# Patient Record
Sex: FEMALE | Race: WHITE | Employment: OTHER
[De-identification: names, ages, dates, MRNs, and addresses within clinical notes are randomized per-mention and may not be internally consistent; named-entity substitution may affect disease eponyms.]

---

## 2017-02-15 ENCOUNTER — OFFICE VISIT (OUTPATIENT)
Facility: CLINIC | Age: 72
End: 2017-02-15

## 2017-02-15 VITALS
OXYGEN SATURATION: 93 % | SYSTOLIC BLOOD PRESSURE: 138 MMHG | TEMPERATURE: 98.5 F | HEIGHT: 67 IN | HEART RATE: 71 BPM | DIASTOLIC BLOOD PRESSURE: 82 MMHG | BODY MASS INDEX: 41.65 KG/M2 | WEIGHT: 265.4 LBS

## 2017-02-15 DIAGNOSIS — J06.9 UPPER RESPIRATORY TRACT INFECTION, UNSPECIFIED TYPE: Primary | ICD-10-CM

## 2017-02-15 PROCEDURE — 99213 OFFICE O/P EST LOW 20 MIN: CPT | Performed by: PHYSICIAN ASSISTANT

## 2017-02-15 RX ORDER — BENZONATATE 100 MG/1
100 CAPSULE ORAL 3 TIMES DAILY PRN
Qty: 30 CAPSULE | Refills: 0 | Status: SHIPPED | OUTPATIENT
Start: 2017-02-15 | End: 2017-02-22

## 2017-02-15 RX ORDER — AZITHROMYCIN 250 MG/1
TABLET, FILM COATED ORAL
Qty: 1 PACKET | Refills: 0 | Status: SHIPPED | OUTPATIENT
Start: 2017-02-15 | End: 2017-02-25

## 2017-02-15 RX ORDER — FLUTICASONE PROPIONATE 50 MCG
1 SPRAY, SUSPENSION (ML) NASAL DAILY
Qty: 1 BOTTLE | Refills: 0 | Status: SHIPPED | OUTPATIENT
Start: 2017-02-15 | End: 2017-10-31 | Stop reason: ALTCHOICE

## 2017-02-15 ASSESSMENT — ENCOUNTER SYMPTOMS
VOMITING: 0
COUGH: 1
SORE THROAT: 0
RHINORRHEA: 1
NAUSEA: 0
CHEST TIGHTNESS: 0
DIARRHEA: 0

## 2017-06-27 PROBLEM — E78.5 DYSLIPIDEMIA: Status: ACTIVE | Noted: 2017-06-27

## 2017-06-27 PROBLEM — G89.29 CHRONIC BILATERAL LOW BACK PAIN WITH LEFT-SIDED SCIATICA: Status: ACTIVE | Noted: 2017-06-27

## 2017-06-27 PROBLEM — M54.42 CHRONIC BILATERAL LOW BACK PAIN WITH LEFT-SIDED SCIATICA: Status: ACTIVE | Noted: 2017-06-27

## 2017-09-07 ENCOUNTER — HOSPITAL ENCOUNTER (OUTPATIENT)
Dept: VASCULAR LAB | Age: 72
Discharge: HOME | End: 2017-09-07
Payer: OTHER MISCELLANEOUS

## 2017-09-07 ENCOUNTER — HOSPITAL ENCOUNTER (OUTPATIENT)
Facility: CLINIC | Age: 72
Discharge: HOME | End: 2017-09-07
Payer: OTHER MISCELLANEOUS

## 2017-09-07 ENCOUNTER — HOSPITAL ENCOUNTER (OUTPATIENT)
Dept: GENERAL RADIOLOGY | Facility: CLINIC | Age: 72
Discharge: HOME | End: 2017-09-07
Payer: OTHER MISCELLANEOUS

## 2017-09-07 DIAGNOSIS — L03.116 CELLULITIS OF LEFT LOWER EXTREMITY: ICD-10-CM

## 2017-09-07 DIAGNOSIS — M79.605 LEFT LEG PAIN: ICD-10-CM

## 2017-09-07 DIAGNOSIS — M25.572 LEFT ANKLE PAIN, UNSPECIFIED CHRONICITY: ICD-10-CM

## 2017-09-07 DIAGNOSIS — M79.89 LEFT LEG SWELLING: ICD-10-CM

## 2017-09-07 DIAGNOSIS — R60.0 LEG EDEMA, LEFT: ICD-10-CM

## 2017-09-07 PROCEDURE — 93971 EXTREMITY STUDY: CPT

## 2017-09-07 PROCEDURE — 73610 X-RAY EXAM OF ANKLE: CPT

## 2017-10-31 ENCOUNTER — HOSPITAL ENCOUNTER (OUTPATIENT)
Dept: PREADMISSION TESTING | Age: 72
Discharge: HOME | End: 2017-10-31
Payer: COMMERCIAL

## 2017-10-31 ENCOUNTER — ANESTHESIA EVENT (OUTPATIENT)
Dept: OPERATING ROOM | Age: 72
End: 2017-10-31
Payer: COMMERCIAL

## 2017-10-31 VITALS
SYSTOLIC BLOOD PRESSURE: 151 MMHG | WEIGHT: 266.98 LBS | HEART RATE: 66 BPM | HEIGHT: 67 IN | RESPIRATION RATE: 16 BRPM | DIASTOLIC BLOOD PRESSURE: 63 MMHG | BODY MASS INDEX: 41.9 KG/M2 | OXYGEN SATURATION: 94 %

## 2017-10-31 LAB
ABSOLUTE EOS #: 0.5 K/UL (ref 0–0.4)
ABSOLUTE IMMATURE GRANULOCYTE: ABNORMAL K/UL (ref 0–0.3)
ABSOLUTE LYMPH #: 1.7 K/UL (ref 1–4.8)
ABSOLUTE MONO #: 0.7 K/UL (ref 0.2–0.8)
ANION GAP SERPL CALCULATED.3IONS-SCNC: 13 MMOL/L (ref 9–17)
BASOPHILS # BLD: 1 %
BASOPHILS ABSOLUTE: 0.1 K/UL (ref 0–0.2)
BUN BLDV-MCNC: 35 MG/DL (ref 8–23)
CHLORIDE BLD-SCNC: 102 MMOL/L (ref 98–107)
CO2: 26 MMOL/L (ref 20–31)
CREAT SERPL-MCNC: 1.49 MG/DL (ref 0.5–0.9)
DIFFERENTIAL TYPE: ABNORMAL
EKG ATRIAL RATE: 70 BPM
EKG Q-T INTERVAL: 424 MS
EKG QRS DURATION: 132 MS
EKG QTC CALCULATION (BAZETT): 440 MS
EKG R AXIS: 59 DEGREES
EKG T AXIS: 29 DEGREES
EKG VENTRICULAR RATE: 65 BPM
EOSINOPHILS RELATIVE PERCENT: 6 %
GFR AFRICAN AMERICAN: 42 ML/MIN
GFR NON-AFRICAN AMERICAN: 35 ML/MIN
GFR SERPL CREATININE-BSD FRML MDRD: ABNORMAL ML/MIN/{1.73_M2}
GFR SERPL CREATININE-BSD FRML MDRD: ABNORMAL ML/MIN/{1.73_M2}
HCT VFR BLD CALC: 38.4 % (ref 36–46)
HEMOGLOBIN: 12.9 G/DL (ref 12–16)
IMMATURE GRANULOCYTES: ABNORMAL %
LYMPHOCYTES # BLD: 22 %
MCH RBC QN AUTO: 31.4 PG (ref 26–34)
MCHC RBC AUTO-ENTMCNC: 33.6 G/DL (ref 31–37)
MCV RBC AUTO: 93.5 FL (ref 80–100)
MONOCYTES # BLD: 9 %
PDW BLD-RTO: 13.9 % (ref 11.5–14.5)
PLATELET # BLD: 236 K/UL (ref 130–400)
PLATELET ESTIMATE: ABNORMAL
PMV BLD AUTO: ABNORMAL FL (ref 6–12)
POTASSIUM SERPL-SCNC: 4.3 MMOL/L (ref 3.7–5.3)
RBC # BLD: 4.11 M/UL (ref 4–5.2)
RBC # BLD: ABNORMAL 10*6/UL
SEG NEUTROPHILS: 62 %
SEGMENTED NEUTROPHILS ABSOLUTE COUNT: 4.6 K/UL (ref 1.8–7.7)
SODIUM BLD-SCNC: 141 MMOL/L (ref 135–144)
WBC # BLD: 7.6 K/UL (ref 3.5–11)
WBC # BLD: ABNORMAL 10*3/UL

## 2017-10-31 PROCEDURE — 82565 ASSAY OF CREATININE: CPT

## 2017-10-31 PROCEDURE — 85025 COMPLETE CBC W/AUTO DIFF WBC: CPT

## 2017-10-31 PROCEDURE — 84520 ASSAY OF UREA NITROGEN: CPT

## 2017-10-31 PROCEDURE — 80051 ELECTROLYTE PANEL: CPT

## 2017-10-31 PROCEDURE — 93005 ELECTROCARDIOGRAM TRACING: CPT

## 2017-10-31 PROCEDURE — 36415 COLL VENOUS BLD VENIPUNCTURE: CPT

## 2017-10-31 RX ORDER — ACETAMINOPHEN 500 MG
1000 TABLET ORAL EVERY 6 HOURS PRN
Status: ON HOLD | COMMUNITY
End: 2017-11-01 | Stop reason: HOSPADM

## 2017-10-31 RX ORDER — SODIUM CHLORIDE 9 MG/ML
INJECTION, SOLUTION INTRAVENOUS CONTINUOUS
Status: CANCELLED | OUTPATIENT
Start: 2017-11-01

## 2017-10-31 RX ORDER — DOXYCYCLINE HYCLATE 100 MG/1
100 CAPSULE ORAL 2 TIMES DAILY
COMMUNITY
End: 2018-10-23 | Stop reason: ALTCHOICE

## 2017-10-31 RX ORDER — PRAVASTATIN SODIUM 40 MG
40 TABLET ORAL DAILY
COMMUNITY
End: 2020-02-11 | Stop reason: SDUPTHER

## 2017-10-31 NOTE — PRE-PROCEDURE INSTRUCTIONS
Pt states she did not stop her aspirin for her surgery tomorrow and states Dr. Vona Babinski is aware.

## 2017-10-31 NOTE — PRE-PROCEDURE INSTRUCTIONS
ARRIVE  [facility] Wednesday, November 1 at 9:15 AM    Once you enter the hospital lobby, take the elevators to the second floor. Check-In is at the surgery registration desk. If you have been given a blood band, you must bring it with you the day of surgery. Continue to take your home medications as you normally do up to and including the night before surgery with the exception of any blood thinning medications. Please stop any blood thinning medications as directed by your surgeon or prescribing physician. Failure to stop certain medications may interfere with your scheduled surgery. These may include:  Aspirin, Warfarin (Coumadin), Clopidogrel (Plavix), Ibuprofen (Motrin, Advil), Naproxen (Aleve), Meloxicam (Mobic), Celecoxib (Celebrex), Eliquis, Pradaxa, Xarelto, Effient, Fish Oil, Herbal supplements. Stop ibuprofen now    If you are diabetic, do not take any of your diabetic medications by mouth the morning of surgery. If you are taking insulin contact the doctor that manages your diabetes for instructions about any changes to your insulin dosages the day before surgery. Do not inject insulin or other injectable diabetic medications the morning of surgery unless otherwise instructed by the doctor who manages your diabetes. Please take the following medication(s) the day of surgery with a small sip of water:  carvedilol    Please use your inhalers at home the day of surgery      PREPARING FOR YOUR SURGERY:     Before surgery, you can play an important role in your own health. Because skin is not sterile, we need to be sure that your skin is as free of germs as possible before surgery by carefully washing before surgery. Preparing or prepping skin before surgery can reduce the risk of a surgical site infection.   Do not shave the area of your body where your surgery will be performed unless you received specific permission from your physician.     Preoperative Bathing Instructions   Bathe or shower the day of surgery.  Wear clean clothing after bathing.  Shampoo hair before surgery   Keep nails clean    Do not apply alcohol-based hair or skin products,    Do not apply lotion, emollients, cosmetics, perfumes or deodorant the day of surgery.  Do not shave the area of your body where your surgery will be performed unless you receive specific permission from your surgeon. Patient Instructions:    [facility] If you are having any type of anesthesia you are to have nothing to eat or drink after midnight the night before your surgery. This includes gum, mints, water or smoking or chewing tobacco.  The only exception to this is a small sip of water to take with any morning dose of heart, blood pressure, or seizure medications. No alcoholic beverages for 24 hours prior to surgery.  Brush your teeth but do not swallow water.  Bring your eyeglasses and case with you. No contacts are to be worn the day of surgery. You also may bring your hearing aids.  If you are on C-PAP or Bi-PAP at home and plan on staying in the hospital overnight for your surgery please bring the machine with you.  Do not wear any jewelry or body piercings day of surgery. Also, NO lotion, perfume or deodorant to be used the day of surgery. No nail polish on the operative extremity (arm/leg surgeries)     Do not bring any valuables, such as jewelry, cash or credit cards. If you are staying overnight with us, please bring a SMALL bag of personal items.  Please wear loose, comfortable clothing. If you are potentially going to have a cast or brace bring clothing that will fit over them.                                                                                                                            In case of illness - If you have cold or flu like symptoms (high fever, runny nose, sore throat, cough, etc.) rash, nausea, vomiting, loose stools, and/or recent contact with someone
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
## 2017-11-01 ENCOUNTER — HOSPITAL ENCOUNTER (OUTPATIENT)
Age: 72
Setting detail: OUTPATIENT SURGERY
Discharge: HOME | End: 2017-11-01
Attending: PODIATRIST | Admitting: PODIATRIST
Payer: COMMERCIAL

## 2017-11-01 ENCOUNTER — ANESTHESIA (OUTPATIENT)
Dept: OPERATING ROOM | Age: 72
End: 2017-11-01
Payer: COMMERCIAL

## 2017-11-01 VITALS
DIASTOLIC BLOOD PRESSURE: 63 MMHG | HEART RATE: 65 BPM | TEMPERATURE: 98.2 F | SYSTOLIC BLOOD PRESSURE: 124 MMHG | OXYGEN SATURATION: 94 % | RESPIRATION RATE: 18 BRPM

## 2017-11-01 VITALS — SYSTOLIC BLOOD PRESSURE: 92 MMHG | OXYGEN SATURATION: 96 % | TEMPERATURE: 96.8 F | DIASTOLIC BLOOD PRESSURE: 51 MMHG

## 2017-11-01 PROCEDURE — 2500000003 HC RX 250 WO HCPCS: Performed by: PODIATRIST

## 2017-11-01 PROCEDURE — 3700000000 HC ANESTHESIA ATTENDED CARE: Performed by: PODIATRIST

## 2017-11-01 PROCEDURE — 2580000003 HC RX 258: Performed by: ANESTHESIOLOGY

## 2017-11-01 PROCEDURE — 2500000003 HC RX 250 WO HCPCS: Performed by: NURSE ANESTHETIST, CERTIFIED REGISTERED

## 2017-11-01 PROCEDURE — 2580000003 HC RX 258: Performed by: NURSE ANESTHETIST, CERTIFIED REGISTERED

## 2017-11-01 PROCEDURE — 3600000012 HC SURGERY LEVEL 2 ADDTL 15MIN: Performed by: PODIATRIST

## 2017-11-01 PROCEDURE — 6360000002 HC RX W HCPCS: Performed by: NURSE ANESTHETIST, CERTIFIED REGISTERED

## 2017-11-01 PROCEDURE — 3700000001 HC ADD 15 MINUTES (ANESTHESIA): Performed by: PODIATRIST

## 2017-11-01 PROCEDURE — 3600000002 HC SURGERY LEVEL 2 BASE: Performed by: PODIATRIST

## 2017-11-01 PROCEDURE — 7100000001 HC PACU RECOVERY - ADDTL 15 MIN: Performed by: PODIATRIST

## 2017-11-01 PROCEDURE — 7100000000 HC PACU RECOVERY - FIRST 15 MIN: Performed by: PODIATRIST

## 2017-11-01 PROCEDURE — 7100000010 HC PHASE II RECOVERY - FIRST 15 MIN: Performed by: PODIATRIST

## 2017-11-01 DEVICE — GRAFT HUM TISS NEO L7.5IN FORESKIN PROC APLIGRAF: Type: IMPLANTABLE DEVICE | Site: LEG | Status: FUNCTIONAL

## 2017-11-01 RX ORDER — FENTANYL CITRATE 50 UG/ML
INJECTION, SOLUTION INTRAMUSCULAR; INTRAVENOUS PRN
Status: DISCONTINUED | OUTPATIENT
Start: 2017-11-01 | End: 2017-11-01 | Stop reason: SDUPTHER

## 2017-11-01 RX ORDER — ONDANSETRON 2 MG/ML
4 INJECTION INTRAMUSCULAR; INTRAVENOUS
Status: CANCELLED | OUTPATIENT
Start: 2017-11-01 | End: 2017-11-01

## 2017-11-01 RX ORDER — FENTANYL CITRATE 50 UG/ML
25 INJECTION, SOLUTION INTRAMUSCULAR; INTRAVENOUS EVERY 5 MIN PRN
Status: DISCONTINUED | OUTPATIENT
Start: 2017-11-01 | End: 2017-11-01 | Stop reason: HOSPADM

## 2017-11-01 RX ORDER — MEPERIDINE HYDROCHLORIDE 25 MG/ML
12.5 INJECTION INTRAMUSCULAR; INTRAVENOUS; SUBCUTANEOUS EVERY 5 MIN PRN
Status: CANCELLED | OUTPATIENT
Start: 2017-11-01

## 2017-11-01 RX ORDER — LABETALOL HYDROCHLORIDE 5 MG/ML
5 INJECTION, SOLUTION INTRAVENOUS EVERY 10 MIN PRN
Status: DISCONTINUED | OUTPATIENT
Start: 2017-11-01 | End: 2017-11-01 | Stop reason: HOSPADM

## 2017-11-01 RX ORDER — FENTANYL CITRATE 50 UG/ML
25 INJECTION, SOLUTION INTRAMUSCULAR; INTRAVENOUS EVERY 5 MIN PRN
Status: CANCELLED | OUTPATIENT
Start: 2017-11-01

## 2017-11-01 RX ORDER — KETOROLAC TROMETHAMINE 30 MG/ML
INJECTION, SOLUTION INTRAMUSCULAR; INTRAVENOUS PRN
Status: DISCONTINUED | OUTPATIENT
Start: 2017-11-01 | End: 2017-11-01

## 2017-11-01 RX ORDER — MIDAZOLAM HYDROCHLORIDE 1 MG/ML
INJECTION INTRAMUSCULAR; INTRAVENOUS PRN
Status: DISCONTINUED | OUTPATIENT
Start: 2017-11-01 | End: 2017-11-01 | Stop reason: SDUPTHER

## 2017-11-01 RX ORDER — SODIUM CHLORIDE, SODIUM LACTATE, POTASSIUM CHLORIDE, CALCIUM CHLORIDE 600; 310; 30; 20 MG/100ML; MG/100ML; MG/100ML; MG/100ML
INJECTION, SOLUTION INTRAVENOUS CONTINUOUS PRN
Status: DISCONTINUED | OUTPATIENT
Start: 2017-11-01 | End: 2017-11-01 | Stop reason: SDUPTHER

## 2017-11-01 RX ORDER — SODIUM CHLORIDE 0.9 % (FLUSH) 0.9 %
10 SYRINGE (ML) INJECTION PRN
Status: DISCONTINUED | OUTPATIENT
Start: 2017-11-01 | End: 2017-11-01 | Stop reason: HOSPADM

## 2017-11-01 RX ORDER — HYDROMORPHONE HCL 110MG/55ML
0.5 PATIENT CONTROLLED ANALGESIA SYRINGE INTRAVENOUS EVERY 5 MIN PRN
Status: CANCELLED | OUTPATIENT
Start: 2017-11-01

## 2017-11-01 RX ORDER — DEXAMETHASONE SODIUM PHOSPHATE 10 MG/ML
4 INJECTION INTRAMUSCULAR; INTRAVENOUS
Status: DISCONTINUED | OUTPATIENT
Start: 2017-11-01 | End: 2017-11-01 | Stop reason: HOSPADM

## 2017-11-01 RX ORDER — LIDOCAINE HYDROCHLORIDE 20 MG/ML
INJECTION, SOLUTION INFILTRATION; PERINEURAL PRN
Status: DISCONTINUED | OUTPATIENT
Start: 2017-11-01 | End: 2017-11-01 | Stop reason: SDUPTHER

## 2017-11-01 RX ORDER — HYDROMORPHONE HCL 110MG/55ML
0.5 PATIENT CONTROLLED ANALGESIA SYRINGE INTRAVENOUS EVERY 5 MIN PRN
Status: DISCONTINUED | OUTPATIENT
Start: 2017-11-01 | End: 2017-11-01 | Stop reason: HOSPADM

## 2017-11-01 RX ORDER — DEXAMETHASONE SODIUM PHOSPHATE 10 MG/ML
4 INJECTION INTRAMUSCULAR; INTRAVENOUS
Status: CANCELLED | OUTPATIENT
Start: 2017-11-01 | End: 2017-11-01

## 2017-11-01 RX ORDER — DIPHENHYDRAMINE HYDROCHLORIDE 50 MG/ML
12.5 INJECTION INTRAMUSCULAR; INTRAVENOUS
Status: DISCONTINUED | OUTPATIENT
Start: 2017-11-01 | End: 2017-11-01 | Stop reason: HOSPADM

## 2017-11-01 RX ORDER — SODIUM CHLORIDE, SODIUM LACTATE, POTASSIUM CHLORIDE, CALCIUM CHLORIDE 600; 310; 30; 20 MG/100ML; MG/100ML; MG/100ML; MG/100ML
INJECTION, SOLUTION INTRAVENOUS CONTINUOUS
Status: DISCONTINUED | OUTPATIENT
Start: 2017-11-02 | End: 2017-11-01 | Stop reason: HOSPADM

## 2017-11-01 RX ORDER — LIDOCAINE HYDROCHLORIDE 10 MG/ML
INJECTION, SOLUTION EPIDURAL; INFILTRATION; INTRACAUDAL; PERINEURAL PRN
Status: DISCONTINUED | OUTPATIENT
Start: 2017-11-01 | End: 2017-11-01 | Stop reason: HOSPADM

## 2017-11-01 RX ORDER — HYDRALAZINE HYDROCHLORIDE 20 MG/ML
5 INJECTION INTRAMUSCULAR; INTRAVENOUS EVERY 10 MIN PRN
Status: CANCELLED | OUTPATIENT
Start: 2017-11-01

## 2017-11-01 RX ORDER — DIPHENHYDRAMINE HYDROCHLORIDE 50 MG/ML
12.5 INJECTION INTRAMUSCULAR; INTRAVENOUS
Status: CANCELLED | OUTPATIENT
Start: 2017-11-01 | End: 2017-11-01

## 2017-11-01 RX ORDER — HYDRALAZINE HYDROCHLORIDE 20 MG/ML
5 INJECTION INTRAMUSCULAR; INTRAVENOUS EVERY 10 MIN PRN
Status: DISCONTINUED | OUTPATIENT
Start: 2017-11-01 | End: 2017-11-01 | Stop reason: HOSPADM

## 2017-11-01 RX ORDER — ONDANSETRON 2 MG/ML
4 INJECTION INTRAMUSCULAR; INTRAVENOUS
Status: DISCONTINUED | OUTPATIENT
Start: 2017-11-01 | End: 2017-11-01 | Stop reason: HOSPADM

## 2017-11-01 RX ORDER — OXYCODONE HYDROCHLORIDE AND ACETAMINOPHEN 5; 325 MG/1; MG/1
1 TABLET ORAL EVERY 6 HOURS PRN
Qty: 28 TABLET | Refills: 0 | Status: SHIPPED | OUTPATIENT
Start: 2017-11-01 | End: 2017-11-08

## 2017-11-01 RX ORDER — PROPOFOL 10 MG/ML
INJECTION, EMULSION INTRAVENOUS PRN
Status: DISCONTINUED | OUTPATIENT
Start: 2017-11-01 | End: 2017-11-01 | Stop reason: SDUPTHER

## 2017-11-01 RX ORDER — ONDANSETRON 2 MG/ML
INJECTION INTRAMUSCULAR; INTRAVENOUS PRN
Status: DISCONTINUED | OUTPATIENT
Start: 2017-11-01 | End: 2017-11-01 | Stop reason: SDUPTHER

## 2017-11-01 RX ORDER — LIDOCAINE HYDROCHLORIDE 10 MG/ML
1 INJECTION, SOLUTION EPIDURAL; INFILTRATION; INTRACAUDAL; PERINEURAL
Status: DISCONTINUED | OUTPATIENT
Start: 2017-11-01 | End: 2017-11-01 | Stop reason: HOSPADM

## 2017-11-01 RX ORDER — MEPERIDINE HYDROCHLORIDE 25 MG/ML
12.5 INJECTION INTRAMUSCULAR; INTRAVENOUS; SUBCUTANEOUS EVERY 5 MIN PRN
Status: DISCONTINUED | OUTPATIENT
Start: 2017-11-01 | End: 2017-11-01 | Stop reason: HOSPADM

## 2017-11-01 RX ORDER — LABETALOL HYDROCHLORIDE 5 MG/ML
5 INJECTION, SOLUTION INTRAVENOUS EVERY 10 MIN PRN
Status: CANCELLED | OUTPATIENT
Start: 2017-11-01

## 2017-11-01 RX ORDER — BUPIVACAINE HYDROCHLORIDE 2.5 MG/ML
INJECTION, SOLUTION EPIDURAL; INFILTRATION; INTRACAUDAL PRN
Status: DISCONTINUED | OUTPATIENT
Start: 2017-11-01 | End: 2017-11-01 | Stop reason: HOSPADM

## 2017-11-01 RX ORDER — DEXAMETHASONE SODIUM PHOSPHATE 10 MG/ML
INJECTION INTRAMUSCULAR; INTRAVENOUS PRN
Status: DISCONTINUED | OUTPATIENT
Start: 2017-11-01 | End: 2017-11-01 | Stop reason: SDUPTHER

## 2017-11-01 RX ORDER — SODIUM CHLORIDE 0.9 % (FLUSH) 0.9 %
10 SYRINGE (ML) INJECTION EVERY 12 HOURS SCHEDULED
Status: DISCONTINUED | OUTPATIENT
Start: 2017-11-01 | End: 2017-11-01 | Stop reason: HOSPADM

## 2017-11-01 RX ADMIN — PROPOFOL 200 MG: 10 INJECTION, EMULSION INTRAVENOUS at 11:36

## 2017-11-01 RX ADMIN — PHENYLEPHRINE HYDROCHLORIDE 200 MCG: 10 INJECTION INTRAVENOUS at 11:48

## 2017-11-01 RX ADMIN — PHENYLEPHRINE HYDROCHLORIDE 200 MCG: 10 INJECTION INTRAVENOUS at 11:45

## 2017-11-01 RX ADMIN — PHENYLEPHRINE HYDROCHLORIDE 100 MCG: 10 INJECTION INTRAVENOUS at 11:40

## 2017-11-01 RX ADMIN — ONDANSETRON 4 MG: 2 INJECTION, SOLUTION INTRAMUSCULAR; INTRAVENOUS at 11:36

## 2017-11-01 RX ADMIN — FENTANYL CITRATE 50 MCG: 50 INJECTION, SOLUTION INTRAMUSCULAR; INTRAVENOUS at 11:40

## 2017-11-01 RX ADMIN — MIDAZOLAM HYDROCHLORIDE 2 MG: 1 INJECTION, SOLUTION INTRAMUSCULAR; INTRAVENOUS at 11:27

## 2017-11-01 RX ADMIN — SODIUM CHLORIDE, POTASSIUM CHLORIDE, SODIUM LACTATE AND CALCIUM CHLORIDE: 600; 310; 30; 20 INJECTION, SOLUTION INTRAVENOUS at 10:16

## 2017-11-01 RX ADMIN — CEFAZOLIN SODIUM 3 G: 2 SOLUTION INTRAVENOUS at 11:39

## 2017-11-01 RX ADMIN — DEXAMETHASONE SODIUM PHOSPHATE 6 MG: 10 INJECTION INTRAMUSCULAR; INTRAVENOUS at 11:36

## 2017-11-01 RX ADMIN — SODIUM CHLORIDE, POTASSIUM CHLORIDE, SODIUM LACTATE AND CALCIUM CHLORIDE: 600; 310; 30; 20 INJECTION, SOLUTION INTRAVENOUS at 11:30

## 2017-11-01 RX ADMIN — FENTANYL CITRATE 50 MCG: 50 INJECTION, SOLUTION INTRAMUSCULAR; INTRAVENOUS at 11:55

## 2017-11-01 RX ADMIN — LIDOCAINE HYDROCHLORIDE 100 MG: 20 INJECTION, SOLUTION INFILTRATION; PERINEURAL at 11:36

## 2017-11-01 ASSESSMENT — PAIN - FUNCTIONAL ASSESSMENT: PAIN_FUNCTIONAL_ASSESSMENT: 0-10

## 2017-11-01 ASSESSMENT — PAIN SCALES - GENERAL: PAINLEVEL_OUTOF10: 0

## 2017-11-01 ASSESSMENT — PAIN DESCRIPTION - PAIN TYPE: TYPE: ACUTE PAIN
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
## 2018-04-04 ENCOUNTER — HOSPITAL ENCOUNTER (OUTPATIENT)
Dept: GENERAL RADIOLOGY | Facility: CLINIC | Age: 73
Discharge: HOME | End: 2018-04-06
Payer: MEDICARE

## 2018-04-04 ENCOUNTER — HOSPITAL ENCOUNTER (OUTPATIENT)
Facility: CLINIC | Age: 73
Discharge: HOME | End: 2018-04-06
Payer: MEDICARE

## 2018-04-04 DIAGNOSIS — G89.29 CHRONIC BILATERAL LOW BACK PAIN WITHOUT SCIATICA: ICD-10-CM

## 2018-04-04 DIAGNOSIS — M54.50 CHRONIC BILATERAL LOW BACK PAIN WITHOUT SCIATICA: ICD-10-CM

## 2018-04-04 DIAGNOSIS — M25.551 ACUTE RIGHT HIP PAIN: ICD-10-CM

## 2018-04-04 PROCEDURE — 73501 X-RAY EXAM HIP UNI 1 VIEW: CPT

## 2018-04-04 PROCEDURE — 72100 X-RAY EXAM L-S SPINE 2/3 VWS: CPT

## 2018-05-23 ENCOUNTER — HOSPITAL ENCOUNTER (OUTPATIENT)
Dept: PHYSICAL THERAPY | Age: 73
Setting detail: THERAPIES SERIES
Discharge: HOME | End: 2018-05-23
Payer: MEDICARE

## 2018-05-23 PROCEDURE — G8979 MOBILITY GOAL STATUS: HCPCS

## 2018-05-23 PROCEDURE — G8978 MOBILITY CURRENT STATUS: HCPCS

## 2018-05-23 PROCEDURE — 97162 PT EVAL MOD COMPLEX 30 MIN: CPT

## 2018-05-23 PROCEDURE — 97110 THERAPEUTIC EXERCISES: CPT

## 2018-05-23 ASSESSMENT — PAIN DESCRIPTION - DESCRIPTORS: DESCRIPTORS: ACHING

## 2018-05-23 ASSESSMENT — ACTIVITIES OF DAILY LIVING (ADL): EFFECT OF PAIN ON DAILY ACTIVITIES: 100% OF HER ADL'S

## 2018-05-23 ASSESSMENT — PAIN DESCRIPTION - PAIN TYPE: TYPE: CHRONIC PAIN

## 2018-05-23 ASSESSMENT — PAIN DESCRIPTION - ORIENTATION: ORIENTATION: RIGHT

## 2018-05-23 ASSESSMENT — PAIN DESCRIPTION - LOCATION: LOCATION: HIP

## 2018-05-23 ASSESSMENT — PAIN DESCRIPTION - PROGRESSION: CLINICAL_PROGRESSION: GRADUALLY WORSENING

## 2018-05-23 ASSESSMENT — PAIN SCALES - GENERAL: PAINLEVEL_OUTOF10: 8

## 2018-05-23 ASSESSMENT — PAIN DESCRIPTION - FREQUENCY: FREQUENCY: INTERMITTENT

## 2018-05-23 ASSESSMENT — PAIN DESCRIPTION - ONSET: ONSET: PROGRESSIVE

## 2018-05-29 ENCOUNTER — HOSPITAL ENCOUNTER (OUTPATIENT)
Dept: PHYSICAL THERAPY | Age: 73
Setting detail: THERAPIES SERIES
Discharge: HOME | End: 2018-05-29
Payer: MEDICARE

## 2018-05-29 PROCEDURE — G0283 ELEC STIM OTHER THAN WOUND: HCPCS

## 2018-05-29 PROCEDURE — 97110 THERAPEUTIC EXERCISES: CPT

## 2018-05-29 ASSESSMENT — PAIN DESCRIPTION - LOCATION: LOCATION: HIP

## 2018-05-29 ASSESSMENT — PAIN DESCRIPTION - PROGRESSION: CLINICAL_PROGRESSION: GRADUALLY WORSENING

## 2018-05-29 ASSESSMENT — PAIN SCALES - GENERAL: PAINLEVEL_OUTOF10: 10

## 2018-05-29 ASSESSMENT — PAIN DESCRIPTION - PAIN TYPE: TYPE: CHRONIC PAIN

## 2018-05-29 ASSESSMENT — PAIN DESCRIPTION - ORIENTATION: ORIENTATION: RIGHT

## 2018-05-31 ENCOUNTER — HOSPITAL ENCOUNTER (OUTPATIENT)
Dept: PHYSICAL THERAPY | Age: 73
Setting detail: THERAPIES SERIES
Discharge: HOME | End: 2018-05-31
Payer: MEDICARE

## 2018-05-31 PROCEDURE — G0283 ELEC STIM OTHER THAN WOUND: HCPCS

## 2018-05-31 PROCEDURE — 97110 THERAPEUTIC EXERCISES: CPT

## 2018-05-31 ASSESSMENT — PAIN DESCRIPTION - PAIN TYPE: TYPE: CHRONIC PAIN

## 2018-05-31 ASSESSMENT — PAIN SCALES - GENERAL: PAINLEVEL_OUTOF10: 8

## 2018-05-31 ASSESSMENT — PAIN DESCRIPTION - PROGRESSION: CLINICAL_PROGRESSION: GRADUALLY WORSENING

## 2018-05-31 ASSESSMENT — PAIN DESCRIPTION - LOCATION: LOCATION: HIP

## 2018-05-31 ASSESSMENT — PAIN DESCRIPTION - ORIENTATION: ORIENTATION: RIGHT

## 2018-06-05 ENCOUNTER — HOSPITAL ENCOUNTER (OUTPATIENT)
Dept: PHYSICAL THERAPY | Age: 73
Setting detail: THERAPIES SERIES
Discharge: HOME | End: 2018-06-05
Payer: MEDICARE

## 2018-06-05 PROCEDURE — G0283 ELEC STIM OTHER THAN WOUND: HCPCS

## 2018-06-05 PROCEDURE — 97110 THERAPEUTIC EXERCISES: CPT

## 2018-06-05 PROCEDURE — G8980 MOBILITY D/C STATUS: HCPCS

## 2018-06-05 PROCEDURE — G8979 MOBILITY GOAL STATUS: HCPCS

## 2018-06-05 ASSESSMENT — PAIN DESCRIPTION - PAIN TYPE: TYPE: CHRONIC PAIN

## 2018-06-05 ASSESSMENT — PAIN SCALES - GENERAL: PAINLEVEL_OUTOF10: 5

## 2018-06-05 ASSESSMENT — PAIN DESCRIPTION - LOCATION: LOCATION: BACK

## 2018-06-05 ASSESSMENT — PAIN DESCRIPTION - ORIENTATION: ORIENTATION: LOWER

## 2018-06-07 ENCOUNTER — APPOINTMENT (OUTPATIENT)
Dept: PHYSICAL THERAPY | Age: 73
End: 2018-06-07
Payer: MEDICARE

## 2018-06-12 ENCOUNTER — APPOINTMENT (OUTPATIENT)
Dept: PHYSICAL THERAPY | Age: 73
End: 2018-06-12
Payer: MEDICARE

## 2018-06-15 ENCOUNTER — APPOINTMENT (OUTPATIENT)
Dept: PHYSICAL THERAPY | Age: 73
End: 2018-06-15
Payer: MEDICARE

## 2018-06-19 ENCOUNTER — APPOINTMENT (OUTPATIENT)
Dept: PHYSICAL THERAPY | Age: 73
End: 2018-06-19
Payer: MEDICARE

## 2018-06-21 ENCOUNTER — APPOINTMENT (OUTPATIENT)
Dept: PHYSICAL THERAPY | Age: 73
End: 2018-06-21
Payer: MEDICARE

## 2018-06-26 ENCOUNTER — APPOINTMENT (OUTPATIENT)
Dept: PHYSICAL THERAPY | Age: 73
End: 2018-06-26
Payer: MEDICARE

## 2018-06-28 ENCOUNTER — APPOINTMENT (OUTPATIENT)
Dept: PHYSICAL THERAPY | Age: 73
End: 2018-06-28
Payer: MEDICARE

## 2018-11-06 ENCOUNTER — OFFICE VISIT (OUTPATIENT)
Dept: NEUROSURGERY | Age: 73
End: 2018-11-06
Payer: MEDICARE

## 2018-11-06 VITALS
HEART RATE: 61 BPM | BODY MASS INDEX: 42.45 KG/M2 | DIASTOLIC BLOOD PRESSURE: 70 MMHG | HEIGHT: 66 IN | SYSTOLIC BLOOD PRESSURE: 101 MMHG

## 2018-11-06 DIAGNOSIS — G89.29 CHRONIC BILATERAL LOW BACK PAIN WITH LEFT-SIDED SCIATICA: Primary | ICD-10-CM

## 2018-11-06 DIAGNOSIS — M54.42 CHRONIC BILATERAL LOW BACK PAIN WITH LEFT-SIDED SCIATICA: Primary | ICD-10-CM

## 2018-11-06 DIAGNOSIS — B02.9 HERPES ZOSTER WITHOUT COMPLICATION: ICD-10-CM

## 2018-11-06 PROCEDURE — 99202 OFFICE O/P NEW SF 15 MIN: CPT | Performed by: PHYSICIAN ASSISTANT

## 2018-11-06 RX ORDER — VALACYCLOVIR HYDROCHLORIDE 1 G/1
1000 TABLET, FILM COATED ORAL 3 TIMES DAILY
Qty: 21 TABLET | Refills: 0 | Status: SHIPPED | OUTPATIENT
Start: 2018-11-06 | End: 2018-11-12 | Stop reason: SDUPTHER
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
## 2018-11-16 PROBLEM — B02.23 SHINGLES (HERPES ZOSTER) POLYNEUROPATHY: Status: ACTIVE | Noted: 2018-11-16

## 2018-11-16 PROBLEM — K57.32 DIVERTICULITIS OF LARGE INTESTINE WITHOUT BLEEDING: Status: ACTIVE | Noted: 2018-11-16

## 2018-12-11 ENCOUNTER — OFFICE VISIT (OUTPATIENT)
Dept: PRIMARY CARE CLINIC | Age: 73
End: 2018-12-11
Payer: MEDICARE

## 2018-12-11 VITALS
OXYGEN SATURATION: 97 % | SYSTOLIC BLOOD PRESSURE: 128 MMHG | HEART RATE: 60 BPM | WEIGHT: 265.6 LBS | BODY MASS INDEX: 42.87 KG/M2 | DIASTOLIC BLOOD PRESSURE: 82 MMHG | TEMPERATURE: 97.6 F

## 2018-12-11 DIAGNOSIS — R10.9 FLANK PAIN: ICD-10-CM

## 2018-12-11 DIAGNOSIS — B02.23 SHINGLES (HERPES ZOSTER) POLYNEUROPATHY: ICD-10-CM

## 2018-12-11 DIAGNOSIS — I10 ESSENTIAL HYPERTENSION: Primary | ICD-10-CM

## 2018-12-11 DIAGNOSIS — G89.29 CHRONIC LLQ PAIN: ICD-10-CM

## 2018-12-11 DIAGNOSIS — R10.32 CHRONIC LLQ PAIN: ICD-10-CM

## 2018-12-11 DIAGNOSIS — N28.9 RENAL INSUFFICIENCY: ICD-10-CM

## 2018-12-11 DIAGNOSIS — R10.11 RUQ PAIN: ICD-10-CM

## 2018-12-11 DIAGNOSIS — D69.6 THROMBOCYTOPENIA (HCC): ICD-10-CM

## 2018-12-11 LAB
BILIRUBIN, POC: ABNORMAL
BLOOD URINE, POC: ABNORMAL
CLARITY, POC: ABNORMAL
COLOR, POC: ABNORMAL
GLUCOSE URINE, POC: ABNORMAL
KETONES, POC: ABNORMAL
LEUKOCYTE EST, POC: ABNORMAL
NITRITE, POC: ABNORMAL
PH, POC: 6
PROTEIN, POC: ABNORMAL
SPECIFIC GRAVITY, POC: 1.02
UROBILINOGEN, POC: 1

## 2018-12-11 PROCEDURE — 99214 OFFICE O/P EST MOD 30 MIN: CPT | Performed by: FAMILY MEDICINE

## 2018-12-11 PROCEDURE — 81003 URINALYSIS AUTO W/O SCOPE: CPT | Performed by: FAMILY MEDICINE

## 2018-12-11 RX ORDER — METRONIDAZOLE 500 MG/1
500 TABLET ORAL 3 TIMES DAILY
Qty: 42 TABLET | Refills: 0 | Status: SHIPPED | OUTPATIENT
Start: 2018-12-11 | End: 2018-12-25

## 2018-12-11 RX ORDER — GABAPENTIN 100 MG/1
200 CAPSULE ORAL NIGHTLY
Qty: 60 CAPSULE | Refills: 1
Start: 2018-12-11 | End: 2019-02-28 | Stop reason: SDUPTHER

## 2018-12-11 ASSESSMENT — ENCOUNTER SYMPTOMS: ABDOMINAL PAIN: 1

## 2018-12-11 NOTE — PATIENT INSTRUCTIONS
Patient Education        Diverticulitis: Care Instructions  Your Care Instructions    Diverticulitis occurs when pouches form in the wall of the colon and become inflamed or infected. It can be very painful. Doctors aren't sure what causes diverticulitis. There is no proof that foods such as nuts, seeds, or berries cause it or make it worse. A low-fiber diet may cause the colon to work harder to push stool forward. Pouches may form because of this extra work. It may be hard to think about healthy eating while you're in pain. But as you recover, you might think about how you can use healthy eating for overall better health. Healthy eating may help you avoid future attacks. Follow-up care is a key part of your treatment and safety. Be sure to make and go to all appointments, and call your doctor if you are having problems. It's also a good idea to know your test results and keep a list of the medicines you take. How can you care for yourself at home? · Drink plenty of fluids, enough so that your urine is light yellow or clear like water. If you have kidney, heart, or liver disease and have to limit fluids, talk with your doctor before you increase the amount of fluids you drink. · Stick to liquids or a bland diet (plain rice, bananas, dry toast or crackers, applesauce) until you are feeling better. Then you can return to regular foods and gradually increase the amount of fiber in your diet. · Use a heating pad set on low on your belly to relieve mild cramps and pain. · Get extra rest until you are feeling better. · Be safe with medicines. Read and follow all instructions on the label. ? If the doctor gave you a prescription medicine for pain, take it as prescribed. ? If you are not taking a prescription pain medicine, ask your doctor if you can take an over-the-counter medicine. · If your doctor prescribed antibiotics, take them as directed. Do not stop taking them just because you feel better.  You need

## 2018-12-19 ENCOUNTER — HOSPITAL ENCOUNTER (OUTPATIENT)
Dept: ULTRASOUND IMAGING | Age: 73
Discharge: HOME | End: 2018-12-21
Payer: MEDICARE

## 2018-12-19 ENCOUNTER — HOSPITAL ENCOUNTER (OUTPATIENT)
Dept: CT IMAGING | Age: 73
Discharge: HOME | End: 2018-12-21
Payer: MEDICARE

## 2018-12-19 ENCOUNTER — HOSPITAL ENCOUNTER (OUTPATIENT)
Age: 73
Discharge: HOME | End: 2018-12-19
Payer: MEDICARE

## 2018-12-19 DIAGNOSIS — G89.29 CHRONIC LLQ PAIN: ICD-10-CM

## 2018-12-19 DIAGNOSIS — R10.32 CHRONIC LLQ PAIN: ICD-10-CM

## 2018-12-19 DIAGNOSIS — R10.11 RUQ PAIN: ICD-10-CM

## 2018-12-19 DIAGNOSIS — N28.9 RENAL INSUFFICIENCY: ICD-10-CM

## 2018-12-19 DIAGNOSIS — R10.9 FLANK PAIN: ICD-10-CM

## 2018-12-19 LAB
BILIRUBIN URINE: NEGATIVE
BUN BLDV-MCNC: 33 MG/DL (ref 8–23)
COLOR: YELLOW
COMMENT UA: NORMAL
CREAT SERPL-MCNC: 1.41 MG/DL (ref 0.5–0.9)
GFR AFRICAN AMERICAN: 45 ML/MIN
GFR NON-AFRICAN AMERICAN: 37 ML/MIN
GFR SERPL CREATININE-BSD FRML MDRD: ABNORMAL ML/MIN/{1.73_M2}
GFR SERPL CREATININE-BSD FRML MDRD: ABNORMAL ML/MIN/{1.73_M2}
GLUCOSE URINE: NEGATIVE
KETONES, URINE: NEGATIVE
LEUKOCYTE ESTERASE, URINE: NEGATIVE
NITRITE, URINE: NEGATIVE
PH UA: 6 (ref 5–8)
PROTEIN UA: NEGATIVE
SPECIFIC GRAVITY UA: 1.01 (ref 1–1.03)
TURBIDITY: CLEAR
URINE HGB: NEGATIVE
UROBILINOGEN, URINE: NORMAL

## 2018-12-19 PROCEDURE — 84520 ASSAY OF UREA NITROGEN: CPT

## 2018-12-19 PROCEDURE — 82565 ASSAY OF CREATININE: CPT

## 2018-12-19 PROCEDURE — 74176 CT ABD & PELVIS W/O CONTRAST: CPT

## 2018-12-19 PROCEDURE — 81003 URINALYSIS AUTO W/O SCOPE: CPT

## 2018-12-19 PROCEDURE — 76705 ECHO EXAM OF ABDOMEN: CPT

## 2018-12-19 PROCEDURE — 36415 COLL VENOUS BLD VENIPUNCTURE: CPT

## 2018-12-21 ENCOUNTER — TELEPHONE (OUTPATIENT)
Dept: PRIMARY CARE CLINIC | Age: 73
End: 2018-12-21

## 2018-12-21 NOTE — TELEPHONE ENCOUNTER
Pt calling to say that Dr. Rocky Hatch said that she does not operate on the type of hernia that the Pt has. Pt would like to know what you suggest or who you would suggest at this time? Pt would like to have someone at [facility] so that she does not have to go clear across town.  Please advise HL

## 2018-12-26 DIAGNOSIS — R10.32 LLQ PAIN: Primary | ICD-10-CM

## 2019-01-04 ENCOUNTER — TELEPHONE (OUTPATIENT)
Dept: GASTROENTEROLOGY | Age: 74
End: 2019-01-04

## 2019-01-25 ENCOUNTER — TELEPHONE (OUTPATIENT)
Dept: GASTROENTEROLOGY | Age: 74
End: 2019-01-25

## 2019-02-28 ENCOUNTER — OFFICE VISIT (OUTPATIENT)
Dept: PRIMARY CARE CLINIC | Age: 74
End: 2019-02-28
Payer: MEDICARE

## 2019-02-28 VITALS
SYSTOLIC BLOOD PRESSURE: 116 MMHG | HEART RATE: 61 BPM | OXYGEN SATURATION: 97 % | WEIGHT: 265.8 LBS | BODY MASS INDEX: 42.9 KG/M2 | DIASTOLIC BLOOD PRESSURE: 68 MMHG

## 2019-02-28 DIAGNOSIS — M54.42 CHRONIC BILATERAL LOW BACK PAIN WITH LEFT-SIDED SCIATICA: ICD-10-CM

## 2019-02-28 DIAGNOSIS — D69.6 THROMBOCYTOPENIA (HCC): ICD-10-CM

## 2019-02-28 DIAGNOSIS — G89.29 CHRONIC BILATERAL LOW BACK PAIN WITH LEFT-SIDED SCIATICA: ICD-10-CM

## 2019-02-28 DIAGNOSIS — J31.0 NON-ALLERGIC RHINITIS: ICD-10-CM

## 2019-02-28 DIAGNOSIS — E66.01 MORBID OBESITY WITH BMI OF 40.0-44.9, ADULT (HCC): ICD-10-CM

## 2019-02-28 DIAGNOSIS — N18.30 CHRONIC KIDNEY DISEASE, STAGE III (MODERATE) (HCC): ICD-10-CM

## 2019-02-28 DIAGNOSIS — I10 ESSENTIAL HYPERTENSION: Primary | ICD-10-CM

## 2019-02-28 DIAGNOSIS — I25.10 CORONARY ARTERY DISEASE INVOLVING NATIVE CORONARY ARTERY OF NATIVE HEART WITHOUT ANGINA PECTORIS: ICD-10-CM

## 2019-02-28 PROBLEM — Z95.5 PRESENCE OF BARE METAL STENT IN LEFT CIRCUMFLEX CORONARY ARTERY: Status: ACTIVE | Noted: 2017-08-30

## 2019-02-28 PROBLEM — I49.1 APC (ATRIAL PREMATURE CONTRACTIONS): Status: ACTIVE | Noted: 2017-10-31

## 2019-02-28 PROCEDURE — 99214 OFFICE O/P EST MOD 30 MIN: CPT | Performed by: FAMILY MEDICINE

## 2019-02-28 RX ORDER — IPRATROPIUM BROMIDE 21 UG/1
2 SPRAY, METERED NASAL 3 TIMES DAILY
Qty: 1 BOTTLE | Refills: 3 | Status: SHIPPED | OUTPATIENT
Start: 2019-02-28 | End: 2019-05-29

## 2019-02-28 RX ORDER — GABAPENTIN 100 MG/1
100 CAPSULE ORAL 2 TIMES DAILY
Qty: 60 CAPSULE | Refills: 2 | Status: SHIPPED | OUTPATIENT
Start: 2019-02-28 | End: 2019-05-29 | Stop reason: SDUPTHER

## 2019-02-28 ASSESSMENT — PATIENT HEALTH QUESTIONNAIRE - PHQ9
SUM OF ALL RESPONSES TO PHQ QUESTIONS 1-9: 0
1. LITTLE INTEREST OR PLEASURE IN DOING THINGS: 0
2. FEELING DOWN, DEPRESSED OR HOPELESS: 0
SUM OF ALL RESPONSES TO PHQ QUESTIONS 1-9: 0
SUM OF ALL RESPONSES TO PHQ9 QUESTIONS 1 & 2: 0

## 2019-02-28 ASSESSMENT — ENCOUNTER SYMPTOMS
RHINORRHEA: 1
SHORTNESS OF BREATH: 1
BACK PAIN: 1

## 2019-03-13 ENCOUNTER — HOSPITAL ENCOUNTER (OUTPATIENT)
Age: 74
Setting detail: SPECIMEN
Discharge: HOME | End: 2019-03-13
Payer: MEDICARE

## 2019-03-13 DIAGNOSIS — D69.6 THROMBOCYTOPENIA (HCC): ICD-10-CM

## 2019-03-13 DIAGNOSIS — N18.30 CHRONIC KIDNEY DISEASE, STAGE III (MODERATE) (HCC): ICD-10-CM

## 2019-03-13 DIAGNOSIS — I10 ESSENTIAL HYPERTENSION: ICD-10-CM

## 2019-03-13 DIAGNOSIS — I25.10 CORONARY ARTERY DISEASE INVOLVING NATIVE CORONARY ARTERY OF NATIVE HEART WITHOUT ANGINA PECTORIS: ICD-10-CM

## 2019-03-13 LAB
ALBUMIN SERPL-MCNC: 3.8 G/DL (ref 3.5–5.2)
ALBUMIN/GLOBULIN RATIO: 1.3 (ref 1–2.5)
ALP BLD-CCNC: 61 U/L (ref 35–104)
ALT SERPL-CCNC: 9 U/L (ref 5–33)
ANION GAP SERPL CALCULATED.3IONS-SCNC: 12 MMOL/L (ref 9–17)
AST SERPL-CCNC: 12 U/L
BILIRUB SERPL-MCNC: 0.62 MG/DL (ref 0.3–1.2)
BUN BLDV-MCNC: 24 MG/DL (ref 8–23)
BUN/CREAT BLD: ABNORMAL (ref 9–20)
CALCIUM SERPL-MCNC: 9.6 MG/DL (ref 8.6–10.4)
CHLORIDE BLD-SCNC: 105 MMOL/L (ref 98–107)
CHOLESTEROL/HDL RATIO: 3
CHOLESTEROL: 134 MG/DL
CO2: 26 MMOL/L (ref 20–31)
CREAT SERPL-MCNC: 1.36 MG/DL (ref 0.5–0.9)
GFR AFRICAN AMERICAN: 46 ML/MIN
GFR NON-AFRICAN AMERICAN: 38 ML/MIN
GFR SERPL CREATININE-BSD FRML MDRD: ABNORMAL ML/MIN/{1.73_M2}
GFR SERPL CREATININE-BSD FRML MDRD: ABNORMAL ML/MIN/{1.73_M2}
GLUCOSE FASTING: 101 MG/DL (ref 70–99)
HCT VFR BLD CALC: 41.6 % (ref 36.3–47.1)
HDLC SERPL-MCNC: 45 MG/DL
HEMOGLOBIN: 13 G/DL (ref 11.9–15.1)
LDL CHOLESTEROL: 74 MG/DL (ref 0–130)
MCH RBC QN AUTO: 31.9 PG (ref 25.2–33.5)
MCHC RBC AUTO-ENTMCNC: 31.3 G/DL (ref 28.4–34.8)
MCV RBC AUTO: 102 FL (ref 82.6–102.9)
NRBC AUTOMATED: 0 PER 100 WBC
PDW BLD-RTO: 13.4 % (ref 11.8–14.4)
PLATELET # BLD: 191 K/UL (ref 138–453)
PMV BLD AUTO: 10.3 FL (ref 8.1–13.5)
POTASSIUM SERPL-SCNC: 4.1 MMOL/L (ref 3.7–5.3)
RBC # BLD: 4.08 M/UL (ref 3.95–5.11)
SODIUM BLD-SCNC: 143 MMOL/L (ref 135–144)
TOTAL PROTEIN: 6.7 G/DL (ref 6.4–8.3)
TRIGL SERPL-MCNC: 75 MG/DL
VLDLC SERPL CALC-MCNC: NORMAL MG/DL (ref 1–30)
WBC # BLD: 6.6 K/UL (ref 3.5–11.3)

## 2019-05-29 ENCOUNTER — OFFICE VISIT (OUTPATIENT)
Dept: PRIMARY CARE CLINIC | Age: 74
End: 2019-05-29
Payer: MEDICARE

## 2019-05-29 ENCOUNTER — HOSPITAL ENCOUNTER (OUTPATIENT)
Age: 74
Setting detail: SPECIMEN
Discharge: HOME | End: 2019-05-29
Payer: MEDICARE

## 2019-05-29 VITALS
BODY MASS INDEX: 43.35 KG/M2 | WEIGHT: 268.6 LBS | HEART RATE: 64 BPM | DIASTOLIC BLOOD PRESSURE: 70 MMHG | OXYGEN SATURATION: 96 % | SYSTOLIC BLOOD PRESSURE: 116 MMHG

## 2019-05-29 DIAGNOSIS — R63.5 WEIGHT GAIN: ICD-10-CM

## 2019-05-29 DIAGNOSIS — N18.30 CHRONIC KIDNEY DISEASE, STAGE III (MODERATE) (HCC): ICD-10-CM

## 2019-05-29 DIAGNOSIS — R06.09 DYSPNEA ON EXERTION: ICD-10-CM

## 2019-05-29 DIAGNOSIS — R06.2 WHEEZING: ICD-10-CM

## 2019-05-29 DIAGNOSIS — I10 ESSENTIAL HYPERTENSION: Primary | ICD-10-CM

## 2019-05-29 DIAGNOSIS — E66.01 MORBID OBESITY WITH BMI OF 40.0-44.9, ADULT (HCC): ICD-10-CM

## 2019-05-29 DIAGNOSIS — G89.29 CHRONIC BILATERAL LOW BACK PAIN WITH LEFT-SIDED SCIATICA: ICD-10-CM

## 2019-05-29 DIAGNOSIS — Z12.31 ENCOUNTER FOR SCREENING MAMMOGRAM FOR BREAST CANCER: ICD-10-CM

## 2019-05-29 DIAGNOSIS — M54.42 CHRONIC BILATERAL LOW BACK PAIN WITH LEFT-SIDED SCIATICA: ICD-10-CM

## 2019-05-29 LAB — TSH SERPL DL<=0.05 MIU/L-ACNC: 1.27 MIU/L (ref 0.3–5)

## 2019-05-29 PROCEDURE — 99214 OFFICE O/P EST MOD 30 MIN: CPT | Performed by: FAMILY MEDICINE

## 2019-05-29 RX ORDER — GABAPENTIN 100 MG/1
100 CAPSULE ORAL 3 TIMES DAILY
Qty: 90 CAPSULE | Refills: 0 | Status: SHIPPED | OUTPATIENT
Start: 2019-05-29 | End: 2019-12-13 | Stop reason: ALTCHOICE

## 2019-05-29 ASSESSMENT — ENCOUNTER SYMPTOMS
SHORTNESS OF BREATH: 1
WHEEZING: 1

## 2019-05-29 NOTE — PATIENT INSTRUCTIONS
Patient Education        Learning About Low-Carbohydrate Diets for Weight Loss  What is a low-carbohydrate diet? Low-carb diets avoid foods that are high in carbohydrate. These high-carb foods include pasta, bread, rice, cereal, fruits, and starchy vegetables. Instead, these diets usually have you eat foods that are high in fat and protein. Many people lose weight quickly on a low-carb diet. But the early weight loss is water. People on this diet often gain the weight back after they start eating carbs again. Not all diet plans are safe or work well. A lot of the evidence shows that low-carb diets aren't healthy. That's because these diets often don't include healthy foods like fruits and vegetables. Losing weight safely means balancing protein, fat, and carbs with every meal and snack. And low-carb diets don't always provide the vitamins, minerals, and fiber you need. If you have a serious medical condition, talk to your doctor before you try any diet. These conditions include kidney disease, heart disease, type 2 diabetes, high cholesterol, and high blood pressure. If you are pregnant, it may not be safe for your baby if you are on a low-carb diet. How can you lose weight safely? You might have heard that a diet plan helped another person lose weight. But that doesn't mean that it will work for you. It is very hard to stay on a diet that includes lots of big changes in your eating habits. If you want to get to a healthy weight and stay there, making healthy lifestyle changes will often work better than dieting. These steps can help. · Make a plan for change. Work with your doctor to create a plan that is right for you. · See a dietitian. He or she can show you how to make healthy changes in your eating habits. · Manage stress. If you have a lot of stress in your life, it can be hard to focus on making healthy changes to your daily habits. · Track your food and activity.  You are likely to do better at losing weight if you keep track of what you eat and what you do. Follow-up care is a key part of your treatment and safety. Be sure to make and go to all appointments, and call your doctor if you are having problems. It's also a good idea to know your test results and keep a list of the medicines you take. Where can you learn more? Go to https://chpepiceweb.[email or web address] org and sign in to your [employer] account. Enter A121 in the [employer] box to learn more about \"Learning About Low-Carbohydrate Diets for Weight Loss. \"     If you do not have an account, please click on the \"Sign Up Now\" link. Current as of: November 7, 2018  Content Version: 12.0  © 5198-2894 Healthwise, [employer]. Care instructions adapted under license by [facility] ([facility]). If you have questions about a medical condition or this instruction, always ask your healthcare professional. [place] [address] any warranty or liability for your use of this information. Patient Education        Sleep Apnea: Care Instructions  Your Care Instructions    Sleep apnea means that you frequently stop breathing for 10 seconds or longer during sleep. It can be mild to severe, based on the number of times an hour that you stop breathing or have slowed breathing. Blocked or narrowed airways in your nose, mouth, or throat can cause sleep apnea. Your airway can become blocked when your throat muscles and tongue relax during sleep. You can treat sleep apnea at home by making lifestyle changes. You also can use a CPAP breathing machine that keeps tissues in the throat from blocking your airway. Or your doctor may suggest that you use a breathing device while you sleep. It helps keep your airway open. This could be a device that you put in your mouth. In some cases, surgery may be needed to remove enlarged tissues in the throat. Follow-up care is a key part of your treatment and safety.  Be sure to make and go to all appointments, and call your doctor if you are having problems. It's also a good idea to know your test results and keep a list of the medicines you take. How can you care for yourself at home? · Lose weight, if needed. It may reduce the number of times you stop breathing or have slowed breathing. · Sleep on your side. It may stop mild apnea. If you tend to roll onto your back, sew a pocket in the back of your pajama top. Put a tennis ball into the pocket, and stitch the pocket shut. This will help keep you from sleeping on your back. · Avoid alcohol and medicines such as sleeping pills and sedatives before bed. · Do not smoke. Smoking can make sleep apnea worse. If you need help quitting, talk to your doctor about stop-smoking programs and medicines. These can increase your chances of quitting for good. · Prop up the head of your bed 4 to 6 inches by putting bricks under the legs of the bed. · Treat breathing problems, such as a stuffy nose, caused by a cold or allergies. · Try a continuous positive airway pressure (CPAP) breathing machine if your doctor recommends it. The machine keeps your airway open when you sleep. · If CPAP does not work for you, ask your doctor if you can try other breathing machines. A bilevel positive airway pressure machine uses one type of air pressure for breathing in and another type for breathing out. Another device raises or lowers air pressure as needed while you breathe. · Talk to your doctor if:  ? Your nose feels dry or bleeds when you use one of these machines. You may need to increase moisture in the air. A humidifier may help. ? Your nose is runny or stuffy from using a breathing machine. Decongestants or a corticosteroid nasal spray may help. ? You are sleepy during the day and it gets in the way of the normal things you do. Do not drive when you are drowsy. When should you call for help?   Watch closely for changes in your health, and be sure to contact your doctor if:    · You still have sleep apnea even though you have made lifestyle changes.     · You are thinking of trying a device such as CPAP.     · You are having problems using a CPAP or similar machine. Where can you learn more? Go to https://[email or web address].[email or web address] org and sign in to your [employer] account. Enter L536 in the [employer] box to learn more about \"Sleep Apnea: Care Instructions. \"     If you do not have an account, please click on the \"Sign Up Now\" link. Current as of: September 5, 2018  Content Version: 12.0  © 1098-9793 Healthwise, Incorporated. Care instructions adapted under license by [facility] ([facility]). If you have questions about a medical condition or this instruction, always ask your healthcare professional. [address] any warranty or liability for your use of this information.
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
## 2019-06-04 ENCOUNTER — PROCEDURE VISIT (OUTPATIENT)
Dept: PRIMARY CARE CLINIC | Age: 74
End: 2019-06-04
Payer: MEDICARE

## 2019-06-04 DIAGNOSIS — R06.2 WHEEZING: Primary | ICD-10-CM

## 2019-06-04 PROCEDURE — 94729 DIFFUSING CAPACITY: CPT | Performed by: FAMILY MEDICINE

## 2019-06-04 PROCEDURE — 94727 GAS DIL/WSHOT DETER LNG VOL: CPT | Performed by: FAMILY MEDICINE

## 2019-06-04 PROCEDURE — 94664 DEMO&/EVAL PT USE INHALER: CPT | Performed by: FAMILY MEDICINE

## 2019-06-04 PROCEDURE — 94060 EVALUATION OF WHEEZING: CPT | Performed by: FAMILY MEDICINE

## 2019-06-14 DIAGNOSIS — R06.2 WHEEZING: ICD-10-CM

## 2019-06-14 DIAGNOSIS — R06.09 DYSPNEA ON EXERTION: ICD-10-CM

## 2019-06-17 DIAGNOSIS — R94.2 RESTRICTIVE PATTERN PRESENT ON PULMONARY FUNCTION TESTING: ICD-10-CM

## 2019-06-17 DIAGNOSIS — R94.2 ABNORMAL PFT: Primary | ICD-10-CM

## 2019-06-17 DIAGNOSIS — R06.09 DYSPNEA ON EXERTION: ICD-10-CM

## 2019-08-12 ENCOUNTER — OFFICE VISIT (OUTPATIENT)
Dept: PULMONOLOGY | Age: 74
End: 2019-08-12
Payer: MEDICARE

## 2019-08-12 VITALS
HEIGHT: 66 IN | SYSTOLIC BLOOD PRESSURE: 129 MMHG | HEART RATE: 65 BPM | DIASTOLIC BLOOD PRESSURE: 72 MMHG | TEMPERATURE: 97.1 F | OXYGEN SATURATION: 96 % | WEIGHT: 271 LBS | BODY MASS INDEX: 43.55 KG/M2

## 2019-08-12 DIAGNOSIS — R09.82 POST-NASAL DRIP: ICD-10-CM

## 2019-08-12 DIAGNOSIS — I10 ESSENTIAL HYPERTENSION: ICD-10-CM

## 2019-08-12 DIAGNOSIS — E66.01 MORBID OBESITY DUE TO EXCESS CALORIES (HCC): ICD-10-CM

## 2019-08-12 DIAGNOSIS — J45.20 MILD INTERMITTENT ASTHMA WITHOUT COMPLICATION: ICD-10-CM

## 2019-08-12 DIAGNOSIS — R05.3 CHRONIC COUGH: Primary | ICD-10-CM

## 2019-08-12 PROCEDURE — 99204 OFFICE O/P NEW MOD 45 MIN: CPT | Performed by: INTERNAL MEDICINE

## 2019-08-12 RX ORDER — NITROGLYCERIN 0.4 MG/1
TABLET SUBLINGUAL
COMMUNITY
Start: 2017-08-30 | End: 2022-06-14

## 2019-08-12 RX ORDER — ALBUTEROL SULFATE 2.5 MG/3ML
2.5 SOLUTION RESPIRATORY (INHALATION) EVERY 6 HOURS PRN
Qty: 120 EACH | Refills: 3 | Status: SHIPPED | OUTPATIENT
Start: 2019-08-12 | End: 2021-09-09

## 2019-08-12 ASSESSMENT — SLEEP AND FATIGUE QUESTIONNAIRES
HOW LIKELY ARE YOU TO NOD OFF OR FALL ASLEEP WHILE SITTING AND TALKING TO SOMEONE: 0
ESS TOTAL SCORE: 5
HOW LIKELY ARE YOU TO NOD OFF OR FALL ASLEEP WHILE SITTING AND READING: 0
HOW LIKELY ARE YOU TO NOD OFF OR FALL ASLEEP WHEN YOU ARE A PASSENGER IN A CAR FOR AN HOUR WITHOUT A BREAK: 0
HOW LIKELY ARE YOU TO NOD OFF OR FALL ASLEEP IN A CAR, WHILE STOPPED FOR A FEW MINUTES IN TRAFFIC: 0
HOW LIKELY ARE YOU TO NOD OFF OR FALL ASLEEP WHILE SITTING INACTIVE IN A PUBLIC PLACE: 0
HOW LIKELY ARE YOU TO NOD OFF OR FALL ASLEEP WHILE SITTING QUIETLY AFTER LUNCH WITHOUT ALCOHOL: 0
HOW LIKELY ARE YOU TO NOD OFF OR FALL ASLEEP WHILE WATCHING TV: 3
HOW LIKELY ARE YOU TO NOD OFF OR FALL ASLEEP WHILE LYING DOWN TO REST IN THE AFTERNOON WHEN CIRCUMSTANCES PERMIT: 2

## 2019-08-15 NOTE — PROGRESS NOTES
Reactions    Aspirin Itching and Rash     Pt can only take 81mg       MEDICATIONS:   Current Outpatient Medications   Medication Sig Dispense Refill    nitroGLYCERIN (NITROSTAT) 0.4 MG SL tablet 1 under the tongue as needed for angina, may repeat q5mins for up three doses      Nebulizers (PORTABLE COMPRESSOR NEBULIZER) MISC 1 ampule by Does not apply route 4 times daily as needed (shortness of breathing or wheezing) 1 each 11    albuterol (PROVENTIL) (2.5 MG/3ML) 0.083% nebulizer solution Take 3 mLs by nebulization every 6 hours as needed for Wheezing 120 each 3    gabapentin (NEURONTIN) 100 MG capsule Take 1 capsule by mouth 3 times daily for 30 days. Intended supply: 90 days 90 capsule 0    pravastatin (PRAVACHOL) 40 MG tablet Take 40 mg by mouth daily      carvedilol (COREG) 25 MG tablet Take 1 tablet by mouth 2 times daily (with meals) 60 tablet 2    lisinopril-hydrochlorothiazide (PRINZIDE;ZESTORETIC) 20-25 MG per tablet Take 1 tablet by mouth daily (Patient taking differently: Take 1 tablet by mouth 2 times daily ) 30 tablet 2    aspirin 81 MG tablet Take 81 mg by mouth daily       No current facility-administered medications for this visit. FAMILY HISTORY: family history includes Heart Disease in her father and mother; Stroke in her father and mother. SOCIAL AND OCCUPATIONAL HEALTH:  The patient is a Past smoker of 80 pack years but quit smoking in 2002. There  is not history of TB or TB exposure. There is not asbestos or silica dust exposure. The patient reports does not have coal, foundry, quarry or [employer] exposure. Travel history reveals no significant history of risk factors for pulm disease. There is not  history of recreational or IV drug use. The patient does not pets, dogs, cats turtles or exotic birds.         Review of Systems:  Review of Systems -   General ROS: Completed and except as mentioned above were negative   Psychological ROS:  Completed and except as mentioned

## 2019-10-16 ENCOUNTER — OFFICE VISIT (OUTPATIENT)
Dept: PRIMARY CARE CLINIC | Age: 74
End: 2019-10-16
Payer: MEDICARE

## 2019-10-16 VITALS
SYSTOLIC BLOOD PRESSURE: 126 MMHG | TEMPERATURE: 98 F | BODY MASS INDEX: 44.45 KG/M2 | OXYGEN SATURATION: 98 % | WEIGHT: 275.4 LBS | HEART RATE: 57 BPM | DIASTOLIC BLOOD PRESSURE: 68 MMHG

## 2019-10-16 DIAGNOSIS — N89.8 VAGINAL DISCHARGE: Primary | ICD-10-CM

## 2019-10-16 DIAGNOSIS — N89.8 VAGINAL ITCHING: ICD-10-CM

## 2019-10-16 PROCEDURE — 99213 OFFICE O/P EST LOW 20 MIN: CPT | Performed by: PHYSICIAN ASSISTANT

## 2019-10-16 RX ORDER — NYSTATIN 100000 U/G
OINTMENT TOPICAL 2 TIMES DAILY
Qty: 30 G | Refills: 0 | Status: SHIPPED | OUTPATIENT
Start: 2019-10-16 | End: 2019-12-13 | Stop reason: ALTCHOICE

## 2019-10-17 DIAGNOSIS — B37.31 VAGINAL YEAST INFECTION: Primary | ICD-10-CM

## 2019-10-17 LAB
CANDIDA SPECIES, DNA PROBE: POSITIVE
GARDNERELLA VAGINALIS, DNA PROBE: NEGATIVE
TRICHOMONAS VAGINALIS DNA: NEGATIVE

## 2019-10-17 RX ORDER — FLUCONAZOLE 150 MG/1
150 TABLET ORAL ONCE
Qty: 2 TABLET | Refills: 0 | Status: SHIPPED | OUTPATIENT
Start: 2019-10-17 | End: 2019-10-17

## 2019-11-26 ENCOUNTER — TELEPHONE (OUTPATIENT)
Dept: PRIMARY CARE CLINIC | Age: 74
End: 2019-11-26

## 2019-11-26 NOTE — TELEPHONE ENCOUNTER
Pt was last seen 10/16/19 with Susanne Todd and was given rx for yeast infection, The meds and cream have not worked. She would like something else into Health system in [facility].      Last visit: 10/16/19  Last Med refill:   Does patient have enough medication for 72 hours: NO  Next Visit Date:  Future Appointments   Date Time Provider Jessica Llamas   8/10/2020  1:30 PM SCHEDULE, RESP [address] Maintenance   Topic Date Due    DTaP/Tdap/Td vaccine (1 - Tdap) 06/19/1956    Shingles Vaccine (1 of 2) 06/19/1997    Breast cancer screen  02/15/2018    Annual Wellness Visit (AWV)  05/29/2019    Pneumococcal 65+ years Vaccine (2 of 2 - PPSV23) 11/16/2019    Lipid screen  03/13/2020    Potassium monitoring  03/13/2020    Creatinine monitoring  03/13/2020    Colon cancer screen colonoscopy  03/17/2026    Flu vaccine  Completed    DEXA (modify frequency per FRAX score)  Addressed    Hepatitis C screen  Addressed       No results found for: LABA1C          ( goal A1C is < 7)   No results found for: LABMICR  LDL Cholesterol (mg/dL)   Date Value   03/13/2019 74       (goal LDL is <100)   AST (U/L)   Date Value   03/13/2019 12     ALT (U/L)   Date Value   03/13/2019 9     BUN (mg/dL)   Date Value   03/13/2019 24 (H)     BP Readings from Last 3 Encounters:   10/16/19 126/68   08/12/19 129/72   05/29/19 116/70          (goal 120/80)    All Future Testing planned in [facility]  Lab Frequency Next Occurrence   Creatinine, Serum Once 03/19/2019   BUN Once 03/19/2019   NAILA Digital Screen Bilateral [[id number]] Once 01/30/2020               Patient Active Problem List:     Essential hypertension     Dyslipidemia     Chronic bilateral low back pain with left-sided sciatica     Diverticulitis of large intestine without bleeding     Shingles (herpes zoster) polyneuropathy     Thrombocytopenia (HCC)     Renal insufficiency     LLQ pain     APC (atrial premature contractions)     Coronary artery disease involving native coronary artery of native heart without angina pectoris     Presence of bare metal stent in left circumflex coronary artery     Dyspnea on exertion     Wheezing     Morbid obesity with BMI of 40.0-44.9, adult (HCC)     Chronic kidney disease, stage III (moderate) (HCC)

## 2019-12-13 ENCOUNTER — OFFICE VISIT (OUTPATIENT)
Dept: PRIMARY CARE CLINIC | Age: 74
End: 2019-12-13
Payer: MEDICARE

## 2019-12-13 ENCOUNTER — TELEPHONE (OUTPATIENT)
Dept: PRIMARY CARE CLINIC | Age: 74
End: 2019-12-13

## 2019-12-13 VITALS
WEIGHT: 273 LBS | HEART RATE: 66 BPM | SYSTOLIC BLOOD PRESSURE: 132 MMHG | BODY MASS INDEX: 44.06 KG/M2 | DIASTOLIC BLOOD PRESSURE: 84 MMHG | OXYGEN SATURATION: 96 % | TEMPERATURE: 97.7 F

## 2019-12-13 DIAGNOSIS — J32.9 RHINOSINUSITIS: ICD-10-CM

## 2019-12-13 DIAGNOSIS — J31.0 RHINOSINUSITIS: ICD-10-CM

## 2019-12-13 DIAGNOSIS — R19.7 DIARRHEA, UNSPECIFIED TYPE: ICD-10-CM

## 2019-12-13 DIAGNOSIS — J40 BRONCHITIS: Primary | ICD-10-CM

## 2019-12-13 PROCEDURE — 99213 OFFICE O/P EST LOW 20 MIN: CPT | Performed by: NURSE PRACTITIONER

## 2019-12-13 RX ORDER — GABAPENTIN 100 MG/1
CAPSULE ORAL
Refills: 2 | COMMUNITY
Start: 2019-11-07 | End: 2021-02-22

## 2019-12-13 RX ORDER — ALUMINUM ZIRCONIUM OCTACHLOROHYDREX GLY 16 G/100G
1 GEL TOPICAL DAILY
Refills: 0 | COMMUNITY
Start: 2019-12-13 | End: 2020-01-12

## 2019-12-13 RX ORDER — BENZONATATE 100 MG/1
100 CAPSULE ORAL 3 TIMES DAILY PRN
Qty: 21 CAPSULE | Refills: 0 | Status: SHIPPED | OUTPATIENT
Start: 2019-12-13 | End: 2019-12-20

## 2019-12-13 RX ORDER — FLUCONAZOLE 150 MG/1
TABLET ORAL
Refills: 0 | COMMUNITY
Start: 2019-10-17 | End: 2019-12-13 | Stop reason: ALTCHOICE

## 2019-12-13 ASSESSMENT — ENCOUNTER SYMPTOMS
NAUSEA: 0
EYE REDNESS: 0
COUGH: 1
SINUS PAIN: 0
SINUS PRESSURE: 0
EYE DISCHARGE: 0
VOMITING: 0
RHINORRHEA: 1
SHORTNESS OF BREATH: 1
DIARRHEA: 1
BLOOD IN STOOL: 0
WHEEZING: 1
SORE THROAT: 1
EYE ITCHING: 0

## 2020-02-11 RX ORDER — PRAVASTATIN SODIUM 40 MG
40 TABLET ORAL DAILY
Qty: 30 TABLET | Refills: 5 | Status: SHIPPED | OUTPATIENT
Start: 2020-02-11

## 2020-06-29 ENCOUNTER — TELEPHONE (OUTPATIENT)
Dept: PRIMARY CARE CLINIC | Age: 75
End: 2020-06-29

## 2020-09-23 NOTE — TELEPHONE ENCOUNTER
I will send in terazol vaginal cream: insert into the vagina nightly x 7 days. Do NOT use soap at vaginal opening when showering or bathing, only use clear water. Patient is in the process of transferring his pap therapy care to [facility].

## 2020-11-12 ENCOUNTER — TELEPHONE (OUTPATIENT)
Dept: PRIMARY CARE CLINIC | Age: 75
End: 2020-11-12

## 2020-11-12 ENCOUNTER — NURSE TRIAGE (OUTPATIENT)
Dept: OTHER | Facility: CLINIC | Age: 75
End: 2020-11-12

## 2020-11-12 NOTE — TELEPHONE ENCOUNTER
Reason for Disposition   Urinating more frequently than usual (i.e., frequency)    Answer Assessment - Initial Assessment Questions  1. SYMPTOM: \"What's the main symptom you're concerned about? \" (e.g., frequency, incontinence)      Urinary frequency, dysuria    2. ONSET: \"When did the  Urinary frequency, dysuria  start? \"      3-4 days    3. PAIN: \"Is there any pain? \" If so, ask: \"How bad is it? \" (Scale: 1-10; mild, moderate, severe)      No pain unless urinating - then mild    4. CAUSE: \"What do you think is causing the symptoms? \"      Possible UTI    5. OTHER SYMPTOMS: \"Do you have any other symptoms? \" (e.g., fever, flank pain, blood in urine, pain with urination)      DEnies    6. PREGNANCY: \"Is there any chance you are pregnant? \" \"When was your last menstrual period? \"      N/A    Protocols used: URINARY SYMPTOMS-ADULT-OH    Pt reports having Urinary frequency, dysuria for 3-4 days. States she prefers telephone appt or having PCP call in medication to treat. Pt insists that she does not want to come in to office for appt. Warm transfer to [facility] [place]. Caller provided care advice and instructed to call back with worsening symptoms. Attention Provider: Thank you for allowing me to participate in the care of your patient. The patient was connected to triage in response to information provided to the [facility]. Please do not respond through this encounter as the response is not directed to a shared pool.

## 2020-11-12 NOTE — TELEPHONE ENCOUNTER
Patient was transferred to scheduling by nurse triage for possible UTI. Experiencing frequency, pain and burning. She is asking not to be seen in the office and is hoping something can be called into her pharmacy on file today. Please advise at earliest convenience. Thank you.

## 2020-11-13 ENCOUNTER — HOSPITAL ENCOUNTER (OUTPATIENT)
Age: 75
Setting detail: SPECIMEN
Discharge: HOME | End: 2020-11-13
Payer: MEDICARE

## 2020-11-13 LAB
-: ABNORMAL
AMORPHOUS: ABNORMAL
BACTERIA: ABNORMAL
BILIRUBIN URINE: NEGATIVE
CASTS UA: ABNORMAL /LPF (ref 0–8)
COLOR: YELLOW
COMMENT UA: ABNORMAL
CRYSTALS, UA: ABNORMAL /HPF
EPITHELIAL CELLS UA: ABNORMAL /HPF (ref 0–5)
GLUCOSE URINE: NEGATIVE
KETONES, URINE: NEGATIVE
LEUKOCYTE ESTERASE, URINE: ABNORMAL
MUCUS: ABNORMAL
NITRITE, URINE: NEGATIVE
OTHER OBSERVATIONS UA: ABNORMAL
PH UA: 5 (ref 5–8)
PROTEIN UA: NEGATIVE
RBC UA: ABNORMAL /HPF (ref 0–4)
RENAL EPITHELIAL, UA: ABNORMAL /HPF
SPECIFIC GRAVITY UA: 1.01 (ref 1–1.03)
TRICHOMONAS: ABNORMAL
TURBIDITY: ABNORMAL
URINE HGB: ABNORMAL
UROBILINOGEN, URINE: NORMAL
WBC UA: ABNORMAL /HPF (ref 0–5)
YEAST: ABNORMAL

## 2020-11-15 LAB
CULTURE: ABNORMAL
Lab: ABNORMAL
SPECIMEN DESCRIPTION: ABNORMAL

## 2020-11-16 RX ORDER — CEPHALEXIN 250 MG/1
250 CAPSULE ORAL 3 TIMES DAILY
Qty: 15 CAPSULE | Refills: 0 | Status: SHIPPED | OUTPATIENT
Start: 2020-11-16 | End: 2020-11-21

## 2021-02-22 RX ORDER — GABAPENTIN 100 MG/1
CAPSULE ORAL
Qty: 60 CAPSULE | Refills: 0 | Status: SHIPPED | OUTPATIENT
Start: 2021-02-22 | End: 2021-09-09

## 2021-03-09 ENCOUNTER — OFFICE VISIT (OUTPATIENT)
Dept: PRIMARY CARE CLINIC | Age: 76
End: 2021-03-09
Payer: MEDICARE

## 2021-03-09 VITALS
HEIGHT: 66 IN | SYSTOLIC BLOOD PRESSURE: 130 MMHG | OXYGEN SATURATION: 98 % | BODY MASS INDEX: 36.74 KG/M2 | HEART RATE: 60 BPM | WEIGHT: 228.6 LBS | DIASTOLIC BLOOD PRESSURE: 60 MMHG | TEMPERATURE: 97.9 F

## 2021-03-09 DIAGNOSIS — I10 ESSENTIAL HYPERTENSION: Primary | ICD-10-CM

## 2021-03-09 DIAGNOSIS — D69.6 THROMBOCYTOPENIA (HCC): ICD-10-CM

## 2021-03-09 DIAGNOSIS — E78.5 DYSLIPIDEMIA: ICD-10-CM

## 2021-03-09 DIAGNOSIS — N28.9 RENAL INSUFFICIENCY: ICD-10-CM

## 2021-03-09 DIAGNOSIS — Z23 IMMUNIZATION DUE: ICD-10-CM

## 2021-03-09 DIAGNOSIS — M47.816 ARTHRITIS OF LUMBAR SPINE: ICD-10-CM

## 2021-03-09 PROCEDURE — 99214 OFFICE O/P EST MOD 30 MIN: CPT | Performed by: FAMILY MEDICINE

## 2021-03-09 SDOH — ECONOMIC STABILITY: FOOD INSECURITY: WITHIN THE PAST 12 MONTHS, THE FOOD YOU BOUGHT JUST DIDN'T LAST AND YOU DIDN'T HAVE MONEY TO GET MORE.: NEVER TRUE

## 2021-03-09 SDOH — ECONOMIC STABILITY: FOOD INSECURITY: WITHIN THE PAST 12 MONTHS, YOU WORRIED THAT YOUR FOOD WOULD RUN OUT BEFORE YOU GOT MONEY TO BUY MORE.: NEVER TRUE

## 2021-03-09 ASSESSMENT — PATIENT HEALTH QUESTIONNAIRE - PHQ9
1. LITTLE INTEREST OR PLEASURE IN DOING THINGS: 0
SUM OF ALL RESPONSES TO PHQ9 QUESTIONS 1 & 2: 0
SUM OF ALL RESPONSES TO PHQ QUESTIONS 1-9: 0
SUM OF ALL RESPONSES TO PHQ QUESTIONS 1-9: 0

## 2021-03-09 ASSESSMENT — ENCOUNTER SYMPTOMS
RESPIRATORY NEGATIVE: 1
BACK PAIN: 1
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
## 2021-03-09 NOTE — PATIENT INSTRUCTIONS
Patient Education        Neck: Exercises  Introduction  Here are some examples of exercises for you to try. The exercises may be suggested for a condition or for rehabilitation. Start each exercise slowly. Ease off the exercises if you start to have pain. You will be told when to start these exercises and which ones will work best for you. How to do the exercises  Neck stretch   1. This stretch works best if you keep your shoulder down as you lean away from it. To help you remember to do this, start by relaxing your shoulders and lightly holding on to your thighs or your chair. 2. Tilt your head toward your shoulder and hold for 15 to 30 seconds. Let the weight of your head stretch your muscles. 3. If you would like a little added stretch, use your hand to gently and steadily pull your head toward your shoulder. For example, keeping your right shoulder down, lean your head to the left. 4. Repeat 2 to 4 times toward each shoulder. Diagonal neck stretch   1. Turn your head slightly toward the direction you will be stretching, and tilt your head diagonally toward your chest and hold for 15 to 30 seconds. 2. If you would like a little added stretch, use your hand to gently and steadily pull your head forward on the diagonal.  3. Repeat 2 to 4 times toward each side. Dorsal glide stretch   The dorsal glide stretches the back of the neck. If you feel pain, do not glide so far back. Some people find this exercise easier to do while lying on their backs with an ice pack on the neck. 1. Sit or stand tall and look straight ahead. 2. Slowly tuck your chin as you glide your head backward over your body  3. Hold for a count of 6, and then relax for up to 10 seconds. 4. Repeat 8 to 12 times. Chest and shoulder stretch   1. Sit or stand tall and glide your head backward as in the dorsal glide stretch. 2. Raise both arms so that your hands are next to your ears.   3. Take a deep breath, and as you breathe out, lower your elbows down and behind your back. You will feel your shoulder blades slide down and together, and at the same time you will feel a stretch across your chest and the front of your shoulders. 4. Hold for about 6 seconds, and then relax for up to 10 seconds. 5. Repeat 8 to 12 times. Strengthening: Hands on head   1. Move your head backward, forward, and side to side against gentle pressure from your hands, holding each position for about 6 seconds. 2. Repeat 8 to 12 times. Follow-up care is a key part of your treatment and safety. Be sure to make and go to all appointments, and call your doctor if you are having problems. It's also a good idea to know your test results and keep a list of the medicines you take. Where can you learn more? Go to [email or web address] org and sign in to your MyGeekDay account. Enter P975 in the Nutrinia box to learn more about \"Neck: Exercises. \"     If you do not have an account, please click on the \"Sign Up Now\" link. Current as of: March 2, 2020               Content Version: 12.6  © 2006-2020 [employer]. Care instructions adapted under license by [employer] ([facility]). If you have questions about a medical condition or this instruction, always ask your healthcare professional. [address] any warranty or liability for your use of this information. Patient Education         Ergonomics: Exercises to Do While Sitting (02:25)  Your health professional recommends that you watch this short online health video. Learn seated exercises you can do at work or at home that can help you relieve stress and strain. How to watch the video    Scan the QR code   OR Visit the website    https://hwi. se/r/Kbqztcddvh8kh   Current as of: March 2, 2020               Content Version: 12.6  © 2006-2020 [employer]. Care instructions adapted under license by [facility]).  If you have questions about a medical condition or this instruction, always ask your healthcare professional. Ayleen Prude any warranty or liability for your use of this information. Patient Education        Pneumococcal Polysaccharide Vaccine: What You Need to Know  Why get vaccinated? Pneumococcal polysaccharide vaccine (PPSV23) can prevent pneumococcal disease. Pneumococcal disease refers to any illness caused by pneumococcal bacteria. These bacteria can cause many types of illnesses, including pneumonia, which is an infection of the lungs. Pneumococcal bacteria are one of the most common causes of pneumonia. Besides pneumonia, pneumococcal bacteria can also cause:  · Ear infections,  · Sinus infections  · Meningitis (infection of the tissue covering the brain and spinal cord)  · Bacteremia (bloodstream infection)  Anyone can get pneumococcal disease, but children under 3years of age, people with certain medical conditions, adults 72 years or older, and cigarette smokers are at the highest risk. Most pneumococcal infections are mild. However, some can result in long-term problems, such as brain damage or hearing loss. Meningitis, bacteremia, and pneumonia caused by pneumococcal disease can be fatal.  PPSV23  PPSV23 protects against 23 types of bacteria that cause pneumococcal disease. PPSV23 is recommended for:  · All adults 72 years or older,  · Anyone 2 years or older with certain medical conditions that can lead to an increased risk for pneumococcal disease. Most people need only one dose of PPSV23. A second dose of PPSV23, and another type of pneumococcal vaccine called PCV13, are recommended for certain high-risk groups. Your health care provider can give you more information. People 65 years or older should get a dose of PPSV23 even if they have already gotten one or more doses of the vaccine before they turned 65.   Talk with your health care provider  Tell your vaccine provider if the person getting the vaccine:  · Has had an allergic reaction after a previous dose of PPSV23, or has any severe, life-threatening allergies. In some cases, your health care provider may decide to postpone PPSV23 vaccination to a future visit. People with minor illnesses, such as a cold, may be vaccinated. People who are moderately or severely ill should usually wait until they recover before getting PPSV23. Your health care provider can give you more information. Risks of a vaccine reaction  · Redness or pain where the shot is given, feeling tired, fever, or muscle aches can happen after PPSV23. People sometimes faint after medical procedures, including vaccination. Tell your provider if you feel dizzy or have vision changes or ringing in the ears. As with any medicine, there is a very remote chance of a vaccine causing a severe allergic reaction, other serious injury, or death. What if there is a serious problem? An allergic reaction could occur after the vaccinated person leaves the clinic. If you see signs of a severe allergic reaction (hives, swelling of the face and throat, difficulty breathing, a fast heartbeat, dizziness, or weakness), call 9-1-1 and get the person to the nearest hospital.  For other signs that concern you, call your health care provider. Adverse reactions should be reported to the Vaccine Adverse Event Reporting System (VAERS). Your health care provider will usually file this report, or you can do it yourself. Visit the VAERS website at www.vaers. hhs.gov at www.vaers. hhs.gov or call 7-866.562.4371. VAERS is only for reporting reactions, and VAERS staff do not give medical advice. How can I learn more? · Ask your health care provider. · Call your local or state health department. · Contact the Centers for Disease Control and Prevention (CDC):  ? Call 0-253.993.4681 (1-800-CDC-INFO) or  ?  Visit CDC's website at www.cdc.gov/vaccines  Vaccine Information Statement  PPSV23 Vaccine  10/30/2019  Department of Health and Human Services  Centers for Disease Control and Prevention  Many Vaccine Information Statements are available in Frisian and other languages. See www.immunize.org/vis. Hojas de información Sobre Vacunas están disponibles en español y en muchos otros idiomas. Visite Ana.si. Care instructions adapted under license by [facility] ([facility]). If you have questions about a medical condition or this instruction, always ask your healthcare professional. [address] any warranty or liability for your use of this information.

## 2021-04-28 ENCOUNTER — TELEPHONE (OUTPATIENT)
Dept: PRIMARY CARE CLINIC | Age: 76
End: 2021-04-28

## 2021-07-30 ENCOUNTER — TELEPHONE (OUTPATIENT)
Dept: FAMILY MEDICINE CLINIC | Age: 76
End: 2021-07-30

## 2021-07-30 NOTE — TELEPHONE ENCOUNTER
Dipak Valles was contacted as part of mammography outreach. Message left on machine for patient to return call to schedule appointment.

## 2021-09-09 ENCOUNTER — OFFICE VISIT (OUTPATIENT)
Dept: PRIMARY CARE CLINIC | Age: 76
End: 2021-09-09
Payer: MEDICARE

## 2021-09-09 VITALS
DIASTOLIC BLOOD PRESSURE: 86 MMHG | OXYGEN SATURATION: 96 % | SYSTOLIC BLOOD PRESSURE: 136 MMHG | WEIGHT: 221 LBS | BODY MASS INDEX: 35.67 KG/M2 | HEART RATE: 59 BPM

## 2021-09-09 DIAGNOSIS — G89.29 CHRONIC BILATERAL LOW BACK PAIN WITH LEFT-SIDED SCIATICA: ICD-10-CM

## 2021-09-09 DIAGNOSIS — E78.5 DYSLIPIDEMIA: ICD-10-CM

## 2021-09-09 DIAGNOSIS — N28.9 RENAL INSUFFICIENCY: ICD-10-CM

## 2021-09-09 DIAGNOSIS — G47.9 SLEEP DISORDER: ICD-10-CM

## 2021-09-09 DIAGNOSIS — I10 ESSENTIAL HYPERTENSION: Primary | ICD-10-CM

## 2021-09-09 DIAGNOSIS — D69.6 THROMBOCYTOPENIA (HCC): ICD-10-CM

## 2021-09-09 DIAGNOSIS — M54.42 CHRONIC BILATERAL LOW BACK PAIN WITH LEFT-SIDED SCIATICA: ICD-10-CM

## 2021-09-09 PROCEDURE — 99214 OFFICE O/P EST MOD 30 MIN: CPT | Performed by: FAMILY MEDICINE

## 2021-09-09 RX ORDER — LANOLIN ALCOHOL/MO/W.PET/CERES
3 CREAM (GRAM) TOPICAL NIGHTLY PRN
Refills: 3 | COMMUNITY
Start: 2021-09-09

## 2021-09-09 RX ORDER — GABAPENTIN 100 MG/1
100 CAPSULE ORAL 2 TIMES DAILY PRN
Qty: 60 CAPSULE | Refills: 1 | Status: SHIPPED | OUTPATIENT
Start: 2021-09-09 | End: 2022-06-14 | Stop reason: DRUGHIGH

## 2021-09-09 ASSESSMENT — PATIENT HEALTH QUESTIONNAIRE - PHQ9
SUM OF ALL RESPONSES TO PHQ QUESTIONS 1-9: 0
SUM OF ALL RESPONSES TO PHQ QUESTIONS 1-9: 0
SUM OF ALL RESPONSES TO PHQ9 QUESTIONS 1 & 2: 0
2. FEELING DOWN, DEPRESSED OR HOPELESS: 0
SUM OF ALL RESPONSES TO PHQ QUESTIONS 1-9: 0
1. LITTLE INTEREST OR PLEASURE IN DOING THINGS: 0

## 2021-09-09 ASSESSMENT — ENCOUNTER SYMPTOMS: RESPIRATORY NEGATIVE: 1

## 2021-09-09 NOTE — PATIENT INSTRUCTIONS
Patient Education        Back Stretches: Exercises  Introduction  Here are some examples of exercises for stretching your back. Start each exercise slowly. Ease off the exercise if you start to have pain. Your doctor or physical therapist will tell you when you can start these exercises and which ones will work best for you. How to do the exercises  Overhead stretch   1. Stand comfortably with your feet shoulder-width apart. 2. Looking straight ahead, raise both arms over your head and reach toward the ceiling. Do not allow your head to tilt back. 3. Hold for 15 to 30 seconds, then lower your arms to your sides. 4. Repeat 2 to 4 times. Side stretch   1. Stand comfortably with your feet shoulder-width apart. 2. Raise one arm over your head, and then lean to the other side. 3. Slide your hand down your leg as you let the weight of your arm gently stretch your side muscles. Hold for 15 to 30 seconds. 4. Repeat 2 to 4 times on each side. Press-up   1. Lie on your stomach, supporting your body with your forearms. 2. Press your elbows down into the floor to raise your upper back. As you do this, relax your stomach muscles and allow your back to arch without using your back muscles. As your press up, do not let your hips or pelvis come off the floor. 3. Hold for 15 to 30 seconds, then relax. 4. Repeat 2 to 4 times. Relax and rest   1. Lie on your back with a rolled towel under your neck and a pillow under your knees. Extend your arms comfortably to your sides. 2. Relax and breathe normally. 3. Remain in this position for about 10 minutes. 4. If you can, do this 2 or 3 times each day. Follow-up care is a key part of your treatment and safety. Be sure to make and go to all appointments, and call your doctor if you are having problems. It's also a good idea to know your test results and keep a list of the medicines you take. Where can you learn more? Go to [email or web address] and sign in to your Open Wager account. Enter O473 in the [employer] box to learn more about \"Back Stretches: Exercises. \"     If you do not have an account, please click on the \"Sign Up Now\" link. Current as of: November 16, 2020               Content Version: 12.9  © 2006-2021 Healthwise, Incorporated. Care instructions adapted under license by [facility] ([facility]). If you have questions about a medical condition or this instruction, always ask your healthcare professional. [place] [address] any warranty or liability for your use of this information. Patient Education        Back Stretches: Exercises  Introduction  Here are some examples of exercises for stretching your back. Start each exercise slowly. Ease off the exercise if you start to have pain. Your doctor or physical therapist will tell you when you can start these exercises and which ones will work best for you. How to do the exercises  Overhead stretch   5. Stand comfortably with your feet shoulder-width apart. 6. Looking straight ahead, raise both arms over your head and reach toward the ceiling. Do not allow your head to tilt back. 7. Hold for 15 to 30 seconds, then lower your arms to your sides. 8. Repeat 2 to 4 times. Side stretch   5. Stand comfortably with your feet shoulder-width apart. 6. Raise one arm over your head, and then lean to the other side. 7. Slide your hand down your leg as you let the weight of your arm gently stretch your side muscles. Hold for 15 to 30 seconds. 8. Repeat 2 to 4 times on each side. Press-up   5. Lie on your stomach, supporting your body with your forearms. 6. Press your elbows down into the floor to raise your upper back. As you do this, relax your stomach muscles and allow your back to arch without using your back muscles. As your press up, do not let your hips or pelvis come off the floor. 7. Hold for 15 to 30 seconds, then relax. 8. Repeat 2 to 4 times.     Relax and rest   5. Lie on your back with a rolled towel under your neck and a pillow under your knees. Extend your arms comfortably to your sides. 6. Relax and breathe normally. 7. Remain in this position for about 10 minutes. 8. If you can, do this 2 or 3 times each day. Follow-up care is a key part of your treatment and safety. Be sure to make and go to all appointments, and call your doctor if you are having problems. It's also a good idea to know your test results and keep a list of the medicines you take. Where can you learn more? Go to [email or web address] and sign in to your [employer] account. Enter D909 in the [employer] box to learn more about \"Back Stretches: Exercises. \"     If you do not have an account, please click on the \"Sign Up Now\" link. Current as of: November 16, 2020               Content Version: 12.9  © 2006-2021 [employer]. Care instructions adapted under license by [employer] ([facility]). If you have questions about a medical condition or this instruction, always ask your healthcare professional. [place] [address] any warranty or liability for your use of this information. Patient Education         How to Do the Single Knee-to-Chest Exercise (01:06)  Your health professional recommends that you watch this short online health video. Learn how to do the single knee-to-chest exercise to stretch your hip and lower back. Purpose:  Demonstrates the steps required to perform the single knee-to-chest exercise to stretch the hip and lower back. Goal:  The user will learn how to do the single knee-to-chest exercise to stretch the hip and lower back. How to watch the video    Scan the QR code   OR Visit the website    [email or web address]   Current as of: September 10, 2020               Content Version: 12.9  © 2006-2021 [employer]. Care instructions adapted under license by [employer] ([facility]).  If you have questions about a medical condition or this instruction, always ask your healthcare professional. [place] [address] any warranty or liability for your use of this information.
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
## 2022-05-27 ENCOUNTER — TELEPHONE (OUTPATIENT)
Dept: PRIMARY CARE CLINIC | Age: 77
End: 2022-05-27

## 2022-05-27 NOTE — TELEPHONE ENCOUNTER
----- Message from Jl Anthony sent at 5/26/2022 10:06 AM EDT -----  Subject: Message to Provider    QUESTIONS  Information for Provider? Pt has an appt on 6/6/2022 for rectal bleeding   when going to the bathroom, hemorrhoid's. Pt would like to be seen sooner   if possible.  ---------------------------------------------------------------------------  --------------  CALL BACK INFO  What is the best way for the office to contact you? OK to leave message on   voicemail  Preferred Call Back Phone Number? [phone]  ---------------------------------------------------------------------------  --------------  SCRIPT ANSWERS  Relationship to Patient?  Self

## 2022-05-27 NOTE — TELEPHONE ENCOUNTER
Hafsa Corona  MRN: [id number]  Female, 65 year old, 1952    Left message with Hgb result of 10.6 which is slightly improved from 10.0, but still anemic. Her ferretin is 23 on the low side of normal.  I told her she may benefit from some OTC iron (Ferrous sulfate 324 mg) for her fatigue. I encouraged her to try taking 1 tab with orange juice daily for first week.  If tolerating, she can increase to 1 tab twice daily for next week. If still tolerating, she can increase again to 1 tab three times daily.  We will see her back in 3 months to see how this is going, and see if helping her fatigue.  Nevaeh Travis, RN, MSN -Nurse Clinician, [facility]   Ok to schedule in a sick call spot earlier next week?

## 2022-06-14 ENCOUNTER — OFFICE VISIT (OUTPATIENT)
Dept: PRIMARY CARE CLINIC | Age: 77
End: 2022-06-14
Payer: MEDICARE

## 2022-06-14 VITALS
OXYGEN SATURATION: 97 % | DIASTOLIC BLOOD PRESSURE: 76 MMHG | BODY MASS INDEX: 37.83 KG/M2 | SYSTOLIC BLOOD PRESSURE: 130 MMHG | HEART RATE: 57 BPM | WEIGHT: 235.4 LBS | HEIGHT: 66 IN

## 2022-06-14 DIAGNOSIS — N18.30 STAGE 3 CHRONIC KIDNEY DISEASE, UNSPECIFIED WHETHER STAGE 3A OR 3B CKD (HCC): ICD-10-CM

## 2022-06-14 DIAGNOSIS — E66.01 SEVERE OBESITY (BMI 35.0-39.9) WITH COMORBIDITY (HCC): ICD-10-CM

## 2022-06-14 DIAGNOSIS — M54.50 CHRONIC MIDLINE LOW BACK PAIN WITHOUT SCIATICA: ICD-10-CM

## 2022-06-14 DIAGNOSIS — M47.816 ARTHRITIS OF LUMBAR SPINE: Primary | ICD-10-CM

## 2022-06-14 DIAGNOSIS — D69.6 THROMBOCYTOPENIA (HCC): ICD-10-CM

## 2022-06-14 DIAGNOSIS — G89.29 CHRONIC MIDLINE LOW BACK PAIN WITHOUT SCIATICA: ICD-10-CM

## 2022-06-14 PROCEDURE — 1123F ACP DISCUSS/DSCN MKR DOCD: CPT | Performed by: FAMILY MEDICINE

## 2022-06-14 PROCEDURE — 99213 OFFICE O/P EST LOW 20 MIN: CPT | Performed by: FAMILY MEDICINE

## 2022-06-14 RX ORDER — GABAPENTIN 300 MG/1
300 CAPSULE ORAL 2 TIMES DAILY
Qty: 60 CAPSULE | Refills: 1 | Status: SHIPPED | OUTPATIENT
Start: 2022-06-14 | End: 2022-07-14

## 2022-06-14 RX ORDER — ACETAMINOPHEN 500 MG
500 TABLET ORAL 4 TIMES DAILY PRN
Qty: 360 TABLET | Refills: 1 | COMMUNITY
Start: 2022-06-14

## 2022-06-14 RX ORDER — AMLODIPINE BESYLATE 5 MG/1
5 TABLET ORAL DAILY
COMMUNITY
Start: 2021-12-21

## 2022-06-14 RX ORDER — METHYLPREDNISOLONE 4 MG/1
TABLET ORAL
COMMUNITY
Start: 2022-05-06 | End: 2022-06-14

## 2022-06-14 SDOH — ECONOMIC STABILITY: FOOD INSECURITY: WITHIN THE PAST 12 MONTHS, THE FOOD YOU BOUGHT JUST DIDN'T LAST AND YOU DIDN'T HAVE MONEY TO GET MORE.: NEVER TRUE

## 2022-06-14 SDOH — ECONOMIC STABILITY: FOOD INSECURITY: WITHIN THE PAST 12 MONTHS, YOU WORRIED THAT YOUR FOOD WOULD RUN OUT BEFORE YOU GOT MONEY TO BUY MORE.: NEVER TRUE

## 2022-06-14 ASSESSMENT — PATIENT HEALTH QUESTIONNAIRE - PHQ9
SUM OF ALL RESPONSES TO PHQ QUESTIONS 1-9: 0
2. FEELING DOWN, DEPRESSED OR HOPELESS: 0
SUM OF ALL RESPONSES TO PHQ QUESTIONS 1-9: 0
SUM OF ALL RESPONSES TO PHQ QUESTIONS 1-9: 0
1. LITTLE INTEREST OR PLEASURE IN DOING THINGS: 0
SUM OF ALL RESPONSES TO PHQ QUESTIONS 1-9: 0
SUM OF ALL RESPONSES TO PHQ9 QUESTIONS 1 & 2: 0

## 2022-06-14 ASSESSMENT — SOCIAL DETERMINANTS OF HEALTH (SDOH): HOW HARD IS IT FOR YOU TO PAY FOR THE VERY BASICS LIKE FOOD, HOUSING, MEDICAL CARE, AND HEATING?: NOT HARD AT ALL

## 2022-06-14 NOTE — PROGRESS NOTES
Joi Villafuerte is a 78 y.o. femalewho presents today for her medical conditions/complaints as noted below. Chief Complaint   Patient presents with    Back Pain     Pt here today for back pain. Pt states medication isn't working. HPI:     HPI    Back pain worse x 6 months, hx of pain x years. Neck pain  Sitting too long and walking makes lower back hurt ( less than 5 min of walking)  Still has walker at home  She falls asleep a lot in the chair    Tylenol helps the pain. Current Outpatient Medications   Medication Sig Dispense Refill    amLODIPine (NORVASC) 5 MG tablet Take 5 mg by mouth daily      acetaminophen (TYLENOL) 500 MG tablet Take 1 tablet by mouth 4 times daily as needed for Pain 360 tablet 1    gabapentin (NEURONTIN) 300 MG capsule Take 1 capsule by mouth 2 times daily for 30 days. Intended supply: 30 days 60 capsule 1    pravastatin (PRAVACHOL) 40 MG tablet Take 1 tablet by mouth daily 30 tablet 5    carvedilol (COREG) 25 MG tablet Take 1 tablet by mouth 2 times daily (with meals) 60 tablet 2    lisinopril-hydrochlorothiazide (PRINZIDE;ZESTORETIC) 20-25 MG per tablet Take 1 tablet by mouth daily 30 tablet 2    aspirin 81 MG tablet Take 81 mg by mouth daily      melatonin (RA MELATONIN) 3 MG TABS tablet Take 1 tablet by mouth nightly as needed (insomnia) (Patient not taking: Reported on 6/14/2022)  3     No current facility-administered medications for this visit. Allergies   Allergen Reactions    Aspirin Itching and Rash     Pt can only take 81mg       Subjective:     Review of Systems   Constitutional: Negative. Here with spouse    Objective:     /76   Pulse 57   Ht 5' 6\" (1.676 m)   Wt 235 lb 6.4 oz (106.8 kg)   SpO2 97%   BMI 37.99 kg/m²   Physical Exam  Vitals and nursing note reviewed. Constitutional:       Appearance: Normal appearance. Musculoskeletal:      Right lower leg: No edema. Left lower leg: No edema.       Comments: Lower lumbar spine is tender to palpation. Sacroiliac joints are slightly tender to palpation sciatic notches are slightly tender to palpation. Lumbar paraspinals are not tender. Lumbar range of motion is decreased forward flexion is about 50 to 60 degrees, extension 0 to 5 degrees, decreased side to side bending. Slow gait. Patient has difficulty getting up from sitting and feels slightly unsteady when she first stands up. She needs standby assist to step up 1 step to get to the exam table   Skin:     General: Skin is warm. Comments: Deep scars on left lower leg. Neurological:      Mental Status: She is alert and oriented to person, place, and time. Deep Tendon Reflexes:      Reflex Scores:       Patellar reflexes are 0 on the right side and 0 on the left side. Achilles reflexes are 0 on the right side and 0 on the left side. Comments: Negative straight leg raise bilaterally  Toes dorsiflexion strength 4-5 over 5. Straight leg raise strength while sitting 4-5 over 5 equal bilaterally   Psychiatric:         Mood and Affect: Mood normal.         Behavior: Behavior normal.         Thought Content: Thought content normal.         Assessment:       Diagnosis Orders   1. Arthritis of lumbar spine  acetaminophen (TYLENOL) 500 MG tablet    gabapentin (NEURONTIN) 300 MG capsule    XR LUMBAR SPINE (MIN 4 VIEWS)   2. Chronic midline low back pain without sciatica     3. Severe obesity (BMI 35.0-39. 9) with comorbidity ([address]     4. Thrombocytopenia (HCC)     5. Stage 3 chronic kidney disease, unspecified whether stage 3a or 3b CKD ([facility] [address]          Plan:      No follow-ups on file. Gentle stretches while sitting  Try to walk to tolerance. Chair exercises: Touch toes while sitting. Sit up straight and stretch the arms to the ceiling, bend side to side. Try gabapentin higher dose  Take Tylenol on a regular basis for the pain  Call for pain is not improved in a month.   If the gabapentin makes her too tired can try Lyrica instead. She does not want to go to physical therapy at this point. She needs to consider doing that if not improving. Orders Placed This Encounter   Procedures    XR LUMBAR SPINE (MIN 4 VIEWS)     Standing Status:   Future     Standing Expiration Date:   6/14/2023     Order Specific Question:   Reason for exam:     Answer:   pain     Orders Placed This Encounter   Medications    acetaminophen (TYLENOL) 500 MG tablet     Sig: Take 1 tablet by mouth 4 times daily as needed for Pain     Dispense:  360 tablet     Refill:  1    gabapentin (NEURONTIN) 300 MG capsule     Sig: Take 1 capsule by mouth 2 times daily for 30 days. Intended supply: 30 days     Dispense:  60 capsule     Refill:  1      Reviewed medications and possibleside effects.        Electronically signed by Nannette Schultz MD on 6/14/2022 at 10:12 AM

## 2022-08-18 ENCOUNTER — OFFICE VISIT (OUTPATIENT)
Dept: PRIMARY CARE CLINIC | Age: 77
End: 2022-08-18
Payer: MEDICARE

## 2022-08-18 ENCOUNTER — HOSPITAL ENCOUNTER (OUTPATIENT)
Age: 77
Setting detail: SPECIMEN
Discharge: HOME | End: 2022-08-18

## 2022-08-18 VITALS
WEIGHT: 238 LBS | BODY MASS INDEX: 38.25 KG/M2 | OXYGEN SATURATION: 98 % | HEIGHT: 66 IN | HEART RATE: 51 BPM | DIASTOLIC BLOOD PRESSURE: 74 MMHG | SYSTOLIC BLOOD PRESSURE: 130 MMHG

## 2022-08-18 DIAGNOSIS — R31.9 HEMATURIA, UNSPECIFIED TYPE: ICD-10-CM

## 2022-08-18 DIAGNOSIS — N89.8 ITCHING IN THE VAGINAL AREA: Primary | ICD-10-CM

## 2022-08-18 LAB
BILIRUBIN, POC: NORMAL
BLOOD URINE, POC: NORMAL
CLARITY, POC: NORMAL
COLOR, POC: NORMAL
GLUCOSE URINE, POC: NORMAL
KETONES, POC: NORMAL
LEUKOCYTE EST, POC: NORMAL
NITRITE, POC: NORMAL
PH, POC: 5.5
PROTEIN, POC: NORMAL
SPECIFIC GRAVITY, POC: 1.02
UROBILINOGEN, POC: 0.2

## 2022-08-18 PROCEDURE — 99214 OFFICE O/P EST MOD 30 MIN: CPT | Performed by: PHYSICIAN ASSISTANT

## 2022-08-18 PROCEDURE — 1123F ACP DISCUSS/DSCN MKR DOCD: CPT | Performed by: PHYSICIAN ASSISTANT

## 2022-08-18 PROCEDURE — 81003 URINALYSIS AUTO W/O SCOPE: CPT | Performed by: PHYSICIAN ASSISTANT

## 2022-08-18 RX ORDER — OXYCODONE HYDROCHLORIDE AND ACETAMINOPHEN 5; 325 MG/1; MG/1
TABLET ORAL
COMMUNITY
Start: 2022-07-22

## 2022-08-18 RX ORDER — CLOTRIMAZOLE AND BETAMETHASONE DIPROPIONATE 10; .64 MG/G; MG/G
CREAM TOPICAL
Qty: 30 G | Refills: 0 | Status: SHIPPED | OUTPATIENT
Start: 2022-08-18

## 2022-08-18 ASSESSMENT — ENCOUNTER SYMPTOMS
COLOR CHANGE: 1
GASTROINTESTINAL NEGATIVE: 1

## 2022-08-18 NOTE — PROGRESS NOTES
[facility] Primary Care  [address]  Phone: [phone]  Fax: [phone]    Mona Pettit is a 78 y.o. female who presents today for her medical conditions/complaintsas noted below. Chief Complaint   Patient presents with    Vaginal Itching     Vaginal itching x 4 weeks and redness. Patient denies any new soaps and thinks it may be a yeast infection. No discharge that she notices         HPI:     HPI  Had an ABX for a toe and started with itching outside. Tried monistat and vagisil. No new TP or soap  Happens after urinating. Wears a pad for urination issues. Current Outpatient Medications   Medication Sig Dispense Refill    oxyCODONE-acetaminophen (PERCOCET) 5-325 MG per tablet TAKE 1 TABLET BY MOUTH EVERY 8 HOURS AS NEEDED POST OP PAIN      clotrimazole-betamethasone (LOTRISONE) 1-0.05 % cream Apply topically 2 times daily for not more than 4-5 days 30 g 0    amLODIPine (NORVASC) 5 MG tablet Take 5 mg by mouth daily      pravastatin (PRAVACHOL) 40 MG tablet Take 1 tablet by mouth daily 30 tablet 5    carvedilol (COREG) 25 MG tablet Take 1 tablet by mouth 2 times daily (with meals) 60 tablet 2    lisinopril-hydrochlorothiazide (PRINZIDE;ZESTORETIC) 20-25 MG per tablet Take 1 tablet by mouth daily 30 tablet 2    aspirin 81 MG tablet Take 81 mg by mouth daily      acetaminophen (TYLENOL) 500 MG tablet Take 1 tablet by mouth 4 times daily as needed for Pain 360 tablet 1    gabapentin (NEURONTIN) 300 MG capsule Take 1 capsule by mouth 2 times daily for 30 days. Intended supply: 30 days 60 capsule 1    melatonin (RA MELATONIN) 3 MG TABS tablet Take 1 tablet by mouth nightly as needed (insomnia) (Patient not taking: No sig reported)  3     No current facility-administered medications for this visit.      Allergies   Allergen Reactions    Aspirin Itching and Rash     Pt can only take 81mg       Subjective:      Review of Systems   Constitutional: Negative. Gastrointestinal: Negative. Genitourinary: Negative. Skin:  Positive for color change (red per ) and rash. Objective:     /74   Pulse 51   Ht 5' 6\" (1.676 m)   Wt 238 lb (108 kg)   SpO2 98%   BMI 38.41 kg/m²   Physical Exam  Vitals and nursing note reviewed. Exam conducted with a chaperone present. Constitutional:       Appearance: Normal appearance. She is obese. Cardiovascular:      Rate and Rhythm: Normal rate. Pulmonary:      Effort: Pulmonary effort is normal.      Comments: Some distress in recumbent position  Genitourinary:     Labia:         Right: No tenderness or lesion. Left: No tenderness or lesion. Comments: Vulva very red, no lesions  Lymphadenopathy:      Lower Body: No right inguinal adenopathy. No left inguinal adenopathy. Neurological:      Mental Status: She is alert. Assessment:       Diagnosis Orders   1. Itching in the vaginal area  POCT Urinalysis No Micro (Auto)           Plan:    Discussed how to use the cream  Check to see if pads or TP have changed and switch back to older one  Use Desitin for barrier protection  No follow-ups on file.     Orders Placed This Encounter   Procedures    POCT Urinalysis No Micro (Auto)       Orders Placed This Encounter   Medications    clotrimazole-betamethasone (LOTRISONE) 1-0.05 % cream     Sig: Apply topically 2 times daily for not more than 4-5 days     Dispense:  30 g     Refill:  0             Electronically signed by Nancy Bains 8/18/2022 at 10:08 AM

## 2022-08-19 LAB
CULTURE: NORMAL
SPECIMEN DESCRIPTION: NORMAL

## 2022-11-07 ENCOUNTER — OFFICE VISIT (OUTPATIENT)
Dept: PRIMARY CARE CLINIC | Age: 77
End: 2022-11-07
Payer: MEDICARE

## 2022-11-07 VITALS
DIASTOLIC BLOOD PRESSURE: 74 MMHG | WEIGHT: 243.8 LBS | SYSTOLIC BLOOD PRESSURE: 128 MMHG | HEART RATE: 69 BPM | OXYGEN SATURATION: 98 % | BODY MASS INDEX: 39.35 KG/M2

## 2022-11-07 DIAGNOSIS — I10 ESSENTIAL HYPERTENSION: ICD-10-CM

## 2022-11-07 DIAGNOSIS — N89.8 ITCHING IN THE VAGINAL AREA: Primary | ICD-10-CM

## 2022-11-07 DIAGNOSIS — R06.02 SHORTNESS OF BREATH: ICD-10-CM

## 2022-11-07 PROCEDURE — 99213 OFFICE O/P EST LOW 20 MIN: CPT | Performed by: FAMILY MEDICINE

## 2022-11-07 PROCEDURE — 1123F ACP DISCUSS/DSCN MKR DOCD: CPT | Performed by: FAMILY MEDICINE

## 2022-11-07 PROCEDURE — 3074F SYST BP LT 130 MM HG: CPT | Performed by: FAMILY MEDICINE

## 2022-11-07 PROCEDURE — 3078F DIAST BP <80 MM HG: CPT | Performed by: FAMILY MEDICINE

## 2022-11-07 RX ORDER — FLUCONAZOLE 150 MG/1
150 TABLET ORAL
Qty: 2 TABLET | Refills: 1 | Status: SHIPPED | OUTPATIENT
Start: 2022-11-07 | End: 2022-11-11

## 2022-11-07 RX ORDER — ALBUTEROL SULFATE 90 UG/1
2 AEROSOL, METERED RESPIRATORY (INHALATION) 4 TIMES DAILY PRN
Qty: 18 G | Refills: 0 | Status: SHIPPED | OUTPATIENT
Start: 2022-11-07

## 2022-11-07 RX ORDER — CLOTRIMAZOLE AND BETAMETHASONE DIPROPIONATE 10; .64 MG/G; MG/G
CREAM TOPICAL
Qty: 30 G | Refills: 1 | Status: SHIPPED | OUTPATIENT
Start: 2022-11-07
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
## 2022-11-08 DIAGNOSIS — I10 ESSENTIAL HYPERTENSION: ICD-10-CM

## 2022-11-08 DIAGNOSIS — R06.02 SHORTNESS OF BREATH: Primary | ICD-10-CM

## 2022-11-08 LAB — PRO-BNP: 1393 PG/ML

## 2022-11-08 RX ORDER — FUROSEMIDE 20 MG/1
20 TABLET ORAL DAILY
Qty: 30 TABLET | Refills: 1 | Status: SHIPPED | OUTPATIENT
Start: 2022-11-08 | End: 2022-12-08

## 2022-11-08 NOTE — RESULT ENCOUNTER NOTE
BNP blood test indicates Congestive heart failure. Start a water pill: lasix,   Check BMP in 2-4 days. Go to ER if SOB is worse  Get echo done soon.

## 2022-11-11 ENCOUNTER — HOSPITAL ENCOUNTER (OUTPATIENT)
Dept: GENERAL RADIOLOGY | Age: 77
Discharge: HOME | End: 2022-11-13
Payer: MEDICARE

## 2022-11-11 ENCOUNTER — HOSPITAL ENCOUNTER (OUTPATIENT)
Age: 77
Discharge: HOME | End: 2022-11-13
Payer: MEDICARE

## 2022-11-11 ENCOUNTER — HOSPITAL ENCOUNTER (OUTPATIENT)
Dept: NON INVASIVE DIAGNOSTICS | Age: 77
Discharge: HOME | End: 2022-11-11
Payer: MEDICARE

## 2022-11-11 DIAGNOSIS — R06.02 SHORTNESS OF BREATH: ICD-10-CM

## 2022-11-11 LAB
LV EF: 55 %
LVEF MODALITY: NORMAL

## 2022-11-11 PROCEDURE — 71046 X-RAY EXAM CHEST 2 VIEWS: CPT

## 2022-11-11 PROCEDURE — 93306 TTE W/DOPPLER COMPLETE: CPT

## 2022-11-11 PROCEDURE — 93005 ELECTROCARDIOGRAM TRACING: CPT

## 2022-11-14 NOTE — RESULT ENCOUNTER NOTE
Overall normal echocardiogram. Mild LVH: this is from Blood pressure, if the blood pressure is kept under good control, it should not get worse.    LVH ( mild thickness in the wall of the heart  )

## 2022-11-16 LAB
EKG ATRIAL RATE: 59 BPM
EKG Q-T INTERVAL: 450 MS
EKG QRS DURATION: 126 MS
EKG QTC CALCULATION (BAZETT): 430 MS
EKG R AXIS: 48 DEGREES
EKG T AXIS: 23 DEGREES
EKG VENTRICULAR RATE: 55 BPM

## 2022-11-16 PROCEDURE — 93010 ELECTROCARDIOGRAM REPORT: CPT | Performed by: INTERNAL MEDICINE

## 2022-11-16 NOTE — RESULT ENCOUNTER NOTE
EKG does show some abnormalities with electrical system with a right bundle branch block and first-degree AV block. As her pulse rate was lower than normal on EKG.   I would like her to see a cardiologist

## 2022-12-03 DIAGNOSIS — R06.02 SHORTNESS OF BREATH: ICD-10-CM

## 2022-12-03 DIAGNOSIS — M47.816 ARTHRITIS OF LUMBAR SPINE: ICD-10-CM

## 2022-12-05 RX ORDER — GABAPENTIN 300 MG/1
CAPSULE ORAL
Qty: 60 CAPSULE | Refills: 3 | Status: SHIPPED | OUTPATIENT
Start: 2022-12-05 | End: 2023-01-04

## 2022-12-05 RX ORDER — ALBUTEROL SULFATE 90 UG/1
AEROSOL, METERED RESPIRATORY (INHALATION)
Qty: 18 G | Refills: 1 | Status: SHIPPED | OUTPATIENT
Start: 2022-12-05

## 2023-05-18 ENCOUNTER — OFFICE VISIT (OUTPATIENT)
Dept: PRIMARY CARE CLINIC | Age: 78
End: 2023-05-18
Payer: MEDICARE

## 2023-05-18 VITALS
OXYGEN SATURATION: 95 % | SYSTOLIC BLOOD PRESSURE: 126 MMHG | HEART RATE: 56 BPM | DIASTOLIC BLOOD PRESSURE: 70 MMHG | BODY MASS INDEX: 41.91 KG/M2 | HEIGHT: 66 IN | WEIGHT: 260.8 LBS

## 2023-05-18 DIAGNOSIS — N18.30 STAGE 3 CHRONIC KIDNEY DISEASE, UNSPECIFIED WHETHER STAGE 3A OR 3B CKD (HCC): ICD-10-CM

## 2023-05-18 DIAGNOSIS — J98.4 RESTRICTIVE LUNG DISEASE: Chronic | ICD-10-CM

## 2023-05-18 DIAGNOSIS — I10 ESSENTIAL HYPERTENSION: Primary | ICD-10-CM

## 2023-05-18 DIAGNOSIS — E66.01 OBESITY, CLASS III, BMI 40-49.9 (MORBID OBESITY) (HCC): ICD-10-CM

## 2023-05-18 DIAGNOSIS — R06.2 WHEEZING: ICD-10-CM

## 2023-05-18 DIAGNOSIS — R06.02 SHORTNESS OF BREATH ON EXERTION: ICD-10-CM

## 2023-05-18 DIAGNOSIS — D69.6 THROMBOCYTOPENIA (HCC): ICD-10-CM

## 2023-05-18 PROCEDURE — 99214 OFFICE O/P EST MOD 30 MIN: CPT | Performed by: FAMILY MEDICINE

## 2023-05-18 PROCEDURE — 1123F ACP DISCUSS/DSCN MKR DOCD: CPT | Performed by: FAMILY MEDICINE

## 2023-05-18 PROCEDURE — 3074F SYST BP LT 130 MM HG: CPT | Performed by: FAMILY MEDICINE

## 2023-05-18 PROCEDURE — 3078F DIAST BP <80 MM HG: CPT | Performed by: FAMILY MEDICINE

## 2023-05-18 RX ORDER — FLUTICASONE FUROATE AND VILANTEROL 200; 25 UG/1; UG/1
1 POWDER RESPIRATORY (INHALATION) DAILY
Qty: 1 EACH | Refills: 5 | Status: SHIPPED | OUTPATIENT
Start: 2023-05-18 | End: 2023-06-17

## 2023-05-18 SDOH — ECONOMIC STABILITY: HOUSING INSECURITY
IN THE LAST 12 MONTHS, WAS THERE A TIME WHEN YOU DID NOT HAVE A STEADY PLACE TO SLEEP OR SLEPT IN A SHELTER (INCLUDING NOW)?: NO

## 2023-05-18 SDOH — ECONOMIC STABILITY: INCOME INSECURITY: HOW HARD IS IT FOR YOU TO PAY FOR THE VERY BASICS LIKE FOOD, HOUSING, MEDICAL CARE, AND HEATING?: NOT HARD AT ALL

## 2023-05-18 SDOH — ECONOMIC STABILITY: FOOD INSECURITY: WITHIN THE PAST 12 MONTHS, THE FOOD YOU BOUGHT JUST DIDN'T LAST AND YOU DIDN'T HAVE MONEY TO GET MORE.: NEVER TRUE

## 2023-05-18 SDOH — ECONOMIC STABILITY: FOOD INSECURITY: WITHIN THE PAST 12 MONTHS, YOU WORRIED THAT YOUR FOOD WOULD RUN OUT BEFORE YOU GOT MONEY TO BUY MORE.: NEVER TRUE

## 2023-05-18 ASSESSMENT — PATIENT HEALTH QUESTIONNAIRE - PHQ9
2. FEELING DOWN, DEPRESSED OR HOPELESS: 0
SUM OF ALL RESPONSES TO PHQ QUESTIONS 1-9: 0
1. LITTLE INTEREST OR PLEASURE IN DOING THINGS: 0
SUM OF ALL RESPONSES TO PHQ9 QUESTIONS 1 & 2: 0
SUM OF ALL RESPONSES TO PHQ QUESTIONS 1-9: 0
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
## 2023-05-22 DIAGNOSIS — M47.816 ARTHRITIS OF LUMBAR SPINE: ICD-10-CM

## 2023-05-22 RX ORDER — GABAPENTIN 300 MG/1
CAPSULE ORAL
Qty: 30 CAPSULE | Refills: 3 | Status: SHIPPED | OUTPATIENT
Start: 2023-05-22 | End: 2023-09-25

## 2023-05-22 RX ORDER — GABAPENTIN 300 MG/1
CAPSULE ORAL
Qty: 60 CAPSULE | Refills: 0 | OUTPATIENT
Start: 2023-05-22

## 2023-07-11 DIAGNOSIS — I10 ESSENTIAL HYPERTENSION: ICD-10-CM

## 2023-07-11 NOTE — TELEPHONE ENCOUNTER
----- Message from Jose Martin Mandujano sent at 7/11/2023  2:40 PM EDT -----  Subject: Refill Request    QUESTIONS  Name of Medication? lisinopril-hydrochlorothiazide (PRINZIDE;ZESTORETIC)   20-25 MG per tablet  Patient-reported dosage and instructions? 20-25 mg / 1/2 tablet a day  How many days do you have left? 5  Preferred Pharmacy? [facility]  Pharmacy phone number (if available)? [phone]  ---------------------------------------------------------------------------  --------------  CALL BACK INFO  What is the best way for the office to contact you? OK to leave message on   voicemail  Preferred Call Back Phone Number? [phone]  ---------------------------------------------------------------------------  --------------  SCRIPT ANSWERS  Relationship to Patient?  Self

## 2023-07-12 RX ORDER — LISINOPRIL AND HYDROCHLOROTHIAZIDE 25; 20 MG/1; MG/1
1 TABLET ORAL DAILY
Qty: 30 TABLET | Refills: 2 | OUTPATIENT
Start: 2023-07-12

## 2023-07-12 RX ORDER — LISINOPRIL AND HYDROCHLOROTHIAZIDE 12.5; 1 MG/1; MG/1
1 TABLET ORAL DAILY
Qty: 90 TABLET | Refills: 1 | Status: SHIPPED | OUTPATIENT
Start: 2023-07-12

## 2023-08-17 ENCOUNTER — OFFICE VISIT (OUTPATIENT)
Dept: PRIMARY CARE CLINIC | Age: 78
End: 2023-08-17
Payer: MEDICARE

## 2023-08-17 VITALS
OXYGEN SATURATION: 95 % | HEART RATE: 59 BPM | HEIGHT: 66 IN | DIASTOLIC BLOOD PRESSURE: 80 MMHG | WEIGHT: 259.4 LBS | SYSTOLIC BLOOD PRESSURE: 130 MMHG | BODY MASS INDEX: 41.69 KG/M2

## 2023-08-17 DIAGNOSIS — I10 ESSENTIAL HYPERTENSION: Primary | ICD-10-CM

## 2023-08-17 DIAGNOSIS — M47.816 ARTHRITIS OF LUMBAR SPINE: ICD-10-CM

## 2023-08-17 DIAGNOSIS — J45.30 MILD PERSISTENT ASTHMA WITHOUT COMPLICATION: ICD-10-CM

## 2023-08-17 DIAGNOSIS — Z12.31 BREAST CANCER SCREENING BY MAMMOGRAM: ICD-10-CM

## 2023-08-17 PROCEDURE — 3079F DIAST BP 80-89 MM HG: CPT | Performed by: FAMILY MEDICINE

## 2023-08-17 PROCEDURE — 1123F ACP DISCUSS/DSCN MKR DOCD: CPT | Performed by: FAMILY MEDICINE

## 2023-08-17 PROCEDURE — 3075F SYST BP GE 130 - 139MM HG: CPT | Performed by: FAMILY MEDICINE

## 2023-08-17 PROCEDURE — 99214 OFFICE O/P EST MOD 30 MIN: CPT | Performed by: FAMILY MEDICINE

## 2023-08-17 RX ORDER — FLUTICASONE PROPIONATE AND SALMETEROL 100; 50 UG/1; UG/1
1 POWDER RESPIRATORY (INHALATION) EVERY 12 HOURS
Qty: 1 EACH | Refills: 5 | Status: SHIPPED | OUTPATIENT
Start: 2023-08-17 | End: 2023-09-16

## 2023-08-17 RX ORDER — GABAPENTIN 300 MG/1
300 CAPSULE ORAL 2 TIMES DAILY
Qty: 60 CAPSULE | Refills: 2 | Status: SHIPPED | OUTPATIENT
Start: 2023-08-17 | End: 2023-08-18 | Stop reason: SDUPTHER

## 2023-08-17 ASSESSMENT — ENCOUNTER SYMPTOMS
SHORTNESS OF BREATH: 1
WHEEZING: 1
BACK PAIN: 1
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
## 2023-08-18 DIAGNOSIS — M47.816 ARTHRITIS OF LUMBAR SPINE: ICD-10-CM

## 2023-08-18 RX ORDER — GABAPENTIN 300 MG/1
300 CAPSULE ORAL 2 TIMES DAILY
Qty: 60 CAPSULE | Refills: 2 | Status: SHIPPED | OUTPATIENT
Start: 2023-08-18 | End: 2023-11-16

## 2023-08-30 DIAGNOSIS — N18.30 STAGE 3 CHRONIC KIDNEY DISEASE, UNSPECIFIED WHETHER STAGE 3A OR 3B CKD (HCC): ICD-10-CM

## 2023-08-30 DIAGNOSIS — D69.6 THROMBOCYTOPENIA (HCC): ICD-10-CM

## 2023-08-30 DIAGNOSIS — I10 ESSENTIAL HYPERTENSION: ICD-10-CM

## 2023-08-30 LAB
ABSOLUTE IMMATURE GRANULOCYTE: <0.03 K/UL (ref 0–0.3)
BASOPHILS ABSOLUTE: 0.06 K/UL (ref 0–0.2)
BASOPHILS RELATIVE PERCENT: 1 % (ref 0–2)
EOSINOPHILS ABSOLUTE: 0.42 K/UL (ref 0–0.44)
EOSINOPHILS RELATIVE PERCENT: 6 % (ref 1–4)
HCT VFR BLD CALC: 44.5 % (ref 36.3–47.1)
HEMOGLOBIN: 13.7 G/DL (ref 11.9–15.1)
IMMATURE GRANULOCYTES: 0 %
LYMPHOCYTES ABSOLUTE: 2.03 K/UL (ref 1.1–3.7)
LYMPHOCYTES RELATIVE PERCENT: 29 % (ref 24–43)
MCH RBC QN AUTO: 30.8 PG (ref 25.2–33.5)
MCHC RBC AUTO-ENTMCNC: 30.8 G/DL (ref 28.4–34.8)
MCV RBC AUTO: 100 FL (ref 82.6–102.9)
MONOCYTES ABSOLUTE: 0.56 K/UL (ref 0.1–1.2)
MONOCYTES RELATIVE PERCENT: 8 % (ref 3–12)
NEUTROPHILS ABSOLUTE: 3.88 K/UL (ref 1.5–8.1)
NEUTROPHILS RELATIVE PERCENT: 56 % (ref 36–65)
NRBC AUTOMATED: 0 PER 100 WBC
PDW BLD-RTO: 13.2 % (ref 11.8–14.4)
PLATELET # BLD: 184 K/UL (ref 138–453)
PMV BLD AUTO: 10.2 FL (ref 8.1–13.5)
RBC # BLD: 4.45 M/UL (ref 3.95–5.11)
WBC # BLD: 7 K/UL (ref 3.5–11.3)

## 2023-08-31 ENCOUNTER — TELEPHONE (OUTPATIENT)
Dept: PRIMARY CARE CLINIC | Age: 78
End: 2023-08-31

## 2023-08-31 DIAGNOSIS — M54.50 CHRONIC MIDLINE LOW BACK PAIN WITHOUT SCIATICA: ICD-10-CM

## 2023-08-31 DIAGNOSIS — G89.29 CHRONIC MIDLINE LOW BACK PAIN WITHOUT SCIATICA: ICD-10-CM

## 2023-08-31 DIAGNOSIS — I10 ESSENTIAL HYPERTENSION: Primary | ICD-10-CM

## 2023-08-31 DIAGNOSIS — M47.816 ARTHRITIS OF LUMBAR SPINE: Primary | ICD-10-CM

## 2023-08-31 PROBLEM — N18.9 CKD (CHRONIC KIDNEY DISEASE): Status: ACTIVE | Noted: 2018-12-11

## 2023-08-31 LAB
ALBUMIN SERPL-MCNC: 3.7 G/DL (ref 3.5–5.2)
ALBUMIN/GLOBULIN RATIO: 1.2 (ref 1–2.5)
ALP BLD-CCNC: 80 U/L (ref 35–104)
ALT SERPL-CCNC: 9 U/L (ref 5–33)
ANION GAP SERPL CALCULATED.3IONS-SCNC: 11 MMOL/L (ref 9–17)
AST SERPL-CCNC: 13 U/L
BILIRUB SERPL-MCNC: 0.4 MG/DL (ref 0.3–1.2)
BUN BLDV-MCNC: 31 MG/DL (ref 8–23)
CALCIUM SERPL-MCNC: 10.2 MG/DL (ref 8.6–10.4)
CHLORIDE BLD-SCNC: 105 MMOL/L (ref 98–107)
CHOLESTEROL/HDL RATIO: 3.3
CHOLESTEROL: 137 MG/DL
CO2: 28 MMOL/L (ref 20–31)
CREAT SERPL-MCNC: 1.6 MG/DL (ref 0.5–0.9)
GFR SERPL CREATININE-BSD FRML MDRD: 33 ML/MIN/1.73M2
GLUCOSE FASTING: 106 MG/DL (ref 70–99)
HDLC SERPL-MCNC: 41 MG/DL
LDL CHOLESTEROL: 83 MG/DL (ref 0–130)
POTASSIUM SERPL-SCNC: 4.3 MMOL/L (ref 3.7–5.3)
SODIUM BLD-SCNC: 144 MMOL/L (ref 135–144)
TOTAL PROTEIN: 6.7 G/DL (ref 6.4–8.3)
TRIGL SERPL-MCNC: 63 MG/DL

## 2023-08-31 RX ORDER — LISINOPRIL 10 MG/1
10 TABLET ORAL DAILY
Qty: 90 TABLET | Refills: 1 | Status: SHIPPED | OUTPATIENT
Start: 2023-08-31

## 2023-08-31 NOTE — TELEPHONE ENCOUNTER
Pt would like a referral to pain management, pt does not have a preference but would like it to be in [place].      Please advise

## 2023-08-31 NOTE — RESULT ENCOUNTER NOTE
Lipids good  Kidney issues still but not as bad as last year. Should consider changing the lisinopril/HCTZ to plain lisinopril. Since diuretics can cause stress on kidneys  If this is ok with her I will change it.

## 2023-09-07 DIAGNOSIS — M47.816 ARTHRITIS OF LUMBAR SPINE: ICD-10-CM

## 2023-09-08 RX ORDER — GABAPENTIN 300 MG/1
CAPSULE ORAL
Qty: 30 CAPSULE | Refills: 5 | Status: SHIPPED | OUTPATIENT
Start: 2023-09-08 | End: 2023-09-13 | Stop reason: SDUPTHER

## 2023-09-08 NOTE — TELEPHONE ENCOUNTER
LAST VISIT:   8/17/2023     Future Appointments   Date Time Provider [address]   10/2/2023  9:45 AM Sree Orosco, DO [address] Michelle Phlegm

## 2023-09-11 DIAGNOSIS — M47.816 ARTHRITIS OF LUMBAR SPINE: ICD-10-CM

## 2023-09-12 RX ORDER — GABAPENTIN 300 MG/1
CAPSULE ORAL
Qty: 30 CAPSULE | Refills: 0 | OUTPATIENT
Start: 2023-09-12 | End: 2023-10-12

## 2023-09-13 RX ORDER — GABAPENTIN 300 MG/1
300 CAPSULE ORAL DAILY
Qty: 30 CAPSULE | Refills: 5 | Status: SHIPPED | OUTPATIENT
Start: 2023-09-13 | End: 2024-03-11

## 2023-09-19 RX ORDER — PRAVASTATIN SODIUM 40 MG
40 TABLET ORAL DAILY
Qty: 30 TABLET | Refills: 5 | Status: SHIPPED | OUTPATIENT
Start: 2023-09-19

## 2023-10-09 ENCOUNTER — TELEPHONE (OUTPATIENT)
Dept: PRIMARY CARE CLINIC | Age: 78
End: 2023-10-09

## 2023-10-09 NOTE — TELEPHONE ENCOUNTER
Pt has had a  constant runny nose for over a week. Has been using Claritin for a week now with no help. Asking what you recommend? No other symptoms, per pt. [facility] listed, if anything sent in.

## 2023-10-25 ENCOUNTER — HOSPITAL ENCOUNTER (OUTPATIENT)
Dept: PAIN MANAGEMENT | Age: 78
Discharge: HOME | End: 2023-10-25
Payer: MEDICARE

## 2023-10-25 VITALS — TEMPERATURE: 97.3 F | WEIGHT: 259 LBS | BODY MASS INDEX: 43.15 KG/M2 | HEIGHT: 65 IN

## 2023-10-25 DIAGNOSIS — M51.36 LUMBAR DEGENERATIVE DISC DISEASE: ICD-10-CM

## 2023-10-25 DIAGNOSIS — M47.817 LUMBOSACRAL SPONDYLOSIS WITHOUT MYELOPATHY: ICD-10-CM

## 2023-10-25 DIAGNOSIS — E66.01 CLASS 3 SEVERE OBESITY WITH BODY MASS INDEX (BMI) OF 40.0 TO 44.9 IN ADULT, UNSPECIFIED OBESITY TYPE, UNSPECIFIED WHETHER SERIOUS COMORBIDITY PRESENT (HCC): ICD-10-CM

## 2023-10-25 DIAGNOSIS — M54.16 LUMBAR RADICULOPATHY: Primary | ICD-10-CM

## 2023-10-25 PROCEDURE — 99204 OFFICE O/P NEW MOD 45 MIN: CPT | Performed by: STUDENT IN AN ORGANIZED HEALTH CARE EDUCATION/TRAINING PROGRAM

## 2023-10-25 PROCEDURE — 99203 OFFICE O/P NEW LOW 30 MIN: CPT

## 2023-10-25 ASSESSMENT — PAIN SCALES - GENERAL: PAINLEVEL_OUTOF10: 0
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
## 2023-10-26 ENCOUNTER — HOSPITAL ENCOUNTER (OUTPATIENT)
Dept: GENERAL RADIOLOGY | Age: 78
Discharge: HOME | End: 2023-10-28
Attending: STUDENT IN AN ORGANIZED HEALTH CARE EDUCATION/TRAINING PROGRAM
Payer: MEDICARE

## 2023-10-26 ENCOUNTER — HOSPITAL ENCOUNTER (OUTPATIENT)
Dept: CT IMAGING | Age: 78
Discharge: HOME | End: 2023-10-28
Attending: STUDENT IN AN ORGANIZED HEALTH CARE EDUCATION/TRAINING PROGRAM
Payer: MEDICARE

## 2023-10-26 ENCOUNTER — HOSPITAL ENCOUNTER (OUTPATIENT)
Age: 78
Discharge: HOME | End: 2023-10-28
Attending: STUDENT IN AN ORGANIZED HEALTH CARE EDUCATION/TRAINING PROGRAM
Payer: MEDICARE

## 2023-10-26 DIAGNOSIS — M54.16 LUMBAR RADICULOPATHY: ICD-10-CM

## 2023-10-26 PROCEDURE — 72120 X-RAY BEND ONLY L-S SPINE: CPT

## 2023-10-26 PROCEDURE — 72131 CT LUMBAR SPINE W/O DYE: CPT

## 2023-11-01 ENCOUNTER — HOSPITAL ENCOUNTER (OUTPATIENT)
Dept: PAIN MANAGEMENT | Age: 78
Discharge: HOME | End: 2023-11-01
Payer: MEDICARE

## 2023-11-01 VITALS — RESPIRATION RATE: 20 BRPM | TEMPERATURE: 97.3 F | WEIGHT: 259 LBS | BODY MASS INDEX: 43.15 KG/M2 | HEIGHT: 65 IN

## 2023-11-01 DIAGNOSIS — M51.36 LUMBAR DEGENERATIVE DISC DISEASE: ICD-10-CM

## 2023-11-01 DIAGNOSIS — M47.817 LUMBOSACRAL SPONDYLOSIS WITHOUT MYELOPATHY: Primary | ICD-10-CM

## 2023-11-01 DIAGNOSIS — E66.01 CLASS 3 SEVERE OBESITY WITH BODY MASS INDEX (BMI) OF 40.0 TO 44.9 IN ADULT, UNSPECIFIED OBESITY TYPE, UNSPECIFIED WHETHER SERIOUS COMORBIDITY PRESENT (HCC): ICD-10-CM

## 2023-11-01 PROCEDURE — 99213 OFFICE O/P EST LOW 20 MIN: CPT

## 2023-11-01 PROCEDURE — 99214 OFFICE O/P EST MOD 30 MIN: CPT | Performed by: STUDENT IN AN ORGANIZED HEALTH CARE EDUCATION/TRAINING PROGRAM

## 2023-11-01 NOTE — PROGRESS NOTES
Chronic Pain Clinic Note     Encounter Date: 11/1/2023     SUBJECTIVE:  Chief Complaint   Patient presents with    Back Pain     C/T imaging       History of Present Illness:   Chadd Marks is a 70 y.o. female who presents with back pain    Medication Refill: n/a    Current Complaints of Pain:   Location: low back   Radiation: None  Severity: moderate  Pain Numerical Score - 4   Average: 10 if walking - 3 if NOT walking   Highest: 10  Lowest: 3  Character/Quality: Complains of pain that is burning  Timing: Intermittent  Associated symptoms: none  Numbness: no  Weakness: yes, legs  Exacerbating factors: walking  Alleviating factors:  sitting  Length of time pain has been present: Started about 2 years ago  Inciting event/injury: no  Bowel/Bladder incontinence: no  Falls: no  Physical Therapy: pt states had PT at [facility] about 2 years ago    History of Interventions:   Surgery: No previous lumbar/cervical surgeries  Injections: None    Imaging:    Lumbar CT 10/30/2023    FINDINGS:  Vertebral Body Height: No fracture. Endplate sclerosis at the opposing  endplates of H1-1. Anterior marginal osteophytes throughout the lumbar  spine. .     Alignment: Straightening of the lumbar lordosis. Disc spaces: Mild disc space height loss L3-4 and L5-S1. Severe disc space  height loss with vacuum disc phenomena at L4-5. Severe spinal canal stenosis  at L4-5 secondary to circumferential disc bulge and redundancy of ligamentum  flavum on background congenital spinal canal stenosis. Posterior Elements: Congenital small canal stenosis on the basis of short  pedicles. Facet degenerative change throughout the lumbar spine most  significant at L3-4. Erin Smith Soft Tissues: Atherosclerotic calcifications of the aorta ectatic infrarenal  abdominal aorta measuring 2.7 cm. Past Medical History:   Diagnosis Date    Atrial fibrillation ([address]) 10/31/2017    New onset atrial fibrillation rate well controlled.     CAD (coronary artery

## 2023-11-10 ENCOUNTER — OFFICE VISIT (OUTPATIENT)
Dept: PRIMARY CARE CLINIC | Age: 78
End: 2023-11-10

## 2023-11-10 VITALS
SYSTOLIC BLOOD PRESSURE: 130 MMHG | OXYGEN SATURATION: 98 % | WEIGHT: 271.8 LBS | BODY MASS INDEX: 45.23 KG/M2 | HEART RATE: 57 BPM | DIASTOLIC BLOOD PRESSURE: 80 MMHG | TEMPERATURE: 98.7 F

## 2023-11-10 DIAGNOSIS — R06.02 SOB (SHORTNESS OF BREATH): ICD-10-CM

## 2023-11-10 DIAGNOSIS — J44.9 CHRONIC OBSTRUCTIVE PULMONARY DISEASE, UNSPECIFIED COPD TYPE (HCC): ICD-10-CM

## 2023-11-10 DIAGNOSIS — R06.02 SOB (SHORTNESS OF BREATH): Primary | ICD-10-CM

## 2023-11-10 DIAGNOSIS — R06.2 WHEEZING: ICD-10-CM

## 2023-11-10 LAB
ABSOLUTE IMMATURE GRANULOCYTE: <0.03 K/UL (ref 0–0.3)
BASOPHILS ABSOLUTE: 0.04 K/UL (ref 0–0.2)
BASOPHILS RELATIVE PERCENT: 1 % (ref 0–2)
EOSINOPHILS ABSOLUTE: 0.23 K/UL (ref 0–0.44)
EOSINOPHILS RELATIVE PERCENT: 4 % (ref 1–4)
HCT VFR BLD CALC: 43.3 % (ref 36.3–47.1)
HEMOGLOBIN: 12.4 G/DL (ref 11.9–15.1)
IMMATURE GRANULOCYTES: 0 %
LYMPHOCYTES ABSOLUTE: 1.22 K/UL (ref 1.1–3.7)
LYMPHOCYTES RELATIVE PERCENT: 19 % (ref 24–43)
MCH RBC QN AUTO: 30.2 PG (ref 25.2–33.5)
MCHC RBC AUTO-ENTMCNC: 28.6 G/DL (ref 28.4–34.8)
MCV RBC AUTO: 105.6 FL (ref 82.6–102.9)
MONOCYTES ABSOLUTE: 0.75 K/UL (ref 0.1–1.2)
MONOCYTES RELATIVE PERCENT: 12 % (ref 3–12)
NEUTROPHILS ABSOLUTE: 4.28 K/UL (ref 1.5–8.1)
NEUTROPHILS RELATIVE PERCENT: 64 % (ref 36–65)
NRBC AUTOMATED: 0 PER 100 WBC
PDW BLD-RTO: 15 % (ref 11.8–14.4)
PLATELET # BLD: 173 K/UL (ref 138–453)
PMV BLD AUTO: 10.1 FL (ref 8.1–13.5)
PRO-BNP: 6042 PG/ML
RBC # BLD: 4.1 M/UL (ref 3.95–5.11)
RBC # BLD: ABNORMAL 10*6/UL
RBC # BLD: ABNORMAL 10*6/UL
WBC # BLD: 6.5 K/UL (ref 3.5–11.3)

## 2023-11-10 RX ORDER — FUROSEMIDE 20 MG/1
20 TABLET ORAL DAILY
Qty: 30 TABLET | Refills: 0 | Status: SHIPPED | OUTPATIENT
Start: 2023-11-10 | End: 2023-12-10

## 2023-11-10 ASSESSMENT — ENCOUNTER SYMPTOMS: SHORTNESS OF BREATH: 1

## 2023-11-10 NOTE — PROGRESS NOTES
María Jain is a 70 y.o. femalewho presents today for her medical conditions/complaints as noted below. Chief Complaint   Patient presents with    Nasal Congestion     Pt is c/o sx x2 weeks ago. Shortness of Breath         HPI:     HPI  Using albuterol   Some coughing  Ill x 2 weeks  Sneezing. No ST , no Ear pain  Clear rhinorrhea  Clear mucous with the coughing  No fever or chills  Takes flonase, claritin   + SOB  Legs more swollen since off water pill    Current Outpatient Medications   Medication Sig Dispense Refill    furosemide (LASIX) 20 MG tablet Take 1 tablet by mouth daily 30 tablet 0    pravastatin (PRAVACHOL) 40 MG tablet Take 1 tablet by mouth daily 30 tablet 5    gabapentin (NEURONTIN) 300 MG capsule Take 1 capsule by mouth daily for 180 days. 30 capsule 5    lisinopril (PRINIVIL;ZESTRIL) 10 MG tablet Take 1 tablet by mouth daily 90 tablet 1    fluticasone-salmeterol (ADVAIR DISKUS) 100-50 MCG/ACT AEPB diskus inhaler Inhale 1 puff into the lungs in the morning and 1 puff in the evening. 1 each 5    albuterol sulfate HFA (PROVENTIL;VENTOLIN;PROAIR) 108 (90 Base) MCG/ACT inhaler INHALE 2 PUFFS BY MOUTH 4 TIMES DAILY AS NEEDED FOR WHEEZING 18 g 1    clotrimazole-betamethasone (LOTRISONE) 1-0.05 % cream Apply topically 2 times daily for not more than 4-5 days 30 g 1    acetaminophen (TYLENOL) 500 MG tablet Take 1 tablet by mouth 4 times daily as needed for Pain 360 tablet 1    carvedilol (COREG) 25 MG tablet Take 1 tablet by mouth 2 times daily (with meals) 60 tablet 2    aspirin 81 MG tablet Take 1 tablet by mouth daily       No current facility-administered medications for this visit. Allergies   Allergen Reactions    Amlodipine Other (See Comments)     Ankle and leg swelling    Aspirin Itching and Rash     Pt can only take 81mg       Subjective:     Review of Systems   Constitutional:  Positive for fatigue. Respiratory:  Positive for shortness of breath.         Objective:     BP

## 2023-11-13 DIAGNOSIS — R06.02 SOB (SHORTNESS OF BREATH): Primary | ICD-10-CM

## 2023-11-13 DIAGNOSIS — I50.9 ACUTE ON CHRONIC CONGESTIVE HEART FAILURE, UNSPECIFIED HEART FAILURE TYPE (HCC): ICD-10-CM

## 2023-11-13 NOTE — RESULT ENCOUNTER NOTE
BNP is high, which indicates CHF.  Is the lasix helping ?  Needs an EKG and echocardiogram: she may want to get this with her cardiologist or at [facility]: orders in.   Needs to see her cardiologist

## 2023-11-13 NOTE — PROGRESS NOTES
On preop call sts tested positive for covid on Friday, Nov 10th. Canceled and sts will call office now and reschedule.

## 2023-11-14 ENCOUNTER — HOSPITAL ENCOUNTER (OUTPATIENT)
Dept: PAIN MANAGEMENT | Facility: CLINIC | Age: 78
Discharge: HOME | End: 2023-11-14

## 2023-11-20 ENCOUNTER — CARE COORDINATION (OUTPATIENT)
Dept: CARE COORDINATION | Age: 78
End: 2023-11-20

## 2023-11-20 ENCOUNTER — TELEPHONE (OUTPATIENT)
Dept: PRIMARY CARE CLINIC | Age: 78
End: 2023-11-20

## 2023-11-20 RX ORDER — HYDRALAZINE HYDROCHLORIDE 25 MG/1
25 TABLET, FILM COATED ORAL EVERY 12 HOURS SCHEDULED
Qty: 60 TABLET | Refills: 0 | COMMUNITY
Start: 2023-11-17 | End: 2023-12-17

## 2023-11-20 NOTE — TELEPHONE ENCOUNTER
Care Transitions Initial Follow Up Call    Outreach made within 2 business days of discharge: Yes    Patient: Mi Lemos Patient : 1945   MRN: [id number]  Reason for Admission: There are no discharge diagnoses documented for the most recent discharge. Discharge Date: 17       Spoke with: patient    Discharge department/facility: [facility] Interactive Patient Contact:  Was patient able to fill all prescriptions: Yes  Was patient instructed to bring all medications to the follow-up visit: Yes  Is patient taking all medications as directed in the discharge summary? Yes  Does patient understand their discharge instructions: Yes  Does patient have questions or concerns that need addressed prior to 7-14 day follow up office visit: no    Scheduled appointment with PCP within 7-14 days    Follow Up  No future appointments.     Neil Garcia MA

## 2023-11-20 NOTE — CARE COORDINATION
Admission 11/14-11/17 at [facility] for acute on chronic CHF, COPD exacerbation. Was prescribed Eliquis for paroxysmal atrial fib, lisinopril was stopped, prescribed hydralazine instead. She is supposed to follow up with Dr. Kieran Owen (no appt scheduled yet) and was referred to [facility]. She was ordered oxygen for home-from [facility], to be followed at home by [facility]. Attempted to call patient for hospital follow up, enroll in care management, left VM message asking for return call. Will call again tomorrow. No future appointments.

## 2023-11-21 ENCOUNTER — TELEPHONE (OUTPATIENT)
Dept: PRIMARY CARE CLINIC | Age: 78
End: 2023-11-21

## 2023-11-21 DIAGNOSIS — E87.6 HYPOKALEMIA: Primary | ICD-10-CM

## 2023-11-21 DIAGNOSIS — N18.30 STAGE 3 CHRONIC KIDNEY DISEASE, UNSPECIFIED WHETHER STAGE 3A OR 3B CKD (HCC): ICD-10-CM

## 2023-11-21 SDOH — ECONOMIC STABILITY: HOUSING INSECURITY: IN THE LAST 12 MONTHS, HOW MANY PLACES HAVE YOU LIVED?: 1

## 2023-11-21 SDOH — ECONOMIC STABILITY: INCOME INSECURITY: IN THE LAST 12 MONTHS, WAS THERE A TIME WHEN YOU WERE NOT ABLE TO PAY THE MORTGAGE OR RENT ON TIME?: NO

## 2023-11-21 SDOH — HEALTH STABILITY: PHYSICAL HEALTH: ON AVERAGE, HOW MANY MINUTES DO YOU ENGAGE IN EXERCISE AT THIS LEVEL?: 10 MIN

## 2023-11-21 SDOH — HEALTH STABILITY: PHYSICAL HEALTH: ON AVERAGE, HOW MANY DAYS PER WEEK DO YOU ENGAGE IN MODERATE TO STRENUOUS EXERCISE (LIKE A BRISK WALK)?: 3 DAYS

## 2023-11-21 SDOH — ECONOMIC STABILITY: TRANSPORTATION INSECURITY
IN THE PAST 12 MONTHS, HAS THE LACK OF TRANSPORTATION KEPT YOU FROM MEDICAL APPOINTMENTS OR FROM GETTING MEDICATIONS?: NO

## 2023-11-21 SDOH — ECONOMIC STABILITY: TRANSPORTATION INSECURITY
IN THE PAST 12 MONTHS, HAS LACK OF TRANSPORTATION KEPT YOU FROM MEETINGS, WORK, OR FROM GETTING THINGS NEEDED FOR DAILY LIVING?: NO

## 2023-11-21 ASSESSMENT — SOCIAL DETERMINANTS OF HEALTH (SDOH)
HOW OFTEN DO YOU ATTEND CHURCH OR RELIGIOUS SERVICES?: 1 TO 4 TIMES PER YEAR
HOW OFTEN DO YOU GET TOGETHER WITH FRIENDS OR RELATIVES?: TWICE A WEEK
IN A TYPICAL WEEK, HOW MANY TIMES DO YOU TALK ON THE PHONE WITH FAMILY, FRIENDS, OR NEIGHBORS?: THREE TIMES A WEEK
HOW OFTEN DO YOU ATTENT MEETINGS OF THE CLUB OR ORGANIZATION YOU BELONG TO?: NEVER
DO YOU BELONG TO ANY CLUBS OR ORGANIZATIONS SUCH AS CHURCH GROUPS UNIONS, FRATERNAL OR ATHLETIC GROUPS, OR SCHOOL GROUPS?: NO

## 2023-11-21 ASSESSMENT — ENCOUNTER SYMPTOMS: DYSPNEA ASSOCIATED WITH: EXERTION

## 2023-11-21 ASSESSMENT — LIFESTYLE VARIABLES: HOW OFTEN DO YOU HAVE A DRINK CONTAINING ALCOHOL: NEVER

## 2023-11-21 NOTE — CARE COORDINATION
Staff member from [facility] office called. They spoke with patient a few months ago and she would not schedule an appt. They require a PFT first if will be seen by a provider at a pulm office other than their [address] [place] [address] office (they could do the PFT at [facility] while there for appt) and patient did not want to go to [facility] and did not want to get a PFT done. PCP needs to order PFT if she does want to see pulmonology.

## 2023-11-21 NOTE — CARE COORDINATION
Care Transitions Initial Follow Up Call    Call within 2 business days of discharge: Yes     Patient: Allie Ramírez Patient : 1945 MRN: [id number]    Last Discharge [address]      Date Complaint Diagnosis Description Type Department Provider    17  Acute on chronic CHF, COPD exacerbation Admission (Discharged) SABINA Vallejo DPM            RARS: No data recorded     Spoke with: patient    Discharge department/facility: [facility]    Non-face-to-face services provided:  Education of patient/family/caregiver/guardian to support self-management-reviewed instructions  Assessment and support for treatment adherence and medication management-reviewed medications  Establishment or re-establishment of referrals-checked on pulm referral (was ordered in May)    Follow Up  Future Appointments   Date Time Provider [facility] [address]   2023  8:30 AM Emerald Lockhart, [address] [place] [address]     Ambulatory Care Coordination Note  2023    Patient Current Location:  Home: [address] [place] [address]    ACM contacted the patient by telephone. Verified name and  with patient as identifiers. Provided introduction to self, and explanation of the ACM role. Hospital follow up. Spoke with patient, she is accepting of care management calls. She has COPD, CHF, chronic low back pain, CAD with stent, HTN. COPD- is wearing the oxygen prescribed at 2 lpm ATC. Has a concentrator and a portable tank she stated is too heavy. Doesn't have a pulse oximeter but stated not dyspneic since wearing oxygen. Has had some blood when blowing nose, was also started on Eliquis. PCP referred to pulm in May but patient denied that, no appt was scheduled. She is agreeable to see one. Writer called Dr. Lashon Galvez office, left VM message asking  to call patient to schedule appt. Has Advair but hasn't been using it every day.  Instructed she needs to use twice a day every day regardless if feels short of

## 2023-11-21 NOTE — TELEPHONE ENCOUNTER
Mohsen Bennett today stating she seen Chanel Gamez today at a nurse visit and pt refused to get her blood drawn , Stevie Duque needs to know when Dr. Leanne Tejeda will need the BW and what to do if pt continues to refuse       Call back number [phone]

## 2023-11-22 ENCOUNTER — TELEPHONE (OUTPATIENT)
Dept: PRIMARY CARE CLINIC | Age: 78
End: 2023-11-22

## 2023-11-22 DIAGNOSIS — I50.9 CHRONIC CONGESTIVE HEART FAILURE, UNSPECIFIED HEART FAILURE TYPE (HCC): Chronic | ICD-10-CM

## 2023-11-22 DIAGNOSIS — J44.9 CHRONIC OBSTRUCTIVE PULMONARY DISEASE, UNSPECIFIED COPD TYPE (HCC): Primary | ICD-10-CM

## 2023-11-22 NOTE — TELEPHONE ENCOUNTER
Need electrolytes and BUN/Creatinine drawn again. She can do that at the home nurse or when she goes to see her cardiologist at [facility]. Fax orders to [facility] also.

## 2023-11-22 NOTE — TELEPHONE ENCOUNTER
See care coordinator's note  Patient needs order to add humidity to nasal O2 fax to [facility]  Fax order  Encourage patient to get PFT done

## 2023-11-27 ENCOUNTER — OFFICE VISIT (OUTPATIENT)
Dept: PRIMARY CARE CLINIC | Age: 78
End: 2023-11-27

## 2023-11-27 VITALS
OXYGEN SATURATION: 96 % | HEIGHT: 65 IN | DIASTOLIC BLOOD PRESSURE: 80 MMHG | BODY MASS INDEX: 44.48 KG/M2 | HEART RATE: 59 BPM | SYSTOLIC BLOOD PRESSURE: 132 MMHG | WEIGHT: 267 LBS

## 2023-11-27 DIAGNOSIS — J96.21 ACUTE ON CHRONIC HYPOXIC RESPIRATORY FAILURE (HCC): ICD-10-CM

## 2023-11-27 DIAGNOSIS — I50.22 CHRONIC SYSTOLIC (CONGESTIVE) HEART FAILURE (HCC): ICD-10-CM

## 2023-11-27 DIAGNOSIS — Z09 HOSPITAL DISCHARGE FOLLOW-UP: Primary | ICD-10-CM

## 2023-11-27 DIAGNOSIS — Z91.199 PATIENT NON-COMPLIANT, REFUSED SERVICE: ICD-10-CM

## 2023-11-27 NOTE — PROGRESS NOTES
Post-Discharge Transitional Care Management Progress Note      María Jain   YOB: 1945    Date of Office Visit:  11/27/2023  Date of Hospital Admission: 11/14/2023  Date of Hospital Discharge: 11/19/2023    Care management risk score Rising risk (score 2-5) and Complex Care (Scores >=6): No Risk Score On File     Non face to face  following discharge, date last encounter closed (first attempt may have been earlier): 11/20/2023 11/20/2023    Call initiated 2 business days of discharge: Yes    ASSESSMENT/PLAN:   Hospital discharge follow-up  -     WA DISCHARGE MEDS RECONCILED W/ CURRENT OUTPATIENT MED LIST  Acute on chronic hypoxic respiratory failure (HCC)  Patient non-compliant, refused service  Chronic systolic (congestive) heart failure    Advised patient to follow through with blood work to recheck LUPE status. Re-emphasized importance of completing PFT for pulmonology referral  Re-printed and provided PFT order, given to patient  Continue home oxygen at 2L  Continue hydralazine    Medical Decision Making: moderate complexity  Return in 1 month (on 12/27/2023) for recheck with Dr. Marizol Toscano. Subjective:   HPI:  Follow up of Hospital problems/diagnosis(es): acute on chronic hypoxic respiratory failure, LUPE, HTN    Tolerating oxygen by NC at 2L/min    Patient states that Eliquis is expensive. She is unwilling to try coumadin. States that she will try to make it work with Eliquis because Xarelto is likely too expensive as well. Has refused blood work from home health. Unable to recheck metabolic panel. Inpatient course: Discharge summary reviewed- see chart.     Interval history/Current status: Fair    Patient Active Problem List   Diagnosis    Essential hypertension    Dyslipidemia    Chronic midline low back pain without sciatica    Diverticulitis of large intestine without bleeding    Shingles (herpes zoster) polyneuropathy    Thrombocytopenia (HCC)    CKD (chronic kidney disease)

## 2023-11-28 ENCOUNTER — CARE COORDINATION (OUTPATIENT)
Dept: CARE COORDINATION | Age: 78
End: 2023-11-28

## 2023-11-28 DIAGNOSIS — J44.9 CHRONIC OBSTRUCTIVE PULMONARY DISEASE, UNSPECIFIED COPD TYPE (HCC): Primary | ICD-10-CM

## 2023-11-28 ASSESSMENT — ENCOUNTER SYMPTOMS: DYSPNEA ASSOCIATED WITH: EXERTION

## 2023-11-28 NOTE — CARE COORDINATION
Ambulatory Care Coordination Note  2023    Patient Current Location:  Home: [address] [place] [id number]     ACM contacted the patient by telephone. Verified name and  with patient as identifiers. She had cardiology and PCP follow up appts. No medications changed. Was given order for PFT so can see pulmonologist, is scheduled [facility] . Writer left VM message for new patient  at [facility] to either call patient or writer back to schedule consult appt. Notified them of PFT scheduled. She stated would like home health nurse to draw labs, writer faxed order to [facility]. CHF- legs are swollen today but she forgot to weigh herself. No worsening of breathing, no chest pain. COPD- continues to wear oxygen. Has some blood when blows her nose but doesn't keep bleeding. Doesn't use a humidifier in her house. Was told by PCP to stop ASA and Eliquis and go to ED if bleeding worsens. Breathing symptoms are stable, admits is not very active to cause shortness of breath. Declined RPM, too overwhelming. CC Plan:   -Sent message to PCP about ordering humidity to oxygen ([address] Follow up next week to see if labs done, pulm appt scheduled, review weight, symptoms.   Congestive Heart Failure Assessment    Are you currently restricting fluids?: No Restriction  Do you understand a low sodium diet?: Yes  Do you understand how to read food labels?: No  How many restaurant meals do you eat per week?: 0  Do you salt your food before tasting it?: No         Symptoms:  CHF associated dyspnea on exertion: Pos, CHF associated leg swelling: Pos      Symptom course: stable  Weight trend:  (Comment: not weighing regularly at home)  Salt intake watch compared to last visit: stable      and   COPD Assessment    Does the patient understand envrionmental exposure?: Yes  Is the patient able to verbalize Rescue vs. Long Acting medications?: Yes  Does the patient have a nebulizer?: No  Does the patient use a

## 2023-11-29 ENCOUNTER — HOSPITAL ENCOUNTER (OUTPATIENT)
Age: 78
Setting detail: SPECIMEN
Discharge: HOME | End: 2023-11-29
Payer: MEDICARE

## 2023-11-29 LAB
ANION GAP SERPL CALCULATED.3IONS-SCNC: 9 MMOL/L (ref 9–17)
BUN SERPL-MCNC: 39 MG/DL (ref 8–23)
CHLORIDE SERPL-SCNC: 101 MMOL/L (ref 98–107)
CO2 SERPL-SCNC: 30 MMOL/L (ref 20–31)
CREAT SERPL-MCNC: 1.3 MG/DL (ref 0.5–0.9)
GFR SERPL CREATININE-BSD FRML MDRD: 43 ML/MIN/1.73M2
POTASSIUM SERPL-SCNC: 4.3 MMOL/L (ref 3.7–5.3)
SODIUM SERPL-SCNC: 140 MMOL/L (ref 135–144)

## 2023-11-29 PROCEDURE — 80051 ELECTROLYTE PANEL: CPT

## 2023-11-29 PROCEDURE — 84520 ASSAY OF UREA NITROGEN: CPT

## 2023-11-29 PROCEDURE — 82565 ASSAY OF CREATININE: CPT

## 2023-11-30 ENCOUNTER — TELEPHONE (OUTPATIENT)
Dept: PRIMARY CARE CLINIC | Age: 78
End: 2023-11-30

## 2023-11-30 NOTE — TELEPHONE ENCOUNTER
FYI:  Pt called stating she talked to [employer] Today and they were waiting on something from Dr. Akiko Dwons in order for her to get her Portable O2 Concentrator. I called [facility] and the need an order requesting a portable O2 concentrator and the order is to state the Pulse dose setting to be 3. Order has been completed and faxed to [facility]. Pt has been notified via phone.

## 2023-12-01 NOTE — CARE COORDINATION
Verified with PCP office that humidity to oxygen was ordered and faxed to [address] Called [facility], they received orders. They only supply the humidifier container, patient has to fill up themselves using their own distilled water. If she's not able to connect humidifier bottle herself to concentrator she needs to call [address] and they will instruct her how to do that. Called and explained to patient, she will get distilled water and call [address] when supplies arrive.

## 2023-12-05 ENCOUNTER — HOSPITAL ENCOUNTER (INPATIENT)
Age: 78
LOS: 5 days | Discharge: HOME HEALTH | DRG: 393 | End: 2023-12-10
Attending: INTERNAL MEDICINE | Admitting: INTERNAL MEDICINE
Payer: MEDICARE

## 2023-12-05 ENCOUNTER — TELEPHONE (OUTPATIENT)
Dept: PRIMARY CARE CLINIC | Age: 78
End: 2023-12-05

## 2023-12-05 DIAGNOSIS — M47.816 ARTHRITIS OF LUMBAR SPINE: ICD-10-CM

## 2023-12-05 DIAGNOSIS — K92.2 UPPER GI BLEED: Primary | ICD-10-CM

## 2023-12-05 DIAGNOSIS — K92.1 MELENA: ICD-10-CM

## 2023-12-05 LAB — BNP SERPL-MCNC: 4841 PG/ML

## 2023-12-05 PROCEDURE — 2580000003 HC RX 258: Performed by: INTERNAL MEDICINE

## 2023-12-05 PROCEDURE — 99223 1ST HOSP IP/OBS HIGH 75: CPT | Performed by: INTERNAL MEDICINE

## 2023-12-05 PROCEDURE — 86920 COMPATIBILITY TEST SPIN: CPT

## 2023-12-05 PROCEDURE — 36415 COLL VENOUS BLD VENIPUNCTURE: CPT

## 2023-12-05 PROCEDURE — 2700000000 HC OXYGEN THERAPY PER DAY

## 2023-12-05 PROCEDURE — 86901 BLOOD TYPING SEROLOGIC RH(D): CPT

## 2023-12-05 PROCEDURE — 6370000000 HC RX 637 (ALT 250 FOR IP): Performed by: INTERNAL MEDICINE

## 2023-12-05 PROCEDURE — C9113 INJ PANTOPRAZOLE SODIUM, VIA: HCPCS | Performed by: INTERNAL MEDICINE

## 2023-12-05 PROCEDURE — 94664 DEMO&/EVAL PT USE INHALER: CPT

## 2023-12-05 PROCEDURE — 94640 AIRWAY INHALATION TREATMENT: CPT

## 2023-12-05 PROCEDURE — 86850 RBC ANTIBODY SCREEN: CPT

## 2023-12-05 PROCEDURE — 2060000000 HC ICU INTERMEDIATE R&B

## 2023-12-05 PROCEDURE — 94760 N-INVAS EAR/PLS OXIMETRY 1: CPT

## 2023-12-05 PROCEDURE — A4216 STERILE WATER/SALINE, 10 ML: HCPCS | Performed by: INTERNAL MEDICINE

## 2023-12-05 PROCEDURE — 6360000002 HC RX W HCPCS: Performed by: INTERNAL MEDICINE

## 2023-12-05 PROCEDURE — 86900 BLOOD TYPING SEROLOGIC ABO: CPT

## 2023-12-05 PROCEDURE — 83880 ASSAY OF NATRIURETIC PEPTIDE: CPT

## 2023-12-05 RX ORDER — ONDANSETRON 4 MG/1
4 TABLET, ORALLY DISINTEGRATING ORAL EVERY 8 HOURS PRN
Status: DISCONTINUED | OUTPATIENT
Start: 2023-12-05 | End: 2023-12-10 | Stop reason: HOSPADM

## 2023-12-05 RX ORDER — BISACODYL 10 MG
10 SUPPOSITORY, RECTAL RECTAL DAILY PRN
Status: DISCONTINUED | OUTPATIENT
Start: 2023-12-05 | End: 2023-12-10 | Stop reason: HOSPADM

## 2023-12-05 RX ORDER — CARVEDILOL 25 MG/1
25 TABLET ORAL 2 TIMES DAILY WITH MEALS
Status: DISCONTINUED | OUTPATIENT
Start: 2023-12-05 | End: 2023-12-08

## 2023-12-05 RX ORDER — BUDESONIDE AND FORMOTEROL FUMARATE DIHYDRATE 160; 4.5 UG/1; UG/1
2 AEROSOL RESPIRATORY (INHALATION)
Status: DISCONTINUED | OUTPATIENT
Start: 2023-12-05 | End: 2023-12-10 | Stop reason: HOSPADM

## 2023-12-05 RX ORDER — ONDANSETRON 2 MG/ML
4 INJECTION INTRAMUSCULAR; INTRAVENOUS EVERY 6 HOURS PRN
Status: DISCONTINUED | OUTPATIENT
Start: 2023-12-05 | End: 2023-12-10 | Stop reason: HOSPADM

## 2023-12-05 RX ORDER — FUROSEMIDE 20 MG/1
20 TABLET ORAL DAILY
Qty: 30 TABLET | Refills: 0 | Status: SHIPPED | OUTPATIENT
Start: 2023-12-05

## 2023-12-05 RX ORDER — SODIUM CHLORIDE 0.9 % (FLUSH) 0.9 %
10 SYRINGE (ML) INJECTION PRN
Status: DISCONTINUED | OUTPATIENT
Start: 2023-12-05 | End: 2023-12-10 | Stop reason: HOSPADM

## 2023-12-05 RX ORDER — ACETAMINOPHEN 325 MG/1
650 TABLET ORAL EVERY 6 HOURS PRN
Status: DISCONTINUED | OUTPATIENT
Start: 2023-12-05 | End: 2023-12-10 | Stop reason: HOSPADM

## 2023-12-05 RX ORDER — FUROSEMIDE 10 MG/ML
20 INJECTION INTRAMUSCULAR; INTRAVENOUS ONCE
Status: COMPLETED | OUTPATIENT
Start: 2023-12-05 | End: 2023-12-05

## 2023-12-05 RX ORDER — SODIUM CHLORIDE 9 MG/ML
INJECTION, SOLUTION INTRAVENOUS PRN
Status: DISCONTINUED | OUTPATIENT
Start: 2023-12-05 | End: 2023-12-10 | Stop reason: HOSPADM

## 2023-12-05 RX ORDER — MAGNESIUM SULFATE 1 G/100ML
1000 INJECTION INTRAVENOUS PRN
Status: DISCONTINUED | OUTPATIENT
Start: 2023-12-05 | End: 2023-12-10 | Stop reason: HOSPADM

## 2023-12-05 RX ORDER — GABAPENTIN 300 MG/1
300 CAPSULE ORAL DAILY
Status: DISCONTINUED | OUTPATIENT
Start: 2023-12-05 | End: 2023-12-06

## 2023-12-05 RX ORDER — SODIUM CHLORIDE 0.9 % (FLUSH) 0.9 %
5-40 SYRINGE (ML) INJECTION EVERY 12 HOURS SCHEDULED
Status: DISCONTINUED | OUTPATIENT
Start: 2023-12-05 | End: 2023-12-10 | Stop reason: HOSPADM

## 2023-12-05 RX ORDER — FUROSEMIDE 20 MG/1
20 TABLET ORAL DAILY
Status: DISCONTINUED | OUTPATIENT
Start: 2023-12-06 | End: 2023-12-10 | Stop reason: HOSPADM

## 2023-12-05 RX ORDER — POTASSIUM CHLORIDE 7.45 MG/ML
10 INJECTION INTRAVENOUS PRN
Status: DISCONTINUED | OUTPATIENT
Start: 2023-12-05 | End: 2023-12-10 | Stop reason: HOSPADM

## 2023-12-05 RX ORDER — ACETAMINOPHEN 650 MG/1
650 SUPPOSITORY RECTAL EVERY 6 HOURS PRN
Status: DISCONTINUED | OUTPATIENT
Start: 2023-12-05 | End: 2023-12-10 | Stop reason: HOSPADM

## 2023-12-05 RX ORDER — SODIUM CHLORIDE 9 MG/ML
INJECTION, SOLUTION INTRAVENOUS CONTINUOUS
Status: ACTIVE | OUTPATIENT
Start: 2023-12-05 | End: 2023-12-07

## 2023-12-05 RX ORDER — ALBUTEROL SULFATE 90 UG/1
1 AEROSOL, METERED RESPIRATORY (INHALATION) EVERY 6 HOURS PRN
Status: DISCONTINUED | OUTPATIENT
Start: 2023-12-05 | End: 2023-12-10 | Stop reason: HOSPADM

## 2023-12-05 RX ORDER — POTASSIUM CHLORIDE 20 MEQ/1
40 TABLET, EXTENDED RELEASE ORAL PRN
Status: DISCONTINUED | OUTPATIENT
Start: 2023-12-05 | End: 2023-12-10 | Stop reason: HOSPADM

## 2023-12-05 RX ORDER — HYDRALAZINE HYDROCHLORIDE 25 MG/1
25 TABLET, FILM COATED ORAL EVERY 12 HOURS SCHEDULED
Status: DISCONTINUED | OUTPATIENT
Start: 2023-12-05 | End: 2023-12-08

## 2023-12-05 RX ADMIN — BUDESONIDE AND FORMOTEROL FUMARATE DIHYDRATE 2 PUFF: 160; 4.5 AEROSOL RESPIRATORY (INHALATION) at 20:09

## 2023-12-05 RX ADMIN — HYDRALAZINE HYDROCHLORIDE 25 MG: 25 TABLET, FILM COATED ORAL at 20:55

## 2023-12-05 RX ADMIN — SODIUM CHLORIDE: 9 INJECTION, SOLUTION INTRAVENOUS at 20:03

## 2023-12-05 RX ADMIN — SODIUM CHLORIDE, PRESERVATIVE FREE 10 ML: 5 INJECTION INTRAVENOUS at 20:46

## 2023-12-05 RX ADMIN — FUROSEMIDE 20 MG: 10 INJECTION, SOLUTION INTRAMUSCULAR; INTRAVENOUS at 22:39

## 2023-12-05 RX ADMIN — PANTOPRAZOLE SODIUM 80 MG: 40 INJECTION, POWDER, FOR SOLUTION INTRAVENOUS at 20:57

## 2023-12-05 RX ADMIN — CARVEDILOL 25 MG: 25 TABLET, FILM COATED ORAL at 20:55

## 2023-12-05 RX ADMIN — PANTOPRAZOLE SODIUM 8 MG/HR: 40 INJECTION, POWDER, FOR SOLUTION INTRAVENOUS at 20:45

## 2023-12-05 RX ADMIN — SODIUM CHLORIDE 200 MG: 9 INJECTION, SOLUTION INTRAVENOUS at 20:05

## 2023-12-05 NOTE — CONSULTS
Date:   12/5/2023  Patient name: Zak Tinajero  Date of admission:  12/5/2023  5:05 PM  MRN:   [id number]  YOB: 1945  PCP: Ayleen Shafer MD    Reason for Admission: Upper GI bleed [K92.2]    Cardiology consult: Cardiovascular assessment prior to EGD       Referring physician: Dr Keli Schultz    Patient admitted with GI bleed/black stools ongoing for 7 days  Inpatient with a part of hospital with a CHF, A-fib 4 weeks ago started on Eliquis  History of congestive heart failure/diastolic  History of coronary artery disease, coronary intervention 2017  Hypertension  Hyperlipidemia  Obesity  Ex smoker  Degenerative joint disease  History of hysterectomy    Drug allergy: Aspirin    Investigation workup    ECG 12/6/2023  Sinus rhythm heart rate 64, first-degree heart block MI interval 216, right bundle branch block, QRS duration 130    ECG 11/11/2022  Sinus bradycardia heart rate 55 first-degree heart block, right bundle branch block    2D echo 11/14/2023  Normal LV size, ejection fraction 55 to 60%  No significant valvular abnormality    2D echo 11/11/2022  Normal LV size, mild LVH, ejection fraction more than 55%      History of present illness  72-year-old  female with a past medical history of obesity, hypertension, CAD, coronary intervention 2017 and stent placement x 1 because she was getting chest pain, no chest pain since she has had a stent placed got hospitalized 12/5/2023 with GI bleeding, black stools ongoing for 7 days. She was hospitalized at [facility] about 4 weeks ago with congestive heart failure and diagnosed A-fib and was started on Eliquis. She has been taking Eliquis. She did complain exertional shortness of breath and orthopnea but no chest pain. No fever no chills no productive cough.     Blood pressure on admission 160/67, heart rate 61, temperature 97.5, oxygen saturation 99% 2 L  Lab work on admission  proBNP 4841  Hemoglobin 6.9    Patient

## 2023-12-05 NOTE — H&P
44.43 hypertension hyperlipidemia admitted with a dark tarry stool with some lightheadedness baseline hemoglobin is 12 admitted with less than 8 presented to [facility] and requested transfer to [facility]  Patient denies any hematemesis denies any bright r bloody stools only dark stools  Likely upper GI bleed due to Eliquis and aspirin rule out peptic ulcer  IV Protonix drip  N.p.o. GI consult patient needs an EGD  Atrial fibrillation paroxysmal  Iv venofer   Every 6 hour hematocrit and hemoglobin patient is a high risk for deterioration due to upper GI bleed on anticoagulation requires inpatient hospital admission    Consultations:   IP CONSULT TO GI     Patient is admitted as inpatient status because of co-morbidities listed above, severity of signs and symptoms as outlined, requirement for current medical therapies and most importantly because of direct risk to patient if care not provided in a hospital setting. Expected length of stay > 48 hours. David Garcia MD  12/5/2023  6:15 PM    Copy sent to Dr. Lela Cox MD    Please note that this chart was generated using voice recognition Dragon dictation software. Although every effort was made to ensure the accuracy of this automated transcription, some errors in transcription may have occurred.

## 2023-12-05 NOTE — TELEPHONE ENCOUNTER
Aiyana Jeter with [facility] called - patient has been having black stool for 3 days - no abdominal pain or other sx. Patient is not currently taking any iron. Is on Eliquis 5 mg BID & Aspirin 81 mg daily. Spoke with Dr. Marleen Redding - he advised ED to have blood counts checked. Burak Jeter notified and states she will discuss with patient.

## 2023-12-06 ENCOUNTER — ANESTHESIA (OUTPATIENT)
Dept: ENDOSCOPY | Age: 78
End: 2023-12-06
Payer: MEDICARE

## 2023-12-06 ENCOUNTER — ANESTHESIA EVENT (OUTPATIENT)
Dept: ENDOSCOPY | Age: 78
End: 2023-12-06
Payer: MEDICARE

## 2023-12-06 PROBLEM — K92.1 MELENA: Status: ACTIVE | Noted: 2023-12-06

## 2023-12-06 LAB
HCT VFR BLD AUTO: 21.7 % (ref 36–46)
HCT VFR BLD AUTO: 24.3 % (ref 36–46)
HCT VFR BLD AUTO: 26.2 % (ref 36–46)
HCT VFR BLD AUTO: 27.2 % (ref 36–46)
HGB BLD-MCNC: 6.9 G/DL (ref 12–16)
HGB BLD-MCNC: 8 G/DL (ref 12–16)
HGB BLD-MCNC: 8.3 G/DL (ref 12–16)
HGB BLD-MCNC: 8.8 G/DL (ref 12–16)
INR PPP: 1.3
PROTHROMBIN TIME: 16.3 SEC (ref 11.8–14.6)

## 2023-12-06 PROCEDURE — 93005 ELECTROCARDIOGRAM TRACING: CPT | Performed by: INTERNAL MEDICINE

## 2023-12-06 PROCEDURE — 3609012400 HC EGD TRANSORAL BIOPSY SINGLE/MULTIPLE: Performed by: INTERNAL MEDICINE

## 2023-12-06 PROCEDURE — C9113 INJ PANTOPRAZOLE SODIUM, VIA: HCPCS | Performed by: INTERNAL MEDICINE

## 2023-12-06 PROCEDURE — 36430 TRANSFUSION BLD/BLD COMPNT: CPT

## 2023-12-06 PROCEDURE — 6370000000 HC RX 637 (ALT 250 FOR IP): Performed by: INTERNAL MEDICINE

## 2023-12-06 PROCEDURE — 85018 HEMOGLOBIN: CPT

## 2023-12-06 PROCEDURE — 7100000001 HC PACU RECOVERY - ADDTL 15 MIN: Performed by: INTERNAL MEDICINE

## 2023-12-06 PROCEDURE — 85610 PROTHROMBIN TIME: CPT

## 2023-12-06 PROCEDURE — 2709999900 HC NON-CHARGEABLE SUPPLY: Performed by: INTERNAL MEDICINE

## 2023-12-06 PROCEDURE — 0DB68ZX EXCISION OF STOMACH, VIA NATURAL OR ARTIFICIAL OPENING ENDOSCOPIC, DIAGNOSTIC: ICD-10-PCS | Performed by: INTERNAL MEDICINE

## 2023-12-06 PROCEDURE — 88305 TISSUE EXAM BY PATHOLOGIST: CPT

## 2023-12-06 PROCEDURE — 7100000000 HC PACU RECOVERY - FIRST 15 MIN: Performed by: INTERNAL MEDICINE

## 2023-12-06 PROCEDURE — 99233 SBSQ HOSP IP/OBS HIGH 50: CPT | Performed by: INTERNAL MEDICINE

## 2023-12-06 PROCEDURE — 6370000000 HC RX 637 (ALT 250 FOR IP): Performed by: NURSE PRACTITIONER

## 2023-12-06 PROCEDURE — 2700000000 HC OXYGEN THERAPY PER DAY

## 2023-12-06 PROCEDURE — 3700000000 HC ANESTHESIA ATTENDED CARE: Performed by: INTERNAL MEDICINE

## 2023-12-06 PROCEDURE — 2060000000 HC ICU INTERMEDIATE R&B

## 2023-12-06 PROCEDURE — 6360000002 HC RX W HCPCS: Performed by: INTERNAL MEDICINE

## 2023-12-06 PROCEDURE — P9016 RBC LEUKOCYTES REDUCED: HCPCS

## 2023-12-06 PROCEDURE — 94640 AIRWAY INHALATION TREATMENT: CPT

## 2023-12-06 PROCEDURE — 6360000002 HC RX W HCPCS: Performed by: NURSE ANESTHETIST, CERTIFIED REGISTERED

## 2023-12-06 PROCEDURE — 2580000003 HC RX 258: Performed by: INTERNAL MEDICINE

## 2023-12-06 PROCEDURE — APPNB30 APP NON BILLABLE TIME 0-30 MINS: Performed by: NURSE PRACTITIONER

## 2023-12-06 PROCEDURE — 85014 HEMATOCRIT: CPT

## 2023-12-06 PROCEDURE — 36415 COLL VENOUS BLD VENIPUNCTURE: CPT

## 2023-12-06 PROCEDURE — 2500000003 HC RX 250 WO HCPCS: Performed by: NURSE ANESTHETIST, CERTIFIED REGISTERED

## 2023-12-06 PROCEDURE — 94761 N-INVAS EAR/PLS OXIMETRY MLT: CPT

## 2023-12-06 PROCEDURE — 99222 1ST HOSP IP/OBS MODERATE 55: CPT | Performed by: INTERNAL MEDICINE

## 2023-12-06 RX ORDER — PROPOFOL 10 MG/ML
INJECTION, EMULSION INTRAVENOUS PRN
Status: DISCONTINUED | OUTPATIENT
Start: 2023-12-06 | End: 2023-12-06 | Stop reason: SDUPTHER

## 2023-12-06 RX ORDER — SODIUM CHLORIDE 9 MG/ML
INJECTION, SOLUTION INTRAVENOUS PRN
Status: DISCONTINUED | OUTPATIENT
Start: 2023-12-06 | End: 2023-12-07

## 2023-12-06 RX ORDER — LIDOCAINE HYDROCHLORIDE 20 MG/ML
INJECTION, SOLUTION INFILTRATION; PERINEURAL PRN
Status: DISCONTINUED | OUTPATIENT
Start: 2023-12-06 | End: 2023-12-06 | Stop reason: SDUPTHER

## 2023-12-06 RX ORDER — GABAPENTIN 300 MG/1
300 CAPSULE ORAL 2 TIMES DAILY
Status: DISCONTINUED | OUTPATIENT
Start: 2023-12-06 | End: 2023-12-10 | Stop reason: HOSPADM

## 2023-12-06 RX ADMIN — SODIUM CHLORIDE 200 MG: 9 INJECTION, SOLUTION INTRAVENOUS at 19:26

## 2023-12-06 RX ADMIN — GABAPENTIN 300 MG: 300 CAPSULE ORAL at 23:09

## 2023-12-06 RX ADMIN — CARVEDILOL 25 MG: 25 TABLET, FILM COATED ORAL at 18:18

## 2023-12-06 RX ADMIN — SODIUM CHLORIDE: 9 INJECTION, SOLUTION INTRAVENOUS at 19:49

## 2023-12-06 RX ADMIN — HYDRALAZINE HYDROCHLORIDE 25 MG: 25 TABLET, FILM COATED ORAL at 09:14

## 2023-12-06 RX ADMIN — HYDRALAZINE HYDROCHLORIDE 25 MG: 25 TABLET, FILM COATED ORAL at 21:32

## 2023-12-06 RX ADMIN — PROPOFOL 150 MG: 10 INJECTION, EMULSION INTRAVENOUS at 12:11

## 2023-12-06 RX ADMIN — PANTOPRAZOLE SODIUM 8 MG/HR: 40 INJECTION, POWDER, FOR SOLUTION INTRAVENOUS at 09:18

## 2023-12-06 RX ADMIN — BUDESONIDE AND FORMOTEROL FUMARATE DIHYDRATE 2 PUFF: 160; 4.5 AEROSOL RESPIRATORY (INHALATION) at 08:16

## 2023-12-06 RX ADMIN — SODIUM CHLORIDE, PRESERVATIVE FREE 10 ML: 5 INJECTION INTRAVENOUS at 19:46

## 2023-12-06 RX ADMIN — CARVEDILOL 25 MG: 25 TABLET, FILM COATED ORAL at 09:14

## 2023-12-06 RX ADMIN — PANTOPRAZOLE SODIUM 8 MG/HR: 40 INJECTION, POWDER, FOR SOLUTION INTRAVENOUS at 19:52

## 2023-12-06 RX ADMIN — BUDESONIDE AND FORMOTEROL FUMARATE DIHYDRATE 2 PUFF: 160; 4.5 AEROSOL RESPIRATORY (INHALATION) at 20:15

## 2023-12-06 RX ADMIN — LIDOCAINE HYDROCHLORIDE 80 MG: 20 INJECTION, SOLUTION EPIDURAL; INFILTRATION; INTRACAUDAL; PERINEURAL at 12:11

## 2023-12-06 RX ADMIN — FUROSEMIDE 20 MG: 20 TABLET ORAL at 09:14

## 2023-12-06 ASSESSMENT — PAIN SCALES - WONG BAKER

## 2023-12-06 ASSESSMENT — PAIN - FUNCTIONAL ASSESSMENT
PAIN_FUNCTIONAL_ASSESSMENT: 0-10
PAIN_FUNCTIONAL_ASSESSMENT: 0-10

## 2023-12-06 ASSESSMENT — COPD QUESTIONNAIRES: CAT_SEVERITY: SEVERE

## 2023-12-06 ASSESSMENT — ENCOUNTER SYMPTOMS: SHORTNESS OF BREATH: 1

## 2023-12-06 ASSESSMENT — PAIN SCALES - GENERAL
PAINLEVEL_OUTOF10: 8
PAINLEVEL_OUTOF10: 0

## 2023-12-06 NOTE — CONSENT
Informed Consent for Blood Component Transfusion Note    I have discussed with the patient the rationale for blood component transfusion; its benefits in treating or preventing fatigue, organ damage, or death; and its risk which includes mild transfusion reactions, rare risk of blood borne infection, or more serious but rare reactions. I have discussed the alternatives to transfusion, including the risk and consequences of not receiving transfusion. The patient had an opportunity to ask questions and had agreed to proceed with transfusion of blood components.     Electronically signed by ADITYA Hernandez CNP on 12/6/23 at 3:15 AM EST

## 2023-12-06 NOTE — OP NOTE
PROCEDURE NOTE    DATE OF PROCEDURE: 12/6/2023     SURGEON: Yesica Oconnor MD  Facility: [facility]  ASSISTANT: None  Anesthesia: Monitored anesthesia care  PREOPERATIVE DIAGNOSIS: GI bleed    Diagnosis:    POSTOPERATIVE DIAGNOSIS: As described below    OPERATION: Upper GI endoscopy with Biopsy    ANESTHESIA: Moderate Sedation     ESTIMATED BLOOD LOSS: Less than 50 ml    COMPLICATIONS: None. SPECIMENS:  Was Obtained: gastric    HISTORY: The patient is a 70y.o. year old female with history of above preop diagnosis. I recommended esophagogastroduodenoscopy with possible biopsy and I explained the risk, benefits, expected outcome, and alternatives to the procedure. Risks included but are not limited to bleeding, infection, respiratory distress, hypotension, and perforation of the esophagus, stomach, or duodenum. Patient understands and is in agreement. PROCEDURE: The patient was given IV conscious sedation. The patient's SPO2 remained above 90% throughout the procedure. The gastroscope was inserted orally and advanced under direct vision through the esophagus, through the stomach, through the pylorus, and into the descending duodenum. Post sedation note : The patient's SPO2 remained above 90% throughout the procedure. the vital signs remained stable , and no immediate complication form the procedure noted, patient will be ready for d/c when criteria is met . Findings:    Retropharyngeal area was grossly normal appearing    Esophagus: normal    Esophagogastric markings: Diaphragmatic hiatus- 40 cm; GE junction- 38 cm; Squamo-columnar junction- 38 cm    Stomach:    Fundus: normal    Body: normal    Antrum: abnormal: few small erosions  Biopsy obtained to r/o- H pyloir    Duodenum:     Descending: normal    Bulb: normal  3rd, 4th part of duodenum- normal    The scope was removed and the patient tolerated the procedure well.      Impression- Few gastric (antral) erosions, biopsy

## 2023-12-06 NOTE — CARE COORDINATION
Case Management Assessment  Initial Evaluation    Date/Time of Evaluation: 12/6/2023 3:18 PM  Assessment Completed by: Tiffany Kapadia RN    If patient is discharged prior to next notation, then this note serves as note for discharge by case management. Patient Name: Mark Persaud                   YOB: 1945  Diagnosis: Upper GI bleed [K92.2]                   Date / Time: 12/5/2023  5:05 PM    Patient Admission Status: Inpatient   Readmission Risk (Low < 19, Mod (19-27), High > 27): Readmission Risk Score: 9.2    Current PCP: Aaliyah Nolasco MD  PCP verified by CM? Yes    Chart Reviewed: Yes      History Provided by: Patient, Spouse  Patient Orientation: Alert and Oriented    Patient Cognition: Alert    Hospitalization in the last 30 days (Readmission):  No    If yes, Readmission Assessment in CM Navigator will be completed. Advance Directives:      Code Status: Full Code   Patient's Primary Decision Maker is: Named in [facility] (filed by Dr Cooney Born in 2022, spouse and/or son)    Primary Decision Maker: Arline Cody - Spouse - [phone]    Primary Decision Maker: Gopal Haley - Child - [phone]    Discharge Planning:    Patient lives with: Spouse/Significant Other Type of Home: House  Primary Care Giver: Self  Patient Support Systems include: Spouse/Significant Other, Children   Current Financial resources: Medicare  Current community resources: ECF/Home Care ([facility])  Current services prior to admission: Durable Medical Equipment, Home Care            Current DME: Domingo Foster, Wheelchair, Cane, Oxygen Therapy (Comment)            Type of Home Care services:  Nursing Services    ADLS  Prior functional level: Independent in ADLs/IADLs, Assistance with the following:, Housework, Cooking, Shopping  Current functional level:  Independent in ADLs/IADLs, Assistance with the following:, Cooking, Housework, Shopping    PT AM-PAC:   /24  OT AM-PAC:   /24    Family can provide
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
## 2023-12-06 NOTE — ACP (ADVANCE CARE PLANNING)
Advance Care Planning     Advance Care Planning Activator (Inpatient)  Conversation Note      Date of ACP Conversation: 12/6/2023     Conversation Conducted with: Patient with Decision Making Capacity    ACP Activator: Luis Alberto Patel RN        Health Care Decision Maker:     Current Designated Health Care Decision Maker:     Primary Decision Maker: Ardeabhay Contreras - [phone]    Primary Decision Maker: Blas Pugh - [phone]    Today we documented Decision Maker(s) consistent with ACP documents on file. Care Preferences    Ventilation: \"If you were in your present state of health and suddenly became very ill and were unable to breathe on your own, what would your preference be about the use of a ventilator (breathing machine) if it were available to you? \"      Would the patient desire the use of ventilator (breathing machine)?: yes    \"If your health worsens and it becomes clear that your chance of recovery is unlikely, what would your preference be about the use of a ventilator (breathing machine) if it were available to you? \"     Would the patient desire the use of ventilator (breathing machine)?: Yes      Resuscitation  \"CPR works best to restart the heart when there is a sudden event, like a heart attack, in someone who is otherwise healthy. Unfortunately, CPR does not typically restart the heart for people who have serious health conditions or who are very sick. \"    \"In the event your heart stopped as a result of an underlying serious health condition, would you want attempts to be made to restart your heart (answer \"yes\" for attempt to resuscitate) or would you prefer a natural death (answer \"no\" for do not attempt to resuscitate)? \" yes       [] Yes   [] No   Educated Patient / Kianna Wilson regarding differences between Advance Directives and portable DNR orders.     Length of ACP Conversation in minutes:      Conversation Outcomes:  ACP discussion completed and Existing

## 2023-12-06 NOTE — DISCHARGE INSTR - COC
Continuity of Care Form    Patient Name: Triston Fermin   :    MRN:  [id number]    Admit date:  2023  Discharge date:  12/10/23    Code Status Order: Full Code   Advance Directives:   Advance Care Flowsheet Documentation       Date/Time Healthcare Directive Type of Healthcare Directive Copy in [address] Agent's Name Healthcare Agent's Phone Number    [phone] Yes, patient has an advance directive for healthcare treatment Living will No, copy requested from family -- -- --            Admitting Physician:  Jenny Hwang MD  PCP: Kyle Kelly MD    Discharging Nurse:   Beltran Jerardo [facility] Unit/Room#: 2096/2096-01  Discharging Unit Phone Number: [phone]    Emergency Contact:   Extended Emergency Contact Information  Primary Emergency Contact: Bonny Aguilar  Address: [address] [place] [address], [address] [facility] of [address] [place] Phone: [phone]  Work Phone: [phone]  Mobile Phone: [phone]  Relation: Spouse  Secondary Emergency Contact: [address] [place] [address] Phone: [phone]  Relation: Child    Past Surgical History:  Past Surgical History:   Procedure Laterality Date    COLONOSCOPY      CORONARY ANGIOPLASTY WITH STENT PLACEMENT        patient reports one stent    HYSTERECTOMY (CERVIX STATUS UNKNOWN)      COMPLETE    LEG SURGERY Left 2017    [place] I&D BELOW FASCIA FOOT 1 BURSAL SPACE Left 2017    DEBRIDEMENT LEFT LOWER EXTREMITY WITH APPLICATION OF APPLIGRAFT performed by Al Michaels DPM at [facility] ENDOSCOPY N/A 2023    EGD BIOPSY performed by Luis Arevalo MD at [address] [facility]       Immunization History:   Immunization History   Administered Date(s) Administered    COVID-19, MODERNA BLUE border, Primary or Immunocompromised, (age 12y+), IM, 100 mcg/0.5mL 2021    COVID-19, PFIZER Bivalent, DO NOT Dilute, (age 12y+), IM, 30 mcg/0.3 mL

## 2023-12-07 ENCOUNTER — APPOINTMENT (OUTPATIENT)
Dept: CT IMAGING | Age: 78
DRG: 393 | End: 2023-12-07
Attending: INTERNAL MEDICINE
Payer: MEDICARE

## 2023-12-07 LAB
EKG ATRIAL RATE: 64 BPM
EKG P AXIS: 71 DEGREES
EKG P-R INTERVAL: 216 MS
EKG Q-T INTERVAL: 398 MS
EKG QRS DURATION: 130 MS
EKG QTC CALCULATION (BAZETT): 410 MS
EKG R AXIS: 71 DEGREES
EKG T AXIS: -33 DEGREES
EKG VENTRICULAR RATE: 64 BPM
HCT VFR BLD AUTO: 23.6 % (ref 36–46)
HCT VFR BLD AUTO: 24.5 % (ref 36–46)
HCT VFR BLD AUTO: 24.5 % (ref 36–46)
HGB BLD-MCNC: 7.4 G/DL (ref 12–16)
HGB BLD-MCNC: 7.8 G/DL (ref 12–16)
HGB BLD-MCNC: 7.9 G/DL (ref 12–16)

## 2023-12-07 PROCEDURE — 36415 COLL VENOUS BLD VENIPUNCTURE: CPT

## 2023-12-07 PROCEDURE — C9113 INJ PANTOPRAZOLE SODIUM, VIA: HCPCS | Performed by: INTERNAL MEDICINE

## 2023-12-07 PROCEDURE — 2700000000 HC OXYGEN THERAPY PER DAY

## 2023-12-07 PROCEDURE — 74176 CT ABD & PELVIS W/O CONTRAST: CPT

## 2023-12-07 PROCEDURE — 93010 ELECTROCARDIOGRAM REPORT: CPT | Performed by: INTERNAL MEDICINE

## 2023-12-07 PROCEDURE — 85018 HEMOGLOBIN: CPT

## 2023-12-07 PROCEDURE — 94760 N-INVAS EAR/PLS OXIMETRY 1: CPT

## 2023-12-07 PROCEDURE — 85014 HEMATOCRIT: CPT

## 2023-12-07 PROCEDURE — 6370000000 HC RX 637 (ALT 250 FOR IP): Performed by: INTERNAL MEDICINE

## 2023-12-07 PROCEDURE — 6360000002 HC RX W HCPCS: Performed by: INTERNAL MEDICINE

## 2023-12-07 PROCEDURE — 2580000003 HC RX 258: Performed by: INTERNAL MEDICINE

## 2023-12-07 PROCEDURE — 2060000000 HC ICU INTERMEDIATE R&B

## 2023-12-07 PROCEDURE — 97530 THERAPEUTIC ACTIVITIES: CPT

## 2023-12-07 PROCEDURE — 99233 SBSQ HOSP IP/OBS HIGH 50: CPT | Performed by: INTERNAL MEDICINE

## 2023-12-07 PROCEDURE — 6370000000 HC RX 637 (ALT 250 FOR IP): Performed by: NURSE PRACTITIONER

## 2023-12-07 PROCEDURE — 97166 OT EVAL MOD COMPLEX 45 MIN: CPT

## 2023-12-07 PROCEDURE — APPSS30 APP SPLIT SHARED TIME 16-30 MINUTES: Performed by: NURSE PRACTITIONER

## 2023-12-07 PROCEDURE — 97162 PT EVAL MOD COMPLEX 30 MIN: CPT

## 2023-12-07 PROCEDURE — 97535 SELF CARE MNGMENT TRAINING: CPT

## 2023-12-07 PROCEDURE — 94640 AIRWAY INHALATION TREATMENT: CPT

## 2023-12-07 RX ORDER — PRAVASTATIN SODIUM 40 MG
40 TABLET ORAL NIGHTLY
Status: DISCONTINUED | OUTPATIENT
Start: 2023-12-07 | End: 2023-12-10 | Stop reason: HOSPADM

## 2023-12-07 RX ADMIN — SODIUM CHLORIDE, PRESERVATIVE FREE 10 ML: 5 INJECTION INTRAVENOUS at 22:07

## 2023-12-07 RX ADMIN — FUROSEMIDE 20 MG: 20 TABLET ORAL at 09:03

## 2023-12-07 RX ADMIN — GABAPENTIN 300 MG: 300 CAPSULE ORAL at 22:06

## 2023-12-07 RX ADMIN — BISACODYL 10 MG: 10 SUPPOSITORY RECTAL at 06:42

## 2023-12-07 RX ADMIN — GABAPENTIN 300 MG: 300 CAPSULE ORAL at 09:03

## 2023-12-07 RX ADMIN — BUDESONIDE AND FORMOTEROL FUMARATE DIHYDRATE 2 PUFF: 160; 4.5 AEROSOL RESPIRATORY (INHALATION) at 20:02

## 2023-12-07 RX ADMIN — SODIUM CHLORIDE 200 MG: 9 INJECTION, SOLUTION INTRAVENOUS at 18:28

## 2023-12-07 RX ADMIN — PANTOPRAZOLE SODIUM 8 MG/HR: 40 INJECTION, POWDER, FOR SOLUTION INTRAVENOUS at 06:37

## 2023-12-07 RX ADMIN — BUDESONIDE AND FORMOTEROL FUMARATE DIHYDRATE 2 PUFF: 160; 4.5 AEROSOL RESPIRATORY (INHALATION) at 08:34

## 2023-12-07 RX ADMIN — HYDRALAZINE HYDROCHLORIDE 25 MG: 25 TABLET, FILM COATED ORAL at 09:04

## 2023-12-07 RX ADMIN — SODIUM CHLORIDE: 9 INJECTION, SOLUTION INTRAVENOUS at 06:38

## 2023-12-07 RX ADMIN — HYDRALAZINE HYDROCHLORIDE 25 MG: 25 TABLET, FILM COATED ORAL at 22:06

## 2023-12-07 RX ADMIN — CARVEDILOL 25 MG: 25 TABLET, FILM COATED ORAL at 17:22

## 2023-12-07 RX ADMIN — PRAVASTATIN SODIUM 40 MG: 40 TABLET ORAL at 22:06

## 2023-12-07 RX ADMIN — CARVEDILOL 25 MG: 25 TABLET, FILM COATED ORAL at 09:04

## 2023-12-07 ASSESSMENT — PAIN SCALES - GENERAL
PAINLEVEL_OUTOF10: 0

## 2023-12-07 ASSESSMENT — PAIN SCALES - WONG BAKER

## 2023-12-07 NOTE — CARE COORDINATION
ONGOING DISCHARGE PLAN:    Patient is alert and oriented x4. Spoke with patient regarding discharge plan and patient confirms that plan is still home with [facility]. GI consult  12/6 EGD- antral erosions, biopsies  Hgb 7.9, protonix drip, IV venofer    Cardiology consult-CHF  afib    CT ABD r/o bleed    PT/OT    Will continue to follow for additional discharge needs. If patient is discharged prior to next notation, then this note serves as note for discharge by case management.     Electronically signed by Kavitha Sawyer RN on 12/7/2023 at 10:04 AM

## 2023-12-08 LAB
ANION GAP SERPL CALCULATED.3IONS-SCNC: 7 MMOL/L (ref 9–17)
BUN SERPL-MCNC: 29 MG/DL (ref 8–23)
CALCIUM SERPL-MCNC: 9.4 MG/DL (ref 8.6–10.4)
CHLORIDE SERPL-SCNC: 102 MMOL/L (ref 98–107)
CO2 SERPL-SCNC: 32 MMOL/L (ref 20–31)
CREAT SERPL-MCNC: 1.5 MG/DL (ref 0.5–0.9)
GFR SERPL CREATININE-BSD FRML MDRD: 36 ML/MIN/1.73M2
GLUCOSE SERPL-MCNC: 112 MG/DL (ref 70–99)
HCT VFR BLD AUTO: 22.9 % (ref 36–46)
HGB BLD-MCNC: 7.2 G/DL (ref 12–16)
POTASSIUM SERPL-SCNC: 3.6 MMOL/L (ref 3.7–5.3)
SODIUM SERPL-SCNC: 141 MMOL/L (ref 135–144)

## 2023-12-08 PROCEDURE — C9113 INJ PANTOPRAZOLE SODIUM, VIA: HCPCS | Performed by: INTERNAL MEDICINE

## 2023-12-08 PROCEDURE — 97110 THERAPEUTIC EXERCISES: CPT

## 2023-12-08 PROCEDURE — 6370000000 HC RX 637 (ALT 250 FOR IP): Performed by: INTERNAL MEDICINE

## 2023-12-08 PROCEDURE — 97116 GAIT TRAINING THERAPY: CPT

## 2023-12-08 PROCEDURE — 85014 HEMATOCRIT: CPT

## 2023-12-08 PROCEDURE — 6360000002 HC RX W HCPCS: Performed by: INTERNAL MEDICINE

## 2023-12-08 PROCEDURE — 2700000000 HC OXYGEN THERAPY PER DAY

## 2023-12-08 PROCEDURE — A4216 STERILE WATER/SALINE, 10 ML: HCPCS | Performed by: INTERNAL MEDICINE

## 2023-12-08 PROCEDURE — APPSS30 APP SPLIT SHARED TIME 16-30 MINUTES: Performed by: NURSE PRACTITIONER

## 2023-12-08 PROCEDURE — 85018 HEMOGLOBIN: CPT

## 2023-12-08 PROCEDURE — 99239 HOSP IP/OBS DSCHRG MGMT >30: CPT | Performed by: INTERNAL MEDICINE

## 2023-12-08 PROCEDURE — 2580000003 HC RX 258: Performed by: INTERNAL MEDICINE

## 2023-12-08 PROCEDURE — 36415 COLL VENOUS BLD VENIPUNCTURE: CPT

## 2023-12-08 PROCEDURE — 94761 N-INVAS EAR/PLS OXIMETRY MLT: CPT

## 2023-12-08 PROCEDURE — 80048 BASIC METABOLIC PNL TOTAL CA: CPT

## 2023-12-08 PROCEDURE — 94640 AIRWAY INHALATION TREATMENT: CPT

## 2023-12-08 PROCEDURE — 2060000000 HC ICU INTERMEDIATE R&B

## 2023-12-08 PROCEDURE — 6370000000 HC RX 637 (ALT 250 FOR IP): Performed by: NURSE PRACTITIONER

## 2023-12-08 RX ORDER — CARVEDILOL 12.5 MG/1
12.5 TABLET ORAL 2 TIMES DAILY WITH MEALS
Status: DISCONTINUED | OUTPATIENT
Start: 2023-12-08 | End: 2023-12-10 | Stop reason: HOSPADM

## 2023-12-08 RX ORDER — HYDRALAZINE HYDROCHLORIDE 25 MG/1
25 TABLET, FILM COATED ORAL EVERY 12 HOURS SCHEDULED
Status: DISCONTINUED | OUTPATIENT
Start: 2023-12-08 | End: 2023-12-10 | Stop reason: HOSPADM

## 2023-12-08 RX ADMIN — PRAVASTATIN SODIUM 40 MG: 40 TABLET ORAL at 21:28

## 2023-12-08 RX ADMIN — SODIUM CHLORIDE 200 MG: 9 INJECTION, SOLUTION INTRAVENOUS at 21:42

## 2023-12-08 RX ADMIN — PANTOPRAZOLE SODIUM 40 MG: 40 INJECTION, POWDER, FOR SOLUTION INTRAVENOUS at 17:22

## 2023-12-08 RX ADMIN — HYDRALAZINE HYDROCHLORIDE 25 MG: 25 TABLET, FILM COATED ORAL at 21:28

## 2023-12-08 RX ADMIN — WATER 60 MG: 1 INJECTION INTRAMUSCULAR; INTRAVENOUS; SUBCUTANEOUS at 15:16

## 2023-12-08 RX ADMIN — BUDESONIDE AND FORMOTEROL FUMARATE DIHYDRATE 2 PUFF: 160; 4.5 AEROSOL RESPIRATORY (INHALATION) at 19:22

## 2023-12-08 RX ADMIN — SODIUM CHLORIDE, PRESERVATIVE FREE 10 ML: 5 INJECTION INTRAVENOUS at 15:22

## 2023-12-08 RX ADMIN — GABAPENTIN 300 MG: 300 CAPSULE ORAL at 15:16

## 2023-12-08 RX ADMIN — GABAPENTIN 300 MG: 300 CAPSULE ORAL at 21:28

## 2023-12-08 RX ADMIN — CARVEDILOL 12.5 MG: 12.5 TABLET, FILM COATED ORAL at 17:22

## 2023-12-08 RX ADMIN — BUDESONIDE AND FORMOTEROL FUMARATE DIHYDRATE 2 PUFF: 160; 4.5 AEROSOL RESPIRATORY (INHALATION) at 07:56

## 2023-12-08 RX ADMIN — SODIUM CHLORIDE, PRESERVATIVE FREE 10 ML: 5 INJECTION INTRAVENOUS at 21:28

## 2023-12-08 RX ADMIN — FUROSEMIDE 20 MG: 20 TABLET ORAL at 15:16

## 2023-12-08 NOTE — CARE COORDINATION
ONGOING DISCHARGE PLAN:    Patient is alert and oriented x4. Spoke with patient regarding discharge plan and patient confirms that plan is still to discharge to home with no needs    PPI    On IV Venofir    Trend H&H    Ct abd without contrast showed no retroperitoneal bleed    Will continue to follow for additional discharge needs. If patient is discharged prior to next notation, then this note serves as note for discharge by case management.     Electronically signed by Joy Ambriz RN on 12/8/2023 at 1:03 PM

## 2023-12-09 LAB
ABO/RH: NORMAL
ANION GAP SERPL CALCULATED.3IONS-SCNC: 10 MMOL/L (ref 9–17)
ANTIBODY SCREEN: NEGATIVE
ARM BAND NUMBER: NORMAL
BLOOD BANK BLOOD PRODUCT EXPIRATION DATE: NORMAL
BLOOD BANK DISPENSE STATUS: NORMAL
BLOOD BANK ISBT PRODUCT BLOOD TYPE: 9500
BLOOD BANK PRODUCT CODE: NORMAL
BLOOD BANK SAMPLE EXPIRATION: NORMAL
BLOOD BANK UNIT TYPE AND RH: NORMAL
BPU ID: NORMAL
BUN SERPL-MCNC: 29 MG/DL (ref 8–23)
CALCIUM SERPL-MCNC: 9.4 MG/DL (ref 8.6–10.4)
CHLORIDE SERPL-SCNC: 102 MMOL/L (ref 98–107)
CO2 SERPL-SCNC: 30 MMOL/L (ref 20–31)
COMPONENT: NORMAL
CREAT SERPL-MCNC: 1.4 MG/DL (ref 0.5–0.9)
CROSSMATCH RESULT: NORMAL
ERYTHROCYTE [DISTWIDTH] IN BLOOD BY AUTOMATED COUNT: 17 % (ref 11.5–14.9)
EST. AVERAGE GLUCOSE BLD GHB EST-MCNC: 100 MG/DL
GFR SERPL CREATININE-BSD FRML MDRD: 39 ML/MIN/1.73M2
GLUCOSE SERPL-MCNC: 148 MG/DL (ref 70–99)
HBA1C MFR BLD: 5.1 % (ref 4–6)
HCT VFR BLD AUTO: 22.2 % (ref 36–46)
HGB BLD-MCNC: 7.1 G/DL (ref 12–16)
MCH RBC QN AUTO: 31.8 PG (ref 26–34)
MCHC RBC AUTO-ENTMCNC: 32.3 G/DL (ref 31–37)
MCV RBC AUTO: 98.6 FL (ref 80–100)
PLATELET # BLD AUTO: 180 K/UL (ref 150–450)
PMV BLD AUTO: 7.8 FL (ref 6–12)
POTASSIUM SERPL-SCNC: 3.9 MMOL/L (ref 3.7–5.3)
RBC # BLD AUTO: 2.25 M/UL (ref 4–5.2)
SODIUM SERPL-SCNC: 142 MMOL/L (ref 135–144)
TRANSFUSION STATUS: NORMAL
UNIT DIVISION: 0
UNIT ISSUE DATE/TIME: NORMAL
WBC OTHER # BLD: 8.2 K/UL (ref 3.5–11)

## 2023-12-09 PROCEDURE — 2580000003 HC RX 258: Performed by: INTERNAL MEDICINE

## 2023-12-09 PROCEDURE — 6360000002 HC RX W HCPCS: Performed by: INTERNAL MEDICINE

## 2023-12-09 PROCEDURE — 83036 HEMOGLOBIN GLYCOSYLATED A1C: CPT

## 2023-12-09 PROCEDURE — 6370000000 HC RX 637 (ALT 250 FOR IP): Performed by: INTERNAL MEDICINE

## 2023-12-09 PROCEDURE — 6370000000 HC RX 637 (ALT 250 FOR IP): Performed by: NURSE PRACTITIONER

## 2023-12-09 PROCEDURE — 80048 BASIC METABOLIC PNL TOTAL CA: CPT

## 2023-12-09 PROCEDURE — APPSS30 APP SPLIT SHARED TIME 16-30 MINUTES: Performed by: NURSE PRACTITIONER

## 2023-12-09 PROCEDURE — 99231 SBSQ HOSP IP/OBS SF/LOW 25: CPT | Performed by: INTERNAL MEDICINE

## 2023-12-09 PROCEDURE — 36415 COLL VENOUS BLD VENIPUNCTURE: CPT

## 2023-12-09 PROCEDURE — 99233 SBSQ HOSP IP/OBS HIGH 50: CPT | Performed by: INTERNAL MEDICINE

## 2023-12-09 PROCEDURE — 94640 AIRWAY INHALATION TREATMENT: CPT

## 2023-12-09 PROCEDURE — C9113 INJ PANTOPRAZOLE SODIUM, VIA: HCPCS | Performed by: INTERNAL MEDICINE

## 2023-12-09 PROCEDURE — 2060000000 HC ICU INTERMEDIATE R&B

## 2023-12-09 PROCEDURE — 94760 N-INVAS EAR/PLS OXIMETRY 1: CPT

## 2023-12-09 PROCEDURE — 85027 COMPLETE CBC AUTOMATED: CPT

## 2023-12-09 PROCEDURE — 97116 GAIT TRAINING THERAPY: CPT

## 2023-12-09 PROCEDURE — 97110 THERAPEUTIC EXERCISES: CPT

## 2023-12-09 PROCEDURE — 97530 THERAPEUTIC ACTIVITIES: CPT

## 2023-12-09 PROCEDURE — 2700000000 HC OXYGEN THERAPY PER DAY

## 2023-12-09 PROCEDURE — A4216 STERILE WATER/SALINE, 10 ML: HCPCS | Performed by: INTERNAL MEDICINE

## 2023-12-09 PROCEDURE — 93005 ELECTROCARDIOGRAM TRACING: CPT | Performed by: INTERNAL MEDICINE

## 2023-12-09 RX ORDER — PANTOPRAZOLE SODIUM 40 MG/1
40 TABLET, DELAYED RELEASE ORAL
Qty: 30 TABLET | Refills: 5 | Status: ON HOLD | OUTPATIENT
Start: 2023-12-09

## 2023-12-09 RX ORDER — GABAPENTIN 300 MG/1
300 CAPSULE ORAL 2 TIMES DAILY
Qty: 60 CAPSULE | Refills: 5 | Status: ON HOLD | OUTPATIENT
Start: 2023-12-09 | End: 2024-06-06

## 2023-12-09 RX ADMIN — BUDESONIDE AND FORMOTEROL FUMARATE DIHYDRATE 2 PUFF: 160; 4.5 AEROSOL RESPIRATORY (INHALATION) at 07:21

## 2023-12-09 RX ADMIN — PANTOPRAZOLE SODIUM 40 MG: 40 INJECTION, POWDER, FOR SOLUTION INTRAVENOUS at 09:25

## 2023-12-09 RX ADMIN — HYDRALAZINE HYDROCHLORIDE 25 MG: 25 TABLET, FILM COATED ORAL at 21:52

## 2023-12-09 RX ADMIN — FUROSEMIDE 20 MG: 20 TABLET ORAL at 09:25

## 2023-12-09 RX ADMIN — CARVEDILOL 12.5 MG: 12.5 TABLET, FILM COATED ORAL at 17:15

## 2023-12-09 RX ADMIN — HYDRALAZINE HYDROCHLORIDE 25 MG: 25 TABLET, FILM COATED ORAL at 09:25

## 2023-12-09 RX ADMIN — PRAVASTATIN SODIUM 40 MG: 40 TABLET ORAL at 21:52

## 2023-12-09 RX ADMIN — SODIUM CHLORIDE, PRESERVATIVE FREE 10 ML: 5 INJECTION INTRAVENOUS at 21:52

## 2023-12-09 RX ADMIN — POLYETHYLENE GLYCOL-3350 AND ELECTROLYTES 4000 ML: 236; 6.74; 5.86; 2.97; 22.74 POWDER, FOR SOLUTION ORAL at 15:22

## 2023-12-09 RX ADMIN — SODIUM CHLORIDE 200 MG: 9 INJECTION, SOLUTION INTRAVENOUS at 21:57

## 2023-12-09 RX ADMIN — GABAPENTIN 300 MG: 300 CAPSULE ORAL at 21:52

## 2023-12-09 RX ADMIN — CARVEDILOL 12.5 MG: 12.5 TABLET, FILM COATED ORAL at 09:25

## 2023-12-09 RX ADMIN — BISACODYL 10 MG: 10 SUPPOSITORY RECTAL at 09:32

## 2023-12-09 RX ADMIN — SODIUM CHLORIDE, PRESERVATIVE FREE 10 ML: 5 INJECTION INTRAVENOUS at 09:26

## 2023-12-09 RX ADMIN — GABAPENTIN 300 MG: 300 CAPSULE ORAL at 09:25

## 2023-12-09 NOTE — CARE COORDINATION
DISCHARGE PLANNING NOTE:    Plan remains for patient to be discahrged home with [facility]. Hb 7.1 today. GI following. Will continue to follow for additional discharge needs.     Electronically signed by Micheal Christie RN on 12/9/2023 at 4:52 PM

## 2023-12-10 ENCOUNTER — ANESTHESIA EVENT (OUTPATIENT)
Dept: OPERATING ROOM | Age: 78
End: 2023-12-10
Payer: MEDICARE

## 2023-12-10 ENCOUNTER — ANESTHESIA (OUTPATIENT)
Dept: OPERATING ROOM | Age: 78
End: 2023-12-10
Payer: MEDICARE

## 2023-12-10 VITALS
RESPIRATION RATE: 20 BRPM | SYSTOLIC BLOOD PRESSURE: 148 MMHG | DIASTOLIC BLOOD PRESSURE: 66 MMHG | OXYGEN SATURATION: 97 % | TEMPERATURE: 98 F | HEART RATE: 74 BPM

## 2023-12-10 LAB
ERYTHROCYTE [DISTWIDTH] IN BLOOD BY AUTOMATED COUNT: 17.6 % (ref 11.5–14.9)
HCT VFR BLD AUTO: 23.4 % (ref 36–46)
HGB BLD-MCNC: 7.4 G/DL (ref 12–16)
MCH RBC QN AUTO: 31.5 PG (ref 26–34)
MCHC RBC AUTO-ENTMCNC: 31.5 G/DL (ref 31–37)
MCV RBC AUTO: 100.3 FL (ref 80–100)
PLATELET # BLD AUTO: 209 K/UL (ref 150–450)
PMV BLD AUTO: 7.6 FL (ref 6–12)
RBC # BLD AUTO: 2.34 M/UL (ref 4–5.2)
WBC OTHER # BLD: 9.7 K/UL (ref 3.5–11)

## 2023-12-10 PROCEDURE — 7100000001 HC PACU RECOVERY - ADDTL 15 MIN: Performed by: INTERNAL MEDICINE

## 2023-12-10 PROCEDURE — 6370000000 HC RX 637 (ALT 250 FOR IP): Performed by: INTERNAL MEDICINE

## 2023-12-10 PROCEDURE — 2709999900 HC NON-CHARGEABLE SUPPLY: Performed by: INTERNAL MEDICINE

## 2023-12-10 PROCEDURE — 2580000003 HC RX 258: Performed by: ANESTHESIOLOGY

## 2023-12-10 PROCEDURE — C9113 INJ PANTOPRAZOLE SODIUM, VIA: HCPCS | Performed by: INTERNAL MEDICINE

## 2023-12-10 PROCEDURE — 36415 COLL VENOUS BLD VENIPUNCTURE: CPT

## 2023-12-10 PROCEDURE — 6360000002 HC RX W HCPCS: Performed by: ANESTHESIOLOGY

## 2023-12-10 PROCEDURE — 0DJD8ZZ INSPECTION OF LOWER INTESTINAL TRACT, VIA NATURAL OR ARTIFICIAL OPENING ENDOSCOPIC: ICD-10-PCS | Performed by: INTERNAL MEDICINE

## 2023-12-10 PROCEDURE — 94760 N-INVAS EAR/PLS OXIMETRY 1: CPT

## 2023-12-10 PROCEDURE — 3700000001 HC ADD 15 MINUTES (ANESTHESIA): Performed by: INTERNAL MEDICINE

## 2023-12-10 PROCEDURE — 2580000003 HC RX 258: Performed by: INTERNAL MEDICINE

## 2023-12-10 PROCEDURE — 99239 HOSP IP/OBS DSCHRG MGMT >30: CPT | Performed by: INTERNAL MEDICINE

## 2023-12-10 PROCEDURE — 3609027000 HC COLONOSCOPY: Performed by: INTERNAL MEDICINE

## 2023-12-10 PROCEDURE — 85027 COMPLETE CBC AUTOMATED: CPT

## 2023-12-10 PROCEDURE — 6360000002 HC RX W HCPCS: Performed by: INTERNAL MEDICINE

## 2023-12-10 PROCEDURE — A4216 STERILE WATER/SALINE, 10 ML: HCPCS | Performed by: INTERNAL MEDICINE

## 2023-12-10 PROCEDURE — 99231 SBSQ HOSP IP/OBS SF/LOW 25: CPT | Performed by: INTERNAL MEDICINE

## 2023-12-10 PROCEDURE — 45378 DIAGNOSTIC COLONOSCOPY: CPT | Performed by: INTERNAL MEDICINE

## 2023-12-10 PROCEDURE — 3700000000 HC ANESTHESIA ATTENDED CARE: Performed by: INTERNAL MEDICINE

## 2023-12-10 PROCEDURE — 2700000000 HC OXYGEN THERAPY PER DAY

## 2023-12-10 PROCEDURE — 7100000000 HC PACU RECOVERY - FIRST 15 MIN: Performed by: INTERNAL MEDICINE

## 2023-12-10 PROCEDURE — 2500000003 HC RX 250 WO HCPCS: Performed by: ANESTHESIOLOGY

## 2023-12-10 PROCEDURE — 94640 AIRWAY INHALATION TREATMENT: CPT

## 2023-12-10 RX ORDER — HYDROCORTISONE ACETATE 25 MG/1
25 SUPPOSITORY RECTAL 2 TIMES DAILY
Qty: 20 SUPPOSITORY | Refills: 0 | Status: SHIPPED | OUTPATIENT
Start: 2023-12-10 | End: 2023-12-15

## 2023-12-10 RX ORDER — SODIUM CHLORIDE, SODIUM LACTATE, POTASSIUM CHLORIDE, CALCIUM CHLORIDE 600; 310; 30; 20 MG/100ML; MG/100ML; MG/100ML; MG/100ML
INJECTION, SOLUTION INTRAVENOUS CONTINUOUS PRN
Status: DISCONTINUED | OUTPATIENT
Start: 2023-12-10 | End: 2023-12-10 | Stop reason: SDUPTHER

## 2023-12-10 RX ORDER — LIDOCAINE HYDROCHLORIDE 10 MG/ML
INJECTION, SOLUTION EPIDURAL; INFILTRATION; INTRACAUDAL; PERINEURAL PRN
Status: DISCONTINUED | OUTPATIENT
Start: 2023-12-10 | End: 2023-12-10 | Stop reason: SDUPTHER

## 2023-12-10 RX ORDER — HYDROCORTISONE ACETATE 25 MG/1
25 SUPPOSITORY RECTAL 2 TIMES DAILY
Status: DISCONTINUED | OUTPATIENT
Start: 2023-12-10 | End: 2023-12-10 | Stop reason: HOSPADM

## 2023-12-10 RX ORDER — FUROSEMIDE 10 MG/ML
40 INJECTION INTRAMUSCULAR; INTRAVENOUS ONCE
Status: COMPLETED | OUTPATIENT
Start: 2023-12-10 | End: 2023-12-10

## 2023-12-10 RX ORDER — PROPOFOL 10 MG/ML
INJECTION, EMULSION INTRAVENOUS PRN
Status: DISCONTINUED | OUTPATIENT
Start: 2023-12-10 | End: 2023-12-10 | Stop reason: SDUPTHER

## 2023-12-10 RX ADMIN — FUROSEMIDE 40 MG: 10 INJECTION, SOLUTION INTRAMUSCULAR; INTRAVENOUS at 14:01

## 2023-12-10 RX ADMIN — APIXABAN 5 MG: 5 TABLET, FILM COATED ORAL at 14:02

## 2023-12-10 RX ADMIN — BUDESONIDE AND FORMOTEROL FUMARATE DIHYDRATE 2 PUFF: 160; 4.5 AEROSOL RESPIRATORY (INHALATION) at 08:39

## 2023-12-10 RX ADMIN — SODIUM CHLORIDE, PRESERVATIVE FREE 10 ML: 5 INJECTION INTRAVENOUS at 12:06

## 2023-12-10 RX ADMIN — HYDRALAZINE HYDROCHLORIDE 25 MG: 25 TABLET, FILM COATED ORAL at 12:04

## 2023-12-10 RX ADMIN — PROPOFOL 25 MG: 10 INJECTION, EMULSION INTRAVENOUS at 10:42

## 2023-12-10 RX ADMIN — PROPOFOL 50 MG: 10 INJECTION, EMULSION INTRAVENOUS at 10:32

## 2023-12-10 RX ADMIN — PROPOFOL 20 MG: 10 INJECTION, EMULSION INTRAVENOUS at 10:35

## 2023-12-10 RX ADMIN — SODIUM CHLORIDE, POTASSIUM CHLORIDE, SODIUM LACTATE AND CALCIUM CHLORIDE: 600; 310; 30; 20 INJECTION, SOLUTION INTRAVENOUS at 10:09

## 2023-12-10 RX ADMIN — CARVEDILOL 12.5 MG: 12.5 TABLET, FILM COATED ORAL at 12:04

## 2023-12-10 RX ADMIN — FUROSEMIDE 20 MG: 20 TABLET ORAL at 12:04

## 2023-12-10 RX ADMIN — PANTOPRAZOLE SODIUM 40 MG: 40 INJECTION, POWDER, FOR SOLUTION INTRAVENOUS at 12:04

## 2023-12-10 RX ADMIN — PROPOFOL 25 MG: 10 INJECTION, EMULSION INTRAVENOUS at 10:38

## 2023-12-10 RX ADMIN — LIDOCAINE HYDROCHLORIDE 25 MG: 10 INJECTION, SOLUTION EPIDURAL; INFILTRATION; INTRACAUDAL; PERINEURAL at 10:32

## 2023-12-10 RX ADMIN — GABAPENTIN 300 MG: 300 CAPSULE ORAL at 12:04

## 2023-12-10 ASSESSMENT — ENCOUNTER SYMPTOMS: SHORTNESS OF BREATH: 1

## 2023-12-10 ASSESSMENT — COPD QUESTIONNAIRES: CAT_SEVERITY: NO INTERVAL CHANGE

## 2023-12-10 NOTE — FLOWSHEET NOTE
12/09/23 2102   Treatment Team Notification   Reason for Communication Review case   Name of Team Member Notified Dr. Nory Snow Nurse   Method of Communication Secure Message   Response Waiting for response   Notification Time 2102     Writer reached out to Dr. Georgie Samson to see if they wanted more frequent h and h checks due to down trending hgb. Per Dr. Georgie Samson ok to check in morning unless pt having black or bloody stools. Pt stools are dark brown. Will continue to monitor.

## 2023-12-10 NOTE — OP NOTE
PROCEDURE NOTE    DATE OF PROCEDURE: 12/10/2023    SURGEON: Jacinta Barraza MD  Facility : [facility]  ASSISTANT: None  Anesthesia: MAC  PREOPERATIVE DIAGNOSIS:   Anemia and GI bleed      POSTOPERATIVE DIAGNOSIS: as described below    OPERATION: Total colonoscopy     ANESTHESIA: Moderate Sedation    ESTIMATED BLOOD LOSS: less than 50     COMPLICATIONS: None. SPECIMENS:  Was Not Obtained    HISTORY: The patient is a 70y.o. year old female with history of above preop diagnosis. I recommended colonoscopy with possible biopsy or polypectomy and I explained the risk, benefits, expected outcome, and alternatives to the procedure. Risks included but are not limited to bleeding, infection, respiratory distress, hypotension, and perforation of the colon and possibility of missing a lesion. The patient understands and is in agreement. The patient was counseled at length about the risks of deni Covid-19 during their perioperative period and any recovery window from their procedure. The patient was made aware that deni Covid-19  may worsen their prognosis for recovering from their procedure  and lend to a higher morbidity and/or mortality risk. All material risks, benefits, and reasonable alternatives including postponing the procedure were discussed. The patient does wish to proceed with the procedure at this time. PROCEDURE: The patient was given IV conscious sedation. The patient's SPO2 remained above 90% throughout the procedure. The colonoscope was inserted per rectum and advanced under direct vision to the cecum without difficulty. Post sedation note : The patient's SPO2 remained above 90% throughout the procedure. the vital signs remained stable , and no immediate complication form the procedure noted, patient will be ready for d/c when criteria is met . The prep was fair.       Findings:  Terminal ileum: normal    Cecum/Ascending colon: normal    Transverse

## 2023-12-10 NOTE — CARE COORDINATION
DISCHARGE PLANNING NOTE:    Unable to speak with pt as she is off the floor for colonoscopy. Plan remains for patient to be discahrged home with [facility]. Hb 7.4 today. Will continue to follow for additional discharge needs. Electronically signed by Beatriz Garcia RN on 12/10/2023 at 10:14 AM    Spoke with pt and spouse and they confirm plan is home with [facility]. Called [facility] and notified Jose Senior that pt will be d/c'ed home today.     Electronically signed by Beatriz Garcia RN on 12/10/2023 at 2:22 PM

## 2023-12-11 ENCOUNTER — CARE COORDINATION (OUTPATIENT)
Dept: CASE MANAGEMENT | Age: 78
End: 2023-12-11

## 2023-12-11 DIAGNOSIS — R58 BLOOD LOSS: ICD-10-CM

## 2023-12-11 DIAGNOSIS — K92.2 UPPER GI BLEED: Primary | ICD-10-CM

## 2023-12-11 LAB
EKG Q-T INTERVAL: 394 MS
EKG QRS DURATION: 142 MS
EKG QTC CALCULATION (BAZETT): 437 MS
EKG R AXIS: 58 DEGREES
EKG T AXIS: -21 DEGREES
EKG VENTRICULAR RATE: 74 BPM
SURGICAL PATHOLOGY REPORT: NORMAL

## 2023-12-11 PROCEDURE — 93010 ELECTROCARDIOGRAM REPORT: CPT | Performed by: INTERNAL MEDICINE

## 2023-12-11 PROCEDURE — 1111F DSCHRG MED/CURRENT MED MERGE: CPT | Performed by: FAMILY MEDICINE

## 2023-12-11 RX ORDER — POLYETHYLENE GLYCOL 3350, SODIUM SULFATE ANHYDROUS, SODIUM BICARBONATE, SODIUM CHLORIDE, POTASSIUM CHLORIDE 236; 22.74; 6.74; 5.86; 2.97 G/4L; G/4L; G/4L; G/4L; G/4L
4 POWDER, FOR SOLUTION ORAL ONCE
Qty: 4000 ML | Refills: 0 | Status: SHIPPED | OUTPATIENT
Start: 2023-12-11 | End: 2023-12-11

## 2023-12-11 RX ORDER — BISACODYL 5 MG/1
5 TABLET, DELAYED RELEASE ORAL ONCE
Qty: 1 TABLET | Refills: 0 | Status: SHIPPED | OUTPATIENT
Start: 2023-12-11 | End: 2023-12-11

## 2023-12-11 NOTE — DISCHARGE SUMMARY
tablet  Commonly known as: APRESOLINE     pravastatin 40 MG tablet  Commonly known as: PRAVACHOL  Take 1 tablet by mouth daily           * This list has 2 medication(s) that are the same as other medications prescribed for you. Read the directions carefully, and ask your doctor or other care provider to review them with you. STOP taking these medications      clotrimazole-betamethasone 1-0.05 % cream  Commonly known as: Lotrisone               Where to Get Your Medications        These medications were sent to [address] [place] [address], [address] [place] [address]      Phone: [phone]   gabapentin 300 MG capsule  hydrocortisone 25 MG suppository  pantoprazole 40 MG tablet         Time Spent on discharge is  35 mins in patient examination, evaluation, counseling as well as medication reconciliation, prescriptions for required medications, discharge plan and follow up. Electronically signed by   Darrin Martin MD  12/11/2023  2:50 PM      Thank you Dr. Becca Sal MD for the opportunity to be involved in this patient's care.

## 2023-12-11 NOTE — TELEPHONE ENCOUNTER
Nae Erickson CNP came as patient needs a Pill Cam ASAP. Pill cam is covered with no PA needed and a 150.00 dollar copay. Pill cam scheduled for 12/19/2023 and info faxed to patient.    Please sign Medication prep

## 2023-12-13 DIAGNOSIS — K92.2 UPPER GI BLEED: ICD-10-CM

## 2023-12-13 LAB
HCT VFR BLD CALC: NORMAL %
HEMOGLOBIN: NORMAL

## 2023-12-15 ENCOUNTER — HOSPITAL ENCOUNTER (INPATIENT)
Age: 78
LOS: 17 days | Discharge: NURSING, REHAB OR LONG-TERM CARE | DRG: 377 | End: 2024-01-01
Attending: EMERGENCY MEDICINE | Admitting: INTERNAL MEDICINE
Payer: MEDICARE

## 2023-12-15 DIAGNOSIS — K92.2 ACUTE GI BLEEDING: ICD-10-CM

## 2023-12-15 DIAGNOSIS — E87.6 HYPOKALEMIA: ICD-10-CM

## 2023-12-15 DIAGNOSIS — D64.9 ANEMIA, UNSPECIFIED TYPE: ICD-10-CM

## 2023-12-15 DIAGNOSIS — I50.9 CHRONIC CONGESTIVE HEART FAILURE, UNSPECIFIED HEART FAILURE TYPE (HCC): Primary | ICD-10-CM

## 2023-12-15 DIAGNOSIS — R60.0 EDEMA OF RIGHT UPPER ARM: ICD-10-CM

## 2023-12-15 DIAGNOSIS — K92.1 MELENA: ICD-10-CM

## 2023-12-15 LAB
ALBUMIN SERPL-MCNC: 3.2 G/DL (ref 3.5–5.2)
ALP SERPL-CCNC: 61 U/L (ref 35–104)
ALT SERPL-CCNC: 14 U/L (ref 5–33)
ANION GAP SERPL CALCULATED.3IONS-SCNC: 11 MMOL/L (ref 9–17)
AST SERPL-CCNC: 15 U/L
BASOPHILS # BLD: 0.1 K/UL (ref 0–0.2)
BASOPHILS NFR BLD: 1 % (ref 0–2)
BILIRUB SERPL-MCNC: 0.6 MG/DL (ref 0.3–1.2)
BUN SERPL-MCNC: 33 MG/DL (ref 8–23)
CALCIUM SERPL-MCNC: 9 MG/DL (ref 8.6–10.4)
CHLORIDE SERPL-SCNC: 99 MMOL/L (ref 98–107)
CO2 SERPL-SCNC: 31 MMOL/L (ref 20–31)
CREAT SERPL-MCNC: 1.3 MG/DL (ref 0.5–0.9)
EOSINOPHIL # BLD: 0.3 K/UL (ref 0–0.4)
EOSINOPHILS RELATIVE PERCENT: 3 % (ref 0–4)
ERYTHROCYTE [DISTWIDTH] IN BLOOD BY AUTOMATED COUNT: 20.2 % (ref 11.5–14.9)
GFR SERPL CREATININE-BSD FRML MDRD: 43 ML/MIN/1.73M2
GLUCOSE SERPL-MCNC: 125 MG/DL (ref 70–99)
HCT VFR BLD AUTO: 22.2 % (ref 36–46)
HCT VFR BLD AUTO: 22.5 % (ref 36–46)
HGB BLD-MCNC: 7.1 G/DL (ref 12–16)
HGB BLD-MCNC: 7.1 G/DL (ref 12–16)
INR PPP: 1.8
LYMPHOCYTES NFR BLD: 1 K/UL (ref 1–4.8)
LYMPHOCYTES RELATIVE PERCENT: 11 % (ref 24–44)
MCH RBC QN AUTO: 32.2 PG (ref 26–34)
MCHC RBC AUTO-ENTMCNC: 31.3 G/DL (ref 31–37)
MCV RBC AUTO: 102.8 FL (ref 80–100)
MONOCYTES NFR BLD: 1 K/UL (ref 0.1–1.3)
MONOCYTES NFR BLD: 10 % (ref 1–7)
NEUTROPHILS NFR BLD: 75 % (ref 36–66)
NEUTS SEG NFR BLD: 7.3 K/UL (ref 1.3–9.1)
PARTIAL THROMBOPLASTIN TIME: 32.2 SEC (ref 24–36)
PLATELET # BLD AUTO: 183 K/UL (ref 150–450)
PMV BLD AUTO: 7.9 FL (ref 6–12)
POTASSIUM SERPL-SCNC: 3.5 MMOL/L (ref 3.7–5.3)
PROT SERPL-MCNC: 5.8 G/DL (ref 6.4–8.3)
PROTHROMBIN TIME: 21.3 SEC (ref 11.8–14.6)
RBC # BLD AUTO: 2.19 M/UL (ref 4–5.2)
SODIUM SERPL-SCNC: 141 MMOL/L (ref 135–144)
WBC OTHER # BLD: 9.7 K/UL (ref 3.5–11)

## 2023-12-15 PROCEDURE — C9113 INJ PANTOPRAZOLE SODIUM, VIA: HCPCS | Performed by: INTERNAL MEDICINE

## 2023-12-15 PROCEDURE — 80053 COMPREHEN METABOLIC PANEL: CPT

## 2023-12-15 PROCEDURE — 86901 BLOOD TYPING SEROLOGIC RH(D): CPT

## 2023-12-15 PROCEDURE — 85025 COMPLETE CBC W/AUTO DIFF WBC: CPT

## 2023-12-15 PROCEDURE — 99285 EMERGENCY DEPT VISIT HI MDM: CPT

## 2023-12-15 PROCEDURE — 6370000000 HC RX 637 (ALT 250 FOR IP): Performed by: EMERGENCY MEDICINE

## 2023-12-15 PROCEDURE — 94640 AIRWAY INHALATION TREATMENT: CPT

## 2023-12-15 PROCEDURE — 2700000000 HC OXYGEN THERAPY PER DAY

## 2023-12-15 PROCEDURE — 86900 BLOOD TYPING SEROLOGIC ABO: CPT

## 2023-12-15 PROCEDURE — 85610 PROTHROMBIN TIME: CPT

## 2023-12-15 PROCEDURE — 86920 COMPATIBILITY TEST SPIN: CPT

## 2023-12-15 PROCEDURE — 2580000003 HC RX 258: Performed by: INTERNAL MEDICINE

## 2023-12-15 PROCEDURE — 86850 RBC ANTIBODY SCREEN: CPT

## 2023-12-15 PROCEDURE — A4216 STERILE WATER/SALINE, 10 ML: HCPCS | Performed by: INTERNAL MEDICINE

## 2023-12-15 PROCEDURE — 6360000002 HC RX W HCPCS: Performed by: INTERNAL MEDICINE

## 2023-12-15 PROCEDURE — 85018 HEMOGLOBIN: CPT

## 2023-12-15 PROCEDURE — 36415 COLL VENOUS BLD VENIPUNCTURE: CPT

## 2023-12-15 PROCEDURE — 85730 THROMBOPLASTIN TIME PARTIAL: CPT

## 2023-12-15 PROCEDURE — 2060000000 HC ICU INTERMEDIATE R&B

## 2023-12-15 PROCEDURE — 85014 HEMATOCRIT: CPT

## 2023-12-15 PROCEDURE — P9040 RBC LEUKOREDUCED IRRADIATED: HCPCS

## 2023-12-15 PROCEDURE — 6370000000 HC RX 637 (ALT 250 FOR IP): Performed by: INTERNAL MEDICINE

## 2023-12-15 PROCEDURE — 30233N1 TRANSFUSION OF NONAUTOLOGOUS RED BLOOD CELLS INTO PERIPHERAL VEIN, PERCUTANEOUS APPROACH: ICD-10-PCS | Performed by: INTERNAL MEDICINE

## 2023-12-15 RX ORDER — HYDROCORTISONE 25 MG/G
CREAM TOPICAL 2 TIMES DAILY
Status: ON HOLD | COMMUNITY
Start: 2023-12-10 | End: 2023-12-18 | Stop reason: HOSPADM

## 2023-12-15 RX ORDER — ACETAMINOPHEN 650 MG/1
650 SUPPOSITORY RECTAL EVERY 6 HOURS PRN
Status: DISCONTINUED | OUTPATIENT
Start: 2023-12-15 | End: 2024-01-01 | Stop reason: HOSPADM

## 2023-12-15 RX ORDER — ALBUTEROL SULFATE 90 UG/1
1 AEROSOL, METERED RESPIRATORY (INHALATION) EVERY 6 HOURS PRN
Status: DISCONTINUED | OUTPATIENT
Start: 2023-12-15 | End: 2024-01-01 | Stop reason: HOSPADM

## 2023-12-15 RX ORDER — SODIUM CHLORIDE 9 MG/ML
INJECTION, SOLUTION INTRAVENOUS PRN
Status: DISCONTINUED | OUTPATIENT
Start: 2023-12-15 | End: 2024-01-01 | Stop reason: HOSPADM

## 2023-12-15 RX ORDER — ONDANSETRON 4 MG/1
4 TABLET, ORALLY DISINTEGRATING ORAL EVERY 8 HOURS PRN
Status: DISCONTINUED | OUTPATIENT
Start: 2023-12-15 | End: 2024-01-01 | Stop reason: HOSPADM

## 2023-12-15 RX ORDER — BUDESONIDE AND FORMOTEROL FUMARATE DIHYDRATE 160; 4.5 UG/1; UG/1
2 AEROSOL RESPIRATORY (INHALATION)
Status: DISCONTINUED | OUTPATIENT
Start: 2023-12-15 | End: 2024-01-01 | Stop reason: HOSPADM

## 2023-12-15 RX ORDER — SODIUM CHLORIDE 0.9 % (FLUSH) 0.9 %
5-40 SYRINGE (ML) INJECTION EVERY 12 HOURS SCHEDULED
Status: DISCONTINUED | OUTPATIENT
Start: 2023-12-15 | End: 2024-01-01 | Stop reason: HOSPADM

## 2023-12-15 RX ORDER — ONDANSETRON 2 MG/ML
4 INJECTION INTRAMUSCULAR; INTRAVENOUS EVERY 6 HOURS PRN
Status: DISCONTINUED | OUTPATIENT
Start: 2023-12-15 | End: 2024-01-01 | Stop reason: HOSPADM

## 2023-12-15 RX ORDER — POTASSIUM CHLORIDE 20 MEQ/1
40 TABLET, EXTENDED RELEASE ORAL ONCE
Status: COMPLETED | OUTPATIENT
Start: 2023-12-15 | End: 2023-12-15

## 2023-12-15 RX ORDER — SODIUM CHLORIDE 0.9 % (FLUSH) 0.9 %
5-40 SYRINGE (ML) INJECTION PRN
Status: DISCONTINUED | OUTPATIENT
Start: 2023-12-15 | End: 2024-01-01 | Stop reason: HOSPADM

## 2023-12-15 RX ORDER — HYDRALAZINE HYDROCHLORIDE 25 MG/1
25 TABLET, FILM COATED ORAL EVERY 12 HOURS SCHEDULED
Status: DISCONTINUED | OUTPATIENT
Start: 2023-12-15 | End: 2023-12-22

## 2023-12-15 RX ORDER — FUROSEMIDE 20 MG/1
20 TABLET ORAL DAILY
Status: DISCONTINUED | OUTPATIENT
Start: 2023-12-15 | End: 2023-12-20

## 2023-12-15 RX ORDER — PRAVASTATIN SODIUM 40 MG
40 TABLET ORAL DAILY
Status: DISCONTINUED | OUTPATIENT
Start: 2023-12-15 | End: 2024-01-01 | Stop reason: HOSPADM

## 2023-12-15 RX ORDER — CARVEDILOL 25 MG/1
25 TABLET ORAL 2 TIMES DAILY WITH MEALS
Status: DISCONTINUED | OUTPATIENT
Start: 2023-12-15 | End: 2023-12-25

## 2023-12-15 RX ORDER — SODIUM CHLORIDE 9 MG/ML
INJECTION, SOLUTION INTRAVENOUS CONTINUOUS
Status: DISCONTINUED | OUTPATIENT
Start: 2023-12-15 | End: 2023-12-25

## 2023-12-15 RX ORDER — ASPIRIN 81 MG/1
81 TABLET ORAL DAILY
Status: DISCONTINUED | OUTPATIENT
Start: 2023-12-15 | End: 2024-01-01 | Stop reason: HOSPADM

## 2023-12-15 RX ORDER — ACETAMINOPHEN 325 MG/1
650 TABLET ORAL EVERY 6 HOURS PRN
Status: DISCONTINUED | OUTPATIENT
Start: 2023-12-15 | End: 2024-01-01 | Stop reason: HOSPADM

## 2023-12-15 RX ORDER — GABAPENTIN 300 MG/1
300 CAPSULE ORAL 2 TIMES DAILY
Status: DISCONTINUED | OUTPATIENT
Start: 2023-12-15 | End: 2024-01-01 | Stop reason: HOSPADM

## 2023-12-15 RX ADMIN — SODIUM CHLORIDE: 9 INJECTION, SOLUTION INTRAVENOUS at 17:44

## 2023-12-15 RX ADMIN — CARVEDILOL 25 MG: 25 TABLET, FILM COATED ORAL at 17:37

## 2023-12-15 RX ADMIN — FUROSEMIDE 20 MG: 20 TABLET ORAL at 17:37

## 2023-12-15 RX ADMIN — Medication 2 PUFF: at 21:42

## 2023-12-15 RX ADMIN — GABAPENTIN 300 MG: 300 CAPSULE ORAL at 21:00

## 2023-12-15 RX ADMIN — POTASSIUM CHLORIDE 40 MEQ: 1500 TABLET, EXTENDED RELEASE ORAL at 14:13

## 2023-12-15 RX ADMIN — PRAVASTATIN SODIUM 40 MG: 40 TABLET ORAL at 17:37

## 2023-12-15 RX ADMIN — PANTOPRAZOLE SODIUM 40 MG: 40 INJECTION, POWDER, FOR SOLUTION INTRAVENOUS at 17:37

## 2023-12-15 RX ADMIN — HYDRALAZINE HYDROCHLORIDE 25 MG: 25 TABLET, FILM COATED ORAL at 21:00

## 2023-12-15 ASSESSMENT — LIFESTYLE VARIABLES
HOW MANY STANDARD DRINKS CONTAINING ALCOHOL DO YOU HAVE ON A TYPICAL DAY: PATIENT DOES NOT DRINK
HOW OFTEN DO YOU HAVE A DRINK CONTAINING ALCOHOL: NEVER

## 2023-12-15 ASSESSMENT — PAIN - FUNCTIONAL ASSESSMENT: PAIN_FUNCTIONAL_ASSESSMENT: NONE - DENIES PAIN

## 2023-12-15 NOTE — ED PROVIDER NOTES
EMERGENCY DEPARTMENT ENCOUNTER    Pt Name: Rosi Haley  MRN: [id number]  Birthdate 1945  Date of evaluation: 12/15/23  CHIEF COMPLAINT       Chief Complaint   Patient presents with    Melena     Reporting black stool that began today. Was recently hospitalized for hematochezia (suspects related to hemorrhoids) but states it was lighter in appearance.  Had a blood transfusion and iron supplements during admission  Has upper GI scope scheduled for Tuesday  Denies ABD pain N/V     HISTORY OF PRESENT ILLNESS   78-year-old female with past medical history of atrial fibrillation on Eliquis who was recently hospitalized for an upper GI bleed where she received an EGD and colonoscopy and was diagnosed with erosions of her upper GI tract is presenting with chief complaint of a bowel movement of black tarry stool.  Patient was discharged 5 days ago and said that her bowel movements have looked more natural in color until this morning.  She had a bowel movement that was black and tarry it made her concerned prompting her visit.  She denies any abdominal pain.  Denies any new shortness of breath, fatigue, pallor.    The history is provided by the patient.           REVIEW OF SYSTEMS     Review of Systems   Constitutional:  Negative for chills, fatigue and fever.   HENT:  Negative for congestion.    Eyes:  Negative for visual disturbance.   Respiratory:  Negative for cough and shortness of breath.    Cardiovascular:  Negative for chest pain.   Gastrointestinal:  Positive for blood in stool. Negative for abdominal pain, constipation, diarrhea, nausea and vomiting.   Genitourinary:  Negative for dysuria, flank pain, frequency, hematuria and urgency.   Musculoskeletal:  Negative for myalgias.   Neurological:  Negative for dizziness, light-headedness and headaches.   Psychiatric/Behavioral:  Negative for behavioral problems.      PASTMEDICAL HISTORY     Past Medical History:   Diagnosis Date    Atrial fibrillation (HCC)

## 2023-12-15 NOTE — CONSENT
Informed Consent for Blood Component Transfusion Note    I have discussed with the patient the rationale for blood component transfusion; its benefits in treating or preventing fatigue, organ damage, or death; and its risk which includes mild transfusion reactions, rare risk of blood borne infection, or more serious but rare reactions. I have discussed the alternatives to transfusion, including the risk and consequences of not receiving transfusion. The patient had an opportunity to ask questions and had agreed to proceed with transfusion of blood components.    Electronically signed by Quinn Ball DO on 12/15/23 at 1:36 PM EST

## 2023-12-16 ENCOUNTER — APPOINTMENT (OUTPATIENT)
Dept: GENERAL RADIOLOGY | Age: 78
DRG: 377 | End: 2023-12-16
Payer: MEDICARE

## 2023-12-16 ENCOUNTER — ANESTHESIA EVENT (OUTPATIENT)
Dept: OPERATING ROOM | Age: 78
End: 2023-12-16
Payer: MEDICARE

## 2023-12-16 ENCOUNTER — ANESTHESIA (OUTPATIENT)
Dept: OPERATING ROOM | Age: 78
End: 2023-12-16
Payer: MEDICARE

## 2023-12-16 PROBLEM — E87.6 HYPOKALEMIA: Status: ACTIVE | Noted: 2023-12-16

## 2023-12-16 PROBLEM — R53.1 WEAKNESS: Status: ACTIVE | Noted: 2023-12-16

## 2023-12-16 PROBLEM — D64.9 ANEMIA: Status: ACTIVE | Noted: 2023-12-16

## 2023-12-16 LAB
HCT VFR BLD AUTO: 15.3 % (ref 36–46)
HCT VFR BLD AUTO: 18.6 % (ref 36–46)
HCT VFR BLD AUTO: 23.1 % (ref 36–46)
HCT VFR BLD AUTO: 24.6 % (ref 36–46)
HGB BLD-MCNC: 5.1 G/DL (ref 12–16)
HGB BLD-MCNC: 6 G/DL (ref 12–16)
HGB BLD-MCNC: 7.5 G/DL (ref 12–16)
HGB BLD-MCNC: 8.1 G/DL (ref 12–16)
IRON SATN MFR SERPL: 88 % (ref 20–55)
IRON SERPL-MCNC: 146 UG/DL (ref 37–145)
PARTIAL THROMBOPLASTIN TIME: 28.7 SEC (ref 24–36)
RETICS # AUTO: 0.1 M/UL (ref 0.02–0.1)
RETICS/RBC NFR AUTO: 5.8 % (ref 0.5–2)
TIBC SERPL-MCNC: 166 UG/DL (ref 250–450)
UNSATURATED IRON BINDING CAPACITY: 20 UG/DL (ref 112–347)

## 2023-12-16 PROCEDURE — 6360000002 HC RX W HCPCS: Performed by: INTERNAL MEDICINE

## 2023-12-16 PROCEDURE — A4216 STERILE WATER/SALINE, 10 ML: HCPCS | Performed by: INTERNAL MEDICINE

## 2023-12-16 PROCEDURE — 6370000000 HC RX 637 (ALT 250 FOR IP): Performed by: INTERNAL MEDICINE

## 2023-12-16 PROCEDURE — 7100000000 HC PACU RECOVERY - FIRST 15 MIN: Performed by: INTERNAL MEDICINE

## 2023-12-16 PROCEDURE — 85045 AUTOMATED RETICULOCYTE COUNT: CPT

## 2023-12-16 PROCEDURE — 85018 HEMOGLOBIN: CPT

## 2023-12-16 PROCEDURE — 85730 THROMBOPLASTIN TIME PARTIAL: CPT

## 2023-12-16 PROCEDURE — 6360000002 HC RX W HCPCS: Performed by: ANESTHESIOLOGY

## 2023-12-16 PROCEDURE — 2580000003 HC RX 258: Performed by: INTERNAL MEDICINE

## 2023-12-16 PROCEDURE — 99223 1ST HOSP IP/OBS HIGH 75: CPT | Performed by: INTERNAL MEDICINE

## 2023-12-16 PROCEDURE — 83550 IRON BINDING TEST: CPT

## 2023-12-16 PROCEDURE — 43235 EGD DIAGNOSTIC BRUSH WASH: CPT | Performed by: INTERNAL MEDICINE

## 2023-12-16 PROCEDURE — 2580000003 HC RX 258: Performed by: ANESTHESIOLOGY

## 2023-12-16 PROCEDURE — 3609017100 HC EGD: Performed by: INTERNAL MEDICINE

## 2023-12-16 PROCEDURE — 83540 ASSAY OF IRON: CPT

## 2023-12-16 PROCEDURE — 36430 TRANSFUSION BLD/BLD COMPNT: CPT

## 2023-12-16 PROCEDURE — 3700000000 HC ANESTHESIA ATTENDED CARE: Performed by: INTERNAL MEDICINE

## 2023-12-16 PROCEDURE — P9016 RBC LEUKOCYTES REDUCED: HCPCS

## 2023-12-16 PROCEDURE — 0DJ08ZZ INSPECTION OF UPPER INTESTINAL TRACT, VIA NATURAL OR ARTIFICIAL OPENING ENDOSCOPIC: ICD-10-PCS | Performed by: INTERNAL MEDICINE

## 2023-12-16 PROCEDURE — C9113 INJ PANTOPRAZOLE SODIUM, VIA: HCPCS | Performed by: INTERNAL MEDICINE

## 2023-12-16 PROCEDURE — 2709999900 HC NON-CHARGEABLE SUPPLY: Performed by: INTERNAL MEDICINE

## 2023-12-16 PROCEDURE — 7100000001 HC PACU RECOVERY - ADDTL 15 MIN: Performed by: INTERNAL MEDICINE

## 2023-12-16 PROCEDURE — 71045 X-RAY EXAM CHEST 1 VIEW: CPT

## 2023-12-16 PROCEDURE — 2500000003 HC RX 250 WO HCPCS: Performed by: ANESTHESIOLOGY

## 2023-12-16 PROCEDURE — 2700000000 HC OXYGEN THERAPY PER DAY

## 2023-12-16 PROCEDURE — 94761 N-INVAS EAR/PLS OXIMETRY MLT: CPT

## 2023-12-16 PROCEDURE — 36415 COLL VENOUS BLD VENIPUNCTURE: CPT

## 2023-12-16 PROCEDURE — 94640 AIRWAY INHALATION TREATMENT: CPT

## 2023-12-16 PROCEDURE — 2060000000 HC ICU INTERMEDIATE R&B

## 2023-12-16 PROCEDURE — 85014 HEMATOCRIT: CPT

## 2023-12-16 PROCEDURE — 3700000001 HC ADD 15 MINUTES (ANESTHESIA): Performed by: INTERNAL MEDICINE

## 2023-12-16 RX ORDER — SODIUM CHLORIDE, SODIUM LACTATE, POTASSIUM CHLORIDE, CALCIUM CHLORIDE 600; 310; 30; 20 MG/100ML; MG/100ML; MG/100ML; MG/100ML
INJECTION, SOLUTION INTRAVENOUS CONTINUOUS PRN
Status: DISCONTINUED | OUTPATIENT
Start: 2023-12-16 | End: 2023-12-16 | Stop reason: SDUPTHER

## 2023-12-16 RX ORDER — LIDOCAINE HYDROCHLORIDE 10 MG/ML
INJECTION, SOLUTION EPIDURAL; INFILTRATION; INTRACAUDAL; PERINEURAL PRN
Status: DISCONTINUED | OUTPATIENT
Start: 2023-12-16 | End: 2023-12-16 | Stop reason: SDUPTHER

## 2023-12-16 RX ORDER — PROPOFOL 10 MG/ML
INJECTION, EMULSION INTRAVENOUS PRN
Status: DISCONTINUED | OUTPATIENT
Start: 2023-12-16 | End: 2023-12-16 | Stop reason: SDUPTHER

## 2023-12-16 RX ADMIN — PRAVASTATIN SODIUM 40 MG: 40 TABLET ORAL at 07:43

## 2023-12-16 RX ADMIN — PANTOPRAZOLE SODIUM 8 MG/HR: 40 INJECTION, POWDER, FOR SOLUTION INTRAVENOUS at 14:44

## 2023-12-16 RX ADMIN — GABAPENTIN 300 MG: 300 CAPSULE ORAL at 21:16

## 2023-12-16 RX ADMIN — GABAPENTIN 300 MG: 300 CAPSULE ORAL at 07:43

## 2023-12-16 RX ADMIN — PANTOPRAZOLE SODIUM 40 MG: 40 INJECTION, POWDER, FOR SOLUTION INTRAVENOUS at 06:00

## 2023-12-16 RX ADMIN — LIDOCAINE HYDROCHLORIDE 50 MG: 10 INJECTION, SOLUTION EPIDURAL; INFILTRATION; INTRACAUDAL; PERINEURAL at 12:23

## 2023-12-16 RX ADMIN — HYDRALAZINE HYDROCHLORIDE 25 MG: 25 TABLET, FILM COATED ORAL at 07:43

## 2023-12-16 RX ADMIN — SODIUM CHLORIDE: 9 INJECTION, SOLUTION INTRAVENOUS at 14:57

## 2023-12-16 RX ADMIN — PANTOPRAZOLE SODIUM 80 MG: 40 INJECTION, POWDER, FOR SOLUTION INTRAVENOUS at 14:41

## 2023-12-16 RX ADMIN — SODIUM CHLORIDE, PRESERVATIVE FREE 10 ML: 5 INJECTION INTRAVENOUS at 07:43

## 2023-12-16 RX ADMIN — PROPOFOL 20 MG: 10 INJECTION, EMULSION INTRAVENOUS at 12:29

## 2023-12-16 RX ADMIN — PROPOFOL 30 MG: 10 INJECTION, EMULSION INTRAVENOUS at 12:27

## 2023-12-16 RX ADMIN — SODIUM CHLORIDE: 9 INJECTION, SOLUTION INTRAVENOUS at 13:46

## 2023-12-16 RX ADMIN — PROPOFOL 50 MG: 10 INJECTION, EMULSION INTRAVENOUS at 12:23

## 2023-12-16 RX ADMIN — SODIUM CHLORIDE, POTASSIUM CHLORIDE, SODIUM LACTATE AND CALCIUM CHLORIDE: 600; 310; 30; 20 INJECTION, SOLUTION INTRAVENOUS at 12:16

## 2023-12-16 RX ADMIN — CARVEDILOL 25 MG: 25 TABLET, FILM COATED ORAL at 07:43

## 2023-12-16 RX ADMIN — FUROSEMIDE 20 MG: 20 TABLET ORAL at 07:43

## 2023-12-16 RX ADMIN — Medication 2 PUFF: at 08:40

## 2023-12-16 NOTE — ACP (ADVANCE CARE PLANNING)
Advance Care Planning     Advance Care Planning Activator (Inpatient)  Conversation Note      Date of ACP Conversation: 12/16/2023     Conversation Conducted with: Patient with Decision Making Capacity    ACP Activator: Yessenia Farley RN    Health Care Decision Maker:     Current Designated Health Care Decision Maker:     Primary Decision Maker: Ihsan Haley - Spouse - [phone]    Primary Decision Maker: Gopal Haley - Child - [phone]  Click here to complete Healthcare Decision Makers including section of the Healthcare Decision Maker Relationship (ie \"Primary\")  Today we documented Decision Maker(s) consistent with Legal Next of Kin hierarchy.    Care Preferences    Ventilation:  \"If you were in your present state of health and suddenly became very ill and were unable to breathe on your own, what would your preference be about the use of a ventilator (breathing machine) if it were available to you?\"      Would the patient desire the use of ventilator (breathing machine)?: yes    \"If your health worsens and it becomes clear that your chance of recovery is unlikely, what would your preference be about the use of a ventilator (breathing machine) if it were available to you?\"     Would the patient desire the use of ventilator (breathing machine)?: No      Resuscitation  \"CPR works best to restart the heart when there is a sudden event, like a heart attack, in someone who is otherwise healthy. Unfortunately, CPR does not typically restart the heart for people who have serious health conditions or who are very sick.\"    \"In the event your heart stopped as a result of an underlying serious health condition, would you want attempts to be made to restart your heart (answer \"yes\" for attempt to resuscitate) or would you prefer a natural death (answer \"no\" for do not attempt to resuscitate)?\" yes       [] Yes   [x] No   Educated Patient / Decision Maker regarding differences between Advance Directives and
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
## 2023-12-16 NOTE — PLAN OF CARE
Problem: Discharge Planning  Goal: Discharge to home or other facility with appropriate resources  12/16/2023 1514 by Dave Fritz RN  Outcome: Progressing  12/16/2023 0548 by Sridevi Villalta RN  Outcome: Progressing     Problem: Safety - Adult  Goal: Free from fall injury  12/16/2023 1514 by Dave Fritz RN  Outcome: Progressing  12/16/2023 0548 by Sridevi Villalta RN  Outcome: Progressing     Problem: ABCDS Injury Assessment  Goal: Absence of physical injury  12/16/2023 1514 by Dave Fritz RN  Outcome: Progressing  12/16/2023 0548 by Sridevi Villalta RN  Outcome: Progressing

## 2023-12-16 NOTE — OP NOTE
PROCEDURE NOTE    DATE OF PROCEDURE: 12/16/2023     SURGEON: Washington Monroe MD    ASSISTANT: None    PREOPERATIVE DIAGNOSIS: ANEMIA  ? MELENA  PATIENT ON BLOOD THINNERS    POSTOPERATIVE DIAGNOSIS: As described below    OPERATION: Upper GI endoscopy with Biopsy    ANESTHESIA: MAC PER ANESTHESIA     ESTIMATED BLOOD LOSS: Less than 50 ml    COMPLICATIONS: None.     SPECIMENS:  Was Not Obtained    HISTORY: The patient is a 78 y.o. year old female with history of above preop diagnosis.  I recommended esophagogastroduodenoscopy with possible biopsy and I explained the risk, benefits, expected outcome, and alternatives to the procedure.  Risks included but are not limited to bleeding, infection, respiratory distress, hypotension, and perforation of the esophagus, stomach, or duodenum.  Patient understands and is in agreement.    PROCEDURE: The patient was given IV conscious sedation.  The patient's SPO2 remained above 90% throughout the procedure. The gastroscope was inserted orally and advanced under direct vision through the esophagus, through the stomach, through the pylorus, and into the descending duodenum.      Findings:    Retropharyngeal area was grossly normal appearing    Esophagus: abnormal: TERTIARY CONTRACTIONS  SMALL ONE TO TWO CM HIATAL HERNIA    Stomach:    Fundus: normal    Body: normal    Antrum: abnormal: MILD EROSIVE GASTRITIS     Duodenum:     Descending: normal    Bulb: normal    The scope was removed and the patient tolerated the procedure well.     Recommendations/Plan:   F/U HGB  OUT PATIENT CAPSULE ENDOSCOPY   Post sedation patient was stable with stable vital signs and stable O2 saturations    Electronically signed by Washington Monroe MD  on 12/16/2023 at 12:28 PM

## 2023-12-16 NOTE — PLAN OF CARE
Case d/w dr hale and dr milagro buckner will plan for egd today for anemia with melena, npo and ppi drip ordered. Pt needs additional IV access, unable to check repeat hgb after second unit of blood.  The Endoscopic procedure was explained to the patient in detail (EGD)  NPO was explained  All the Risks, Benefits, and Alternatives were explained  Risk of Bleeding, Perforation and Cardio Respiratory risks were explained  her questions were answered  The procedure has been scheduled today  The patient has verbalized understanding and agreement to this plan.    ..Shruthi Guidry, ADITYA - CNP

## 2023-12-16 NOTE — PLAN OF CARE
Problem: Discharge Planning  Goal: Discharge to home or other facility with appropriate resources  12/16/2023 0548 by Sridevi Villalta RN  Outcome: Progressing     Problem: Safety - Adult  Goal: Free from fall injury  12/16/2023 0548 by Sridevi Villalta RN  Outcome: Progressing     Problem: ABCDS Injury Assessment  Goal: Absence of physical injury  12/16/2023 0548 by Sridevi Villalta RN  Outcome: Progressing

## 2023-12-16 NOTE — PLAN OF CARE
Problem: Discharge Planning  Goal: Discharge to home or other facility with appropriate resources  Outcome: Progressing     Problem: Safety - Adult  Goal: Free from fall injury  Outcome: Progressing     Problem: ABCDS Injury Assessment  Goal: Absence of physical injury  Outcome: Progressing

## 2023-12-17 LAB
HCT VFR BLD AUTO: 21.9 % (ref 36–46)
HCT VFR BLD AUTO: 22.3 % (ref 36–46)
HCT VFR BLD AUTO: 23 % (ref 36–46)
HCT VFR BLD AUTO: 23 % (ref 36–46)
HGB BLD-MCNC: 7 G/DL (ref 12–16)
HGB BLD-MCNC: 7.1 G/DL (ref 12–16)
HGB BLD-MCNC: 7.3 G/DL (ref 12–16)
HGB BLD-MCNC: 7.3 G/DL (ref 12–16)

## 2023-12-17 PROCEDURE — 94761 N-INVAS EAR/PLS OXIMETRY MLT: CPT

## 2023-12-17 PROCEDURE — 2580000003 HC RX 258: Performed by: INTERNAL MEDICINE

## 2023-12-17 PROCEDURE — C9113 INJ PANTOPRAZOLE SODIUM, VIA: HCPCS | Performed by: INTERNAL MEDICINE

## 2023-12-17 PROCEDURE — 6370000000 HC RX 637 (ALT 250 FOR IP): Performed by: INTERNAL MEDICINE

## 2023-12-17 PROCEDURE — 2060000000 HC ICU INTERMEDIATE R&B

## 2023-12-17 PROCEDURE — 99232 SBSQ HOSP IP/OBS MODERATE 35: CPT | Performed by: INTERNAL MEDICINE

## 2023-12-17 PROCEDURE — 2700000000 HC OXYGEN THERAPY PER DAY

## 2023-12-17 PROCEDURE — 6360000002 HC RX W HCPCS: Performed by: INTERNAL MEDICINE

## 2023-12-17 PROCEDURE — 36415 COLL VENOUS BLD VENIPUNCTURE: CPT

## 2023-12-17 PROCEDURE — 94640 AIRWAY INHALATION TREATMENT: CPT

## 2023-12-17 PROCEDURE — P9016 RBC LEUKOCYTES REDUCED: HCPCS

## 2023-12-17 PROCEDURE — 36430 TRANSFUSION BLD/BLD COMPNT: CPT

## 2023-12-17 PROCEDURE — 85018 HEMOGLOBIN: CPT

## 2023-12-17 PROCEDURE — 85014 HEMATOCRIT: CPT

## 2023-12-17 PROCEDURE — APPSS30 APP SPLIT SHARED TIME 16-30 MINUTES: Performed by: NURSE PRACTITIONER

## 2023-12-17 PROCEDURE — 94664 DEMO&/EVAL PT USE INHALER: CPT

## 2023-12-17 RX ORDER — SODIUM CHLORIDE 9 MG/ML
INJECTION, SOLUTION INTRAVENOUS PRN
Status: DISCONTINUED | OUTPATIENT
Start: 2023-12-17 | End: 2023-12-28

## 2023-12-17 RX ADMIN — GABAPENTIN 300 MG: 300 CAPSULE ORAL at 21:30

## 2023-12-17 RX ADMIN — SODIUM CHLORIDE: 9 INJECTION, SOLUTION INTRAVENOUS at 17:14

## 2023-12-17 RX ADMIN — GABAPENTIN 300 MG: 300 CAPSULE ORAL at 07:30

## 2023-12-17 RX ADMIN — Medication 2 PUFF: at 20:32

## 2023-12-17 RX ADMIN — SODIUM CHLORIDE: 9 INJECTION, SOLUTION INTRAVENOUS at 03:39

## 2023-12-17 RX ADMIN — ALBUTEROL SULFATE 1 PUFF: 90 AEROSOL, METERED RESPIRATORY (INHALATION) at 20:32

## 2023-12-17 RX ADMIN — CARVEDILOL 25 MG: 25 TABLET, FILM COATED ORAL at 17:15

## 2023-12-17 RX ADMIN — PANTOPRAZOLE SODIUM 8 MG/HR: 40 INJECTION, POWDER, FOR SOLUTION INTRAVENOUS at 21:30

## 2023-12-17 RX ADMIN — SODIUM CHLORIDE, PRESERVATIVE FREE 10 ML: 5 INJECTION INTRAVENOUS at 07:35

## 2023-12-17 RX ADMIN — HYDRALAZINE HYDROCHLORIDE 25 MG: 25 TABLET, FILM COATED ORAL at 07:30

## 2023-12-17 RX ADMIN — FUROSEMIDE 20 MG: 20 TABLET ORAL at 07:31

## 2023-12-17 RX ADMIN — PRAVASTATIN SODIUM 40 MG: 40 TABLET ORAL at 07:30

## 2023-12-17 RX ADMIN — PANTOPRAZOLE SODIUM 8 MG/HR: 40 INJECTION, POWDER, FOR SOLUTION INTRAVENOUS at 09:30

## 2023-12-17 RX ADMIN — CARVEDILOL 25 MG: 25 TABLET, FILM COATED ORAL at 07:30

## 2023-12-17 RX ADMIN — Medication 2 PUFF: at 08:51

## 2023-12-18 LAB
ANION GAP SERPL CALCULATED.3IONS-SCNC: 5 MMOL/L (ref 9–17)
BASOPHILS # BLD: 0.07 K/UL (ref 0–0.2)
BASOPHILS NFR BLD: 1 % (ref 0–2)
BUN SERPL-MCNC: 26 MG/DL (ref 8–23)
CALCIUM SERPL-MCNC: 8.7 MG/DL (ref 8.6–10.4)
CHLORIDE SERPL-SCNC: 107 MMOL/L (ref 98–107)
CO2 SERPL-SCNC: 31 MMOL/L (ref 20–31)
CREAT SERPL-MCNC: 1.4 MG/DL (ref 0.5–0.9)
EOSINOPHIL # BLD: 0.22 K/UL (ref 0–0.4)
EOSINOPHILS RELATIVE PERCENT: 3 % (ref 0–4)
ERYTHROCYTE [DISTWIDTH] IN BLOOD BY AUTOMATED COUNT: 20.7 % (ref 11.5–14.9)
GFR SERPL CREATININE-BSD FRML MDRD: 39 ML/MIN/1.73M2
GLUCOSE SERPL-MCNC: 144 MG/DL (ref 70–99)
HCT VFR BLD AUTO: 23.5 % (ref 36–46)
HCT VFR BLD AUTO: 23.7 % (ref 36–46)
HCT VFR BLD AUTO: 24 % (ref 36–46)
HGB BLD-MCNC: 7.4 G/DL (ref 12–16)
HGB BLD-MCNC: 7.6 G/DL (ref 12–16)
HGB BLD-MCNC: 7.7 G/DL (ref 12–16)
LYMPHOCYTES NFR BLD: 0.86 K/UL (ref 1–4.8)
LYMPHOCYTES RELATIVE PERCENT: 12 % (ref 24–44)
MAGNESIUM SERPL-MCNC: 1.5 MG/DL (ref 1.6–2.6)
MCH RBC QN AUTO: 30.3 PG (ref 26–34)
MCHC RBC AUTO-ENTMCNC: 31.5 G/DL (ref 31–37)
MCV RBC AUTO: 96.3 FL (ref 80–100)
MONOCYTES NFR BLD: 0.72 K/UL (ref 0.1–1.3)
MONOCYTES NFR BLD: 10 % (ref 1–7)
MORPHOLOGY: ABNORMAL
NEUTROPHILS NFR BLD: 74 % (ref 36–66)
NEUTS SEG NFR BLD: 5.33 K/UL (ref 1.3–9.1)
PATH REV BLD -IMP: NORMAL
PLATELET # BLD AUTO: 121 K/UL (ref 150–450)
PMV BLD AUTO: 7.8 FL (ref 6–12)
POTASSIUM SERPL-SCNC: 4.1 MMOL/L (ref 3.7–5.3)
RBC # BLD AUTO: 2.44 M/UL (ref 4–5.2)
SODIUM SERPL-SCNC: 143 MMOL/L (ref 135–144)
SURGICAL PATHOLOGY REPORT: NORMAL
WBC OTHER # BLD: 7.2 K/UL (ref 3.5–11)

## 2023-12-18 PROCEDURE — 94640 AIRWAY INHALATION TREATMENT: CPT

## 2023-12-18 PROCEDURE — 83735 ASSAY OF MAGNESIUM: CPT

## 2023-12-18 PROCEDURE — 2700000000 HC OXYGEN THERAPY PER DAY

## 2023-12-18 PROCEDURE — 2580000003 HC RX 258: Performed by: INTERNAL MEDICINE

## 2023-12-18 PROCEDURE — APPSS30 APP SPLIT SHARED TIME 16-30 MINUTES: Performed by: NURSE PRACTITIONER

## 2023-12-18 PROCEDURE — 80048 BASIC METABOLIC PNL TOTAL CA: CPT

## 2023-12-18 PROCEDURE — 6370000000 HC RX 637 (ALT 250 FOR IP): Performed by: INTERNAL MEDICINE

## 2023-12-18 PROCEDURE — 85018 HEMOGLOBIN: CPT

## 2023-12-18 PROCEDURE — 94761 N-INVAS EAR/PLS OXIMETRY MLT: CPT

## 2023-12-18 PROCEDURE — 6360000002 HC RX W HCPCS: Performed by: INTERNAL MEDICINE

## 2023-12-18 PROCEDURE — C9113 INJ PANTOPRAZOLE SODIUM, VIA: HCPCS | Performed by: INTERNAL MEDICINE

## 2023-12-18 PROCEDURE — 85025 COMPLETE CBC W/AUTO DIFF WBC: CPT

## 2023-12-18 PROCEDURE — 99233 SBSQ HOSP IP/OBS HIGH 50: CPT | Performed by: INTERNAL MEDICINE

## 2023-12-18 PROCEDURE — 99232 SBSQ HOSP IP/OBS MODERATE 35: CPT | Performed by: INTERNAL MEDICINE

## 2023-12-18 PROCEDURE — 2060000000 HC ICU INTERMEDIATE R&B

## 2023-12-18 PROCEDURE — 85014 HEMATOCRIT: CPT

## 2023-12-18 PROCEDURE — 36415 COLL VENOUS BLD VENIPUNCTURE: CPT

## 2023-12-18 RX ORDER — HYDRALAZINE HYDROCHLORIDE 25 MG/1
25 TABLET, FILM COATED ORAL EVERY 12 HOURS SCHEDULED
Qty: 60 TABLET | Refills: 0 | Status: SHIPPED | OUTPATIENT
Start: 2023-12-18 | End: 2024-01-01 | Stop reason: HOSPADM

## 2023-12-18 RX ADMIN — PANTOPRAZOLE SODIUM 8 MG/HR: 40 INJECTION, POWDER, FOR SOLUTION INTRAVENOUS at 22:09

## 2023-12-18 RX ADMIN — GABAPENTIN 300 MG: 300 CAPSULE ORAL at 09:20

## 2023-12-18 RX ADMIN — CARVEDILOL 25 MG: 25 TABLET, FILM COATED ORAL at 17:14

## 2023-12-18 RX ADMIN — Medication 2 PUFF: at 18:56

## 2023-12-18 RX ADMIN — GABAPENTIN 300 MG: 300 CAPSULE ORAL at 22:10

## 2023-12-18 RX ADMIN — SODIUM CHLORIDE, PRESERVATIVE FREE 10 ML: 5 INJECTION INTRAVENOUS at 09:20

## 2023-12-18 RX ADMIN — PRAVASTATIN SODIUM 40 MG: 40 TABLET ORAL at 09:20

## 2023-12-18 RX ADMIN — SODIUM CHLORIDE: 9 INJECTION, SOLUTION INTRAVENOUS at 09:23

## 2023-12-18 RX ADMIN — CARVEDILOL 25 MG: 25 TABLET, FILM COATED ORAL at 09:20

## 2023-12-18 RX ADMIN — PANTOPRAZOLE SODIUM 8 MG/HR: 40 INJECTION, POWDER, FOR SOLUTION INTRAVENOUS at 10:35

## 2023-12-18 RX ADMIN — FUROSEMIDE 20 MG: 20 TABLET ORAL at 09:20

## 2023-12-18 RX ADMIN — Medication 2 PUFF: at 09:00

## 2023-12-18 RX ADMIN — HYDRALAZINE HYDROCHLORIDE 25 MG: 25 TABLET, FILM COATED ORAL at 09:20

## 2023-12-18 NOTE — CARDIO/PULMONARY
BRONCHOSPASM/BRONCHOCONSTRICTION     [x]         IMPROVE AERATION/BREATH SOUNDS  [x]   ADMINISTER BRONCHODILATOR THERAPY AS APPROPRIATE  [x]   ASSESS BREATH SOUNDS  [x]   IMPLEMENT AEROSOL/MDI PROTOCOL  [x]   PATIENT EDUCATION AS NEEDED

## 2023-12-18 NOTE — PLAN OF CARE
Pt was going to be discharged home today, but while getting patient up she was a heavy 2 assist to commode with nevaeh moon. Pt also had a 7 beat run of vtach. PT consulted. Dr. Manuel consulted.         Problem: Discharge Planning  Goal: Discharge to home or other facility with appropriate resources  12/18/2023 1622 by Shameka Freeman RN  Outcome: Progressing     Problem: Safety - Adult  Goal: Free from fall injury  12/18/2023 1622 by Shameka Freeman RN  Outcome: Progressing     Problem: ABCDS Injury Assessment  Goal: Absence of physical injury  12/18/2023 1622 by Shameka Freeman RN  Outcome: Progressing     Problem: Skin/Tissue Integrity  Goal: Absence of new skin breakdown  Description: 1.  Monitor for areas of redness and/or skin breakdown  2.  Assess vascular access sites hourly  3.  Every 4-6 hours minimum:  Change oxygen saturation probe site  4.  Every 4-6 hours:  If on nasal continuous positive airway pressure, respiratory therapy assess nares and determine need for appliance change or resting period.  12/18/2023 1622 by Shameka Freeman RN  Outcome: Progressing     Problem: Chronic Conditions and Co-morbidities  Goal: Patient's chronic conditions and co-morbidity symptoms are monitored and maintained or improved  12/18/2023 1622 by Shameka Freeman RN  Outcome: Progressing

## 2023-12-19 LAB
ANION GAP SERPL CALCULATED.3IONS-SCNC: 6 MMOL/L (ref 9–17)
BUN SERPL-MCNC: 24 MG/DL (ref 8–23)
CALCIUM SERPL-MCNC: 9 MG/DL (ref 8.6–10.4)
CHLORIDE SERPL-SCNC: 108 MMOL/L (ref 98–107)
CO2 SERPL-SCNC: 29 MMOL/L (ref 20–31)
CREAT SERPL-MCNC: 1.4 MG/DL (ref 0.5–0.9)
GFR SERPL CREATININE-BSD FRML MDRD: 39 ML/MIN/1.73M2
GLUCOSE SERPL-MCNC: 128 MG/DL (ref 70–99)
HCT VFR BLD AUTO: 20 % (ref 36–46)
HCT VFR BLD AUTO: 21.2 % (ref 36–46)
HCT VFR BLD AUTO: 23.9 % (ref 36–46)
HCT VFR BLD AUTO: 25.9 % (ref 36–46)
HGB BLD-MCNC: 6.3 G/DL (ref 12–16)
HGB BLD-MCNC: 6.8 G/DL (ref 12–16)
HGB BLD-MCNC: 7.6 G/DL (ref 12–16)
HGB BLD-MCNC: 8.4 G/DL (ref 12–16)
MAGNESIUM SERPL-MCNC: 1.6 MG/DL (ref 1.6–2.6)
POTASSIUM SERPL-SCNC: 4 MMOL/L (ref 3.7–5.3)
SODIUM SERPL-SCNC: 143 MMOL/L (ref 135–144)

## 2023-12-19 PROCEDURE — 36415 COLL VENOUS BLD VENIPUNCTURE: CPT

## 2023-12-19 PROCEDURE — 94640 AIRWAY INHALATION TREATMENT: CPT

## 2023-12-19 PROCEDURE — 6370000000 HC RX 637 (ALT 250 FOR IP): Performed by: INTERNAL MEDICINE

## 2023-12-19 PROCEDURE — 86920 COMPATIBILITY TEST SPIN: CPT

## 2023-12-19 PROCEDURE — 6360000002 HC RX W HCPCS: Performed by: NURSE PRACTITIONER

## 2023-12-19 PROCEDURE — 86901 BLOOD TYPING SEROLOGIC RH(D): CPT

## 2023-12-19 PROCEDURE — 97166 OT EVAL MOD COMPLEX 45 MIN: CPT

## 2023-12-19 PROCEDURE — 99233 SBSQ HOSP IP/OBS HIGH 50: CPT | Performed by: INTERNAL MEDICINE

## 2023-12-19 PROCEDURE — 99232 SBSQ HOSP IP/OBS MODERATE 35: CPT | Performed by: INTERNAL MEDICINE

## 2023-12-19 PROCEDURE — 97162 PT EVAL MOD COMPLEX 30 MIN: CPT

## 2023-12-19 PROCEDURE — 94760 N-INVAS EAR/PLS OXIMETRY 1: CPT

## 2023-12-19 PROCEDURE — 97530 THERAPEUTIC ACTIVITIES: CPT

## 2023-12-19 PROCEDURE — 83735 ASSAY OF MAGNESIUM: CPT

## 2023-12-19 PROCEDURE — 86900 BLOOD TYPING SEROLOGIC ABO: CPT

## 2023-12-19 PROCEDURE — 36430 TRANSFUSION BLD/BLD COMPNT: CPT

## 2023-12-19 PROCEDURE — 85014 HEMATOCRIT: CPT

## 2023-12-19 PROCEDURE — 86850 RBC ANTIBODY SCREEN: CPT

## 2023-12-19 PROCEDURE — 80048 BASIC METABOLIC PNL TOTAL CA: CPT

## 2023-12-19 PROCEDURE — 2500000003 HC RX 250 WO HCPCS: Performed by: INTERNAL MEDICINE

## 2023-12-19 PROCEDURE — P9016 RBC LEUKOCYTES REDUCED: HCPCS

## 2023-12-19 PROCEDURE — APPSS30 APP SPLIT SHARED TIME 16-30 MINUTES: Performed by: NURSE PRACTITIONER

## 2023-12-19 PROCEDURE — 2060000000 HC ICU INTERMEDIATE R&B

## 2023-12-19 PROCEDURE — 85018 HEMOGLOBIN: CPT

## 2023-12-19 PROCEDURE — 2580000003 HC RX 258: Performed by: INTERNAL MEDICINE

## 2023-12-19 PROCEDURE — 6360000002 HC RX W HCPCS: Performed by: INTERNAL MEDICINE

## 2023-12-19 RX ORDER — MAGNESIUM SULFATE HEPTAHYDRATE 40 MG/ML
2000 INJECTION, SOLUTION INTRAVENOUS ONCE
Status: COMPLETED | OUTPATIENT
Start: 2023-12-19 | End: 2023-12-19

## 2023-12-19 RX ORDER — LORAZEPAM 2 MG/ML
0.5 INJECTION INTRAMUSCULAR ONCE
Status: COMPLETED | OUTPATIENT
Start: 2023-12-19 | End: 2023-12-19

## 2023-12-19 RX ORDER — SODIUM CHLORIDE 9 MG/ML
INJECTION, SOLUTION INTRAVENOUS PRN
Status: DISCONTINUED | OUTPATIENT
Start: 2023-12-19 | End: 2023-12-28

## 2023-12-19 RX ORDER — MAGNESIUM SULFATE HEPTAHYDRATE 40 MG/ML
2000 INJECTION, SOLUTION INTRAVENOUS PRN
Status: DISCONTINUED | OUTPATIENT
Start: 2023-12-19 | End: 2024-01-01 | Stop reason: HOSPADM

## 2023-12-19 RX ORDER — MAGNESIUM SULFATE 1 G/100ML
1000 INJECTION INTRAVENOUS ONCE
Status: COMPLETED | OUTPATIENT
Start: 2023-12-19 | End: 2023-12-19

## 2023-12-19 RX ADMIN — GABAPENTIN 300 MG: 300 CAPSULE ORAL at 20:38

## 2023-12-19 RX ADMIN — ACETAMINOPHEN 650 MG: 325 TABLET ORAL at 03:20

## 2023-12-19 RX ADMIN — LORAZEPAM 0.5 MG: 2 INJECTION INTRAMUSCULAR; INTRAVENOUS at 20:38

## 2023-12-19 RX ADMIN — GABAPENTIN 300 MG: 300 CAPSULE ORAL at 10:27

## 2023-12-19 RX ADMIN — HYDRALAZINE HYDROCHLORIDE 25 MG: 25 TABLET, FILM COATED ORAL at 10:27

## 2023-12-19 RX ADMIN — CARVEDILOL 25 MG: 25 TABLET, FILM COATED ORAL at 18:39

## 2023-12-19 RX ADMIN — SODIUM CHLORIDE, PRESERVATIVE FREE 10 ML: 5 INJECTION INTRAVENOUS at 10:28

## 2023-12-19 RX ADMIN — MAGNESIUM SULFATE HEPTAHYDRATE 2000 MG: 40 INJECTION, SOLUTION INTRAVENOUS at 16:13

## 2023-12-19 RX ADMIN — SODIUM CHLORIDE: 9 INJECTION, SOLUTION INTRAVENOUS at 15:45

## 2023-12-19 RX ADMIN — Medication 2 PUFF: at 20:41

## 2023-12-19 RX ADMIN — Medication 2 PUFF: at 07:30

## 2023-12-19 RX ADMIN — PRAVASTATIN SODIUM 40 MG: 40 TABLET ORAL at 10:27

## 2023-12-19 RX ADMIN — FUROSEMIDE 20 MG: 20 TABLET ORAL at 10:27

## 2023-12-19 RX ADMIN — MAGNESIUM SULFATE HEPTAHYDRATE 2000 MG: 40 INJECTION, SOLUTION INTRAVENOUS at 20:45

## 2023-12-19 RX ADMIN — MAGNESIUM SULFATE HEPTAHYDRATE 1000 MG: 1 INJECTION, SOLUTION INTRAVENOUS at 03:04

## 2023-12-19 ASSESSMENT — PAIN SCALES - GENERAL: PAINLEVEL_OUTOF10: 6

## 2023-12-19 NOTE — PLAN OF CARE
Problem: Discharge Planning  Goal: Discharge to home or other facility with appropriate resources  12/19/2023 0509 by Danuta Daniels RN  Outcome: Progressing     Problem: Safety - Adult  Goal: Free from fall injury  12/19/2023 0509 by Danuta Daniels RN  Outcome: Progressing     Problem: ABCDS Injury Assessment  Goal: Absence of physical injury  12/19/2023 0509 by Danuta Daniels RN  Outcome: Progressing     Problem: Skin/Tissue Integrity  Goal: Absence of new skin breakdown  Description: 1.  Monitor for areas of redness and/or skin breakdown  2.  Assess vascular access sites hourly  3.  Every 4-6 hours minimum:  Change oxygen saturation probe site  4.  Every 4-6 hours:  If on nasal continuous positive airway pressure, respiratory therapy assess nares and determine need for appliance change or resting period.  12/19/2023 0509 by Danuta Daniels RN  Outcome: Progressing     Problem: Chronic Conditions and Co-morbidities  Goal: Patient's chronic conditions and co-morbidity symptoms are monitored and maintained or improved  12/19/2023 0509 by Danuta Daniels RN  Outcome: Progressing

## 2023-12-19 NOTE — PLAN OF CARE
Patient receiving one unit of RBC, hgb 6.3. Pt worked with PT/OT today, weakness both sides of legs.      Problem: Discharge Planning  Goal: Discharge to home or other facility with appropriate resources  12/19/2023 1814 by Shameka Freeman RN  Outcome: Progressing     Problem: Safety - Adult  Goal: Free from fall injury  12/19/2023 1814 by Shameka Freeman RN  Outcome: Progressing     Problem: ABCDS Injury Assessment  Goal: Absence of physical injury  12/19/2023 1814 by Shameka Freeman RN  Outcome: Progressing     Problem: Skin/Tissue Integrity  Goal: Absence of new skin breakdown  Description: 1.  Monitor for areas of redness and/or skin breakdown  2.  Assess vascular access sites hourly  3.  Every 4-6 hours minimum:  Change oxygen saturation probe site  4.  Every 4-6 hours:  If on nasal continuous positive airway pressure, respiratory therapy assess nares and determine need for appliance change or resting period.  12/19/2023 1814 by Shameka Freeman RN  Outcome: Progressing     Problem: Chronic Conditions and Co-morbidities  Goal: Patient's chronic conditions and co-morbidity symptoms are monitored and maintained or improved  12/19/2023 1814 by Shameka Freeman RN  Outcome: Progressing

## 2023-12-20 ENCOUNTER — APPOINTMENT (OUTPATIENT)
Dept: GENERAL RADIOLOGY | Age: 78
DRG: 377 | End: 2023-12-20
Payer: MEDICARE

## 2023-12-20 ENCOUNTER — APPOINTMENT (OUTPATIENT)
Dept: NUCLEAR MEDICINE | Age: 78
DRG: 377 | End: 2023-12-20
Payer: MEDICARE

## 2023-12-20 LAB
ABO/RH: NORMAL
ABO/RH: NORMAL
ANTIBODY SCREEN: NEGATIVE
ANTIBODY SCREEN: NEGATIVE
ARM BAND NUMBER: NORMAL
ARM BAND NUMBER: NORMAL
ARTERIAL PATENCY WRIST A: ABNORMAL
BDY SITE: ABNORMAL
BLOOD BANK BLOOD PRODUCT EXPIRATION DATE: NORMAL
BLOOD BANK DISPENSE STATUS: NORMAL
BLOOD BANK ISBT PRODUCT BLOOD TYPE: 9500
BLOOD BANK PRODUCT CODE: NORMAL
BLOOD BANK SAMPLE EXPIRATION: NORMAL
BLOOD BANK SAMPLE EXPIRATION: NORMAL
BLOOD BANK UNIT TYPE AND RH: NORMAL
BODY TEMPERATURE: 37
BPU ID: NORMAL
COHGB MFR BLD: 2 % (ref 0–5)
COMPONENT: NORMAL
CROSSMATCH RESULT: NORMAL
GAS FLOW.O2 O2 DELIVERY SYS: ABNORMAL L/MIN
GLUCOSE BLD-MCNC: 117 MG/DL (ref 65–105)
HCO3 ARTERIAL: 32.7 MMOL/L (ref 22–26)
HCT VFR BLD AUTO: 25.8 % (ref 36–46)
HCT VFR BLD AUTO: 25.8 % (ref 36–46)
HCT VFR BLD AUTO: 26.1 % (ref 36–46)
HGB BLD-MCNC: 8.3 G/DL (ref 12–16)
HGB BLD-MCNC: 8.3 G/DL (ref 12–16)
HGB BLD-MCNC: 8.4 G/DL (ref 12–16)
METHEMOGLOBIN: 1 % (ref 0–1.9)
O2 SAT, ARTERIAL: 92.4 % (ref 95–98)
PCO2 ARTERIAL: 70.4 MMHG (ref 35–45)
PH ARTERIAL: 7.28 (ref 7.35–7.45)
PO2 ARTERIAL: 75.3 MMHG (ref 80–100)
POSITIVE BASE EXCESS, ART: 5.9 MMOL/L (ref 0–2)
PT. POSITION: ABNORMAL
RESPIRATORY RATE: 18
TRANSFUSION STATUS: NORMAL
UNIT DIVISION: 0
UNIT ISSUE DATE/TIME: NORMAL

## 2023-12-20 PROCEDURE — A9560 TC99M LABELED RBC: HCPCS | Performed by: NURSE PRACTITIONER

## 2023-12-20 PROCEDURE — 36415 COLL VENOUS BLD VENIPUNCTURE: CPT

## 2023-12-20 PROCEDURE — 99232 SBSQ HOSP IP/OBS MODERATE 35: CPT | Performed by: INTERNAL MEDICINE

## 2023-12-20 PROCEDURE — 85018 HEMOGLOBIN: CPT

## 2023-12-20 PROCEDURE — APPSS30 APP SPLIT SHARED TIME 16-30 MINUTES: Performed by: NURSE PRACTITIONER

## 2023-12-20 PROCEDURE — 5A09557 ASSISTANCE WITH RESPIRATORY VENTILATION, GREATER THAN 96 CONSECUTIVE HOURS, CONTINUOUS POSITIVE AIRWAY PRESSURE: ICD-10-PCS | Performed by: INTERNAL MEDICINE

## 2023-12-20 PROCEDURE — 3430000000 HC RX DIAGNOSTIC RADIOPHARMACEUTICAL: Performed by: NURSE PRACTITIONER

## 2023-12-20 PROCEDURE — 99231 SBSQ HOSP IP/OBS SF/LOW 25: CPT | Performed by: INTERNAL MEDICINE

## 2023-12-20 PROCEDURE — 2580000003 HC RX 258: Performed by: INTERNAL MEDICINE

## 2023-12-20 PROCEDURE — 6360000002 HC RX W HCPCS: Performed by: NURSE PRACTITIONER

## 2023-12-20 PROCEDURE — 94640 AIRWAY INHALATION TREATMENT: CPT

## 2023-12-20 PROCEDURE — 6360000002 HC RX W HCPCS: Performed by: INTERNAL MEDICINE

## 2023-12-20 PROCEDURE — 82947 ASSAY GLUCOSE BLOOD QUANT: CPT

## 2023-12-20 PROCEDURE — 78278 ACUTE GI BLOOD LOSS IMAGING: CPT

## 2023-12-20 PROCEDURE — 99222 1ST HOSP IP/OBS MODERATE 55: CPT | Performed by: PHYSICAL MEDICINE & REHABILITATION

## 2023-12-20 PROCEDURE — 85014 HEMATOCRIT: CPT

## 2023-12-20 PROCEDURE — 94660 CPAP INITIATION&MGMT: CPT

## 2023-12-20 PROCEDURE — 36600 WITHDRAWAL OF ARTERIAL BLOOD: CPT

## 2023-12-20 PROCEDURE — 97530 THERAPEUTIC ACTIVITIES: CPT

## 2023-12-20 PROCEDURE — 82805 BLOOD GASES W/O2 SATURATION: CPT

## 2023-12-20 PROCEDURE — 2580000003 HC RX 258: Performed by: NURSE PRACTITIONER

## 2023-12-20 PROCEDURE — 6370000000 HC RX 637 (ALT 250 FOR IP): Performed by: INTERNAL MEDICINE

## 2023-12-20 PROCEDURE — 2060000000 HC ICU INTERMEDIATE R&B

## 2023-12-20 PROCEDURE — 94761 N-INVAS EAR/PLS OXIMETRY MLT: CPT

## 2023-12-20 PROCEDURE — 71045 X-RAY EXAM CHEST 1 VIEW: CPT

## 2023-12-20 RX ORDER — LANOLIN ALCOHOL/MO/W.PET/CERES
400 CREAM (GRAM) TOPICAL 2 TIMES DAILY
Status: DISCONTINUED | OUTPATIENT
Start: 2023-12-21 | End: 2024-01-01 | Stop reason: HOSPADM

## 2023-12-20 RX ORDER — FUROSEMIDE 20 MG/1
20 TABLET ORAL DAILY
Status: DISCONTINUED | OUTPATIENT
Start: 2023-12-22 | End: 2023-12-21

## 2023-12-20 RX ORDER — FUROSEMIDE 10 MG/ML
40 INJECTION INTRAMUSCULAR; INTRAVENOUS ONCE
Status: COMPLETED | OUTPATIENT
Start: 2023-12-20 | End: 2023-12-20

## 2023-12-20 RX ORDER — SODIUM CHLORIDE 0.9 % (FLUSH) 0.9 %
10 SYRINGE (ML) INJECTION PRN
Status: DISCONTINUED | OUTPATIENT
Start: 2023-12-20 | End: 2024-01-01 | Stop reason: HOSPADM

## 2023-12-20 RX ORDER — FUROSEMIDE 10 MG/ML
40 INJECTION INTRAMUSCULAR; INTRAVENOUS ONCE
Status: COMPLETED | OUTPATIENT
Start: 2023-12-21 | End: 2023-12-21

## 2023-12-20 RX ORDER — ALBUTEROL SULFATE 2.5 MG/3ML
2.5 SOLUTION RESPIRATORY (INHALATION) EVERY 6 HOURS PRN
Status: DISCONTINUED | OUTPATIENT
Start: 2023-12-20 | End: 2024-01-01 | Stop reason: HOSPADM

## 2023-12-20 RX ORDER — FUROSEMIDE 10 MG/ML
40 INJECTION INTRAMUSCULAR; INTRAVENOUS ONCE
Status: DISCONTINUED | OUTPATIENT
Start: 2023-12-20 | End: 2023-12-20

## 2023-12-20 RX ADMIN — FUROSEMIDE 40 MG: 10 INJECTION, SOLUTION INTRAMUSCULAR; INTRAVENOUS at 00:42

## 2023-12-20 RX ADMIN — CARVEDILOL 25 MG: 25 TABLET, FILM COATED ORAL at 10:28

## 2023-12-20 RX ADMIN — CARVEDILOL 25 MG: 25 TABLET, FILM COATED ORAL at 16:44

## 2023-12-20 RX ADMIN — HYDRALAZINE HYDROCHLORIDE 25 MG: 25 TABLET, FILM COATED ORAL at 10:28

## 2023-12-20 RX ADMIN — SODIUM CHLORIDE, PRESERVATIVE FREE 10 ML: 5 INJECTION INTRAVENOUS at 10:39

## 2023-12-20 RX ADMIN — PRAVASTATIN SODIUM 40 MG: 40 TABLET ORAL at 10:28

## 2023-12-20 RX ADMIN — FUROSEMIDE 40 MG: 10 INJECTION, SOLUTION INTRAMUSCULAR; INTRAVENOUS at 15:39

## 2023-12-20 RX ADMIN — SODIUM CHLORIDE, PRESERVATIVE FREE 10 ML: 5 INJECTION INTRAVENOUS at 20:02

## 2023-12-20 RX ADMIN — SODIUM CHLORIDE, PRESERVATIVE FREE 10 ML: 5 INJECTION INTRAVENOUS at 09:04

## 2023-12-20 RX ADMIN — GABAPENTIN 300 MG: 300 CAPSULE ORAL at 10:28

## 2023-12-20 RX ADMIN — Medication 18.8 MILLICURIE: at 09:04

## 2023-12-20 RX ADMIN — FUROSEMIDE 40 MG: 10 INJECTION, SOLUTION INTRAMUSCULAR; INTRAVENOUS at 07:08

## 2023-12-20 RX ADMIN — FUROSEMIDE 40 MG: 10 INJECTION, SOLUTION INTRAMUSCULAR; INTRAVENOUS at 22:39

## 2023-12-20 RX ADMIN — ALBUTEROL SULFATE 2.5 MG: 2.5 SOLUTION RESPIRATORY (INHALATION) at 20:24

## 2023-12-20 ASSESSMENT — PAIN SCALES - GENERAL: PAINLEVEL_OUTOF10: 6

## 2023-12-20 NOTE — DISCHARGE INSTR - COC
Continuity of Care Form    Patient Name: Rosi Haley   :  1945  MRN:  [id number]    Admit date:  12/15/2023  Discharge date:  2024    Code Status Order: Full Code   Advance Directives:     Admitting Physician:  Lita Raymond MD  PCP: Radha Wynn MD    Discharging Nurse: Tiffanie HUTSON  Discharging Hospital Unit/Room#: 2107/2107-01  Discharging Unit Phone Number: [phone]    Emergency Contact:   Extended Emergency Contact Information  Primary Emergency Contact: Ihsan Haley  Address: [address] [address]  Home Phone: [phone]  Work Phone: [phone]  Mobile Phone: [phone]  Relation: Spouse  Secondary Emergency Contact: Gopal Haley  Home Phone: [phone]  Relation: Child    Past Surgical History:  Past Surgical History:   Procedure Laterality Date    COLONOSCOPY      COLONOSCOPY N/A 12/10/2023    COLONOSCOPY DIAGNOSTIC performed by Carlos Rodriguez MD at [facility] ENDO    CORONARY ANGIOPLASTY WITH STENT PLACEMENT        patient reports one stent    HYSTERECTOMY (CERVIX STATUS UNKNOWN)      COMPLETE    LEG SURGERY Left 2017    WY I&D BELOW FASCIA FOOT 1 BURSAL SPACE Left 2017    DEBRIDEMENT LEFT LOWER EXTREMITY WITH APPLICATION OF APPLIGRAFT performed by Gideon Wasserman DPM at [facility] OR    TONSILLECTOMY      TUBAL LIGATION      UPPER GASTROINTESTINAL ENDOSCOPY N/A 2023    EGD BIOPSY performed by Deisy Andre MD at [facility] ENDO    UPPER GASTROINTESTINAL ENDOSCOPY N/A 2023    EGD ESOPHAGOGASTRODUODENOSCOPY performed by Washington Monroe MD at [facility] OR       Immunization History:   Immunization History   Administered Date(s) Administered    COVID-19, MODERNA BLUE border, Primary or Immunocompromised, (age 12y+), IM, 100 mcg/0.5mL 2021    COVID-19, PFIZER Bivalent, DO NOT Dilute, (age 12y+), IM, 30 mcg/0.3 mL 10/18/2022    COVID-19, US Vaccine, Vaccine Unspecified 2021, 10/27/2021    Influenza Virus Vaccine

## 2023-12-20 NOTE — PLAN OF CARE
Problem: Discharge Planning  Goal: Discharge to home or other facility with appropriate resources  Outcome: Progressing     Problem: Safety - Adult  Goal: Free from fall injury  Outcome: Progressing     Problem: ABCDS Injury Assessment  Goal: Absence of physical injury  Outcome: Progressing     Problem: Skin/Tissue Integrity  Goal: Absence of new skin breakdown  Description: 1.  Monitor for areas of redness and/or skin breakdown  2.  Assess vascular access sites hourly  3.  Every 4-6 hours minimum:  Change oxygen saturation probe site  4.  Every 4-6 hours:  If on nasal continuous positive airway pressure, respiratory therapy assess nares and determine need for appliance change or resting period.  Outcome: Progressing     Problem: Chronic Conditions and Co-morbidities  Goal: Patient's chronic conditions and co-morbidity symptoms are monitored and maintained or improved  Outcome: Progressing     Problem: Pain  Goal: Verbalizes/displays adequate comfort level or baseline comfort level  Outcome: Progressing

## 2023-12-21 ENCOUNTER — APPOINTMENT (OUTPATIENT)
Dept: GENERAL RADIOLOGY | Age: 78
DRG: 377 | End: 2023-12-21
Payer: MEDICARE

## 2023-12-21 ENCOUNTER — APPOINTMENT (OUTPATIENT)
Age: 78
DRG: 377 | End: 2023-12-21
Attending: INTERNAL MEDICINE
Payer: MEDICARE

## 2023-12-21 LAB
ALBUMIN SERPL-MCNC: 2.6 G/DL (ref 3.5–5.2)
ALP SERPL-CCNC: 51 U/L (ref 35–104)
ALT SERPL-CCNC: 17 U/L (ref 5–33)
ANION GAP SERPL CALCULATED.3IONS-SCNC: 6 MMOL/L (ref 9–17)
ARTERIAL PATENCY WRIST A: ABNORMAL
AST SERPL-CCNC: 14 U/L
BASOPHILS # BLD: 0 K/UL (ref 0–0.2)
BASOPHILS NFR BLD: 1 % (ref 0–2)
BDY SITE: ABNORMAL
BILIRUB SERPL-MCNC: 0.6 MG/DL (ref 0.3–1.2)
BNP SERPL-MCNC: ABNORMAL PG/ML
BODY TEMPERATURE: 37
BUN SERPL-MCNC: 35 MG/DL (ref 8–23)
CALCIUM SERPL-MCNC: 9.8 MG/DL (ref 8.6–10.4)
CHLORIDE SERPL-SCNC: 106 MMOL/L (ref 98–107)
CO2 SERPL-SCNC: 34 MMOL/L (ref 20–31)
COHGB MFR BLD: 1.9 % (ref 0–5)
CREAT SERPL-MCNC: 1.7 MG/DL (ref 0.5–0.9)
ECHO AO ROOT DIAM: 2.9 CM
ECHO AO ROOT INDEX: 1.26 CM/M2
ECHO AV AREA PEAK VELOCITY: 1.7 CM2
ECHO AV AREA VTI: 1.6 CM2
ECHO AV AREA/BSA PEAK VELOCITY: 0.7 CM2/M2
ECHO AV AREA/BSA VTI: 0.7 CM2/M2
ECHO AV MEAN GRADIENT: 6 MMHG
ECHO AV MEAN VELOCITY: 1.1 M/S
ECHO AV PEAK GRADIENT: 12 MMHG
ECHO AV PEAK VELOCITY: 1.8 M/S
ECHO AV VELOCITY RATIO: 0.56
ECHO AV VTI: 38.9 CM
ECHO BSA: 2.31 M2
ECHO EST RA PRESSURE: 3 MMHG
ECHO LA AREA 2C: 19.8 CM2
ECHO LA AREA 4C: 18.6 CM2
ECHO LA DIAMETER INDEX: 1.65 CM/M2
ECHO LA DIAMETER: 3.8 CM
ECHO LA MAJOR AXIS: 5.9 CM
ECHO LA MINOR AXIS: 5.8 CM
ECHO LA TO AORTIC ROOT RATIO: 1.31
ECHO LA VOL BP: 52 ML (ref 22–52)
ECHO LA VOL MOD A2C: 56 ML (ref 22–52)
ECHO LA VOL MOD A4C: 48 ML (ref 22–52)
ECHO LA VOL/BSA BIPLANE: 23 ML/M2 (ref 16–34)
ECHO LA VOLUME INDEX MOD A2C: 24 ML/M2 (ref 16–34)
ECHO LA VOLUME INDEX MOD A4C: 21 ML/M2 (ref 16–34)
ECHO LV EDV A2C: 50 ML
ECHO LV EDV A4C: 104 ML
ECHO LV EDV INDEX A4C: 45 ML/M2
ECHO LV EDV NDEX A2C: 22 ML/M2
ECHO LV EJECTION FRACTION A2C: 55 %
ECHO LV EJECTION FRACTION A4C: 56 %
ECHO LV EJECTION FRACTION BIPLANE: 55 % (ref 55–100)
ECHO LV ESV A2C: 22 ML
ECHO LV ESV A4C: 45 ML
ECHO LV ESV INDEX A2C: 10 ML/M2
ECHO LV ESV INDEX A4C: 19 ML/M2
ECHO LV FRACTIONAL SHORTENING: 33 % (ref 28–44)
ECHO LV INTERNAL DIMENSION DIASTOLE INDEX: 2.08 CM/M2
ECHO LV INTERNAL DIMENSION DIASTOLIC: 4.8 CM (ref 3.9–5.3)
ECHO LV INTERNAL DIMENSION SYSTOLIC INDEX: 1.39 CM/M2
ECHO LV INTERNAL DIMENSION SYSTOLIC: 3.2 CM
ECHO LV IVSD: 1.2 CM (ref 0.6–0.9)
ECHO LV MASS 2D: 219.1 G (ref 67–162)
ECHO LV MASS INDEX 2D: 94.9 G/M2 (ref 43–95)
ECHO LV POSTERIOR WALL DIASTOLIC: 1.2 CM (ref 0.6–0.9)
ECHO LV RELATIVE WALL THICKNESS RATIO: 0.5
ECHO LVOT AREA: 2.8 CM2
ECHO LVOT AV VTI INDEX: 0.56
ECHO LVOT DIAM: 1.9 CM
ECHO LVOT MEAN GRADIENT: 2 MMHG
ECHO LVOT PEAK GRADIENT: 4 MMHG
ECHO LVOT PEAK VELOCITY: 1 M/S
ECHO LVOT STROKE VOLUME INDEX: 26.5 ML/M2
ECHO LVOT SV: 61.2 ML
ECHO LVOT VTI: 21.6 CM
ECHO MV AREA VTI: 1.1 CM2
ECHO MV LVOT VTI INDEX: 2.56
ECHO MV MAX VELOCITY: 1.5 M/S
ECHO MV MEAN GRADIENT: 2 MMHG
ECHO MV MEAN VELOCITY: 0.6 M/S
ECHO MV PEAK GRADIENT: 8 MMHG
ECHO MV VTI: 55.3 CM
ECHO RA AREA 4C: 17.3 CM2
ECHO RA END SYSTOLIC VOLUME APICAL 4 CHAMBER INDEX BSA: 20 ML/M2
ECHO RA VOLUME: 46 ML
ECHO RIGHT VENTRICULAR SYSTOLIC PRESSURE (RVSP): 7 MMHG
ECHO RV TAPSE: 1.5 CM (ref 1.7–?)
ECHO TV REGURGITANT MAX VELOCITY: 1 M/S
ECHO TV REGURGITANT PEAK GRADIENT: 4 MMHG
EOSINOPHIL # BLD: 0.1 K/UL (ref 0–0.4)
EOSINOPHILS RELATIVE PERCENT: 2 % (ref 0–4)
ERYTHROCYTE [DISTWIDTH] IN BLOOD BY AUTOMATED COUNT: 20.3 % (ref 11.5–14.9)
GAS FLOW.O2 O2 DELIVERY SYS: ABNORMAL L/MIN
GFR SERPL CREATININE-BSD FRML MDRD: 31 ML/MIN/1.73M2
GLUCOSE SERPL-MCNC: 100 MG/DL (ref 70–99)
HCO3 ARTERIAL: 34.1 MMOL/L (ref 22–26)
HCT VFR BLD AUTO: 24 % (ref 36–46)
HCT VFR BLD AUTO: 24.7 % (ref 36–46)
HCT VFR BLD AUTO: 24.8 % (ref 36–46)
HCT VFR BLD AUTO: 24.9 % (ref 36–46)
HCT VFR BLD AUTO: 24.9 % (ref 36–46)
HGB BLD-MCNC: 7.7 G/DL (ref 12–16)
HGB BLD-MCNC: 7.9 G/DL (ref 12–16)
HGB BLD-MCNC: 8 G/DL (ref 12–16)
HGB BLD-MCNC: 8 G/DL (ref 12–16)
HGB BLD-MCNC: 8.1 G/DL (ref 12–16)
LYMPHOCYTES NFR BLD: 0.8 K/UL (ref 1–4.8)
LYMPHOCYTES RELATIVE PERCENT: 13 % (ref 24–44)
MAGNESIUM SERPL-MCNC: 1.9 MG/DL (ref 1.6–2.6)
MCH RBC QN AUTO: 31.2 PG (ref 26–34)
MCHC RBC AUTO-ENTMCNC: 32.4 G/DL (ref 31–37)
MCV RBC AUTO: 96.2 FL (ref 80–100)
METHEMOGLOBIN: 0.8 % (ref 0–1.9)
MONOCYTES NFR BLD: 0.6 K/UL (ref 0.1–1.3)
MONOCYTES NFR BLD: 9 % (ref 1–7)
NEUTROPHILS NFR BLD: 75 % (ref 36–66)
NEUTS SEG NFR BLD: 4.6 K/UL (ref 1.3–9.1)
O2 SAT, ARTERIAL: 95.4 % (ref 95–98)
PCO2 ARTERIAL: 65 MMHG (ref 35–45)
PH ARTERIAL: 7.33 (ref 7.35–7.45)
PLATELET # BLD AUTO: 146 K/UL (ref 150–450)
PMV BLD AUTO: 7.8 FL (ref 6–12)
PO2 ARTERIAL: 90.2 MMHG (ref 80–100)
POSITIVE BASE EXCESS, ART: 8.1 MMOL/L (ref 0–2)
POTASSIUM SERPL-SCNC: 3.4 MMOL/L (ref 3.7–5.3)
PROT SERPL-MCNC: 5.6 G/DL (ref 6.4–8.3)
PT. POSITION: ABNORMAL
RBC # BLD AUTO: 2.57 M/UL (ref 4–5.2)
RESPIRATORY RATE: 20
SODIUM SERPL-SCNC: 146 MMOL/L (ref 135–144)
TEXT FOR RESPIRATORY: ABNORMAL
VENTILATION MODE VENT: ABNORMAL
WBC OTHER # BLD: 6.1 K/UL (ref 3.5–11)

## 2023-12-21 PROCEDURE — 6370000000 HC RX 637 (ALT 250 FOR IP): Performed by: INTERNAL MEDICINE

## 2023-12-21 PROCEDURE — 85014 HEMATOCRIT: CPT

## 2023-12-21 PROCEDURE — 6360000002 HC RX W HCPCS: Performed by: INTERNAL MEDICINE

## 2023-12-21 PROCEDURE — 6360000002 HC RX W HCPCS: Performed by: NURSE PRACTITIONER

## 2023-12-21 PROCEDURE — 82805 BLOOD GASES W/O2 SATURATION: CPT

## 2023-12-21 PROCEDURE — 83880 ASSAY OF NATRIURETIC PEPTIDE: CPT

## 2023-12-21 PROCEDURE — 80053 COMPREHEN METABOLIC PANEL: CPT

## 2023-12-21 PROCEDURE — 94640 AIRWAY INHALATION TREATMENT: CPT

## 2023-12-21 PROCEDURE — 93306 TTE W/DOPPLER COMPLETE: CPT

## 2023-12-21 PROCEDURE — 94660 CPAP INITIATION&MGMT: CPT

## 2023-12-21 PROCEDURE — 36600 WITHDRAWAL OF ARTERIAL BLOOD: CPT

## 2023-12-21 PROCEDURE — 2000000000 HC ICU R&B

## 2023-12-21 PROCEDURE — 85018 HEMOGLOBIN: CPT

## 2023-12-21 PROCEDURE — 85025 COMPLETE CBC W/AUTO DIFF WBC: CPT

## 2023-12-21 PROCEDURE — APPSS30 APP SPLIT SHARED TIME 16-30 MINUTES: Performed by: NURSE PRACTITIONER

## 2023-12-21 PROCEDURE — 83735 ASSAY OF MAGNESIUM: CPT

## 2023-12-21 PROCEDURE — 94761 N-INVAS EAR/PLS OXIMETRY MLT: CPT

## 2023-12-21 PROCEDURE — 99231 SBSQ HOSP IP/OBS SF/LOW 25: CPT | Performed by: INTERNAL MEDICINE

## 2023-12-21 PROCEDURE — 2700000000 HC OXYGEN THERAPY PER DAY

## 2023-12-21 PROCEDURE — 71045 X-RAY EXAM CHEST 1 VIEW: CPT

## 2023-12-21 PROCEDURE — 36415 COLL VENOUS BLD VENIPUNCTURE: CPT

## 2023-12-21 PROCEDURE — 2580000003 HC RX 258: Performed by: INTERNAL MEDICINE

## 2023-12-21 RX ORDER — POTASSIUM CHLORIDE 7.45 MG/ML
10 INJECTION INTRAVENOUS PRN
Status: DISCONTINUED | OUTPATIENT
Start: 2023-12-21 | End: 2024-01-01 | Stop reason: HOSPADM

## 2023-12-21 RX ORDER — POTASSIUM CHLORIDE 20 MEQ/1
40 TABLET, EXTENDED RELEASE ORAL PRN
Status: DISCONTINUED | OUTPATIENT
Start: 2023-12-21 | End: 2024-01-01 | Stop reason: HOSPADM

## 2023-12-21 RX ORDER — HYDROCODONE BITARTRATE AND ACETAMINOPHEN 5; 325 MG/1; MG/1
1 TABLET ORAL EVERY 6 HOURS PRN
Status: DISCONTINUED | OUTPATIENT
Start: 2023-12-21 | End: 2024-01-01 | Stop reason: HOSPADM

## 2023-12-21 RX ADMIN — ALTEPLASE 1 MG: 2.2 INJECTION, POWDER, LYOPHILIZED, FOR SOLUTION INTRAVENOUS at 07:36

## 2023-12-21 RX ADMIN — HYDROCODONE BITARTRATE AND ACETAMINOPHEN 1 TABLET: 5; 325 TABLET ORAL at 15:13

## 2023-12-21 RX ADMIN — FUROSEMIDE 40 MG: 10 INJECTION, SOLUTION INTRAMUSCULAR; INTRAVENOUS at 06:28

## 2023-12-21 RX ADMIN — SODIUM CHLORIDE, PRESERVATIVE FREE 10 ML: 5 INJECTION INTRAVENOUS at 20:13

## 2023-12-21 RX ADMIN — Medication 2 PUFF: at 08:14

## 2023-12-21 RX ADMIN — POTASSIUM CHLORIDE 10 MEQ: 7.46 INJECTION, SOLUTION INTRAVENOUS at 08:31

## 2023-12-21 RX ADMIN — ALTEPLASE 1 MG: 2.2 INJECTION, POWDER, LYOPHILIZED, FOR SOLUTION INTRAVENOUS at 07:24

## 2023-12-21 RX ADMIN — Medication 2 PUFF: at 20:05

## 2023-12-21 RX ADMIN — POTASSIUM CHLORIDE 10 MEQ: 7.46 INJECTION, SOLUTION INTRAVENOUS at 10:50

## 2023-12-21 RX ADMIN — CARVEDILOL 25 MG: 25 TABLET, FILM COATED ORAL at 16:35

## 2023-12-21 RX ADMIN — HYDRALAZINE HYDROCHLORIDE 25 MG: 25 TABLET, FILM COATED ORAL at 20:13

## 2023-12-21 RX ADMIN — POTASSIUM CHLORIDE 10 MEQ: 7.46 INJECTION, SOLUTION INTRAVENOUS at 06:28

## 2023-12-21 RX ADMIN — POTASSIUM CHLORIDE 10 MEQ: 7.46 INJECTION, SOLUTION INTRAVENOUS at 09:36

## 2023-12-21 RX ADMIN — Medication 400 MG: at 20:13

## 2023-12-21 ASSESSMENT — PAIN DESCRIPTION - ORIENTATION
ORIENTATION: LEFT;RIGHT
ORIENTATION: LEFT;RIGHT

## 2023-12-21 ASSESSMENT — PAIN DESCRIPTION - LOCATION
LOCATION: FOOT
LOCATION: FOOT

## 2023-12-21 ASSESSMENT — PAIN SCALES - GENERAL
PAINLEVEL_OUTOF10: 0
PAINLEVEL_OUTOF10: 2
PAINLEVEL_OUTOF10: 7

## 2023-12-21 ASSESSMENT — PAIN DESCRIPTION - DESCRIPTORS
DESCRIPTORS: ACHING;DISCOMFORT
DESCRIPTORS: ACHING;DISCOMFORT

## 2023-12-21 NOTE — PLAN OF CARE
Problem: Discharge Planning  Goal: Discharge to home or other facility with appropriate resources  12/21/2023 0451 by Abigail Barrera RN  Outcome: Progressing  Flowsheets (Taken 12/21/2023 0015)  Discharge to home or other facility with appropriate resources:   Identify barriers to discharge with patient and caregiver   Arrange for needed discharge resources and transportation as appropriate   Refer to discharge planning if patient needs post-hospital services based on physician order or complex needs related to functional status, cognitive ability or social support system  12/20/2023 1658 by Shameka Freeman RN  Outcome: Progressing     Problem: Safety - Adult  Goal: Free from fall injury  12/21/2023 0451 by Abigail Barrera RN  Outcome: Progressing  12/20/2023 1658 by Shameka Freeman RN  Outcome: Progressing     Problem: ABCDS Injury Assessment  Goal: Absence of physical injury  12/21/2023 0451 by Abigail Barrera RN  Outcome: Progressing  12/20/2023 1658 by Shameka Freeman RN  Outcome: Progressing     Problem: Skin/Tissue Integrity  Goal: Absence of new skin breakdown  Description: 1.  Monitor for areas of redness and/or skin breakdown  2.  Assess vascular access sites hourly  3.  Every 4-6 hours minimum:  Change oxygen saturation probe site  4.  Every 4-6 hours:  If on nasal continuous positive airway pressure, respiratory therapy assess nares and determine need for appliance change or resting period.  12/21/2023 0451 by Abigail Barrera RN  Outcome: Progressing  12/20/2023 1658 by Shameka Freeman RN  Outcome: Progressing     Problem: Chronic Conditions and Co-morbidities  Goal: Patient's chronic conditions and co-morbidity symptoms are monitored and maintained or improved  12/21/2023 0451 by Abigail Barrera RN  Outcome: Progressing  Flowsheets (Taken 12/21/2023 0015)  Care Plan - Patient's Chronic Conditions and Co-Morbidity Symptoms are Monitored and Maintained or Improved:   Monitor and assess

## 2023-12-21 NOTE — CONSULTS
[facility]   CONSULT NOTE:    DATE OF CONSULT 12/21/2023    REASON FOR CONSULTATION:  Acute respiratory failure with hypercapnia.  Requiring BiPAP.      PCP Radha Wynn MD     CHIEF COMPLAINT: I had a hard time breathing.    HISTORY OF PRESENT ILLNESS:     78-year-old very pleasant female.  She presented to the hospital because of black tarry stools.  She reportedly was recently hospitalized for hematochezia and it was thought secondary to hemorrhoids.  She has history of atrial fibrillation on Eliquis, coronary artery disease, chronic back pain.  Hypertension dyslipidemia.    Patient did have a PICC line placed.  His hemoglobin was as low as 5.1.  Patient did receive a total of 3 units packed cells.  Patient underwent an EGD December 16.  Patient was found to have mild erosive gastritis.    Patient underwent cardiology consultation because there was an episode of nonsustained V. tach.  There was concern for hypokalemia as well.  Patient was treated with medical management.  Note, patient underwent tagged red blood cell scan December 28 revealing no active GI bleed.    Of note, the patient was from evaluated by nurse practitioner for internal medicine because of problems with excessive drowsiness.  The patient had evidence of wheezing.  Chest x-ray revealed evidence of congestive changes.  ABG 7.27, 70.4, 75.3.    Patient was placed on BiPAP transferred to ICU.  Repeat ABG 7.328-65-90.2.    Patient currently is completing sentences.  Off Bipap  No fevers appreciated.  Patient's proBNP 17 312  Of note, the patient had an echo done November 2022 ejection fraction greater than 55%.    ALLERGIES:  Allergies   Allergen Reactions    Amlodipine Other (See Comments)     Ankle and leg swelling    Aspirin Itching and Rash     Pt can only take 81mg       HOME MEDICATIONS:  Medications Prior to Admission: [DISCONTINUED] hydrocortisone (ANUSOL-HC) 2.5 % CREA rectal cream, Place 
Date:   12/18/2023  Patient name: Rosi Haley  Date of admission:  12/15/2023 12:01 PM  MRN:   [id number]  YOB: 1945  PCP: Radha Wynn MD    Reason for Admission: Melena [K92.1]  Hypokalemia [E87.6]  Acute GI bleeding [K92.2]  Anemia, unspecified type [D64.9]    Cardiology consult: Ventricular tachyarrhythmia       Referring physician: Dr Lita Raymond    Impression  Episode of nonsustained V. tach patient had low potassium on admission  Inpatient at [facility] with a CHF, paroxysmal A-fib ,4 weeks ago started on Eliquis  History of congestive heart failure/diastolic  History of coronary artery disease, coronary intervention 2017  Conduction disorder, first-degree heart block, AZ interval 216, right bundle branch block,  ms  Hypertension  Blood pressure difference in both arms higher than the left arm  Hyperlipidemia  Obesity  Ex smoker  Degenerative joint disease  History of gout, episode of acute gout 12/8/2023 left big toe  History of hysterectomy    Previous admission 12/5/2023 with a GI bleeding, drop in hemoglobin from 12 to 7  EGD 12/6/2023 showed gastric antrum few small erosions no bleeding, fundus normal, duodenum normal  Colonoscopy 12/10/2023 normal terminal ileum, cecum, ascending colon, transverse colon, descending colon, sigmoid colon, mild sigmoid colon diverticulosis, rectal examination showed large and protruding hemorrhoids with congestion and edema    Drug allergy: Not known     Investigation workup     ECG 12/6/2023  Sinus rhythm heart rate 64, first-degree heart block AZ interval 216, right bundle branch block, QRS duration 130     ECG 11/11/2022  Sinus bradycardia heart rate 55 first-degree heart block, right bundle branch block     2D echo 11/14/2023  Normal LV size, ejection fraction 55 to 60%  No significant valvular abnormality      History of present illness  78-year-old  female with a past medical history of obesity, hypertension, CAD, 
meaning can be extrapolated by contextual diversion.    Electronically signed by Washington Monroe MD on 12/16/2023 at 11:15 AM     Copy sent to Radha Iglesias MD          
proximal SVC.     Chronic pulmonary change with left basilar infiltrate. Right-sided PICC line with tip in the proximal SVC.     CT ABDOMEN PELVIS WO CONTRAST Additional Contrast? None    Result Date: 12/9/2023  EXAMINATION: CT OF THE ABDOMEN AND PELVIS WITHOUT CONTRAST 12/7/2023 11:25 am TECHNIQUE: CT of the abdomen and pelvis was performed without the administration of intravenous contrast. Multiplanar reformatted images are provided for review. Automated exposure control, iterative reconstruction, and/or weight based adjustment of the mA/kV was utilized to reduce the radiation dose to as low as reasonably achievable. COMPARISON: 12/19/2018 HISTORY: ORDERING SYSTEM PROVIDED HISTORY: rule out retroperitoneal bleed TECHNOLOGIST PROVIDED HISTORY: rule out retroperitoneal bleed Reason for Exam: ? retroperitoneal bleed FINDINGS: Lower Chest: Visualized portion of the lower chest demonstrates no acute abnormality. Organs: Limited evaluation of the intra-abdominal organs without intravenous contrast.  Within this limitation, the liver, gallbladder, spleen, pancreas, adrenal glands, and kidneys demonstrate no acute abnormality.  There are simple cysts in the liver, for which no follow-up imaging is recommended. There is also a simple cyst in the right kidney. GI/Bowel: Colonic diverticulosis.  No bowel obstruction is seen.  Normal appendix. Pelvis: The urinary bladder is unremarkable. Peritoneum/Retroperitoneum: No retroperitoneal hematoma.  Vascular calcifications are seen.  No lymphadenopathy.  The infrarenal abdominal aorta measures 2.8 cm in diameter.  No intraperitoneal free air or significant free fluid. Bones/Soft Tissues: No acute bony abnormality is seen.     No evidence of a retroperitoneal hematoma. No acute abnormality is seen in the abdomen or pelvis. Colonic diverticulosis. 2.8 cm infrarenal abdominal aortic aneurysm suspected. Recommend follow-up every 5 years. Reference: J Am Bruce Radiol 2013;10:789-794.

## 2023-12-21 NOTE — PLAN OF CARE
Problem: Discharge Planning  Goal: Discharge to home or other facility with appropriate resources  12/21/2023 1700 by Chelsie Nunez, RN  Outcome: Progressing  Flowsheets  Taken 12/21/2023 1600  Discharge to home or other facility with appropriate resources:   Identify barriers to discharge with patient and caregiver   Arrange for needed discharge resources and transportation as appropriate   Identify discharge learning needs (meds, wound care, etc)   Refer to discharge planning if patient needs post-hospital services based on physician order or complex needs related to functional status, cognitive ability or social support system  Taken 12/21/2023 1200  Discharge to home or other facility with appropriate resources:   Identify barriers to discharge with patient and caregiver   Arrange for needed discharge resources and transportation as appropriate   Identify discharge learning needs (meds, wound care, etc)   Refer to discharge planning if patient needs post-hospital services based on physician order or complex needs related to functional status, cognitive ability or social support system  Taken 12/21/2023 0808  Discharge to home or other facility with appropriate resources:   Identify barriers to discharge with patient and caregiver   Arrange for needed discharge resources and transportation as appropriate   Identify discharge learning needs (meds, wound care, etc)   Refer to discharge planning if patient needs post-hospital services based on physician order or complex needs related to functional status, cognitive ability or social support system     Problem: Safety - Adult  Goal: Free from fall injury  12/21/2023 1700 by Chelsie Nunez, RN  Outcome: Progressing  Flowsheets (Taken 12/21/2023 0800)  Free From Fall Injury: Instruct family/caregiver on patient safety       Problem: Pain  Goal: Verbalizes/displays adequate comfort level or baseline comfort level  12/21/2023 1700 by Chelsie Nunez, RN  Outcome:

## 2023-12-22 PROBLEM — I50.9 CHRONIC CONGESTIVE HEART FAILURE (HCC): Status: ACTIVE | Noted: 2023-12-22

## 2023-12-22 LAB
ANION GAP SERPL CALCULATED.3IONS-SCNC: 3 MMOL/L (ref 9–17)
BASOPHILS # BLD: 0 K/UL (ref 0–0.2)
BASOPHILS NFR BLD: 1 % (ref 0–2)
BUN SERPL-MCNC: 37 MG/DL (ref 8–23)
CALCIUM SERPL-MCNC: 10 MG/DL (ref 8.6–10.4)
CHLORIDE SERPL-SCNC: 105 MMOL/L (ref 98–107)
CO2 SERPL-SCNC: 36 MMOL/L (ref 20–31)
CREAT SERPL-MCNC: 1.5 MG/DL (ref 0.5–0.9)
EOSINOPHIL # BLD: 0.3 K/UL (ref 0–0.4)
EOSINOPHILS RELATIVE PERCENT: 5 % (ref 0–4)
ERYTHROCYTE [DISTWIDTH] IN BLOOD BY AUTOMATED COUNT: 19.5 % (ref 11.5–14.9)
GFR SERPL CREATININE-BSD FRML MDRD: 36 ML/MIN/1.73M2
GLUCOSE SERPL-MCNC: 120 MG/DL (ref 70–99)
HCT VFR BLD AUTO: 25 % (ref 36–46)
HGB BLD-MCNC: 8.1 G/DL (ref 12–16)
LYMPHOCYTES NFR BLD: 0.8 K/UL (ref 1–4.8)
LYMPHOCYTES RELATIVE PERCENT: 13 % (ref 24–44)
MCH RBC QN AUTO: 30.7 PG (ref 26–34)
MCHC RBC AUTO-ENTMCNC: 32.2 G/DL (ref 31–37)
MCV RBC AUTO: 95.1 FL (ref 80–100)
MONOCYTES NFR BLD: 0.5 K/UL (ref 0.1–1.3)
MONOCYTES NFR BLD: 7 % (ref 1–7)
NEUTROPHILS NFR BLD: 74 % (ref 36–66)
NEUTS SEG NFR BLD: 4.8 K/UL (ref 1.3–9.1)
PLATELET # BLD AUTO: 180 K/UL (ref 150–450)
PMV BLD AUTO: 7.9 FL (ref 6–12)
POTASSIUM SERPL-SCNC: 3.7 MMOL/L (ref 3.7–5.3)
RBC # BLD AUTO: 2.63 M/UL (ref 4–5.2)
SODIUM SERPL-SCNC: 144 MMOL/L (ref 135–144)
WBC OTHER # BLD: 6.4 K/UL (ref 3.5–11)

## 2023-12-22 PROCEDURE — 97535 SELF CARE MNGMENT TRAINING: CPT

## 2023-12-22 PROCEDURE — 6370000000 HC RX 637 (ALT 250 FOR IP): Performed by: INTERNAL MEDICINE

## 2023-12-22 PROCEDURE — 2580000003 HC RX 258: Performed by: INTERNAL MEDICINE

## 2023-12-22 PROCEDURE — 97530 THERAPEUTIC ACTIVITIES: CPT

## 2023-12-22 PROCEDURE — 97110 THERAPEUTIC EXERCISES: CPT

## 2023-12-22 PROCEDURE — APPSS30 APP SPLIT SHARED TIME 16-30 MINUTES: Performed by: NURSE PRACTITIONER

## 2023-12-22 PROCEDURE — 80048 BASIC METABOLIC PNL TOTAL CA: CPT

## 2023-12-22 PROCEDURE — 36415 COLL VENOUS BLD VENIPUNCTURE: CPT

## 2023-12-22 PROCEDURE — 99233 SBSQ HOSP IP/OBS HIGH 50: CPT | Performed by: INTERNAL MEDICINE

## 2023-12-22 PROCEDURE — 2060000000 HC ICU INTERMEDIATE R&B

## 2023-12-22 PROCEDURE — 99231 SBSQ HOSP IP/OBS SF/LOW 25: CPT | Performed by: INTERNAL MEDICINE

## 2023-12-22 PROCEDURE — 85025 COMPLETE CBC W/AUTO DIFF WBC: CPT

## 2023-12-22 PROCEDURE — 2700000000 HC OXYGEN THERAPY PER DAY

## 2023-12-22 PROCEDURE — 6360000002 HC RX W HCPCS: Performed by: INTERNAL MEDICINE

## 2023-12-22 PROCEDURE — 94660 CPAP INITIATION&MGMT: CPT

## 2023-12-22 PROCEDURE — 94761 N-INVAS EAR/PLS OXIMETRY MLT: CPT

## 2023-12-22 PROCEDURE — 94640 AIRWAY INHALATION TREATMENT: CPT

## 2023-12-22 RX ORDER — FUROSEMIDE 10 MG/ML
40 INJECTION INTRAMUSCULAR; INTRAVENOUS ONCE
Status: COMPLETED | OUTPATIENT
Start: 2023-12-22 | End: 2023-12-22

## 2023-12-22 RX ORDER — HYDRALAZINE HYDROCHLORIDE 25 MG/1
25 TABLET, FILM COATED ORAL EVERY 8 HOURS SCHEDULED
Status: DISCONTINUED | OUTPATIENT
Start: 2023-12-22 | End: 2023-12-27

## 2023-12-22 RX ADMIN — Medication 2 PUFF: at 07:47

## 2023-12-22 RX ADMIN — Medication 400 MG: at 08:28

## 2023-12-22 RX ADMIN — ACETAMINOPHEN 650 MG: 325 TABLET ORAL at 12:43

## 2023-12-22 RX ADMIN — HYDRALAZINE HYDROCHLORIDE 25 MG: 25 TABLET, FILM COATED ORAL at 22:00

## 2023-12-22 RX ADMIN — CARVEDILOL 25 MG: 25 TABLET, FILM COATED ORAL at 08:28

## 2023-12-22 RX ADMIN — CARVEDILOL 25 MG: 25 TABLET, FILM COATED ORAL at 16:54

## 2023-12-22 RX ADMIN — SODIUM CHLORIDE: 9 INJECTION, SOLUTION INTRAVENOUS at 19:21

## 2023-12-22 RX ADMIN — HYDRALAZINE HYDROCHLORIDE 25 MG: 25 TABLET, FILM COATED ORAL at 08:28

## 2023-12-22 RX ADMIN — Medication 2 PUFF: at 19:46

## 2023-12-22 RX ADMIN — Medication 400 MG: at 22:00

## 2023-12-22 RX ADMIN — SODIUM CHLORIDE, PRESERVATIVE FREE 10 ML: 5 INJECTION INTRAVENOUS at 08:28

## 2023-12-22 RX ADMIN — PRAVASTATIN SODIUM 40 MG: 40 TABLET ORAL at 08:28

## 2023-12-22 RX ADMIN — FUROSEMIDE 40 MG: 10 INJECTION, SOLUTION INTRAMUSCULAR; INTRAVENOUS at 13:41

## 2023-12-22 ASSESSMENT — PAIN DESCRIPTION - LOCATION: LOCATION: FOOT

## 2023-12-22 ASSESSMENT — PAIN SCALES - GENERAL
PAINLEVEL_OUTOF10: 4
PAINLEVEL_OUTOF10: 0
PAINLEVEL_OUTOF10: 0

## 2023-12-22 NOTE — PLAN OF CARE
Problem: Discharge Planning  Goal: Discharge to home or other facility with appropriate resources  Outcome: Progressing     Problem: Safety - Adult  Goal: Free from fall injury  Outcome: Progressing     Problem: ABCDS Injury Assessment  Goal: Absence of physical injury  Outcome: Progressing     Problem: Skin/Tissue Integrity  Goal: Absence of new skin breakdown  Description: 1.  Monitor for areas of redness and/or skin breakdown  2.  Assess vascular access sites hourly  3.  Every 4-6 hours minimum:  Change oxygen saturation probe site  4.  Every 4-6 hours:  If on nasal continuous positive airway pressure, respiratory therapy assess nares and determine need for appliance change or resting period.  Outcome: Progressing     Problem: Chronic Conditions and Co-morbidities  Goal: Patient's chronic conditions and co-morbidity symptoms are monitored and maintained or improved  Outcome: Progressing     Problem: Pain  Goal: Verbalizes/displays adequate comfort level or baseline comfort level  Outcome: Progressing     Problem: Nutrition Deficit:  Goal: Optimize nutritional status  Outcome: Progressing

## 2023-12-22 NOTE — PLAN OF CARE
Problem: Discharge Planning  Goal: Discharge to home or other facility with appropriate resources  12/22/2023 0353 by Abigail Barrera, RN  Outcome: Progressing  Flowsheets (Taken 12/21/2023 2000)  Discharge to home or other facility with appropriate resources:   Identify barriers to discharge with patient and caregiver   Arrange for needed discharge resources and transportation as appropriate   Refer to discharge planning if patient needs post-hospital services based on physician order or complex needs related to functional status, cognitive ability or social support system  12/21/2023 1700 by Chelsie Nunez, RN  Outcome: Progressing  Flowsheets  Taken 12/21/2023 1600  Discharge to home or other facility with appropriate resources:   Identify barriers to discharge with patient and caregiver   Arrange for needed discharge resources and transportation as appropriate   Identify discharge learning needs (meds, wound care, etc)   Refer to discharge planning if patient needs post-hospital services based on physician order or complex needs related to functional status, cognitive ability or social support system  Taken 12/21/2023 1200  Discharge to home or other facility with appropriate resources:   Identify barriers to discharge with patient and caregiver   Arrange for needed discharge resources and transportation as appropriate   Identify discharge learning needs (meds, wound care, etc)   Refer to discharge planning if patient needs post-hospital services based on physician order or complex needs related to functional status, cognitive ability or social support system  Taken 12/21/2023 0808  Discharge to home or other facility with appropriate resources:   Identify barriers to discharge with patient and caregiver   Arrange for needed discharge resources and transportation as appropriate   Identify discharge learning needs (meds, wound care, etc)   Refer to discharge planning if patient needs post-hospital services based

## 2023-12-23 LAB
ANION GAP SERPL CALCULATED.3IONS-SCNC: 7 MMOL/L (ref 9–17)
BASOPHILS # BLD: 0.1 K/UL (ref 0–0.2)
BASOPHILS NFR BLD: 1 % (ref 0–2)
BUN SERPL-MCNC: 34 MG/DL (ref 8–23)
CALCIUM SERPL-MCNC: 9.7 MG/DL (ref 8.6–10.4)
CHLORIDE SERPL-SCNC: 103 MMOL/L (ref 98–107)
CO2 SERPL-SCNC: 35 MMOL/L (ref 20–31)
CREAT SERPL-MCNC: 1.2 MG/DL (ref 0.5–0.9)
EOSINOPHIL # BLD: 0.3 K/UL (ref 0–0.4)
EOSINOPHILS RELATIVE PERCENT: 6 % (ref 0–4)
ERYTHROCYTE [DISTWIDTH] IN BLOOD BY AUTOMATED COUNT: 19.7 % (ref 11.5–14.9)
GFR SERPL CREATININE-BSD FRML MDRD: 47 ML/MIN/1.73M2
GLUCOSE SERPL-MCNC: 121 MG/DL (ref 70–99)
HCT VFR BLD AUTO: 25.7 % (ref 36–46)
HGB BLD-MCNC: 8.1 G/DL (ref 12–16)
LYMPHOCYTES NFR BLD: 0.7 K/UL (ref 1–4.8)
LYMPHOCYTES RELATIVE PERCENT: 12 % (ref 24–44)
MCH RBC QN AUTO: 30.6 PG (ref 26–34)
MCHC RBC AUTO-ENTMCNC: 31.6 G/DL (ref 31–37)
MCV RBC AUTO: 96.8 FL (ref 80–100)
MONOCYTES NFR BLD: 0.4 K/UL (ref 0.1–1.3)
MONOCYTES NFR BLD: 7 % (ref 1–7)
NEUTROPHILS NFR BLD: 74 % (ref 36–66)
NEUTS SEG NFR BLD: 4.2 K/UL (ref 1.3–9.1)
PLATELET # BLD AUTO: 190 K/UL (ref 150–450)
PMV BLD AUTO: 7.7 FL (ref 6–12)
POTASSIUM SERPL-SCNC: 3.7 MMOL/L (ref 3.7–5.3)
RBC # BLD AUTO: 2.65 M/UL (ref 4–5.2)
SODIUM SERPL-SCNC: 145 MMOL/L (ref 135–144)
WBC OTHER # BLD: 5.7 K/UL (ref 3.5–11)

## 2023-12-23 PROCEDURE — 99232 SBSQ HOSP IP/OBS MODERATE 35: CPT | Performed by: INTERNAL MEDICINE

## 2023-12-23 PROCEDURE — 6370000000 HC RX 637 (ALT 250 FOR IP): Performed by: INTERNAL MEDICINE

## 2023-12-23 PROCEDURE — 94660 CPAP INITIATION&MGMT: CPT

## 2023-12-23 PROCEDURE — 85025 COMPLETE CBC W/AUTO DIFF WBC: CPT

## 2023-12-23 PROCEDURE — 94640 AIRWAY INHALATION TREATMENT: CPT

## 2023-12-23 PROCEDURE — 2580000003 HC RX 258: Performed by: INTERNAL MEDICINE

## 2023-12-23 PROCEDURE — 6360000002 HC RX W HCPCS: Performed by: INTERNAL MEDICINE

## 2023-12-23 PROCEDURE — APPSS30 APP SPLIT SHARED TIME 16-30 MINUTES: Performed by: NURSE PRACTITIONER

## 2023-12-23 PROCEDURE — 2700000000 HC OXYGEN THERAPY PER DAY

## 2023-12-23 PROCEDURE — P9047 ALBUMIN (HUMAN), 25%, 50ML: HCPCS | Performed by: INTERNAL MEDICINE

## 2023-12-23 PROCEDURE — 80048 BASIC METABOLIC PNL TOTAL CA: CPT

## 2023-12-23 PROCEDURE — 6370000000 HC RX 637 (ALT 250 FOR IP)

## 2023-12-23 PROCEDURE — 36415 COLL VENOUS BLD VENIPUNCTURE: CPT

## 2023-12-23 PROCEDURE — 2060000000 HC ICU INTERMEDIATE R&B

## 2023-12-23 PROCEDURE — 94761 N-INVAS EAR/PLS OXIMETRY MLT: CPT

## 2023-12-23 RX ORDER — DOCUSATE SODIUM 100 MG/1
100 CAPSULE, LIQUID FILLED ORAL 2 TIMES DAILY
Status: DISCONTINUED | OUTPATIENT
Start: 2023-12-23 | End: 2024-01-01 | Stop reason: HOSPADM

## 2023-12-23 RX ORDER — ALBUMIN (HUMAN) 12.5 G/50ML
25 SOLUTION INTRAVENOUS DAILY
Status: DISCONTINUED | OUTPATIENT
Start: 2023-12-23 | End: 2023-12-28

## 2023-12-23 RX ADMIN — ALBUMIN (HUMAN) 25 G: 0.25 INJECTION, SOLUTION INTRAVENOUS at 17:29

## 2023-12-23 RX ADMIN — DOCUSATE SODIUM 100 MG: 100 CAPSULE, LIQUID FILLED ORAL at 21:40

## 2023-12-23 RX ADMIN — CARVEDILOL 25 MG: 25 TABLET, FILM COATED ORAL at 17:26

## 2023-12-23 RX ADMIN — HYDRALAZINE HYDROCHLORIDE 25 MG: 25 TABLET, FILM COATED ORAL at 21:40

## 2023-12-23 RX ADMIN — DOCUSATE SODIUM 100 MG: 100 CAPSULE, LIQUID FILLED ORAL at 15:25

## 2023-12-23 RX ADMIN — PRAVASTATIN SODIUM 40 MG: 40 TABLET ORAL at 08:51

## 2023-12-23 RX ADMIN — HYDRALAZINE HYDROCHLORIDE 25 MG: 25 TABLET, FILM COATED ORAL at 15:25

## 2023-12-23 RX ADMIN — ACETAMINOPHEN 650 MG: 325 TABLET ORAL at 06:08

## 2023-12-23 RX ADMIN — Medication 2 PUFF: at 21:01

## 2023-12-23 RX ADMIN — Medication 400 MG: at 21:40

## 2023-12-23 RX ADMIN — HYDRALAZINE HYDROCHLORIDE 25 MG: 25 TABLET, FILM COATED ORAL at 06:06

## 2023-12-23 RX ADMIN — CARVEDILOL 25 MG: 25 TABLET, FILM COATED ORAL at 08:51

## 2023-12-23 RX ADMIN — SODIUM CHLORIDE, PRESERVATIVE FREE 10 ML: 5 INJECTION INTRAVENOUS at 21:41

## 2023-12-23 RX ADMIN — Medication 2 PUFF: at 08:08

## 2023-12-23 RX ADMIN — Medication 400 MG: at 08:51

## 2023-12-23 ASSESSMENT — PAIN SCALES - GENERAL: PAINLEVEL_OUTOF10: 8

## 2023-12-23 NOTE — PLAN OF CARE
Problem: Discharge Planning  Goal: Discharge to home or other facility with appropriate resources  12/23/2023 0532 by Danuta Daniels RN  Outcome: Progressing     Problem: Safety - Adult  Goal: Free from fall injury  12/23/2023 0532 by Danuta Daniels RN  Outcome: Progressing     Problem: ABCDS Injury Assessment  Goal: Absence of physical injury  12/23/2023 0532 by Danuta Daniels RN  Outcome: Progressing     Problem: Skin/Tissue Integrity  Goal: Absence of new skin breakdown  Description: 1.  Monitor for areas of redness and/or skin breakdown  2.  Assess vascular access sites hourly  3.  Every 4-6 hours minimum:  Change oxygen saturation probe site  4.  Every 4-6 hours:  If on nasal continuous positive airway pressure, respiratory therapy assess nares and determine need for appliance change or resting period.  12/23/2023 0532 by Danuta Daniels RN  Outcome: Progressing     Problem: Chronic Conditions and Co-morbidities  Goal: Patient's chronic conditions and co-morbidity symptoms are monitored and maintained or improved  12/23/2023 0532 by Danuta Daniels RN  Outcome: Progressing     Problem: Pain  Goal: Verbalizes/displays adequate comfort level or baseline comfort level  12/23/2023 0532 by Danuta Daniels RN  Outcome: Progressing     Problem: Nutrition Deficit:  Goal: Optimize nutritional status  12/23/2023 0532 by Danuta Daniels RN  Outcome: Progressing

## 2023-12-23 NOTE — PLAN OF CARE
Problem: Discharge Planning  Goal: Discharge to home or other facility with appropriate resources  12/23/2023 1800 by Lisette Gonzalez RN  Outcome: Progressing     Problem: Safety - Adult  Goal: Free from fall injury  12/23/2023 1800 by Lisette Gonzalez RN  Outcome: Progressing     Problem: ABCDS Injury Assessment  Goal: Absence of physical injury  12/23/2023 1800 by Lisette Gonzalez RN  Outcome: Progressing     Problem: Skin/Tissue Integrity  Goal: Absence of new skin breakdown  Description: 1.  Monitor for areas of redness and/or skin breakdown  2.  Assess vascular access sites hourly  3.  Every 4-6 hours minimum:  Change oxygen saturation probe site  4.  Every 4-6 hours:  If on nasal continuous positive airway pressure, respiratory therapy assess nares and determine need for appliance change or resting period.  12/23/2023 1800 by Lisette Gonzalez RN  Outcome: Progressing     Problem: Chronic Conditions and Co-morbidities  Goal: Patient's chronic conditions and co-morbidity symptoms are monitored and maintained or improved  12/23/2023 1800 by Lisette Gonzalez RN  Outcome: Progressing     Problem: Pain  Goal: Verbalizes/displays adequate comfort level or baseline comfort level  12/23/2023 1800 by Lisette Gonzalez RN  Outcome: Progressing     Problem: Nutrition Deficit:  Goal: Optimize nutritional status  12/23/2023 1800 by Lisette Gonzalez RN  Outcome: Progressing

## 2023-12-24 LAB
ERYTHROCYTE [DISTWIDTH] IN BLOOD BY AUTOMATED COUNT: 19.6 % (ref 11.5–14.9)
HCT VFR BLD AUTO: 26 % (ref 36–46)
HGB BLD-MCNC: 8 G/DL (ref 12–16)
MCH RBC QN AUTO: 30.3 PG (ref 26–34)
MCHC RBC AUTO-ENTMCNC: 30.9 G/DL (ref 31–37)
MCV RBC AUTO: 97.8 FL (ref 80–100)
PLATELET # BLD AUTO: 208 K/UL (ref 150–450)
PMV BLD AUTO: 7.7 FL (ref 6–12)
RBC # BLD AUTO: 2.66 M/UL (ref 4–5.2)
WBC OTHER # BLD: 5.1 K/UL (ref 3.5–11)

## 2023-12-24 PROCEDURE — 99232 SBSQ HOSP IP/OBS MODERATE 35: CPT | Performed by: INTERNAL MEDICINE

## 2023-12-24 PROCEDURE — 2060000000 HC ICU INTERMEDIATE R&B

## 2023-12-24 PROCEDURE — 2580000003 HC RX 258: Performed by: INTERNAL MEDICINE

## 2023-12-24 PROCEDURE — 6360000002 HC RX W HCPCS: Performed by: INTERNAL MEDICINE

## 2023-12-24 PROCEDURE — 6370000000 HC RX 637 (ALT 250 FOR IP): Performed by: INTERNAL MEDICINE

## 2023-12-24 PROCEDURE — P9047 ALBUMIN (HUMAN), 25%, 50ML: HCPCS | Performed by: INTERNAL MEDICINE

## 2023-12-24 PROCEDURE — 99231 SBSQ HOSP IP/OBS SF/LOW 25: CPT | Performed by: INTERNAL MEDICINE

## 2023-12-24 PROCEDURE — 94761 N-INVAS EAR/PLS OXIMETRY MLT: CPT

## 2023-12-24 PROCEDURE — 94640 AIRWAY INHALATION TREATMENT: CPT

## 2023-12-24 PROCEDURE — 36415 COLL VENOUS BLD VENIPUNCTURE: CPT

## 2023-12-24 PROCEDURE — 2700000000 HC OXYGEN THERAPY PER DAY

## 2023-12-24 PROCEDURE — 6370000000 HC RX 637 (ALT 250 FOR IP)

## 2023-12-24 PROCEDURE — 85027 COMPLETE CBC AUTOMATED: CPT

## 2023-12-24 RX ORDER — FUROSEMIDE 10 MG/ML
40 INJECTION INTRAMUSCULAR; INTRAVENOUS DAILY
Status: DISCONTINUED | OUTPATIENT
Start: 2023-12-24 | End: 2023-12-25

## 2023-12-24 RX ADMIN — HYDRALAZINE HYDROCHLORIDE 25 MG: 25 TABLET, FILM COATED ORAL at 05:57

## 2023-12-24 RX ADMIN — CARVEDILOL 25 MG: 25 TABLET, FILM COATED ORAL at 10:38

## 2023-12-24 RX ADMIN — Medication 400 MG: at 10:38

## 2023-12-24 RX ADMIN — ALBUTEROL SULFATE 2.5 MG: 2.5 SOLUTION RESPIRATORY (INHALATION) at 08:35

## 2023-12-24 RX ADMIN — DOCUSATE SODIUM 100 MG: 100 CAPSULE, LIQUID FILLED ORAL at 21:54

## 2023-12-24 RX ADMIN — ALBUMIN (HUMAN) 25 G: 0.25 INJECTION, SOLUTION INTRAVENOUS at 10:37

## 2023-12-24 RX ADMIN — HYDRALAZINE HYDROCHLORIDE 25 MG: 25 TABLET, FILM COATED ORAL at 14:33

## 2023-12-24 RX ADMIN — SODIUM CHLORIDE: 9 INJECTION, SOLUTION INTRAVENOUS at 12:06

## 2023-12-24 RX ADMIN — Medication 400 MG: at 21:54

## 2023-12-24 RX ADMIN — SODIUM CHLORIDE, PRESERVATIVE FREE 10 ML: 5 INJECTION INTRAVENOUS at 10:38

## 2023-12-24 RX ADMIN — CARVEDILOL 25 MG: 25 TABLET, FILM COATED ORAL at 17:41

## 2023-12-24 RX ADMIN — PRAVASTATIN SODIUM 40 MG: 40 TABLET ORAL at 10:38

## 2023-12-24 RX ADMIN — Medication 2 PUFF: at 19:29

## 2023-12-24 RX ADMIN — HYDRALAZINE HYDROCHLORIDE 25 MG: 25 TABLET, FILM COATED ORAL at 21:54

## 2023-12-24 RX ADMIN — Medication 2 PUFF: at 08:33

## 2023-12-24 RX ADMIN — DOCUSATE SODIUM 100 MG: 100 CAPSULE, LIQUID FILLED ORAL at 10:38

## 2023-12-24 RX ADMIN — ACETAMINOPHEN 650 MG: 325 TABLET ORAL at 10:38

## 2023-12-24 RX ADMIN — FUROSEMIDE 40 MG: 10 INJECTION, SOLUTION INTRAMUSCULAR; INTRAVENOUS at 14:34

## 2023-12-24 ASSESSMENT — PAIN SCALES - GENERAL: PAINLEVEL_OUTOF10: 7

## 2023-12-24 NOTE — PLAN OF CARE
Problem: Discharge Planning  Goal: Discharge to home or other facility with appropriate resources  Outcome: Progressing     Problem: Safety - Adult  Goal: Free from fall injury  Outcome: Progressing     Problem: ABCDS Injury Assessment  Goal: Absence of physical injury  Outcome: Progressing     Problem: Skin/Tissue Integrity  Goal: Absence of new skin breakdown  Outcome: Progressing     Problem: Chronic Conditions and Co-morbidities  Goal: Patient's chronic conditions and co-morbidity symptoms are monitored and maintained or improved  Outcome: Progressing     Problem: Pain  Goal: Verbalizes/displays adequate comfort level or baseline comfort level  Outcome: Progressing     Problem: Nutrition Deficit:  Goal: Optimize nutritional status  Outcome: Progressing

## 2023-12-24 NOTE — PLAN OF CARE
Problem: Safety - Adult  Goal: Free from fall injury  12/24/2023 0407 by Carlotta Duque, RN  Outcome: Progressing  Note: Pt assessed as a fall risk this shift. Remains free from falls and accidental injury at this time. Fall precautions in place, including falling star sign and fall risk band on pt. Floor free from obstacles, and bed is locked and in lowest position. Adequate lighting provided.  Pt encouraged to call before getting OOB for any need.  Bed alarm activated. Will continue to monitor needs during hourly rounding, and reinforce education on use of call light.       Problem: Skin/Tissue Integrity  Goal: Absence of new skin breakdown  Description: 1.  Monitor for areas of redness and/or skin breakdown  2.  Assess vascular access sites hourly  3.  Every 4-6 hours minimum:  Change oxygen saturation probe site  4.  Every 4-6 hours:  If on nasal continuous positive airway pressure, respiratory therapy assess nares and determine need for appliance change or resting period.  12/24/2023 0407 by Carlotta Duque, RN  Outcome: Progressing  Note: Skin assessment complete. Waffle mattress in place. Coccyx reddened. Sensicare applied PRN. Turned and repositioned every two hours.  Area kept free from moisture. Proper nourishment and fluids encouraged, as appropriate. Will continue to monitor for additional needs and changes in skin breakdown.       Problem: Chronic Conditions and Co-morbidities  Goal: Patient's chronic conditions and co-morbidity symptoms are monitored and maintained or improved  12/24/2023 0407 by Carlotta Duque, RN  Outcome: Progressing  Note:   Patient's Chronic Conditions and Co-Morbidity symptoms are monitored and maintained or improved; monitor and assess patient's chronic conditions and comorbid symptoms for stability, deterioration, or improvement; Collaborate with multidisciplinary team to address chronic and comorbid conditions and prevent exacerbation or deterioration.       Problem: Pain  Goal:

## 2023-12-25 PROBLEM — J44.9 OSA AND COPD OVERLAP SYNDROME (HCC): Status: ACTIVE | Noted: 2023-12-25

## 2023-12-25 PROBLEM — G47.33 OSA AND COPD OVERLAP SYNDROME (HCC): Status: ACTIVE | Noted: 2023-12-25

## 2023-12-25 LAB
ANION GAP SERPL CALCULATED.3IONS-SCNC: 7 MMOL/L (ref 9–17)
ARTERIAL PATENCY WRIST A: ABNORMAL
BDY SITE: ABNORMAL
BNP SERPL-MCNC: ABNORMAL PG/ML
BODY TEMPERATURE: 37
BUN SERPL-MCNC: 33 MG/DL (ref 8–23)
CALCIUM SERPL-MCNC: 10.5 MG/DL (ref 8.6–10.4)
CHLORIDE SERPL-SCNC: 104 MMOL/L (ref 98–107)
CO2 SERPL-SCNC: 35 MMOL/L (ref 20–31)
COHGB MFR BLD: 2 % (ref 0–5)
COHGB MFR BLD: 2.1 % (ref 0–5)
CREAT SERPL-MCNC: 1.1 MG/DL (ref 0.5–0.9)
ERYTHROCYTE [DISTWIDTH] IN BLOOD BY AUTOMATED COUNT: 19.6 % (ref 11.5–14.9)
GAS FLOW.O2 O2 DELIVERY SYS: ABNORMAL L/MIN
GAS FLOW.O2 O2 DELIVERY SYS: ABNORMAL L/MIN
GFR SERPL CREATININE-BSD FRML MDRD: 52 ML/MIN/1.73M2
GLUCOSE SERPL-MCNC: 116 MG/DL (ref 70–99)
HCO3 ARTERIAL: 38.6 MMOL/L (ref 22–26)
HCO3 VENOUS: 36.9 MMOL/L (ref 24–30)
HCT VFR BLD AUTO: 27 % (ref 36–46)
HGB BLD-MCNC: 8.3 G/DL (ref 12–16)
MAGNESIUM SERPL-MCNC: 2.1 MG/DL (ref 1.6–2.6)
MCH RBC QN AUTO: 30.4 PG (ref 26–34)
MCHC RBC AUTO-ENTMCNC: 30.8 G/DL (ref 31–37)
MCV RBC AUTO: 98.8 FL (ref 80–100)
METHEMOGLOBIN: 1 % (ref 0–1.9)
METHEMOGLOBIN: 1 % (ref 0–1.9)
O2 SAT, ARTERIAL: 93.7 % (ref 95–98)
O2 SAT, VEN: 75 % (ref 60–85)
PCO2 ARTERIAL: 63.5 MMHG (ref 35–45)
PCO2, VEN: 81.8 MM HG (ref 39–55)
PH ARTERIAL: 7.39 (ref 7.35–7.45)
PH VENOUS: 7.26 (ref 7.32–7.42)
PLATELET # BLD AUTO: 227 K/UL (ref 150–450)
PMV BLD AUTO: 7.5 FL (ref 6–12)
PO2 ARTERIAL: 81.7 MMHG (ref 80–100)
PO2, VEN: 44.8 MM HG (ref 30–50)
POSITIVE BASE EXCESS, ART: 13.7 MMOL/L (ref 0–2)
POSITIVE BASE EXCESS, VEN: 9.9 MMOL/L (ref 0–2)
POTASSIUM SERPL-SCNC: 4.2 MMOL/L (ref 3.7–5.3)
PT. POSITION: ABNORMAL
RBC # BLD AUTO: 2.73 M/UL (ref 4–5.2)
RESPIRATORY RATE: 16
SODIUM SERPL-SCNC: 146 MMOL/L (ref 135–144)
TEXT FOR RESPIRATORY: ABNORMAL
TEXT FOR RESPIRATORY: ABNORMAL
VENTILATION MODE VENT: ABNORMAL
WBC OTHER # BLD: 5.8 K/UL (ref 3.5–11)

## 2023-12-25 PROCEDURE — 6360000002 HC RX W HCPCS: Performed by: INTERNAL MEDICINE

## 2023-12-25 PROCEDURE — 6370000000 HC RX 637 (ALT 250 FOR IP): Performed by: INTERNAL MEDICINE

## 2023-12-25 PROCEDURE — 6370000000 HC RX 637 (ALT 250 FOR IP)

## 2023-12-25 PROCEDURE — 2580000003 HC RX 258: Performed by: INTERNAL MEDICINE

## 2023-12-25 PROCEDURE — 80048 BASIC METABOLIC PNL TOTAL CA: CPT

## 2023-12-25 PROCEDURE — 83880 ASSAY OF NATRIURETIC PEPTIDE: CPT

## 2023-12-25 PROCEDURE — 36600 WITHDRAWAL OF ARTERIAL BLOOD: CPT

## 2023-12-25 PROCEDURE — 82800 BLOOD PH: CPT

## 2023-12-25 PROCEDURE — 93005 ELECTROCARDIOGRAM TRACING: CPT

## 2023-12-25 PROCEDURE — 83735 ASSAY OF MAGNESIUM: CPT

## 2023-12-25 PROCEDURE — P9047 ALBUMIN (HUMAN), 25%, 50ML: HCPCS | Performed by: INTERNAL MEDICINE

## 2023-12-25 PROCEDURE — 85027 COMPLETE CBC AUTOMATED: CPT

## 2023-12-25 PROCEDURE — 82805 BLOOD GASES W/O2 SATURATION: CPT

## 2023-12-25 PROCEDURE — 99233 SBSQ HOSP IP/OBS HIGH 50: CPT | Performed by: INTERNAL MEDICINE

## 2023-12-25 PROCEDURE — 94660 CPAP INITIATION&MGMT: CPT

## 2023-12-25 PROCEDURE — 94640 AIRWAY INHALATION TREATMENT: CPT

## 2023-12-25 PROCEDURE — 36415 COLL VENOUS BLD VENIPUNCTURE: CPT

## 2023-12-25 PROCEDURE — 2060000000 HC ICU INTERMEDIATE R&B

## 2023-12-25 RX ORDER — FUROSEMIDE 10 MG/ML
40 INJECTION INTRAMUSCULAR; INTRAVENOUS 2 TIMES DAILY
Status: DISCONTINUED | OUTPATIENT
Start: 2023-12-25 | End: 2023-12-28

## 2023-12-25 RX ORDER — HEPARIN SODIUM 5000 [USP'U]/ML
5000 INJECTION, SOLUTION INTRAVENOUS; SUBCUTANEOUS EVERY 8 HOURS SCHEDULED
Status: DISCONTINUED | OUTPATIENT
Start: 2023-12-25 | End: 2023-12-31

## 2023-12-25 RX ORDER — CARVEDILOL 12.5 MG/1
12.5 TABLET ORAL 2 TIMES DAILY WITH MEALS
Status: DISCONTINUED | OUTPATIENT
Start: 2023-12-25 | End: 2023-12-26

## 2023-12-25 RX ADMIN — HYDRALAZINE HYDROCHLORIDE 25 MG: 25 TABLET, FILM COATED ORAL at 20:38

## 2023-12-25 RX ADMIN — HEPARIN SODIUM 5000 UNITS: 5000 INJECTION INTRAVENOUS; SUBCUTANEOUS at 15:26

## 2023-12-25 RX ADMIN — SODIUM CHLORIDE: 9 INJECTION, SOLUTION INTRAVENOUS at 08:22

## 2023-12-25 RX ADMIN — FUROSEMIDE 40 MG: 10 INJECTION, SOLUTION INTRAMUSCULAR; INTRAVENOUS at 09:30

## 2023-12-25 RX ADMIN — Medication 2 PUFF: at 20:35

## 2023-12-25 RX ADMIN — Medication 400 MG: at 09:30

## 2023-12-25 RX ADMIN — PRAVASTATIN SODIUM 40 MG: 40 TABLET ORAL at 09:30

## 2023-12-25 RX ADMIN — HEPARIN SODIUM 5000 UNITS: 5000 INJECTION INTRAVENOUS; SUBCUTANEOUS at 23:27

## 2023-12-25 RX ADMIN — ALBUMIN (HUMAN) 25 G: 0.25 INJECTION, SOLUTION INTRAVENOUS at 09:49

## 2023-12-25 RX ADMIN — FUROSEMIDE 40 MG: 10 INJECTION, SOLUTION INTRAMUSCULAR; INTRAVENOUS at 17:48

## 2023-12-25 RX ADMIN — DOCUSATE SODIUM 100 MG: 100 CAPSULE, LIQUID FILLED ORAL at 09:30

## 2023-12-25 RX ADMIN — SODIUM CHLORIDE, PRESERVATIVE FREE 10 ML: 5 INJECTION INTRAVENOUS at 20:50

## 2023-12-25 RX ADMIN — GABAPENTIN 300 MG: 300 CAPSULE ORAL at 20:38

## 2023-12-25 RX ADMIN — HYDRALAZINE HYDROCHLORIDE 25 MG: 25 TABLET, FILM COATED ORAL at 05:34

## 2023-12-25 RX ADMIN — Medication 400 MG: at 20:38

## 2023-12-25 RX ADMIN — HYDRALAZINE HYDROCHLORIDE 25 MG: 25 TABLET, FILM COATED ORAL at 16:05

## 2023-12-25 RX ADMIN — SODIUM CHLORIDE, PRESERVATIVE FREE 10 ML: 5 INJECTION INTRAVENOUS at 09:30

## 2023-12-25 RX ADMIN — SODIUM CHLORIDE, PRESERVATIVE FREE 10 ML: 5 INJECTION INTRAVENOUS at 02:01

## 2023-12-25 NOTE — PLAN OF CARE
Problem: Safety - Adult  Goal: Free from fall injury  12/25/2023 0332 by Carlotta Duque, RN  Outcome: Progressing  Note: Pt assessed as a fall risk this shift. Remains free from falls and accidental injury at this time. Fall precautions in place, including falling star sign and fall risk band on pt. Floor free from obstacles, and bed is locked and in lowest position. Adequate lighting provided.  Pt encouraged to call before getting OOB for any need.  Bed alarm activated. Will continue to monitor needs during hourly rounding, and reinforce education on use of call light.       Problem: Skin/Tissue Integrity  Goal: Absence of new skin breakdown  Description: 1.  Monitor for areas of redness and/or skin breakdown  2.  Assess vascular access sites hourly  3.  Every 4-6 hours minimum:  Change oxygen saturation probe site  4.  Every 4-6 hours:  If on nasal continuous positive airway pressure, respiratory therapy assess nares and determine need for appliance change or resting period.  12/25/2023 0332 by Carlotta Duque, RN  Outcome: Progressing  Note: Skin assessment complete. Waffle mattress in place. Coccyx reddened. Sensicare applied PRN. Turned and repositioned every two hours.  Area kept free from moisture. Proper nourishment and fluids encouraged, as appropriate. Will continue to monitor for additional needs and changes in skin breakdown.       Problem: Chronic Conditions and Co-morbidities  Goal: Patient's chronic conditions and co-morbidity symptoms are monitored and maintained or improved  12/25/2023 0332 by Carlotta Duque, RN  Outcome: Progressing  Note:   Patient's Chronic Conditions and Co-Morbidity symptoms are monitored and maintained or improved; monitor and assess patient's chronic conditions and comorbid symptoms for stability, deterioration, or improvement; Collaborate with multidisciplinary team to address chronic and comorbid conditions and prevent exacerbation or deterioration.       Problem: Pain  Goal:

## 2023-12-26 ENCOUNTER — APPOINTMENT (OUTPATIENT)
Dept: GENERAL RADIOLOGY | Age: 78
DRG: 377 | End: 2023-12-26
Payer: MEDICARE

## 2023-12-26 LAB
ANION GAP SERPL CALCULATED.3IONS-SCNC: 8 MMOL/L (ref 9–17)
ARTERIAL PATENCY WRIST A: ABNORMAL
BASOPHILS # BLD: 0 K/UL (ref 0–0.2)
BASOPHILS NFR BLD: 1 % (ref 0–2)
BDY SITE: ABNORMAL
BUN SERPL-MCNC: 35 MG/DL (ref 8–23)
CALCIUM SERPL-MCNC: 10.5 MG/DL (ref 8.6–10.4)
CHLORIDE SERPL-SCNC: 100 MMOL/L (ref 98–107)
CO2 SERPL-SCNC: 38 MMOL/L (ref 20–31)
COHGB MFR BLD: 2.2 % (ref 0–5)
CREAT SERPL-MCNC: 1 MG/DL (ref 0.5–0.9)
EKG ATRIAL RATE: 71 BPM
EKG P AXIS: 57 DEGREES
EKG P-R INTERVAL: 220 MS
EKG Q-T INTERVAL: 408 MS
EKG QRS DURATION: 134 MS
EKG QTC CALCULATION (BAZETT): 443 MS
EKG R AXIS: 66 DEGREES
EKG T AXIS: -29 DEGREES
EKG VENTRICULAR RATE: 71 BPM
EOSINOPHIL # BLD: 0.1 K/UL (ref 0–0.4)
EOSINOPHILS RELATIVE PERCENT: 2 % (ref 0–4)
ERYTHROCYTE [DISTWIDTH] IN BLOOD BY AUTOMATED COUNT: 19.8 % (ref 11.5–14.9)
GAS FLOW.O2 O2 DELIVERY SYS: ABNORMAL L/MIN
GFR SERPL CREATININE-BSD FRML MDRD: 58 ML/MIN/1.73M2
GLUCOSE SERPL-MCNC: 82 MG/DL (ref 70–99)
HCO3 ARTERIAL: 42.7 MMOL/L (ref 22–26)
HCT VFR BLD AUTO: 23.3 % (ref 36–46)
HCT VFR BLD AUTO: 24.8 % (ref 36–46)
HGB BLD-MCNC: 7.4 G/DL (ref 12–16)
HGB BLD-MCNC: 7.7 G/DL (ref 12–16)
LYMPHOCYTES NFR BLD: 0.7 K/UL (ref 1–4.8)
LYMPHOCYTES RELATIVE PERCENT: 14 % (ref 24–44)
MCH RBC QN AUTO: 30.1 PG (ref 26–34)
MCHC RBC AUTO-ENTMCNC: 31 G/DL (ref 31–37)
MCV RBC AUTO: 97 FL (ref 80–100)
METHEMOGLOBIN: 1.1 % (ref 0–1.9)
MONOCYTES NFR BLD: 0.4 K/UL (ref 0.1–1.3)
MONOCYTES NFR BLD: 8 % (ref 1–7)
NEUTROPHILS NFR BLD: 75 % (ref 36–66)
NEUTS SEG NFR BLD: 4 K/UL (ref 1.3–9.1)
O2 SAT, ARTERIAL: 75.4 % (ref 95–98)
PCO2 ARTERIAL: 59.4 MMHG (ref 35–45)
PH ARTERIAL: 7.46 (ref 7.35–7.45)
PLATELET # BLD AUTO: 214 K/UL (ref 150–450)
PMV BLD AUTO: 7.7 FL (ref 6–12)
PO2 ARTERIAL: 40.3 MMHG (ref 80–100)
POSITIVE BASE EXCESS, ART: 19 MMOL/L (ref 0–2)
POTASSIUM SERPL-SCNC: 3.4 MMOL/L (ref 3.7–5.3)
PT. POSITION: ABNORMAL
RBC # BLD AUTO: 2.55 M/UL (ref 4–5.2)
RESPIRATORY RATE: 16
SODIUM SERPL-SCNC: 146 MMOL/L (ref 135–144)
TEXT FOR RESPIRATORY: ABNORMAL
VENTILATION MODE VENT: ABNORMAL
WBC OTHER # BLD: 5.3 K/UL (ref 3.5–11)

## 2023-12-26 PROCEDURE — 36600 WITHDRAWAL OF ARTERIAL BLOOD: CPT

## 2023-12-26 PROCEDURE — P9047 ALBUMIN (HUMAN), 25%, 50ML: HCPCS | Performed by: INTERNAL MEDICINE

## 2023-12-26 PROCEDURE — 6370000000 HC RX 637 (ALT 250 FOR IP): Performed by: INTERNAL MEDICINE

## 2023-12-26 PROCEDURE — 85018 HEMOGLOBIN: CPT

## 2023-12-26 PROCEDURE — 82805 BLOOD GASES W/O2 SATURATION: CPT

## 2023-12-26 PROCEDURE — 2700000000 HC OXYGEN THERAPY PER DAY

## 2023-12-26 PROCEDURE — 94640 AIRWAY INHALATION TREATMENT: CPT

## 2023-12-26 PROCEDURE — 94660 CPAP INITIATION&MGMT: CPT

## 2023-12-26 PROCEDURE — 99233 SBSQ HOSP IP/OBS HIGH 50: CPT | Performed by: INTERNAL MEDICINE

## 2023-12-26 PROCEDURE — 94761 N-INVAS EAR/PLS OXIMETRY MLT: CPT

## 2023-12-26 PROCEDURE — 2060000000 HC ICU INTERMEDIATE R&B

## 2023-12-26 PROCEDURE — 36415 COLL VENOUS BLD VENIPUNCTURE: CPT

## 2023-12-26 PROCEDURE — 97530 THERAPEUTIC ACTIVITIES: CPT

## 2023-12-26 PROCEDURE — 6370000000 HC RX 637 (ALT 250 FOR IP)

## 2023-12-26 PROCEDURE — 85025 COMPLETE CBC W/AUTO DIFF WBC: CPT

## 2023-12-26 PROCEDURE — 6360000002 HC RX W HCPCS: Performed by: INTERNAL MEDICINE

## 2023-12-26 PROCEDURE — 80048 BASIC METABOLIC PNL TOTAL CA: CPT

## 2023-12-26 PROCEDURE — 2580000003 HC RX 258: Performed by: INTERNAL MEDICINE

## 2023-12-26 PROCEDURE — 71045 X-RAY EXAM CHEST 1 VIEW: CPT

## 2023-12-26 PROCEDURE — 85014 HEMATOCRIT: CPT

## 2023-12-26 RX ORDER — CARVEDILOL 25 MG/1
25 TABLET ORAL 2 TIMES DAILY WITH MEALS
Status: DISCONTINUED | OUTPATIENT
Start: 2023-12-26 | End: 2023-12-29

## 2023-12-26 RX ADMIN — Medication 2 PUFF: at 08:07

## 2023-12-26 RX ADMIN — SODIUM CHLORIDE, PRESERVATIVE FREE 10 ML: 5 INJECTION INTRAVENOUS at 21:19

## 2023-12-26 RX ADMIN — CARVEDILOL 12.5 MG: 12.5 TABLET, FILM COATED ORAL at 08:52

## 2023-12-26 RX ADMIN — SODIUM CHLORIDE, PRESERVATIVE FREE 10 ML: 5 INJECTION INTRAVENOUS at 08:57

## 2023-12-26 RX ADMIN — FUROSEMIDE 40 MG: 10 INJECTION, SOLUTION INTRAMUSCULAR; INTRAVENOUS at 17:56

## 2023-12-26 RX ADMIN — Medication 400 MG: at 08:44

## 2023-12-26 RX ADMIN — HEPARIN SODIUM 5000 UNITS: 5000 INJECTION INTRAVENOUS; SUBCUTANEOUS at 06:08

## 2023-12-26 RX ADMIN — FUROSEMIDE 40 MG: 10 INJECTION, SOLUTION INTRAMUSCULAR; INTRAVENOUS at 08:52

## 2023-12-26 RX ADMIN — HEPARIN SODIUM 5000 UNITS: 5000 INJECTION INTRAVENOUS; SUBCUTANEOUS at 15:25

## 2023-12-26 RX ADMIN — DOCUSATE SODIUM 100 MG: 100 CAPSULE, LIQUID FILLED ORAL at 21:19

## 2023-12-26 RX ADMIN — HYDRALAZINE HYDROCHLORIDE 25 MG: 25 TABLET, FILM COATED ORAL at 15:25

## 2023-12-26 RX ADMIN — PRAVASTATIN SODIUM 40 MG: 40 TABLET ORAL at 08:43

## 2023-12-26 RX ADMIN — CARVEDILOL 25 MG: 25 TABLET, FILM COATED ORAL at 16:51

## 2023-12-26 RX ADMIN — ALBUMIN (HUMAN) 25 G: 0.25 INJECTION, SOLUTION INTRAVENOUS at 08:54

## 2023-12-26 RX ADMIN — Medication 2 PUFF: at 19:58

## 2023-12-26 RX ADMIN — HEPARIN SODIUM 5000 UNITS: 5000 INJECTION INTRAVENOUS; SUBCUTANEOUS at 21:19

## 2023-12-26 RX ADMIN — HYDRALAZINE HYDROCHLORIDE 25 MG: 25 TABLET, FILM COATED ORAL at 21:19

## 2023-12-26 RX ADMIN — DOCUSATE SODIUM 100 MG: 100 CAPSULE, LIQUID FILLED ORAL at 08:44

## 2023-12-26 RX ADMIN — Medication 400 MG: at 21:19

## 2023-12-26 RX ADMIN — HYDRALAZINE HYDROCHLORIDE 25 MG: 25 TABLET, FILM COATED ORAL at 06:08

## 2023-12-26 NOTE — PLAN OF CARE
Problem: Discharge Planning  Goal: Discharge to home or other facility with appropriate resources  12/26/2023 1716 by Sheba Palma RN  Outcome: Progressing  12/26/2023 0553 by Neto Arias RN  Outcome: Progressing     Problem: Safety - Adult  Goal: Free from fall injury  12/26/2023 1716 by Sheba Palma RN  Outcome: Progressing  12/26/2023 0553 by Neto Arias RN  Outcome: Progressing     Problem: ABCDS Injury Assessment  Goal: Absence of physical injury  12/26/2023 1716 by Sheba Palma RN  Outcome: Progressing  12/26/2023 0553 by Neto Arias RN  Outcome: Progressing     Problem: Skin/Tissue Integrity  Goal: Absence of new skin breakdown  Description: 1.  Monitor for areas of redness and/or skin breakdown  2.  Assess vascular access sites hourly  3.  Every 4-6 hours minimum:  Change oxygen saturation probe site  4.  Every 4-6 hours:  If on nasal continuous positive airway pressure, respiratory therapy assess nares and determine need for appliance change or resting period.  12/26/2023 1716 by Shbea Palma RN  Outcome: Progressing  12/26/2023 0553 by Neto Arias, RN  Outcome: Progressing

## 2023-12-27 ENCOUNTER — APPOINTMENT (OUTPATIENT)
Dept: VASCULAR LAB | Age: 78
DRG: 377 | End: 2023-12-27
Attending: INTERNAL MEDICINE
Payer: MEDICARE

## 2023-12-27 LAB
ANION GAP SERPL CALCULATED.3IONS-SCNC: 6 MMOL/L (ref 9–17)
ARTERIAL PATENCY WRIST A: ABNORMAL
BASOPHILS # BLD: 0.06 K/UL (ref 0–0.2)
BASOPHILS NFR BLD: 1 % (ref 0–2)
BDY SITE: ABNORMAL
BODY TEMPERATURE: 37
BUN SERPL-MCNC: 37 MG/DL (ref 8–23)
CALCIUM SERPL-MCNC: 10.4 MG/DL (ref 8.6–10.4)
CHLORIDE SERPL-SCNC: 98 MMOL/L (ref 98–107)
CO2 SERPL-SCNC: 42 MMOL/L (ref 20–31)
COHGB MFR BLD: 2 % (ref 0–5)
CREAT SERPL-MCNC: 1.1 MG/DL (ref 0.5–0.9)
EOSINOPHIL # BLD: 0.18 K/UL (ref 0–0.4)
EOSINOPHILS RELATIVE PERCENT: 3 % (ref 0–4)
ERYTHROCYTE [DISTWIDTH] IN BLOOD BY AUTOMATED COUNT: 19.3 % (ref 11.5–14.9)
GAS FLOW.O2 O2 DELIVERY SYS: ABNORMAL L/MIN
GFR SERPL CREATININE-BSD FRML MDRD: 52 ML/MIN/1.73M2
GLUCOSE SERPL-MCNC: 118 MG/DL (ref 70–99)
HCO3 ARTERIAL: 43.5 MMOL/L (ref 22–26)
HCT VFR BLD AUTO: 22.9 % (ref 36–46)
HCT VFR BLD AUTO: 23.1 % (ref 36–46)
HCT VFR BLD AUTO: 23.4 % (ref 36–46)
HGB BLD-MCNC: 7.3 G/DL (ref 12–16)
HGB BLD-MCNC: 7.4 G/DL (ref 12–16)
HGB BLD-MCNC: 7.5 G/DL (ref 12–16)
LYMPHOCYTES NFR BLD: 0.9 K/UL (ref 1–4.8)
LYMPHOCYTES RELATIVE PERCENT: 15 % (ref 24–44)
MAGNESIUM SERPL-MCNC: 2.2 MG/DL (ref 1.6–2.6)
MCH RBC QN AUTO: 31.1 PG (ref 26–34)
MCHC RBC AUTO-ENTMCNC: 32.5 G/DL (ref 31–37)
MCV RBC AUTO: 95.6 FL (ref 80–100)
METHEMOGLOBIN: 0.9 % (ref 0–1.9)
MONOCYTES NFR BLD: 0.6 K/UL (ref 0.1–1.3)
MONOCYTES NFR BLD: 10 % (ref 1–7)
MORPHOLOGY: ABNORMAL
NEUTROPHILS NFR BLD: 71 % (ref 36–66)
NEUTS SEG NFR BLD: 4.26 K/UL (ref 1.3–9.1)
O2 SAT, ARTERIAL: 91.9 % (ref 95–98)
PCO2 ARTERIAL: 69.9 MMHG (ref 35–45)
PH ARTERIAL: 7.4 (ref 7.35–7.45)
PLATELET # BLD AUTO: 198 K/UL (ref 150–450)
PMV BLD AUTO: 7.3 FL (ref 6–12)
PO2 ARTERIAL: 67.3 MMHG (ref 80–100)
POSITIVE BASE EXCESS, ART: 18.8 MMOL/L (ref 0–2)
POTASSIUM SERPL-SCNC: 3.4 MMOL/L (ref 3.7–5.3)
POTASSIUM SERPL-SCNC: 3.8 MMOL/L (ref 3.7–5.3)
PT. POSITION: ABNORMAL
RBC # BLD AUTO: 2.39 M/UL (ref 4–5.2)
RESPIRATORY RATE: 26
SODIUM SERPL-SCNC: 146 MMOL/L (ref 135–144)
TEXT FOR RESPIRATORY: ABNORMAL
WBC OTHER # BLD: 6 K/UL (ref 3.5–11)

## 2023-12-27 PROCEDURE — 6370000000 HC RX 637 (ALT 250 FOR IP)

## 2023-12-27 PROCEDURE — 6360000002 HC RX W HCPCS: Performed by: INTERNAL MEDICINE

## 2023-12-27 PROCEDURE — 6370000000 HC RX 637 (ALT 250 FOR IP): Performed by: INTERNAL MEDICINE

## 2023-12-27 PROCEDURE — 93971 EXTREMITY STUDY: CPT

## 2023-12-27 PROCEDURE — 82805 BLOOD GASES W/O2 SATURATION: CPT

## 2023-12-27 PROCEDURE — 83735 ASSAY OF MAGNESIUM: CPT

## 2023-12-27 PROCEDURE — 94660 CPAP INITIATION&MGMT: CPT

## 2023-12-27 PROCEDURE — 2060000000 HC ICU INTERMEDIATE R&B

## 2023-12-27 PROCEDURE — 36600 WITHDRAWAL OF ARTERIAL BLOOD: CPT

## 2023-12-27 PROCEDURE — 85025 COMPLETE CBC W/AUTO DIFF WBC: CPT

## 2023-12-27 PROCEDURE — P9047 ALBUMIN (HUMAN), 25%, 50ML: HCPCS | Performed by: INTERNAL MEDICINE

## 2023-12-27 PROCEDURE — 85014 HEMATOCRIT: CPT

## 2023-12-27 PROCEDURE — 85018 HEMOGLOBIN: CPT

## 2023-12-27 PROCEDURE — 80048 BASIC METABOLIC PNL TOTAL CA: CPT

## 2023-12-27 PROCEDURE — 6370000000 HC RX 637 (ALT 250 FOR IP): Performed by: NURSE PRACTITIONER

## 2023-12-27 PROCEDURE — 99233 SBSQ HOSP IP/OBS HIGH 50: CPT | Performed by: INTERNAL MEDICINE

## 2023-12-27 PROCEDURE — 84132 ASSAY OF SERUM POTASSIUM: CPT

## 2023-12-27 PROCEDURE — 2580000003 HC RX 258: Performed by: INTERNAL MEDICINE

## 2023-12-27 PROCEDURE — 94640 AIRWAY INHALATION TREATMENT: CPT

## 2023-12-27 PROCEDURE — 94761 N-INVAS EAR/PLS OXIMETRY MLT: CPT

## 2023-12-27 PROCEDURE — 2700000000 HC OXYGEN THERAPY PER DAY

## 2023-12-27 PROCEDURE — 36415 COLL VENOUS BLD VENIPUNCTURE: CPT

## 2023-12-27 RX ORDER — HYDRALAZINE HYDROCHLORIDE 50 MG/1
50 TABLET, FILM COATED ORAL EVERY 8 HOURS SCHEDULED
Status: DISCONTINUED | OUTPATIENT
Start: 2023-12-27 | End: 2023-12-27

## 2023-12-27 RX ORDER — HYDRALAZINE HYDROCHLORIDE 50 MG/1
50 TABLET, FILM COATED ORAL EVERY 8 HOURS SCHEDULED
Status: DISCONTINUED | OUTPATIENT
Start: 2023-12-27 | End: 2023-12-28

## 2023-12-27 RX ADMIN — FUROSEMIDE 40 MG: 10 INJECTION, SOLUTION INTRAMUSCULAR; INTRAVENOUS at 16:46

## 2023-12-27 RX ADMIN — HEPARIN SODIUM 5000 UNITS: 5000 INJECTION INTRAVENOUS; SUBCUTANEOUS at 15:10

## 2023-12-27 RX ADMIN — DOCUSATE SODIUM 100 MG: 100 CAPSULE, LIQUID FILLED ORAL at 20:25

## 2023-12-27 RX ADMIN — HEPARIN SODIUM 5000 UNITS: 5000 INJECTION INTRAVENOUS; SUBCUTANEOUS at 20:24

## 2023-12-27 RX ADMIN — Medication 400 MG: at 20:25

## 2023-12-27 RX ADMIN — POTASSIUM CHLORIDE 40 MEQ: 1500 TABLET, EXTENDED RELEASE ORAL at 09:10

## 2023-12-27 RX ADMIN — HYDRALAZINE HYDROCHLORIDE 25 MG: 25 TABLET, FILM COATED ORAL at 06:15

## 2023-12-27 RX ADMIN — Medication 2 PUFF: at 08:39

## 2023-12-27 RX ADMIN — ALBUTEROL SULFATE 1 PUFF: 90 AEROSOL, METERED RESPIRATORY (INHALATION) at 08:42

## 2023-12-27 RX ADMIN — ALBUTEROL SULFATE 2.5 MG: 2.5 SOLUTION RESPIRATORY (INHALATION) at 16:35

## 2023-12-27 RX ADMIN — HYDRALAZINE HYDROCHLORIDE 50 MG: 50 TABLET ORAL at 20:25

## 2023-12-27 RX ADMIN — Medication 400 MG: at 09:10

## 2023-12-27 RX ADMIN — CARVEDILOL 25 MG: 25 TABLET, FILM COATED ORAL at 16:46

## 2023-12-27 RX ADMIN — ALBUMIN (HUMAN) 25 G: 0.25 INJECTION, SOLUTION INTRAVENOUS at 09:20

## 2023-12-27 RX ADMIN — ACETAMINOPHEN 650 MG: 325 TABLET ORAL at 01:17

## 2023-12-27 RX ADMIN — SODIUM CHLORIDE, PRESERVATIVE FREE 10 ML: 5 INJECTION INTRAVENOUS at 20:26

## 2023-12-27 RX ADMIN — FUROSEMIDE 40 MG: 10 INJECTION, SOLUTION INTRAMUSCULAR; INTRAVENOUS at 11:20

## 2023-12-27 RX ADMIN — SODIUM CHLORIDE, PRESERVATIVE FREE 10 ML: 5 INJECTION INTRAVENOUS at 09:24

## 2023-12-27 RX ADMIN — POTASSIUM CHLORIDE 40 MEQ: 1500 TABLET, EXTENDED RELEASE ORAL at 01:17

## 2023-12-27 RX ADMIN — PRAVASTATIN SODIUM 40 MG: 40 TABLET ORAL at 09:10

## 2023-12-27 RX ADMIN — CARVEDILOL 25 MG: 25 TABLET, FILM COATED ORAL at 09:10

## 2023-12-27 RX ADMIN — HYDRALAZINE HYDROCHLORIDE 50 MG: 50 TABLET ORAL at 15:10

## 2023-12-27 RX ADMIN — HEPARIN SODIUM 5000 UNITS: 5000 INJECTION INTRAVENOUS; SUBCUTANEOUS at 06:15

## 2023-12-27 RX ADMIN — Medication 2 PUFF: at 21:33

## 2023-12-27 RX ADMIN — DOCUSATE SODIUM 100 MG: 100 CAPSULE, LIQUID FILLED ORAL at 09:10

## 2023-12-27 ASSESSMENT — PAIN DESCRIPTION - LOCATION: LOCATION: HEAD

## 2023-12-27 ASSESSMENT — PAIN SCALES - GENERAL
PAINLEVEL_OUTOF10: 0
PAINLEVEL_OUTOF10: 4

## 2023-12-27 ASSESSMENT — PAIN DESCRIPTION - ORIENTATION: ORIENTATION: ANTERIOR

## 2023-12-27 ASSESSMENT — PAIN DESCRIPTION - DESCRIPTORS: DESCRIPTORS: THROBBING;TIGHTNESS

## 2023-12-28 LAB
ANION GAP SERPL CALCULATED.3IONS-SCNC: 5 MMOL/L (ref 9–17)
BUN SERPL-MCNC: 35 MG/DL (ref 8–23)
CALCIUM SERPL-MCNC: 10.5 MG/DL (ref 8.6–10.4)
CHLORIDE SERPL-SCNC: 100 MMOL/L (ref 98–107)
CO2 SERPL-SCNC: 42 MMOL/L (ref 20–31)
CREAT SERPL-MCNC: 1.2 MG/DL (ref 0.5–0.9)
ECHO BSA: 2.31 M2
GFR SERPL CREATININE-BSD FRML MDRD: 47 ML/MIN/1.73M2
GLUCOSE SERPL-MCNC: 107 MG/DL (ref 70–99)
HCT VFR BLD AUTO: 23.3 % (ref 36–46)
HCT VFR BLD AUTO: 23.9 % (ref 36–46)
HCT VFR BLD AUTO: 24 % (ref 36–46)
HGB BLD-MCNC: 7.4 G/DL (ref 12–16)
HGB BLD-MCNC: 7.6 G/DL (ref 12–16)
HGB BLD-MCNC: 7.7 G/DL (ref 12–16)
POTASSIUM SERPL-SCNC: 3.6 MMOL/L (ref 3.7–5.3)
SODIUM SERPL-SCNC: 147 MMOL/L (ref 135–144)

## 2023-12-28 PROCEDURE — P9047 ALBUMIN (HUMAN), 25%, 50ML: HCPCS | Performed by: INTERNAL MEDICINE

## 2023-12-28 PROCEDURE — 6370000000 HC RX 637 (ALT 250 FOR IP): Performed by: INTERNAL MEDICINE

## 2023-12-28 PROCEDURE — 2580000003 HC RX 258: Performed by: INTERNAL MEDICINE

## 2023-12-28 PROCEDURE — 97110 THERAPEUTIC EXERCISES: CPT

## 2023-12-28 PROCEDURE — 6370000000 HC RX 637 (ALT 250 FOR IP)

## 2023-12-28 PROCEDURE — 36415 COLL VENOUS BLD VENIPUNCTURE: CPT

## 2023-12-28 PROCEDURE — 85014 HEMATOCRIT: CPT

## 2023-12-28 PROCEDURE — 6360000002 HC RX W HCPCS: Performed by: INTERNAL MEDICINE

## 2023-12-28 PROCEDURE — 6360000002 HC RX W HCPCS: Performed by: NURSE PRACTITIONER

## 2023-12-28 PROCEDURE — 2060000000 HC ICU INTERMEDIATE R&B

## 2023-12-28 PROCEDURE — 97530 THERAPEUTIC ACTIVITIES: CPT

## 2023-12-28 PROCEDURE — 94761 N-INVAS EAR/PLS OXIMETRY MLT: CPT

## 2023-12-28 PROCEDURE — 94640 AIRWAY INHALATION TREATMENT: CPT

## 2023-12-28 PROCEDURE — 99233 SBSQ HOSP IP/OBS HIGH 50: CPT | Performed by: INTERNAL MEDICINE

## 2023-12-28 PROCEDURE — 2700000000 HC OXYGEN THERAPY PER DAY

## 2023-12-28 PROCEDURE — 93971 EXTREMITY STUDY: CPT | Performed by: SURGERY

## 2023-12-28 PROCEDURE — 85018 HEMOGLOBIN: CPT

## 2023-12-28 PROCEDURE — 97116 GAIT TRAINING THERAPY: CPT

## 2023-12-28 PROCEDURE — 80048 BASIC METABOLIC PNL TOTAL CA: CPT

## 2023-12-28 RX ORDER — FUROSEMIDE 10 MG/ML
40 INJECTION INTRAMUSCULAR; INTRAVENOUS DAILY
Status: DISCONTINUED | OUTPATIENT
Start: 2023-12-29 | End: 2023-12-30

## 2023-12-28 RX ORDER — DEXTROSE MONOHYDRATE 50 MG/ML
INJECTION, SOLUTION INTRAVENOUS CONTINUOUS
Status: DISCONTINUED | OUTPATIENT
Start: 2023-12-28 | End: 2023-12-28

## 2023-12-28 RX ORDER — HYDRALAZINE HYDROCHLORIDE 20 MG/ML
20 INJECTION INTRAMUSCULAR; INTRAVENOUS EVERY 6 HOURS PRN
Status: DISCONTINUED | OUTPATIENT
Start: 2023-12-28 | End: 2024-01-01 | Stop reason: HOSPADM

## 2023-12-28 RX ORDER — HYDRALAZINE HYDROCHLORIDE 50 MG/1
100 TABLET, FILM COATED ORAL EVERY 8 HOURS SCHEDULED
Status: DISCONTINUED | OUTPATIENT
Start: 2023-12-28 | End: 2024-01-01 | Stop reason: HOSPADM

## 2023-12-28 RX ORDER — ALBUMIN (HUMAN) 12.5 G/50ML
25 SOLUTION INTRAVENOUS 2 TIMES DAILY
Status: DISCONTINUED | OUTPATIENT
Start: 2023-12-28 | End: 2023-12-30

## 2023-12-28 RX ADMIN — PRAVASTATIN SODIUM 40 MG: 40 TABLET ORAL at 07:40

## 2023-12-28 RX ADMIN — CARVEDILOL 25 MG: 25 TABLET, FILM COATED ORAL at 07:40

## 2023-12-28 RX ADMIN — Medication 400 MG: at 07:40

## 2023-12-28 RX ADMIN — CARVEDILOL 25 MG: 25 TABLET, FILM COATED ORAL at 16:36

## 2023-12-28 RX ADMIN — ALBUMIN (HUMAN) 25 G: 0.25 INJECTION, SOLUTION INTRAVENOUS at 08:00

## 2023-12-28 RX ADMIN — GABAPENTIN 300 MG: 300 CAPSULE ORAL at 07:40

## 2023-12-28 RX ADMIN — HEPARIN SODIUM 5000 UNITS: 5000 INJECTION INTRAVENOUS; SUBCUTANEOUS at 20:32

## 2023-12-28 RX ADMIN — SODIUM CHLORIDE, PRESERVATIVE FREE 10 ML: 5 INJECTION INTRAVENOUS at 14:08

## 2023-12-28 RX ADMIN — HEPARIN SODIUM 5000 UNITS: 5000 INJECTION INTRAVENOUS; SUBCUTANEOUS at 05:55

## 2023-12-28 RX ADMIN — HYDRALAZINE HYDROCHLORIDE 50 MG: 50 TABLET ORAL at 05:55

## 2023-12-28 RX ADMIN — FUROSEMIDE 40 MG: 10 INJECTION, SOLUTION INTRAMUSCULAR; INTRAVENOUS at 07:40

## 2023-12-28 RX ADMIN — DOCUSATE SODIUM 100 MG: 100 CAPSULE, LIQUID FILLED ORAL at 07:40

## 2023-12-28 RX ADMIN — Medication 400 MG: at 20:32

## 2023-12-28 RX ADMIN — DOCUSATE SODIUM 100 MG: 100 CAPSULE, LIQUID FILLED ORAL at 20:32

## 2023-12-28 RX ADMIN — Medication 2 PUFF: at 08:07

## 2023-12-28 RX ADMIN — ALBUMIN (HUMAN) 25 G: 0.25 INJECTION, SOLUTION INTRAVENOUS at 16:36

## 2023-12-28 RX ADMIN — HYDRALAZINE HYDROCHLORIDE 100 MG: 50 TABLET ORAL at 14:13

## 2023-12-28 RX ADMIN — HEPARIN SODIUM 5000 UNITS: 5000 INJECTION INTRAVENOUS; SUBCUTANEOUS at 14:13

## 2023-12-28 RX ADMIN — HYDRALAZINE HYDROCHLORIDE 20 MG: 20 INJECTION INTRAMUSCULAR; INTRAVENOUS at 03:13

## 2023-12-28 RX ADMIN — HYDRALAZINE HYDROCHLORIDE 100 MG: 50 TABLET ORAL at 20:32

## 2023-12-28 ASSESSMENT — PAIN SCALES - GENERAL
PAINLEVEL_OUTOF10: 0
PAINLEVEL_OUTOF10: 0

## 2023-12-28 NOTE — PLAN OF CARE
Patient using bipap at night.  Transferring to PCU today.  Able to wean oxygen to 3l NC during the day.  Working with pt/ot , planning to transfer to SNF for rehab soon

## 2023-12-28 NOTE — PLAN OF CARE
Problem: Discharge Planning  Goal: Discharge to home or other facility with appropriate resources  Outcome: Progressing  Flowsheets (Taken 12/27/2023 2000)  Discharge to home or other facility with appropriate resources:   Identify barriers to discharge with patient and caregiver   Arrange for needed discharge resources and transportation as appropriate   Identify discharge learning needs (meds, wound care, etc)   Arrange for interpreters to assist at discharge as needed   Refer to discharge planning if patient needs post-hospital services based on physician order or complex needs related to functional status, cognitive ability or social support system     Problem: Safety - Adult  Goal: Free from fall injury  Outcome: Progressing  Flowsheets (Taken 12/27/2023 2326)  Free From Fall Injury: Instruct family/caregiver on patient safety     Problem: ABCDS Injury Assessment  Goal: Absence of physical injury  Outcome: Progressing  Flowsheets (Taken 12/27/2023 2326)  Absence of Physical Injury: Implement safety measures based on patient assessment     Problem: Skin/Tissue Integrity  Goal: Absence of new skin breakdown  Description: 1.  Monitor for areas of redness and/or skin breakdown  Outcome: Progressing     Problem: Chronic Conditions and Co-morbidities  Goal: Patient's chronic conditions and co-morbidity symptoms are monitored and maintained or improved  Outcome: Progressing  Flowsheets (Taken 12/27/2023 2000)  Care Plan - Patient's Chronic Conditions and Co-Morbidity Symptoms are Monitored and Maintained or Improved:   Monitor and assess patient's chronic conditions and comorbid symptoms for stability, deterioration, or improvement   Collaborate with multidisciplinary team to address chronic and comorbid conditions and prevent exacerbation or deterioration   Update acute care plan with appropriate goals if chronic or comorbid symptoms are exacerbated and prevent overall improvement and discharge     Problem:

## 2023-12-29 ENCOUNTER — APPOINTMENT (OUTPATIENT)
Dept: GENERAL RADIOLOGY | Age: 78
DRG: 377 | End: 2023-12-29
Payer: MEDICARE

## 2023-12-29 LAB
ANION GAP SERPL CALCULATED.3IONS-SCNC: 6 MMOL/L (ref 9–17)
BASOPHILS # BLD: 0.06 K/UL (ref 0–0.2)
BASOPHILS NFR BLD: 1 % (ref 0–2)
BUN SERPL-MCNC: 41 MG/DL (ref 8–23)
CALCIUM SERPL-MCNC: 10.8 MG/DL (ref 8.6–10.4)
CHLORIDE SERPL-SCNC: 99 MMOL/L (ref 98–107)
CO2 SERPL-SCNC: 43 MMOL/L (ref 20–31)
CREAT SERPL-MCNC: 1.2 MG/DL (ref 0.5–0.9)
EOSINOPHIL # BLD: 0.12 K/UL (ref 0–0.4)
EOSINOPHILS RELATIVE PERCENT: 2 % (ref 0–4)
ERYTHROCYTE [DISTWIDTH] IN BLOOD BY AUTOMATED COUNT: 19.6 % (ref 11.5–14.9)
GFR SERPL CREATININE-BSD FRML MDRD: 47 ML/MIN/1.73M2
GLUCOSE SERPL-MCNC: 118 MG/DL (ref 70–99)
HCT VFR BLD AUTO: 23.2 % (ref 36–46)
HCT VFR BLD AUTO: 23.5 % (ref 36–46)
HGB BLD-MCNC: 7.4 G/DL (ref 12–16)
HGB BLD-MCNC: 7.4 G/DL (ref 12–16)
LYMPHOCYTES NFR BLD: 0.72 K/UL (ref 1–4.8)
LYMPHOCYTES RELATIVE PERCENT: 12 % (ref 24–44)
MCH RBC QN AUTO: 30.6 PG (ref 26–34)
MCHC RBC AUTO-ENTMCNC: 31.8 G/DL (ref 31–37)
MCV RBC AUTO: 96.2 FL (ref 80–100)
MONOCYTES NFR BLD: 0.48 K/UL (ref 0.1–1.3)
MONOCYTES NFR BLD: 8 % (ref 1–7)
MORPHOLOGY: ABNORMAL
NEUTROPHILS NFR BLD: 77 % (ref 36–66)
NEUTS SEG NFR BLD: 4.62 K/UL (ref 1.3–9.1)
PLATELET # BLD AUTO: 210 K/UL (ref 150–450)
PMV BLD AUTO: 7.5 FL (ref 6–12)
POTASSIUM SERPL-SCNC: 3.6 MMOL/L (ref 3.7–5.3)
RBC # BLD AUTO: 2.41 M/UL (ref 4–5.2)
SODIUM SERPL-SCNC: 148 MMOL/L (ref 135–144)
WBC OTHER # BLD: 6 K/UL (ref 3.5–11)

## 2023-12-29 PROCEDURE — 94640 AIRWAY INHALATION TREATMENT: CPT

## 2023-12-29 PROCEDURE — 6360000002 HC RX W HCPCS: Performed by: INTERNAL MEDICINE

## 2023-12-29 PROCEDURE — 2060000000 HC ICU INTERMEDIATE R&B

## 2023-12-29 PROCEDURE — 36415 COLL VENOUS BLD VENIPUNCTURE: CPT

## 2023-12-29 PROCEDURE — 85014 HEMATOCRIT: CPT

## 2023-12-29 PROCEDURE — 80048 BASIC METABOLIC PNL TOTAL CA: CPT

## 2023-12-29 PROCEDURE — 71045 X-RAY EXAM CHEST 1 VIEW: CPT

## 2023-12-29 PROCEDURE — 6370000000 HC RX 637 (ALT 250 FOR IP)

## 2023-12-29 PROCEDURE — 6370000000 HC RX 637 (ALT 250 FOR IP): Performed by: INTERNAL MEDICINE

## 2023-12-29 PROCEDURE — 99222 1ST HOSP IP/OBS MODERATE 55: CPT | Performed by: INTERNAL MEDICINE

## 2023-12-29 PROCEDURE — P9047 ALBUMIN (HUMAN), 25%, 50ML: HCPCS | Performed by: INTERNAL MEDICINE

## 2023-12-29 PROCEDURE — 2700000000 HC OXYGEN THERAPY PER DAY

## 2023-12-29 PROCEDURE — 85018 HEMOGLOBIN: CPT

## 2023-12-29 PROCEDURE — 97116 GAIT TRAINING THERAPY: CPT

## 2023-12-29 PROCEDURE — 97530 THERAPEUTIC ACTIVITIES: CPT

## 2023-12-29 PROCEDURE — 94761 N-INVAS EAR/PLS OXIMETRY MLT: CPT

## 2023-12-29 PROCEDURE — 97110 THERAPEUTIC EXERCISES: CPT

## 2023-12-29 PROCEDURE — 85025 COMPLETE CBC W/AUTO DIFF WBC: CPT

## 2023-12-29 PROCEDURE — 2580000003 HC RX 258: Performed by: INTERNAL MEDICINE

## 2023-12-29 RX ORDER — CARVEDILOL 6.25 MG/1
6.25 TABLET ORAL 2 TIMES DAILY WITH MEALS
Status: DISCONTINUED | OUTPATIENT
Start: 2023-12-30 | End: 2024-01-01 | Stop reason: HOSPADM

## 2023-12-29 RX ORDER — MENTHOL 1.4 %
ADHESIVE PATCH, MEDICATED TOPICAL 3 TIMES DAILY PRN
Status: DISCONTINUED | OUTPATIENT
Start: 2023-12-29 | End: 2024-01-01 | Stop reason: HOSPADM

## 2023-12-29 RX ORDER — PANTOPRAZOLE SODIUM 40 MG/1
40 TABLET, DELAYED RELEASE ORAL
Status: DISCONTINUED | OUTPATIENT
Start: 2023-12-29 | End: 2024-01-01 | Stop reason: HOSPADM

## 2023-12-29 RX ADMIN — ACETAMINOPHEN 650 MG: 325 TABLET ORAL at 16:16

## 2023-12-29 RX ADMIN — Medication 400 MG: at 10:11

## 2023-12-29 RX ADMIN — FUROSEMIDE 40 MG: 10 INJECTION, SOLUTION INTRAMUSCULAR; INTRAVENOUS at 10:11

## 2023-12-29 RX ADMIN — GABAPENTIN 300 MG: 300 CAPSULE ORAL at 10:11

## 2023-12-29 RX ADMIN — PANTOPRAZOLE SODIUM 40 MG: 40 TABLET, DELAYED RELEASE ORAL at 16:16

## 2023-12-29 RX ADMIN — PRAVASTATIN SODIUM 40 MG: 40 TABLET ORAL at 10:11

## 2023-12-29 RX ADMIN — CARVEDILOL 25 MG: 25 TABLET, FILM COATED ORAL at 16:16

## 2023-12-29 RX ADMIN — HYDRALAZINE HYDROCHLORIDE 100 MG: 50 TABLET ORAL at 13:38

## 2023-12-29 RX ADMIN — HEPARIN SODIUM 5000 UNITS: 5000 INJECTION INTRAVENOUS; SUBCUTANEOUS at 13:38

## 2023-12-29 RX ADMIN — HYDRALAZINE HYDROCHLORIDE 100 MG: 50 TABLET ORAL at 04:59

## 2023-12-29 RX ADMIN — Medication 400 MG: at 20:53

## 2023-12-29 RX ADMIN — ALBUMIN (HUMAN) 25 G: 0.25 INJECTION, SOLUTION INTRAVENOUS at 16:23

## 2023-12-29 RX ADMIN — Medication 2 PUFF: at 08:00

## 2023-12-29 RX ADMIN — MENTHOL, UNSPECIFIED FORM AND METHYL SALICYLATE: 10; 150 CREAM TOPICAL at 16:23

## 2023-12-29 RX ADMIN — MENTHOL, UNSPECIFIED FORM AND METHYL SALICYLATE: 10; 150 CREAM TOPICAL at 20:52

## 2023-12-29 RX ADMIN — HEPARIN SODIUM 5000 UNITS: 5000 INJECTION INTRAVENOUS; SUBCUTANEOUS at 04:59

## 2023-12-29 RX ADMIN — HEPARIN SODIUM 5000 UNITS: 5000 INJECTION INTRAVENOUS; SUBCUTANEOUS at 21:04

## 2023-12-29 RX ADMIN — ASPIRIN 81 MG: 81 TABLET, COATED ORAL at 11:40

## 2023-12-29 RX ADMIN — CARVEDILOL 25 MG: 25 TABLET, FILM COATED ORAL at 10:11

## 2023-12-29 RX ADMIN — ALBUMIN (HUMAN) 25 G: 0.25 INJECTION, SOLUTION INTRAVENOUS at 11:40

## 2023-12-29 RX ADMIN — SODIUM CHLORIDE, PRESERVATIVE FREE 10 ML: 5 INJECTION INTRAVENOUS at 21:12

## 2023-12-29 RX ADMIN — HYDRALAZINE HYDROCHLORIDE 100 MG: 50 TABLET ORAL at 20:53

## 2023-12-29 ASSESSMENT — PAIN DESCRIPTION - ORIENTATION: ORIENTATION: RIGHT;LEFT

## 2023-12-29 ASSESSMENT — PAIN SCALES - GENERAL
PAINLEVEL_OUTOF10: 10
PAINLEVEL_OUTOF10: 5

## 2023-12-29 ASSESSMENT — PAIN DESCRIPTION - LOCATION: LOCATION: LEG

## 2023-12-29 NOTE — PLAN OF CARE
Problem: Safety - Adult  Goal: Free from fall injury  12/29/2023 0416 by Ebenezer Daniel, RN  Outcome: Progressing  Note: No falls noted this shift, bed in lowest position, call light within reach, side rails up. Continue to monitor closely.      Problem: Skin/Tissue Integrity  Goal: Absence of new skin breakdown  Description: 1.  Monitor for areas of redness and/or skin breakdown  2.  Assess vascular access sites hourly  3.  Every 4-6 hours minimum:  Change oxygen saturation probe site  4.  Every 4-6 hours:  If on nasal continuous positive airway pressure, respiratory therapy assess nares and determine need for appliance change or resting period.  12/29/2023 0416 by Ebenezer Daniel, RN  Outcome: Progressing  Note: Skin assessment as charted, assist patient with Q2 and PRN turning.

## 2023-12-29 NOTE — PLAN OF CARE
Problem: Discharge Planning  Goal: Discharge to home or other facility with appropriate resources  Outcome: Progressing     Problem: Safety - Adult  Goal: Free from fall injury  12/29/2023 1648 by Elsa Santiago, RN  Outcome: Progressing    Problem: ABCDS Injury Assessment  Goal: Absence of physical injury  Outcome: Progressing     Problem: Skin/Tissue Integrity  Goal: Absence of new skin breakdown  Description: 1.  Monitor for areas of redness and/or skin breakdown  2.  Assess vascular access sites hourly  3.  Every 4-6 hours minimum:  Change oxygen saturation probe site  4.  Every 4-6 hours:  If on nasal continuous positive airway pressure, respiratory therapy assess nares and determine need for appliance change or resting period.  12/29/2023 1648 by Elsa Santiago, RN  Outcome: Progressing    Problem: Pain  Goal: Verbalizes/displays adequate comfort level or baseline comfort level  Outcome: Progressing     Problem: Nutrition Deficit:  Goal: Optimize nutritional status  Outcome: Progressing

## 2023-12-30 LAB
ANION GAP SERPL CALCULATED.3IONS-SCNC: 6 MMOL/L (ref 9–17)
BASOPHILS # BLD: 0.07 K/UL (ref 0–0.2)
BASOPHILS NFR BLD: 1 % (ref 0–2)
BUN SERPL-MCNC: 47 MG/DL (ref 8–23)
CALCIUM SERPL-MCNC: 11.4 MG/DL (ref 8.6–10.4)
CHLORIDE SERPL-SCNC: 97 MMOL/L (ref 98–107)
CO2 SERPL-SCNC: 42 MMOL/L (ref 20–31)
CREAT SERPL-MCNC: 1.4 MG/DL (ref 0.5–0.9)
EOSINOPHIL # BLD: 0.13 K/UL (ref 0–0.4)
EOSINOPHILS RELATIVE PERCENT: 2 % (ref 0–4)
ERYTHROCYTE [DISTWIDTH] IN BLOOD BY AUTOMATED COUNT: 20.8 % (ref 11.5–14.9)
GFR SERPL CREATININE-BSD FRML MDRD: 39 ML/MIN/1.73M2
GLUCOSE SERPL-MCNC: 94 MG/DL (ref 70–99)
HCT VFR BLD AUTO: 22.6 % (ref 36–46)
HCT VFR BLD AUTO: 26.6 % (ref 36–46)
HGB BLD-MCNC: 7 G/DL (ref 12–16)
HGB BLD-MCNC: 8.4 G/DL (ref 12–16)
LYMPHOCYTES NFR BLD: 1.07 K/UL (ref 1–4.8)
LYMPHOCYTES RELATIVE PERCENT: 16 % (ref 24–44)
MAGNESIUM SERPL-MCNC: 2.5 MG/DL (ref 1.6–2.6)
MCH RBC QN AUTO: 31 PG (ref 26–34)
MCHC RBC AUTO-ENTMCNC: 31 G/DL (ref 31–37)
MCV RBC AUTO: 100 FL (ref 80–100)
MONOCYTES NFR BLD: 0.6 K/UL (ref 0.1–1.3)
MONOCYTES NFR BLD: 9 % (ref 1–7)
MORPHOLOGY: ABNORMAL
NEUTROPHILS NFR BLD: 72 % (ref 36–66)
NEUTS SEG NFR BLD: 4.83 K/UL (ref 1.3–9.1)
PLATELET # BLD AUTO: 216 K/UL (ref 150–450)
PMV BLD AUTO: 7.9 FL (ref 6–12)
POTASSIUM SERPL-SCNC: 3.5 MMOL/L (ref 3.7–5.3)
RBC # BLD AUTO: 2.26 M/UL (ref 4–5.2)
SODIUM SERPL-SCNC: 145 MMOL/L (ref 135–144)
WBC OTHER # BLD: 6.7 K/UL (ref 3.5–11)

## 2023-12-30 PROCEDURE — 6370000000 HC RX 637 (ALT 250 FOR IP): Performed by: INTERNAL MEDICINE

## 2023-12-30 PROCEDURE — 94660 CPAP INITIATION&MGMT: CPT

## 2023-12-30 PROCEDURE — 2060000000 HC ICU INTERMEDIATE R&B

## 2023-12-30 PROCEDURE — 83735 ASSAY OF MAGNESIUM: CPT

## 2023-12-30 PROCEDURE — 86920 COMPATIBILITY TEST SPIN: CPT

## 2023-12-30 PROCEDURE — 99232 SBSQ HOSP IP/OBS MODERATE 35: CPT | Performed by: INTERNAL MEDICINE

## 2023-12-30 PROCEDURE — P9047 ALBUMIN (HUMAN), 25%, 50ML: HCPCS | Performed by: INTERNAL MEDICINE

## 2023-12-30 PROCEDURE — 85018 HEMOGLOBIN: CPT

## 2023-12-30 PROCEDURE — 97110 THERAPEUTIC EXERCISES: CPT

## 2023-12-30 PROCEDURE — 36415 COLL VENOUS BLD VENIPUNCTURE: CPT

## 2023-12-30 PROCEDURE — 36430 TRANSFUSION BLD/BLD COMPNT: CPT

## 2023-12-30 PROCEDURE — 80048 BASIC METABOLIC PNL TOTAL CA: CPT

## 2023-12-30 PROCEDURE — 6370000000 HC RX 637 (ALT 250 FOR IP): Performed by: NURSE PRACTITIONER

## 2023-12-30 PROCEDURE — 86900 BLOOD TYPING SEROLOGIC ABO: CPT

## 2023-12-30 PROCEDURE — P9016 RBC LEUKOCYTES REDUCED: HCPCS

## 2023-12-30 PROCEDURE — 6360000002 HC RX W HCPCS: Performed by: INTERNAL MEDICINE

## 2023-12-30 PROCEDURE — 85014 HEMATOCRIT: CPT

## 2023-12-30 PROCEDURE — 86850 RBC ANTIBODY SCREEN: CPT

## 2023-12-30 PROCEDURE — 2700000000 HC OXYGEN THERAPY PER DAY

## 2023-12-30 PROCEDURE — 94761 N-INVAS EAR/PLS OXIMETRY MLT: CPT

## 2023-12-30 PROCEDURE — 2580000003 HC RX 258: Performed by: INTERNAL MEDICINE

## 2023-12-30 PROCEDURE — 94640 AIRWAY INHALATION TREATMENT: CPT

## 2023-12-30 PROCEDURE — 85025 COMPLETE CBC W/AUTO DIFF WBC: CPT

## 2023-12-30 PROCEDURE — 86901 BLOOD TYPING SEROLOGIC RH(D): CPT

## 2023-12-30 RX ORDER — ALBUMIN (HUMAN) 12.5 G/50ML
25 SOLUTION INTRAVENOUS 3 TIMES DAILY
Status: DISCONTINUED | OUTPATIENT
Start: 2023-12-30 | End: 2023-12-31

## 2023-12-30 RX ORDER — FUROSEMIDE 10 MG/ML
40 INJECTION INTRAMUSCULAR; INTRAVENOUS 2 TIMES DAILY
Status: DISCONTINUED | OUTPATIENT
Start: 2023-12-30 | End: 2023-12-31

## 2023-12-30 RX ORDER — SODIUM CHLORIDE 9 MG/ML
INJECTION, SOLUTION INTRAVENOUS PRN
Status: DISCONTINUED | OUTPATIENT
Start: 2023-12-30 | End: 2024-01-01 | Stop reason: HOSPADM

## 2023-12-30 RX ORDER — ALBUMIN (HUMAN) 12.5 G/50ML
25 SOLUTION INTRAVENOUS EVERY 8 HOURS
Status: DISCONTINUED | OUTPATIENT
Start: 2023-12-30 | End: 2023-12-30

## 2023-12-30 RX ADMIN — FUROSEMIDE 40 MG: 10 INJECTION, SOLUTION INTRAMUSCULAR; INTRAVENOUS at 08:40

## 2023-12-30 RX ADMIN — HYDRALAZINE HYDROCHLORIDE 100 MG: 50 TABLET ORAL at 05:07

## 2023-12-30 RX ADMIN — HEPARIN SODIUM 5000 UNITS: 5000 INJECTION INTRAVENOUS; SUBCUTANEOUS at 05:07

## 2023-12-30 RX ADMIN — ALBUMIN (HUMAN) 25 G: 0.25 INJECTION, SOLUTION INTRAVENOUS at 17:20

## 2023-12-30 RX ADMIN — CARVEDILOL 6.25 MG: 6.25 TABLET, FILM COATED ORAL at 17:16

## 2023-12-30 RX ADMIN — PRAVASTATIN SODIUM 40 MG: 40 TABLET ORAL at 08:40

## 2023-12-30 RX ADMIN — ALBUMIN (HUMAN) 25 G: 0.25 INJECTION, SOLUTION INTRAVENOUS at 08:49

## 2023-12-30 RX ADMIN — PANTOPRAZOLE SODIUM 40 MG: 40 TABLET, DELAYED RELEASE ORAL at 17:16

## 2023-12-30 RX ADMIN — SODIUM CHLORIDE, PRESERVATIVE FREE 10 ML: 5 INJECTION INTRAVENOUS at 08:44

## 2023-12-30 RX ADMIN — ASPIRIN 81 MG: 81 TABLET, COATED ORAL at 08:42

## 2023-12-30 RX ADMIN — Medication 2 PUFF: at 08:57

## 2023-12-30 RX ADMIN — PANTOPRAZOLE SODIUM 40 MG: 40 TABLET, DELAYED RELEASE ORAL at 05:07

## 2023-12-30 RX ADMIN — HYDRALAZINE HYDROCHLORIDE 100 MG: 50 TABLET ORAL at 14:47

## 2023-12-30 RX ADMIN — CARVEDILOL 6.25 MG: 6.25 TABLET, FILM COATED ORAL at 08:41

## 2023-12-30 RX ADMIN — POTASSIUM CHLORIDE 40 MEQ: 1500 TABLET, EXTENDED RELEASE ORAL at 08:41

## 2023-12-30 RX ADMIN — FUROSEMIDE 40 MG: 10 INJECTION, SOLUTION INTRAMUSCULAR; INTRAVENOUS at 18:20

## 2023-12-30 RX ADMIN — Medication 400 MG: at 08:41

## 2023-12-30 RX ADMIN — Medication 2 PUFF: at 20:25

## 2023-12-30 NOTE — PLAN OF CARE
Problem: Discharge Planning  Goal: Discharge to home or other facility with appropriate resources  12/30/2023 0230 by Sandra Hoyt RN  Outcome: Progressing  12/29/2023 1648 by Elsa Santiago RN  Outcome: Progressing     Problem: Safety - Adult  Goal: Free from fall injury  12/30/2023 0230 by Sandra Hoyt RN  Outcome: Progressing  Note: Patient weak.  Alert and oriented.  Bed alarm on  Making no attempt to get out of bed per self.  12/29/2023 1648 by Elsa Santiago RN  Outcome: Progressing     Problem: ABCDS Injury Assessment  Goal: Absence of physical injury  12/30/2023 0230 by Sandra Hoyt RN  Outcome: Progressing  12/29/2023 1648 by Elsa Santiago RN  Outcome: Progressing     Problem: Skin/Tissue Integrity  Goal: Absence of new skin breakdown  Description: 1.  Monitor for areas of redness and/or skin breakdown  2.  Assess vascular access sites hourly  3.  Every 4-6 hours minimum:  Change oxygen saturation probe site  4.  Every 4-6 hours:  If on nasal continuous positive airway pressure, respiratory therapy assess nares and determine need for appliance change or resting period.  12/30/2023 0230 by Sandra Hoyt RN  Outcome: Progressing  Note: Buttocks and coccyx reddened.  Waffle mattress continues. Turned and repositioned comfortably.  12/29/2023 1648 by Elsa Santiago RN  Outcome: Progressing     Problem: Chronic Conditions and Co-morbidities  Goal: Patient's chronic conditions and co-morbidity symptoms are monitored and maintained or improved  12/30/2023 0230 by Sandra Hoyt RN  Outcome: Progressing  Note: Bipap worn at HS. Oxygen sat with bipap on is in mid 90s.  Lung sounds decreased.  Short of breath with exertion.    12/29/2023 1648 by Elsa Santiago RN  Outcome: Progressing     Problem: Pain  Goal: Verbalizes/displays adequate comfort level or baseline comfort level  12/30/2023 0230 by Sandra Hoyt RN  Outcome: Progressing  Note: Denies pain.

## 2023-12-30 NOTE — PLAN OF CARE
Problem: Discharge Planning  Goal: Discharge to home or other facility with appropriate resources  12/30/2023 1504 by Tiffanie Rajput RN  Outcome: Progressing     Problem: Safety - Adult  Goal: Free from fall injury  12/30/2023 1504 by Tiffanie Rajput RN  Outcome: Progressing     Problem: ABCDS Injury Assessment  Goal: Absence of physical injury  12/30/2023 1504 by Tiffanie Rajput RN  Outcome: Progressing     Problem: Skin/Tissue Integrity  Goal: Absence of new skin breakdown  Description: 1.  Monitor for areas of redness and/or skin breakdown  2.  Assess vascular access sites hourly  3.  Every 4-6 hours minimum:  Change oxygen saturation probe site  4.  Every 4-6 hours:  If on nasal continuous positive airway pressure, respiratory therapy assess nares and determine need for appliance change or resting period.  12/30/2023 1504 by Tiffanie Rajput, RN  Outcome: Progressing

## 2023-12-31 LAB
ALBUMIN SERPL-MCNC: 4.7 G/DL (ref 3.5–5.2)
ALP SERPL-CCNC: 51 U/L (ref 35–104)
ALT SERPL-CCNC: 57 U/L (ref 5–33)
ANION GAP SERPL CALCULATED.3IONS-SCNC: 9 MMOL/L (ref 9–17)
ARTERIAL PATENCY WRIST A: ABNORMAL
AST SERPL-CCNC: 52 U/L
BASOPHILS # BLD: 0 K/UL (ref 0–0.2)
BASOPHILS NFR BLD: 0 % (ref 0–2)
BDY SITE: ABNORMAL
BILIRUB DIRECT SERPL-MCNC: 0.3 MG/DL
BILIRUB INDIRECT SERPL-MCNC: 1.5 MG/DL (ref 0–1)
BILIRUB SERPL-MCNC: 1.8 MG/DL (ref 0.3–1.2)
BODY TEMPERATURE: 37
BUN SERPL-MCNC: 65 MG/DL (ref 8–23)
CALCIUM SERPL-MCNC: 11.4 MG/DL (ref 8.6–10.4)
CHLORIDE SERPL-SCNC: 98 MMOL/L (ref 98–107)
CO2 SERPL-SCNC: 41 MMOL/L (ref 20–31)
COHGB MFR BLD: 2.7 % (ref 0–5)
CREAT SERPL-MCNC: 1.4 MG/DL (ref 0.5–0.9)
EOSINOPHIL # BLD: 0.06 K/UL (ref 0–0.4)
EOSINOPHILS RELATIVE PERCENT: 1 % (ref 0–4)
ERYTHROCYTE [DISTWIDTH] IN BLOOD BY AUTOMATED COUNT: 18.7 % (ref 11.5–14.9)
GAS FLOW.O2 O2 DELIVERY SYS: ABNORMAL L/MIN
GFR SERPL CREATININE-BSD FRML MDRD: 39 ML/MIN/1.73M2
GLUCOSE SERPL-MCNC: 97 MG/DL (ref 70–99)
HAPTOGLOB SERPL-MCNC: 63 MG/DL (ref 30–200)
HCO3 ARTERIAL: 44.6 MMOL/L (ref 22–26)
HCT VFR BLD AUTO: 21.8 % (ref 36–46)
HGB BLD-MCNC: 7 G/DL (ref 12–16)
LDH SERPL-CCNC: 285 U/L (ref 135–214)
LYMPHOCYTES NFR BLD: 0.93 K/UL (ref 1–4.8)
LYMPHOCYTES RELATIVE PERCENT: 15 % (ref 24–44)
MAGNESIUM SERPL-MCNC: 2.5 MG/DL (ref 1.6–2.6)
MCH RBC QN AUTO: 31.3 PG (ref 26–34)
MCHC RBC AUTO-ENTMCNC: 31.9 G/DL (ref 31–37)
MCV RBC AUTO: 98 FL (ref 80–100)
METHEMOGLOBIN: 1.2 % (ref 0–1.9)
MONOCYTES NFR BLD: 0.56 K/UL (ref 0.1–1.3)
MONOCYTES NFR BLD: 9 % (ref 1–7)
MORPHOLOGY: ABNORMAL
NEUTROPHILS NFR BLD: 75 % (ref 36–66)
NEUTS SEG NFR BLD: 4.65 K/UL (ref 1.3–9.1)
O2 SAT, ARTERIAL: 93.2 % (ref 95–98)
PCO2 ARTERIAL: 67.7 MMHG (ref 35–45)
PH ARTERIAL: 7.43 (ref 7.35–7.45)
PLATELET # BLD AUTO: 177 K/UL (ref 150–450)
PMV BLD AUTO: 7.9 FL (ref 6–12)
PO2 ARTERIAL: 75.8 MMHG (ref 80–100)
POSITIVE BASE EXCESS, ART: 20.2 MMOL/L (ref 0–2)
POTASSIUM SERPL-SCNC: 3.6 MMOL/L (ref 3.7–5.3)
PROT SERPL-MCNC: 6.4 G/DL (ref 6.4–8.3)
PT. POSITION: ABNORMAL
RBC # BLD AUTO: 2.22 M/UL (ref 4–5.2)
RESPIRATORY RATE: 21
SODIUM SERPL-SCNC: 148 MMOL/L (ref 135–144)
TEXT FOR RESPIRATORY: ABNORMAL
VENTILATION MODE VENT: ABNORMAL
WBC OTHER # BLD: 6.2 K/UL (ref 3.5–11)

## 2023-12-31 PROCEDURE — 6370000000 HC RX 637 (ALT 250 FOR IP): Performed by: INTERNAL MEDICINE

## 2023-12-31 PROCEDURE — P9047 ALBUMIN (HUMAN), 25%, 50ML: HCPCS | Performed by: INTERNAL MEDICINE

## 2023-12-31 PROCEDURE — 94761 N-INVAS EAR/PLS OXIMETRY MLT: CPT

## 2023-12-31 PROCEDURE — 83735 ASSAY OF MAGNESIUM: CPT

## 2023-12-31 PROCEDURE — 94660 CPAP INITIATION&MGMT: CPT

## 2023-12-31 PROCEDURE — 6370000000 HC RX 637 (ALT 250 FOR IP)

## 2023-12-31 PROCEDURE — 83615 LACTATE (LD) (LDH) ENZYME: CPT

## 2023-12-31 PROCEDURE — 36415 COLL VENOUS BLD VENIPUNCTURE: CPT

## 2023-12-31 PROCEDURE — 83010 ASSAY OF HAPTOGLOBIN QUANT: CPT

## 2023-12-31 PROCEDURE — 82805 BLOOD GASES W/O2 SATURATION: CPT

## 2023-12-31 PROCEDURE — 80076 HEPATIC FUNCTION PANEL: CPT

## 2023-12-31 PROCEDURE — 99232 SBSQ HOSP IP/OBS MODERATE 35: CPT | Performed by: INTERNAL MEDICINE

## 2023-12-31 PROCEDURE — 6360000002 HC RX W HCPCS: Performed by: INTERNAL MEDICINE

## 2023-12-31 PROCEDURE — P9016 RBC LEUKOCYTES REDUCED: HCPCS

## 2023-12-31 PROCEDURE — 94640 AIRWAY INHALATION TREATMENT: CPT

## 2023-12-31 PROCEDURE — 80048 BASIC METABOLIC PNL TOTAL CA: CPT

## 2023-12-31 PROCEDURE — 36430 TRANSFUSION BLD/BLD COMPNT: CPT

## 2023-12-31 PROCEDURE — 85025 COMPLETE CBC W/AUTO DIFF WBC: CPT

## 2023-12-31 PROCEDURE — 36600 WITHDRAWAL OF ARTERIAL BLOOD: CPT

## 2023-12-31 PROCEDURE — 2580000003 HC RX 258: Performed by: INTERNAL MEDICINE

## 2023-12-31 PROCEDURE — 2060000000 HC ICU INTERMEDIATE R&B

## 2023-12-31 PROCEDURE — 2700000000 HC OXYGEN THERAPY PER DAY

## 2023-12-31 RX ORDER — SODIUM CHLORIDE 9 MG/ML
INJECTION, SOLUTION INTRAVENOUS PRN
Status: DISCONTINUED | OUTPATIENT
Start: 2023-12-31 | End: 2024-01-01 | Stop reason: HOSPADM

## 2023-12-31 RX ORDER — POTASSIUM CHLORIDE 20 MEQ/1
20 TABLET, EXTENDED RELEASE ORAL ONCE
Status: COMPLETED | OUTPATIENT
Start: 2023-12-31 | End: 2023-12-31

## 2023-12-31 RX ORDER — BUMETANIDE 1 MG/1
2 TABLET ORAL DAILY
Status: DISCONTINUED | OUTPATIENT
Start: 2024-01-01 | End: 2024-01-01 | Stop reason: HOSPADM

## 2023-12-31 RX ADMIN — PANTOPRAZOLE SODIUM 40 MG: 40 TABLET, DELAYED RELEASE ORAL at 16:18

## 2023-12-31 RX ADMIN — CARVEDILOL 6.25 MG: 6.25 TABLET, FILM COATED ORAL at 08:47

## 2023-12-31 RX ADMIN — POTASSIUM CHLORIDE 20 MEQ: 1500 TABLET, EXTENDED RELEASE ORAL at 18:11

## 2023-12-31 RX ADMIN — Medication 2 PUFF: at 08:11

## 2023-12-31 RX ADMIN — HEPARIN SODIUM 5000 UNITS: 5000 INJECTION INTRAVENOUS; SUBCUTANEOUS at 05:55

## 2023-12-31 RX ADMIN — SODIUM CHLORIDE, PRESERVATIVE FREE 10 ML: 5 INJECTION INTRAVENOUS at 20:02

## 2023-12-31 RX ADMIN — CARVEDILOL 6.25 MG: 6.25 TABLET, FILM COATED ORAL at 16:18

## 2023-12-31 RX ADMIN — ASPIRIN 81 MG: 81 TABLET, COATED ORAL at 08:47

## 2023-12-31 RX ADMIN — HYDRALAZINE HYDROCHLORIDE 100 MG: 50 TABLET ORAL at 13:19

## 2023-12-31 RX ADMIN — FUROSEMIDE 40 MG: 10 INJECTION, SOLUTION INTRAMUSCULAR; INTRAVENOUS at 09:49

## 2023-12-31 RX ADMIN — PRAVASTATIN SODIUM 40 MG: 40 TABLET ORAL at 08:47

## 2023-12-31 RX ADMIN — PANTOPRAZOLE SODIUM 40 MG: 40 TABLET, DELAYED RELEASE ORAL at 05:55

## 2023-12-31 RX ADMIN — ACETAMINOPHEN 650 MG: 325 TABLET ORAL at 06:51

## 2023-12-31 RX ADMIN — SODIUM CHLORIDE, PRESERVATIVE FREE 10 ML: 5 INJECTION INTRAVENOUS at 08:47

## 2023-12-31 RX ADMIN — ALBUMIN (HUMAN) 25 G: 0.25 INJECTION, SOLUTION INTRAVENOUS at 08:51

## 2023-12-31 RX ADMIN — Medication 400 MG: at 08:47

## 2023-12-31 RX ADMIN — DOCUSATE SODIUM 100 MG: 100 CAPSULE, LIQUID FILLED ORAL at 08:47

## 2023-12-31 RX ADMIN — HYDRALAZINE HYDROCHLORIDE 100 MG: 50 TABLET ORAL at 05:55

## 2023-12-31 RX ADMIN — ALBUMIN (HUMAN) 25 G: 0.25 INJECTION, SOLUTION INTRAVENOUS at 02:14

## 2023-12-31 RX ADMIN — HYDRALAZINE HYDROCHLORIDE 100 MG: 50 TABLET ORAL at 20:01

## 2023-12-31 RX ADMIN — Medication 400 MG: at 20:02

## 2023-12-31 RX ADMIN — DOCUSATE SODIUM 100 MG: 100 CAPSULE, LIQUID FILLED ORAL at 20:01

## 2023-12-31 ASSESSMENT — PAIN DESCRIPTION - LOCATION: LOCATION: BACK

## 2023-12-31 ASSESSMENT — PAIN SCALES - GENERAL: PAINLEVEL_OUTOF10: 3

## 2023-12-31 NOTE — PLAN OF CARE
Problem: Discharge Planning  Goal: Discharge to home or other facility with appropriate resources  12/30/2023 2243 by Bette Spence RN  Outcome: Progressing  Flowsheets (Taken 12/30/2023 2000)  Discharge to home or other facility with appropriate resources:   Identify barriers to discharge with patient and caregiver   Arrange for needed discharge resources and transportation as appropriate   Identify discharge learning needs (meds, wound care, etc)     Problem: Safety - Adult  Goal: Free from fall injury  12/30/2023 2243 by Bette Spence, RN  Outcome: Progressing     Problem: ABCDS Injury Assessment  Goal: Absence of physical injury  12/30/2023 2243 by Bette Spence, RN  Outcome: Progressing     Problem: Skin/Tissue Integrity  Goal: Absence of new skin breakdown  Description: 1.  Monitor for areas of redness and/or skin breakdown  2.  Assess vascular access sites hourly  3.  Every 4-6 hours minimum:  Change oxygen saturation probe site  4.  Every 4-6 hours:  If on nasal continuous positive airway pressure, respiratory therapy assess nares and determine need for appliance change or resting period.  12/30/2023 2243 by Bette Spence, RN  Outcome: Progressing     Problem: Chronic Conditions and Co-morbidities  Goal: Patient's chronic conditions and co-morbidity symptoms are monitored and maintained or improved  12/30/2023 2243 by Bette Spence RN  Outcome: Progressing  Flowsheets (Taken 12/30/2023 2000)  Care Plan - Patient's Chronic Conditions and Co-Morbidity Symptoms are Monitored and Maintained or Improved:   Monitor and assess patient's chronic conditions and comorbid symptoms for stability, deterioration, or improvement   Collaborate with multidisciplinary team to address chronic and comorbid conditions and prevent exacerbation or deterioration   Update acute care plan with appropriate goals if chronic or comorbid symptoms are exacerbated and prevent overall improvement and discharge     Problem:

## 2024-01-01 VITALS
TEMPERATURE: 97.6 F | OXYGEN SATURATION: 96 % | HEART RATE: 56 BPM | RESPIRATION RATE: 20 BRPM | WEIGHT: 291.01 LBS | BODY MASS INDEX: 48.48 KG/M2 | DIASTOLIC BLOOD PRESSURE: 66 MMHG | HEIGHT: 65 IN | SYSTOLIC BLOOD PRESSURE: 148 MMHG

## 2024-01-01 LAB
ABO/RH: NORMAL
ALBUMIN SERPL-MCNC: 4.5 G/DL (ref 3.5–5.2)
ALP SERPL-CCNC: 50 U/L (ref 35–104)
ALT SERPL-CCNC: 54 U/L (ref 5–33)
ANION GAP SERPL CALCULATED.3IONS-SCNC: 8 MMOL/L (ref 9–17)
ANTIBODY SCREEN: NEGATIVE
ARM BAND NUMBER: NORMAL
AST SERPL-CCNC: 37 U/L
BASOPHILS # BLD: 0 K/UL (ref 0–0.2)
BASOPHILS NFR BLD: 0 % (ref 0–2)
BILIRUB DIRECT SERPL-MCNC: 0.3 MG/DL
BILIRUB INDIRECT SERPL-MCNC: 1.4 MG/DL (ref 0–1)
BILIRUB SERPL-MCNC: 1.7 MG/DL (ref 0.3–1.2)
BLOOD BANK BLOOD PRODUCT EXPIRATION DATE: NORMAL
BLOOD BANK BLOOD PRODUCT EXPIRATION DATE: NORMAL
BLOOD BANK DISPENSE STATUS: NORMAL
BLOOD BANK DISPENSE STATUS: NORMAL
BLOOD BANK ISBT PRODUCT BLOOD TYPE: 9500
BLOOD BANK ISBT PRODUCT BLOOD TYPE: 9500
BLOOD BANK PRODUCT CODE: NORMAL
BLOOD BANK PRODUCT CODE: NORMAL
BLOOD BANK SAMPLE EXPIRATION: NORMAL
BLOOD BANK UNIT TYPE AND RH: NORMAL
BLOOD BANK UNIT TYPE AND RH: NORMAL
BPU ID: NORMAL
BPU ID: NORMAL
BUN SERPL-MCNC: 72 MG/DL (ref 8–23)
CALCIUM SERPL-MCNC: 11.7 MG/DL (ref 8.6–10.4)
CHLORIDE SERPL-SCNC: 98 MMOL/L (ref 98–107)
CO2 SERPL-SCNC: 41 MMOL/L (ref 20–31)
COMPONENT: NORMAL
COMPONENT: NORMAL
CREAT SERPL-MCNC: 1.5 MG/DL (ref 0.5–0.9)
CROSSMATCH RESULT: NORMAL
CROSSMATCH RESULT: NORMAL
EOSINOPHIL # BLD: 0.2 K/UL (ref 0–0.4)
EOSINOPHILS RELATIVE PERCENT: 2 % (ref 0–4)
ERYTHROCYTE [DISTWIDTH] IN BLOOD BY AUTOMATED COUNT: 18.7 % (ref 11.5–14.9)
GFR SERPL CREATININE-BSD FRML MDRD: 36 ML/MIN/1.73M2
GLUCOSE SERPL-MCNC: 100 MG/DL (ref 70–99)
HCT VFR BLD AUTO: 24.7 % (ref 36–46)
HGB BLD-MCNC: 8.1 G/DL (ref 12–16)
LYMPHOCYTES NFR BLD: 1.1 K/UL (ref 1–4.8)
LYMPHOCYTES RELATIVE PERCENT: 14 % (ref 24–44)
MAGNESIUM SERPL-MCNC: 2.7 MG/DL (ref 1.6–2.6)
MCH RBC QN AUTO: 31.4 PG (ref 26–34)
MCHC RBC AUTO-ENTMCNC: 32.6 G/DL (ref 31–37)
MCV RBC AUTO: 96.4 FL (ref 80–100)
MONOCYTES NFR BLD: 0.8 K/UL (ref 0.1–1.3)
MONOCYTES NFR BLD: 10 % (ref 1–7)
NEUTROPHILS NFR BLD: 74 % (ref 36–66)
NEUTS SEG NFR BLD: 5.6 K/UL (ref 1.3–9.1)
PLATELET # BLD AUTO: 173 K/UL (ref 150–450)
PMV BLD AUTO: 7.9 FL (ref 6–12)
POTASSIUM SERPL-SCNC: 3.4 MMOL/L (ref 3.7–5.3)
PROT SERPL-MCNC: 6.4 G/DL (ref 6.4–8.3)
RBC # BLD AUTO: 2.56 M/UL (ref 4–5.2)
SODIUM SERPL-SCNC: 147 MMOL/L (ref 135–144)
TRANSFUSION STATUS: NORMAL
TRANSFUSION STATUS: NORMAL
UNIT DIVISION: 0
UNIT DIVISION: 0
UNIT ISSUE DATE/TIME: NORMAL
UNIT ISSUE DATE/TIME: NORMAL
WBC OTHER # BLD: 7.7 K/UL (ref 3.5–11)

## 2024-01-01 PROCEDURE — 6370000000 HC RX 637 (ALT 250 FOR IP): Performed by: INTERNAL MEDICINE

## 2024-01-01 PROCEDURE — 2580000003 HC RX 258: Performed by: INTERNAL MEDICINE

## 2024-01-01 PROCEDURE — 80048 BASIC METABOLIC PNL TOTAL CA: CPT

## 2024-01-01 PROCEDURE — 94640 AIRWAY INHALATION TREATMENT: CPT

## 2024-01-01 PROCEDURE — 80076 HEPATIC FUNCTION PANEL: CPT

## 2024-01-01 PROCEDURE — 97530 THERAPEUTIC ACTIVITIES: CPT

## 2024-01-01 PROCEDURE — 94761 N-INVAS EAR/PLS OXIMETRY MLT: CPT

## 2024-01-01 PROCEDURE — 83735 ASSAY OF MAGNESIUM: CPT

## 2024-01-01 PROCEDURE — 6370000000 HC RX 637 (ALT 250 FOR IP)

## 2024-01-01 PROCEDURE — 99239 HOSP IP/OBS DSCHRG MGMT >30: CPT | Performed by: INTERNAL MEDICINE

## 2024-01-01 PROCEDURE — 85025 COMPLETE CBC W/AUTO DIFF WBC: CPT

## 2024-01-01 PROCEDURE — 94660 CPAP INITIATION&MGMT: CPT

## 2024-01-01 PROCEDURE — 36415 COLL VENOUS BLD VENIPUNCTURE: CPT

## 2024-01-01 PROCEDURE — 2700000000 HC OXYGEN THERAPY PER DAY

## 2024-01-01 PROCEDURE — 97110 THERAPEUTIC EXERCISES: CPT

## 2024-01-01 RX ORDER — PSEUDOEPHEDRINE HCL 30 MG
100 TABLET ORAL 2 TIMES DAILY
Qty: 90 CAPSULE | Refills: 0 | Status: SHIPPED | OUTPATIENT
Start: 2024-01-01

## 2024-01-01 RX ORDER — MENTHOL 1.4 %
ADHESIVE PATCH, MEDICATED TOPICAL 3 TIMES DAILY PRN
Qty: 57 G | Refills: 0 | Status: SHIPPED | OUTPATIENT
Start: 2024-01-01

## 2024-01-01 RX ORDER — PANTOPRAZOLE SODIUM 40 MG/1
40 TABLET, DELAYED RELEASE ORAL
Qty: 30 TABLET | Refills: 3 | Status: SHIPPED | OUTPATIENT
Start: 2024-01-01

## 2024-01-01 RX ORDER — HYDRALAZINE HYDROCHLORIDE 100 MG/1
100 TABLET, FILM COATED ORAL EVERY 8 HOURS SCHEDULED
Qty: 90 TABLET | Refills: 3 | Status: SHIPPED | OUTPATIENT
Start: 2024-01-01

## 2024-01-01 RX ORDER — CARVEDILOL 6.25 MG/1
6.25 TABLET ORAL 2 TIMES DAILY WITH MEALS
Qty: 60 TABLET | Refills: 3 | Status: SHIPPED | OUTPATIENT
Start: 2024-01-01

## 2024-01-01 RX ORDER — HYDROCODONE BITARTRATE AND ACETAMINOPHEN 5; 325 MG/1; MG/1
1 TABLET ORAL EVERY 6 HOURS PRN
Qty: 20 TABLET | Refills: 0 | Status: SHIPPED | OUTPATIENT
Start: 2024-01-01 | End: 2024-01-06

## 2024-01-01 RX ORDER — BUMETANIDE 1 MG/1
1 TABLET ORAL DAILY
Qty: 90 TABLET | Refills: 0 | Status: SHIPPED | OUTPATIENT
Start: 2024-01-04

## 2024-01-01 RX ORDER — ASPIRIN 81 MG/1
81 TABLET ORAL DAILY
Qty: 90 TABLET | Refills: 1 | Status: SHIPPED | OUTPATIENT
Start: 2024-01-01

## 2024-01-01 RX ADMIN — CARVEDILOL 6.25 MG: 6.25 TABLET, FILM COATED ORAL at 08:26

## 2024-01-01 RX ADMIN — Medication 400 MG: at 08:28

## 2024-01-01 RX ADMIN — PRAVASTATIN SODIUM 40 MG: 40 TABLET ORAL at 08:28

## 2024-01-01 RX ADMIN — PANTOPRAZOLE SODIUM 40 MG: 40 TABLET, DELAYED RELEASE ORAL at 05:22

## 2024-01-01 RX ADMIN — Medication 2 PUFF: at 07:30

## 2024-01-01 RX ADMIN — SODIUM CHLORIDE, PRESERVATIVE FREE 10 ML: 5 INJECTION INTRAVENOUS at 08:28

## 2024-01-01 RX ADMIN — HYDRALAZINE HYDROCHLORIDE 100 MG: 50 TABLET ORAL at 05:22

## 2024-01-01 RX ADMIN — ASPIRIN 81 MG: 81 TABLET, COATED ORAL at 08:26

## 2024-01-01 RX ADMIN — DOCUSATE SODIUM 100 MG: 100 CAPSULE, LIQUID FILLED ORAL at 08:27

## 2024-01-01 NOTE — DISCHARGE INSTRUCTIONS
Weigh patient daily, monitor for worsening pedal edema.  If you notice any repeat dose of Bumex 6 hours later.  Continue to monitor stools closely for bleeding  Repeat blood work to be performed on 1/3/2023, resumption of Bumex based on sodium and creatinine then.  Low-sodium diet  2-3 L oxygen via nasal cannula in morning, BiPAP 12/8 during nighttime

## 2024-01-01 NOTE — PLAN OF CARE
Problem: Discharge Planning  Goal: Discharge to home or other facility with appropriate resources  12/31/2023 2143 by Bette Spence, RN  Outcome: Progressing  Flowsheets (Taken 12/31/2023 2000)  Discharge to home or other facility with appropriate resources:   Identify barriers to discharge with patient and caregiver   Arrange for needed discharge resources and transportation as appropriate   Identify discharge learning needs (meds, wound care, etc)     Problem: Safety - Adult  Goal: Free from fall injury  12/31/2023 2143 by Bette Spence, RN  Outcome: Progressing     Problem: ABCDS Injury Assessment  Goal: Absence of physical injury  12/31/2023 2143 by Bette Spence, RN  Outcome: Progressing     Problem: Skin/Tissue Integrity  Goal: Absence of new skin breakdown  Description: 1.  Monitor for areas of redness and/or skin breakdown  2.  Assess vascular access sites hourly  3.  Every 4-6 hours minimum:  Change oxygen saturation probe site  4.  Every 4-6 hours:  If on nasal continuous positive airway pressure, respiratory therapy assess nares and determine need for appliance change or resting period.  12/31/2023 2143 by Bette Spence, RN  Outcome: Progressing     Problem: Chronic Conditions and Co-morbidities  Goal: Patient's chronic conditions and co-morbidity symptoms are monitored and maintained or improved  12/31/2023 2143 by Bette Spence RN  Outcome: Progressing  Flowsheets (Taken 12/31/2023 2000)  Care Plan - Patient's Chronic Conditions and Co-Morbidity Symptoms are Monitored and Maintained or Improved:   Monitor and assess patient's chronic conditions and comorbid symptoms for stability, deterioration, or improvement   Collaborate with multidisciplinary team to address chronic and comorbid conditions and prevent exacerbation or deterioration   Update acute care plan with appropriate goals if chronic or comorbid symptoms are exacerbated and prevent overall improvement and discharge     Problem:

## 2024-01-01 NOTE — PROGRESS NOTES
ICU Progress Note (Non-Vent)  [facility]    Patient - Rosi Haley,  Age - 78 y.o.    - 1945      Room Number - 2116/2116-01   N -  [id number]   [facility] # - [id number]  Date of Admission -  12/15/2023 12:01 PM    Events of Past 24 Hours   I was asked to see the patient because the bedside nurse was commenting on worsening confusion.  An ABG was performed.  It did reveal evidence of acute upon chronic hypercapnia.    I did see the patient.  It turns out that the patient has been declining the use of the BiPAP.  Patient's  present at bedside.  Gideon RT present bedside and making efforts to place the patient on NIV AVAPS.  Vitals    height is 1.651 m (5' 5\") and weight is 134.2 kg (295 lb 13.7 oz). Her axillary temperature is 98.1 °F (36.7 °C). Her blood pressure is 136/73 and her pulse is 62. Her respiration is 32 (abnormal) and oxygen saturation is 96%.       Temperature Range: Temp: 98.1 °F (36.7 °C) Temp  Av.8 °F (36.6 °C)  Min: 97.1 °F (36.2 °C)  Max: 98.2 °F (36.8 °C)  BP Range:  Systolic (24hrs), Av , Min:136 , Max:153     Diastolic (24hrs), Av, Min:58, Max:80    Pulse Range: Pulse  Av.9  Min: 58  Max: 62  Respiration Range: Resp  Av.8  Min: 16  Max: 32  Current Pulse Ox::  SpO2: 96 %  24HR Pulse Ox Range:  SpO2  Av.4 %  Min: 92 %  Max: 98 %  Oxygen Amount and Delivery: O2 Flow Rate (L/min): 2 L/min    Wt Readings from Last 3 Encounters:   23 134.2 kg (295 lb 13.7 oz)   23 121.1 kg (267 lb)   11/10/23 123.3 kg (271 lb 12.8 oz)     I/O     Intake/Output Summary (Last 24 hours) at 2023 1056  Last data filed at 2023 0621  Gross per 24 hour   Intake 1368 ml   Output 1150 ml   Net 218 ml     DRAIN/TUBE OUTPUT       Invasive Lines   ICP PRESSURE RANGE  No data recorded  CVP PRESSURE RANGE  No data recorded      Medications      furosemide  40 mg 
                                                                         ICU Progress Note (Non-Vent)  [facility]    Patient - Rosi Haley,  Age - 78 y.o.    - 1945      Room Number -    N -  [id number]   [facility] # - [id number]  Date of Admission -  12/15/2023 12:01 PM    Events of Past 24 Hours   Wore her BiPAP for 6 hours!  Alert and oriented    Vitals    height is 1.651 m (5' 5\") and weight is 128.6 kg (283 lb 8.2 oz). Her oral temperature is 98.4 °F (36.9 °C). Her blood pressure is 155/53 (abnormal) and her pulse is 61. Her respiration is 24 and oxygen saturation is 93%.       Temperature Range: Temp: 98.4 °F (36.9 °C) Temp  Av.8 °F (36.6 °C)  Min: 97.3 °F (36.3 °C)  Max: 98.4 °F (36.9 °C)  BP Range:  Systolic (24hrs), Av , Min:147 , Max:177     Diastolic (24hrs), Av, Min:44, Max:65    Pulse Range: Pulse  Av.2  Min: 54  Max: 66  Respiration Range: Resp  Av.7  Min: 20  Max: 37  Current Pulse Ox::  SpO2: 93 %  24HR Pulse Ox Range:  SpO2  Av.7 %  Min: 89 %  Max: 98 %  Oxygen Amount and Delivery: O2 Flow Rate (L/min): 3 L/min    Wt Readings from Last 3 Encounters:   23 128.6 kg (283 lb 8.2 oz)   23 121.1 kg (267 lb)   11/10/23 123.3 kg (271 lb 12.8 oz)     I/O     Intake/Output Summary (Last 24 hours) at 2023 09  Last data filed at 2023  Gross per 24 hour   Intake 100 ml   Output 1350 ml   Net -1250 ml     DRAIN/TUBE OUTPUT       Invasive Lines   ICP PRESSURE RANGE  No data recorded  CVP PRESSURE RANGE  No data recorded      Medications      hydrALAZINE  50 mg Oral 3 times per day    carvedilol  25 mg Oral BID WC    heparin (porcine)  5,000 Units SubCUTAneous 3 times per day    furosemide  40 mg IntraVENous BID    docusate sodium  100 mg Oral BID    albumin human 25%  25 g IntraVENous Daily    magnesium oxide  400 mg Oral BID    [Held by provider] apixaban  5 mg Oral BID    [Held by provider] aspirin  
                               Pulmonary Progress Note  [facility]      Patient - Rosi Haley,  Age - 78 y.o.    - 1945      Room Number - 2124/2124-01   N -  [id number]   [facility] # - [id number]  Date of Admission -  12/15/2023 12:01 PM        Consulting Service/Physician   Consulting - Lita Raymond MD  Primary Care Physician - Radha Wynn MD     SUBJECTIVE   She is alert and oriented, voices no complaints.  Sat on the edge of the bed.    OBJECTIVE   VITALS    height is 1.651 m (5' 5\") and weight is 132 kg (291 lb 0.1 oz). Her axillary temperature is 97.6 °F (36.4 °C). Her blood pressure is 148/66 (abnormal) and her pulse is 56. Her respiration is 20 and oxygen saturation is 96%.     Body mass index is 48.43 kg/m².  Temperature Range: Temp: 97.6 °F (36.4 °C) Temp  Av.9 °F (36.6 °C)  Min: 97.4 °F (36.3 °C)  Max: 98.3 °F (36.8 °C)  BP Range:  Systolic (24hrs), Av , Min:148 , Max:172     Diastolic (24hrs), Av, Min:47, Max:77    Pulse Range: Pulse  Av.5  Min: 56  Max: 66  Respiration Range: Resp  Av.8  Min: 20  Max: 24  Current Pulse Ox::  SpO2: 96 %  24HR Pulse Ox Range:  SpO2  Av.4 %  Min: 90 %  Max: 99 %  Oxygen Amount and Delivery: O2 Flow Rate (L/min): 4 L/min    Wt Readings from Last 3 Encounters:   23 132 kg (291 lb 0.1 oz)   23 121.1 kg (267 lb)   11/10/23 123.3 kg (271 lb 12.8 oz)       I/O (24 Hours)    Intake/Output Summary (Last 24 hours) at 2024 1202  Last data filed at 2024 0830  Gross per 24 hour   Intake 1426.67 ml   Output 1250 ml   Net 176.67 ml       EXAM     General Appearance  Awake, alert, oriented, in no acute distress  HEENT - normocephalic, atraumatic. []  Mallampati  [x] Crowded airway   [] Macroglossia  []  Retrognathia  [] Micrognathia  []  Normal tongue size []  Normal Bite  [] Mary sign positive    Neck - Supple,  trachea midline   Lungs -diminished without wheezes or 
      Physical Therapy Cancel Note      DATE: 2023    NAME: Rosi Haley  MRN: [id number]   : 1945      Patient not seen this date for Physical Therapy due to:    HOLD tx today. Pt Hgb dropped to 7.0 after receiving 1 unit of blood yesterday.  Pt also has orders for STAT ABG's per CARYL Moreno.  Will continue to follow for when patient is appropriate for PT tx.      Electronically signed by Shannon Turk PTA on 2023 at 1:28 PM    
     [facility] Internal Medicine  Meir Blum MD; Ritchie Marrero MD; Juan Francisco Alba MD; MD Zaida Costa MD; Lita Raymond MD    [facility] Internal Medicine   IN-PATIENT SERVICE   [facility]    Progress note            Date:   12/20/2023  Patient name:  Rosi Haley  Date of admission:  12/15/2023 12:01 PM  MRN:   [id number]  Account:  [id number]  YOB: 1945  PCP:    Radha Wynn MD  Room:   2107/2107-01  Code Status:    Full Code    Chief Complaint:     Chief Complaint   Patient presents with    Melena     Reporting black stool that began today. Was recently hospitalized for hematochezia (suspects related to hemorrhoids) but states it was lighter in appearance.  Had a blood transfusion and iron supplements during admission  Has upper GI scope scheduled for Tuesday  Denies ABD pain N/V       History Obtained From:     Patient/EMR/Bedside RN    History of Present Illness:     Rosi Haley is a 78 y.o. Non- / non  female who presents with Melena (Reporting black stool that began today. Was recently hospitalized for hematochezia (suspects related to hemorrhoids) but states it was lighter in appearance./Had a blood transfusion and iron supplements during admission/Has upper GI scope scheduled for Tuesday/Denies ABD pain N/V)   and is admitted to the hospital for the management of Acute GI bleeding.  78 old female with underlying history of atrial fibrillation on Eliquis, coronary disease, chronic back pain, hypertension dyslipidemia has been having GI bleed, multiple EGD colonoscopies, seen by gastroenterology was scheduled to have outpatient capsule endoscopy presented with black stools, hemoglobin was 7.1 on admission, given unit of blood, subsequent hemoglobin check was 6, gastroenterology was on board, few attempts to check the hemoglobin failed with multiple phlebotomist on board,  Emergently patient had PICC 
     [facility] Internal Medicine  Meir Blum MD; Ritchie Marrero MD; Juan Francisco Alba MD; MD Zaida Costa MD; Lita Raymond MD    [facility] Internal Medicine   IN-PATIENT SERVICE   [facility]    Progress note            Date:   12/19/2023  Patient name:  Rosi Haley  Date of admission:  12/15/2023 12:01 PM  MRN:   [id number]  Account:  [id number]  YOB: 1945  PCP:    Radha Wynn MD  Room:   2107/2107-01  Code Status:    Full Code    Chief Complaint:     Chief Complaint   Patient presents with    Melena     Reporting black stool that began today. Was recently hospitalized for hematochezia (suspects related to hemorrhoids) but states it was lighter in appearance.  Had a blood transfusion and iron supplements during admission  Has upper GI scope scheduled for Tuesday  Denies ABD pain N/V       History Obtained From:     Patient/EMR/Bedside RN    History of Present Illness:     Rosi Haley is a 78 y.o. Non- / non  female who presents with Melena (Reporting black stool that began today. Was recently hospitalized for hematochezia (suspects related to hemorrhoids) but states it was lighter in appearance./Had a blood transfusion and iron supplements during admission/Has upper GI scope scheduled for Tuesday/Denies ABD pain N/V)   and is admitted to the hospital for the management of Acute GI bleeding.  78 old female with underlying history of atrial fibrillation on Eliquis, coronary disease, chronic back pain, hypertension dyslipidemia has been having GI bleed, multiple EGD colonoscopies, seen by gastroenterology was scheduled to have outpatient capsule endoscopy presented with black stools, hemoglobin was 7.1 on admission, given unit of blood, subsequent hemoglobin check was 6, gastroenterology was on board, few attempts to check the hemoglobin failed with multiple phlebotomist on board,  Emergently patient had PICC 
     [facility] Internal Medicine  Meir Blum MD; Ritchie Marrero MD; Juan Francisco Alba MD; MD Zaida Costa MD; Lita Raymond MD    [facility] Internal Medicine   IN-PATIENT SERVICE   [facility]    Progress note            Date:   12/27/2023  Patient name:  Rosi Haley  Date of admission:  12/15/2023 12:01 PM  MRN:   [id number]  Account:  [id number]  YOB: 1945  PCP:    Radha Wynn MD  Room:   2014/2014-01  Code Status:    Full Code    Chief Complaint:     Chief Complaint   Patient presents with    Melena     Reporting black stool that began today. Was recently hospitalized for hematochezia (suspects related to hemorrhoids) but states it was lighter in appearance.  Had a blood transfusion and iron supplements during admission  Has upper GI scope scheduled for Tuesday  Denies ABD pain N/V       History Obtained From:     Patient/EMR/Bedside RN    History of Present Illness:     Rosi Haley is a 78 y.o. Non- / non  female who presents with Melena (Reporting black stool that began today. Was recently hospitalized for hematochezia (suspects related to hemorrhoids) but states it was lighter in appearance./Had a blood transfusion and iron supplements during admission/Has upper GI scope scheduled for Tuesday/Denies ABD pain N/V)   and is admitted to the hospital for the management of Acute GI bleeding.  78 old female with underlying history of atrial fibrillation on Eliquis, coronary disease, chronic back pain, hypertension dyslipidemia has been having GI bleed, multiple EGD colonoscopies, seen by gastroenterology was scheduled to have outpatient capsule endoscopy presented with black stools, hemoglobin was 7.1 on admission, given unit of blood, subsequent hemoglobin check was 6, gastroenterology was on board, few attempts to check the hemoglobin failed with multiple phlebotomist on board,  Emergently patient had PICC 
     [facility] Internal Medicine  Meir Blum MD; Ritchie Marrero MD; Juan Francisco Alba MD; MD Zaida Costa MD; Lita Raymond MD    [facility] Internal Medicine   IN-PATIENT SERVICE   [facility]    Progress note            Date:   12/26/2023  Patient name:  Rosi Haley  Date of admission:  12/15/2023 12:01 PM  MRN:   [id number]  Account:  [id number]  YOB: 1945  PCP:    Radha Wynn MD  Room:   2014/2014-01  Code Status:    Full Code    Chief Complaint:     Chief Complaint   Patient presents with    Melena     Reporting black stool that began today. Was recently hospitalized for hematochezia (suspects related to hemorrhoids) but states it was lighter in appearance.  Had a blood transfusion and iron supplements during admission  Has upper GI scope scheduled for Tuesday  Denies ABD pain N/V       History Obtained From:     Patient/EMR/Bedside RN    History of Present Illness:     Rosi Haley is a 78 y.o. Non- / non  female who presents with Melena (Reporting black stool that began today. Was recently hospitalized for hematochezia (suspects related to hemorrhoids) but states it was lighter in appearance./Had a blood transfusion and iron supplements during admission/Has upper GI scope scheduled for Tuesday/Denies ABD pain N/V)   and is admitted to the hospital for the management of Acute GI bleeding.  78 old female with underlying history of atrial fibrillation on Eliquis, coronary disease, chronic back pain, hypertension dyslipidemia has been having GI bleed, multiple EGD colonoscopies, seen by gastroenterology was scheduled to have outpatient capsule endoscopy presented with black stools, hemoglobin was 7.1 on admission, given unit of blood, subsequent hemoglobin check was 6, gastroenterology was on board, few attempts to check the hemoglobin failed with multiple phlebotomist on board,  Emergently patient had PICC 
     [facility] Internal Medicine  Meir Blum MD; Ritchie Marrero MD; Juan Francisco Alba MD; MD Zaida Costa MD; Lita Raymond MD    [facility] Internal Medicine   IN-PATIENT SERVICE   [facility]    Progress note            Date:   12/24/2023  Patient name:  Rosi Haley  Date of admission:  12/15/2023 12:01 PM  MRN:   [id number]  Account:  [id number]  YOB: 1945  PCP:    Radha Wynn MD  Room:   2116/2116-01  Code Status:    Full Code    Chief Complaint:     Chief Complaint   Patient presents with    Melena     Reporting black stool that began today. Was recently hospitalized for hematochezia (suspects related to hemorrhoids) but states it was lighter in appearance.  Had a blood transfusion and iron supplements during admission  Has upper GI scope scheduled for Tuesday  Denies ABD pain N/V       History Obtained From:     Patient/EMR/Bedside RN    History of Present Illness:     Rosi Haley is a 78 y.o. Non- / non  female who presents with Melena (Reporting black stool that began today. Was recently hospitalized for hematochezia (suspects related to hemorrhoids) but states it was lighter in appearance./Had a blood transfusion and iron supplements during admission/Has upper GI scope scheduled for Tuesday/Denies ABD pain N/V)   and is admitted to the hospital for the management of Acute GI bleeding.  78 old female with underlying history of atrial fibrillation on Eliquis, coronary disease, chronic back pain, hypertension dyslipidemia has been having GI bleed, multiple EGD colonoscopies, seen by gastroenterology was scheduled to have outpatient capsule endoscopy presented with black stools, hemoglobin was 7.1 on admission, given unit of blood, subsequent hemoglobin check was 6, gastroenterology was on board, few attempts to check the hemoglobin failed with multiple phlebotomist on board,  Emergently patient had PICC 
     [facility] Internal Medicine  Meir Blum MD; Ritchie Marrero MD; Juan Francisco Alba MD; MD Zaida Costa MD; Lita Raymond MD    [facility] Internal Medicine   IN-PATIENT SERVICE   [facility]    Progress note            Date:   12/22/2023  Patient name:  Rosi Haley  Date of admission:  12/15/2023 12:01 PM  MRN:   [id number]  Account:  [id number]  YOB: 1945  PCP:    Radha Wynn MD  Room:   2010/2010-01  Code Status:    Full Code    Chief Complaint:     Chief Complaint   Patient presents with    Melena     Reporting black stool that began today. Was recently hospitalized for hematochezia (suspects related to hemorrhoids) but states it was lighter in appearance.  Had a blood transfusion and iron supplements during admission  Has upper GI scope scheduled for Tuesday  Denies ABD pain N/V       History Obtained From:     Patient/EMR/Bedside RN    History of Present Illness:     Rosi Haley is a 78 y.o. Non- / non  female who presents with Melena (Reporting black stool that began today. Was recently hospitalized for hematochezia (suspects related to hemorrhoids) but states it was lighter in appearance./Had a blood transfusion and iron supplements during admission/Has upper GI scope scheduled for Tuesday/Denies ABD pain N/V)   and is admitted to the hospital for the management of Acute GI bleeding.  78 old female with underlying history of atrial fibrillation on Eliquis, coronary disease, chronic back pain, hypertension dyslipidemia has been having GI bleed, multiple EGD colonoscopies, seen by gastroenterology was scheduled to have outpatient capsule endoscopy presented with black stools, hemoglobin was 7.1 on admission, given unit of blood, subsequent hemoglobin check was 6, gastroenterology was on board, few attempts to check the hemoglobin failed with multiple phlebotomist on board,  Emergently patient had PICC 
     [facility] Internal Medicine  Meir Blum MD; Ritchie Marrero MD; Juan Francisco Alba MD; MD Zaida Costa MD; Lita Raymond MD    [facility] Internal Medicine   IN-PATIENT SERVICE   [facility]    Progress note            Date:   12/17/2023  Patient name:  Rosi Haley  Date of admission:  12/15/2023 12:01 PM  MRN:   [id number]  Account:  [id number]  YOB: 1945  PCP:    Radha Wynn MD  Room:   2107/2107-01  Code Status:    Full Code    Chief Complaint:     Chief Complaint   Patient presents with    Melena     Reporting black stool that began today. Was recently hospitalized for hematochezia (suspects related to hemorrhoids) but states it was lighter in appearance.  Had a blood transfusion and iron supplements during admission  Has upper GI scope scheduled for Tuesday  Denies ABD pain N/V       History Obtained From:     Patient/EMR/Bedside RN    History of Present Illness:     Rosi Halye is a 78 y.o. Non- / non  female who presents with Melena (Reporting black stool that began today. Was recently hospitalized for hematochezia (suspects related to hemorrhoids) but states it was lighter in appearance./Had a blood transfusion and iron supplements during admission/Has upper GI scope scheduled for Tuesday/Denies ABD pain N/V)   and is admitted to the hospital for the management of Acute GI bleeding.  78 old female with underlying history of atrial fibrillation on Eliquis, coronary disease, chronic back pain, hypertension dyslipidemia has been having GI bleed, multiple EGD colonoscopies, seen by gastroenterology was scheduled to have outpatient capsule endoscopy presented with black stools, hemoglobin was 7.1 on admission, given unit of blood, subsequent hemoglobin check was 6, gastroenterology was on board, few attempts to check the hemoglobin failed with multiple phlebotomist on board,  Emergently patient had PICC 
     [facility] Internal Medicine  Meir Blum MD; Ritchie Marrero MD; Juan Francisco Alba MD; MD Zaida Costa MD; Lita Raymond MD    [facility] Internal Medicine   IN-PATIENT SERVICE   [facility]    Progress note            Date:   12/23/2023  Patient name:  Rosi Haley  Date of admission:  12/15/2023 12:01 PM  MRN:   [id number]  Account:  [id number]  YOB: 1945  PCP:    Radha Wynn MD  Room:   2116/2116-01  Code Status:    Full Code    Chief Complaint:     Chief Complaint   Patient presents with    Melena     Reporting black stool that began today. Was recently hospitalized for hematochezia (suspects related to hemorrhoids) but states it was lighter in appearance.  Had a blood transfusion and iron supplements during admission  Has upper GI scope scheduled for Tuesday  Denies ABD pain N/V       History Obtained From:     Patient/EMR/Bedside RN    History of Present Illness:     Rosi Haley is a 78 y.o. Non- / non  female who presents with Melena (Reporting black stool that began today. Was recently hospitalized for hematochezia (suspects related to hemorrhoids) but states it was lighter in appearance./Had a blood transfusion and iron supplements during admission/Has upper GI scope scheduled for Tuesday/Denies ABD pain N/V)   and is admitted to the hospital for the management of Acute GI bleeding.  78 old female with underlying history of atrial fibrillation on Eliquis, coronary disease, chronic back pain, hypertension dyslipidemia has been having GI bleed, multiple EGD colonoscopies, seen by gastroenterology was scheduled to have outpatient capsule endoscopy presented with black stools, hemoglobin was 7.1 on admission, given unit of blood, subsequent hemoglobin check was 6, gastroenterology was on board, few attempts to check the hemoglobin failed with multiple phlebotomist on board,  Emergently patient had PICC 
     [facility] Internal Medicine  Meir Blum MD; Ritchie Marrero MD; Juan Francisco Alba MD; MD Zaida Costa MD; Lita Raymond MD    [facility] Internal Medicine   IN-PATIENT SERVICE   [facility]    Progress note            Date:   12/28/2023  Patient name:  Rosi Haley  Date of admission:  12/15/2023 12:01 PM  MRN:   [id number]  Account:  [id number]  YOB: 1945  PCP:    Radha Wynn MD  Room:   2014/2014-01  Code Status:    Full Code    Chief Complaint:     Chief Complaint   Patient presents with    Melena     Reporting black stool that began today. Was recently hospitalized for hematochezia (suspects related to hemorrhoids) but states it was lighter in appearance.  Had a blood transfusion and iron supplements during admission  Has upper GI scope scheduled for Tuesday  Denies ABD pain N/V       History Obtained From:     Patient/EMR/Bedside RN    History of Present Illness:     Rosi Haley is a 78 y.o. Non- / non  female who presents with Melena (Reporting black stool that began today. Was recently hospitalized for hematochezia (suspects related to hemorrhoids) but states it was lighter in appearance./Had a blood transfusion and iron supplements during admission/Has upper GI scope scheduled for Tuesday/Denies ABD pain N/V)   and is admitted to the hospital for the management of Acute GI bleeding.  78 old female with underlying history of atrial fibrillation on Eliquis, coronary disease, chronic back pain, hypertension dyslipidemia has been having GI bleed, multiple EGD colonoscopies, seen by gastroenterology was scheduled to have outpatient capsule endoscopy presented with black stools, hemoglobin was 7.1 on admission, given unit of blood, subsequent hemoglobin check was 6, gastroenterology was on board, few attempts to check the hemoglobin failed with multiple phlebotomist on board,  Emergently patient had PICC 
    [facility] PULMONARY,CRITICAL CARE & SLEEP   Soy Bullard MD/Dane Frost MD/ Roger Sam MD/Dr Jami Hare APRN AGACNP-BC, NP-C      Brielle Briceño APRN NP-C     Mira Lara APRN NP-C                                           Pulmonary Progress Note    Patient - Rosi Haley   Age - 78 y.o.   - 1945  MRN - [id number]  [id number]  Date of Admission - 12/15/2023 12:01 PM    Consulting Service/Physician:       Primary Care Physician: Radha Wynn MD    SUBJECTIVE:     Chief Complaint:   Chief Complaint   Patient presents with    Melena     Reporting black stool that began today. Was recently hospitalized for hematochezia (suspects related to hemorrhoids) but states it was lighter in appearance.  Had a blood transfusion and iron supplements during admission  Has upper GI scope scheduled for Tuesday  Denies ABD pain N/V     Subjective:    Rosi is seen lying in bed.  Per nursing she has been more sleepy today.  She was placed on BiPAP.  ABG was obtained and shows chronic hypercapnia with a pH of 7.42, CO2 of 67, O2 of 75.  Bicarb elevated at 44.  She has been getting diuresed per cardiology with Lasix IV 40 mg twice a day.  Creatinine is 1.4.  BUN is rising.  Hemoglobin was 7.0 this morning with order for 1 unit packed cells per primary service.    VITALS  /68   Pulse 58   Temp 97.9 °F (36.6 °C) (Oral)   Resp 22   Ht 1.651 m (5' 5\")   Wt 132 kg (291 lb 0.1 oz)   SpO2 96%   BMI 48.43 kg/m²   Wt Readings from Last 3 Encounters:   23 132 kg (291 lb 0.1 oz)   23 121.1 kg (267 lb)   11/10/23 123.3 kg (271 lb 12.8 oz)     I/O (24 Hours)    Intake/Output Summary (Last 24 hours) at 2023 0955  Last data filed at 2023 0620  Gross per 24 hour   Intake 1453.75 ml   Output 1450 ml   Net 3.75 ml     Ventilator:   Settings  FiO2 : 30 %  Insp Rise Time (%): 3 %  Exam:   Physical Exam   Constitutional: Morbidly obese female lying in the 
    [facility]    Gastroenterology Daily Progress Note      Patient:   Rosi Haley   :    1945   Facility:   [facility]  Date:     2023  Consultant:   ADITYA Stephens CNP, CNP      SUBJECTIVE  78 y.o. female admitted 12/15/2023 with Melena [K92.1]  Hypokalemia [E87.6]  Acute GI bleeding [K92.2]  Anemia, unspecified type [D64.9] and seen for anemia with melena. The pt was seen and examined. Reports fatigue hgb 7.6, had brown colored stool last night. No c/o abdominal pain or nausea.        OBJECTIVE  Scheduled Meds:   [Held by provider] apixaban  5 mg Oral BID    [Held by provider] aspirin  81 mg Oral Daily    carvedilol  25 mg Oral BID WC    budesonide-formoterol  2 puff Inhalation BID RT    furosemide  20 mg Oral Daily    gabapentin  300 mg Oral BID    hydrALAZINE  25 mg Oral 2 times per day    pravastatin  40 mg Oral Daily    sodium chloride flush  5-40 mL IntraVENous 2 times per day       Vital Signs:  BP (!) 115/42   Pulse 57   Temp 98.2 °F (36.8 °C) (Axillary)   Resp 22   Ht 1.651 m (5' 5\")   Wt 123.7 kg (272 lb 11.3 oz)   SpO2 92%   BMI 45.38 kg/m²    Review of Systems - History obtained from chart review and the patient  General ROS: positive for  - fatigue  Respiratory ROS: positive for - shortness of breath  Cardiovascular ROS: positive for - shortness of breath  Gastrointestinal ROS: no abdominal pain, change in bowel habits, or black or bloody stools   Physical Exam:     General Appearance: ill appearing alert and oriented to person, place and time, well-developed and well-nourished, in no acute distress  Skin: warm and dry, no rash or erythema  Head: normocephalic and atraumatic  Eyes: pupils equal, round, and reactive to light, extraocular eye movements intact, conjunctivae normal  ENT: hearing grossly normal bilaterally  Neck: neck supple and non tender without mass, no thyromegaly or thyroid nodules, no cervical lymphadenopathy 
    [facility]   Gastroenterology Progress Note    Rosi Haley is a 78 y.o. female patient.  Hospitalization Day:9      Chief consult reason:   Melena    Subjective:  Pt seen and examined. No acute events overnight  Tolerating reg breakfast  Denies any rectal bleeding   Hgb stable 8.1 g/dL    VITALS:  /87   Pulse 70   Temp 98 °F (36.7 °C) (Oral)   Resp 20   Ht 1.651 m (5' 5\")   Wt 134.2 kg (295 lb 13.7 oz)   SpO2 97%   BMI 49.23 kg/m²   TEMPERATURE:  Current - Temp: 98 °F (36.7 °C); Max - Temp  Av.6 °F (36.4 °C)  Min: 97.2 °F (36.2 °C)  Max: 98 °F (36.7 °C)    Physical Assessment:  General appearance:  alert, cooperative and no distress  Mental Status:  oriented to person, place and time and normal affect  Lungs:  clear to auscultation bilaterally, normal effort  Heart:  regular rate and rhythm, no murmur  Abdomen:  soft, nontender, nondistended, normal bowel sounds, no masses, hepatomegaly, splenomegaly  Extremities:  no edema, redness, tenderness in the calves  Skin:  no gross lesions, rashes, induration    Data Review:    Labs and Imaging:     CBC:  Recent Labs     23  1037 23  1707 23  2123 23  0737 23  0458   WBC  --   --   --  6.4 5.7   HGB 8.1* 7.7* 8.0* 8.1* 8.1*   MCV  --   --   --  95.1 96.8   RDW  --   --   --  19.5* 19.7*   PLT  --   --   --  180 190       ANEMIA STUDIES:  No results for input(s): \"LABIRON\", \"TIBC\", \"FERRITIN\", \"JYMCGMPF69\", \"FOLATE\", \"OCCULTBLD\" in the last 72 hours.    BMP:  Recent Labs     23  0737 23  0458    145*   K 3.7 3.7    103   CO2 36* 35*   BUN 37* 34*   CREATININE 1.5* 1.2*   GLUCOSE 120* 121*   CALCIUM 10.0 9.7       LFTS:  No results for input(s): \"ALKPHOS\", \"ALT\", \"AST\", \"BILITOT\", \"BILIDIR\", \"LABALBU\" in the last 72 hours.    Amylase/Lipase and Ammonia:  No results for input(s): \"AMYLASE\", \"LIPASE\", \"AMMONIA\" in the last 72 hours.    Acute Hepatitis Panel:  No results found for: \"HEPBSAG\", 
    [facility]    Gastroenterology Daily Progress Note      Patient:   Rosi Haley   :    1945   Facility:   [facility] [place]  Date:     2023  Consultant:   ADITYA Stephens CNP, CNP      SUBJECTIVE  78 y.o. female admitted 12/15/2023 with Melena [K92.1]  Hypokalemia [E87.6]  Acute GI bleeding [K92.2]  Anemia, unspecified type [D64.9] and seen for anemia with melena. The pt was seen and examined. No bm overnight, hgb 8.1 no c/o abdominal pain tolerating a diet. .        OBJECTIVE  Scheduled Meds:   magnesium oxide  400 mg Oral BID    [Held by provider] apixaban  5 mg Oral BID    [Held by provider] aspirin  81 mg Oral Daily    carvedilol  25 mg Oral BID WC    budesonide-formoterol  2 puff Inhalation BID RT    gabapentin  300 mg Oral BID    hydrALAZINE  25 mg Oral 2 times per day    pravastatin  40 mg Oral Daily    sodium chloride flush  5-40 mL IntraVENous 2 times per day       Vital Signs:  BP (!) 174/45   Pulse 58   Temp 97.7 °F (36.5 °C) (Axillary)   Resp 13   Ht 1.651 m (5' 5\")   Wt 130.4 kg (287 lb 6.4 oz)   SpO2 94%   BMI 47.83 kg/m²    Review of Systems - History obtained from chart review and the patient  General ROS: positive for  - fatigue  Respiratory ROS: positive for - shortness of breath  Cardiovascular ROS: positive for - shortness of breath  Gastrointestinal ROS: no abdominal pain, change in bowel habits, or black or bloody stools   Physical Exam:     General Appearance:  ill appearing alert and oriented to person, place and time, well-developed and well-nourished, in no acute distress  Skin: warm and dry, no rash or erythema  Head: normocephalic and atraumatic  Eyes: pupils equal, round, and reactive to light, extraocular eye movements intact, conjunctivae normal  ENT: hearing grossly normal bilaterally  Neck: neck supple and non tender without mass, no thyromegaly or thyroid nodules, no cervical lymphadenopathy   Pulmonary/Chest: clear to 
    [facility] PULMONARY,CRITICAL CARE & SLEEP   Soy Bullard MD/Dane Frost MD/ Roger Sam MD/Dr Jami BURGESS AGACNP-BC, NP-C      Brielle Briceño APRN NP-C     Mira Lara APRN NP-C                                           Pulmonary Progress Note    Patient - Rosi Haley   Age - 78 y.o.   - 1945  MRN - [id number]  [id number]  Date of Admission - 12/15/2023 12:01 PM    Consulting Service/Physician:       Primary Care Physician: Radha Wynn MD    SUBJECTIVE:     Chief Complaint:   Chief Complaint   Patient presents with    Melena     Reporting black stool that began today. Was recently hospitalized for hematochezia (suspects related to hemorrhoids) but states it was lighter in appearance.  Had a blood transfusion and iron supplements during admission  Has upper GI scope scheduled for Tuesday  Denies ABD pain N/V     Subjective:    Rosi is seen sitting up in bed.  She tells me she was doing well until she choked on an egg a few seconds ago.  When I entered the room she was coughing.  She was on 2-1/2 L with pulse ox 88 to 90%.  Oxygen was turned up to 3 L.  She tells me she did tolerate the BiPAP last night.  Venous Dopplers were negative.  She has been afebrile.  Hemoglobin was 7.4.  BMP was not checked today.    VITALS  BP (!) 159/63   Pulse 58   Temp 98.8 °F (37.1 °C)   Resp 20   Ht 1.651 m (5' 5\")   Wt 128.6 kg (283 lb 8.2 oz)   SpO2 98%   BMI 47.18 kg/m²   Wt Readings from Last 3 Encounters:   23 128.6 kg (283 lb 8.2 oz)   23 121.1 kg (267 lb)   11/10/23 123.3 kg (271 lb 12.8 oz)     I/O (24 Hours)    Intake/Output Summary (Last 24 hours) at 2023 1145  Last data filed at 2023 0152  Gross per 24 hour   Intake --   Output 600 ml   Net -600 ml     Ventilator:   Settings  FiO2 : 30 %  Insp Rise Time (%): 3 %  Exam:   Physical Exam   Constitutional: Morbidly obese female sitting up in the bed placed on 3 L, 
    [facility]    Gastroenterology Daily Progress Note      Patient:   Rosi Haley   :    1945   Facility:   [facility]  Date:     2023  Consultant:   ADITYA Stephens CNP, CNP      SUBJECTIVE  78 y.o. female admitted 12/15/2023 with Melena [K92.1]  Hypokalemia [E87.6]  Acute GI bleeding [K92.2]  Anemia, unspecified type [D64.9] and seen for anemia. The pt was seen and examined. No BM, denies nausea and abdominal pain, had v tach this afternoon and is now c/o tremors and inability to walk. Hgb 7.4.        OBJECTIVE  Scheduled Meds:   [Held by provider] apixaban  5 mg Oral BID    [Held by provider] aspirin  81 mg Oral Daily    carvedilol  25 mg Oral BID WC    budesonide-formoterol  2 puff Inhalation BID RT    furosemide  20 mg Oral Daily    gabapentin  300 mg Oral BID    hydrALAZINE  25 mg Oral 2 times per day    pravastatin  40 mg Oral Daily    sodium chloride flush  5-40 mL IntraVENous 2 times per day       Vital Signs:  BP (!) 128/52 Comment: RN notified  Pulse 62   Temp 98 °F (36.7 °C) (Oral)   Resp 17   Ht 1.651 m (5' 5\")   Wt 123.7 kg (272 lb 11.3 oz)   SpO2 97%   BMI 45.38 kg/m²    Review of Systems - History obtained from chart review and the patient  General ROS: positive for  - fatigue  Respiratory ROS: positive for - shortness of breath  Cardiovascular ROS: positive for - v tach this afternoon  Gastrointestinal ROS: no abdominal pain, change in bowel habits, or black or bloody stools   Physical Exam:     General Appearance: ill appearing alert and oriented to person, place and time, well-developed and well-nourished, in no acute distress  Skin: warm and dry, no rash or erythema  Head: normocephalic and atraumatic  Eyes: pupils equal, round, and reactive to light, extraocular eye movements intact, conjunctivae normal  ENT: hearing grossly normal bilaterally  Neck: neck supple and non tender without mass, no thyromegaly or thyroid nodules, no cervical 
    [facility] PULMONARY,CRITICAL CARE & SLEEP   Soy Bullard MD/Dane Frost MD/Bill BURGESS AGACNP-BC, NP-C      Brielle BURGESS NP-C     Mira BURGESS NP-C                                           Pulmonary Progress Note    Patient - Rosi Haley   Age - 78 y.o.   - 1945  MRN - [id number]  Acct # - [id number]  Date of Admission - 12/15/2023 12:01 PM    Consulting Service/Physician:       Primary Care Physician: Radha Wynn MD    SUBJECTIVE:     Chief Complaint:   Chief Complaint   Patient presents with    Melena     Reporting black stool that began today. Was recently hospitalized for hematochezia (suspects related to hemorrhoids) but states it was lighter in appearance.  Had a blood transfusion and iron supplements during admission  Has upper GI scope scheduled for Tuesday  Denies ABD pain N/V     Subjective:    She is more alert today, still with some episodes of confusion.  I am told she did not use the BiPAP overnight.  She does look edematous, Lasix was ordered earlier.  No lab work for today.  No other acute issues or concerns per nursing.   is trying encourage her to use BiPAP when she sleeps.    VITALS  BP (!) 153/66   Pulse 60   Temp 97.6 °F (36.4 °C) (Axillary)   Resp 18   Ht 1.651 m (5' 5\")   Wt 134.2 kg (295 lb 13.7 oz)   SpO2 96%   BMI 49.23 kg/m²   Wt Readings from Last 3 Encounters:   23 134.2 kg (295 lb 13.7 oz)   23 121.1 kg (267 lb)   11/10/23 123.3 kg (271 lb 12.8 oz)     I/O (24 Hours)    Intake/Output Summary (Last 24 hours) at 2023 1406  Last data filed at 2023 0600  Gross per 24 hour   Intake 2480 ml   Output 650 ml   Net 1830 ml     Ventilator:   Settings  FiO2 : 30 %  Insp Rise Time (%): 3 %  Exam:   Physical Exam   Constitutional: Awake, more alert today, able to answer questions, does not appear to be in distress  HENT: Unremarkable, nasal cannula in place  Head: Normocephalic 
    [facility]    Gastroenterology Daily Progress Note      Patient:   Rosi Haley   :    1945   Facility:   [facility]  Date:     2023  Consultant:   ADITYA Stephens - CNP, CNP      SUBJECTIVE  78 y.o. female admitted 12/15/2023 with Melena [K92.1]  Hypokalemia [E87.6]  Acute GI bleeding [K92.2]  Anemia, unspecified type [D64.9] and seen for anemia with melena. The pt was seen and examined. She is s/p egd yesterday that is discussed below. No BM overnight hgb 7.3 denies abdominal pain. .        OBJECTIVE  Scheduled Meds:   [Held by provider] apixaban  5 mg Oral BID    [Held by provider] aspirin  81 mg Oral Daily    carvedilol  25 mg Oral BID WC    budesonide-formoterol  2 puff Inhalation BID RT    furosemide  20 mg Oral Daily    gabapentin  300 mg Oral BID    hydrALAZINE  25 mg Oral 2 times per day    pravastatin  40 mg Oral Daily    sodium chloride flush  5-40 mL IntraVENous 2 times per day       Vital Signs:  /78   Pulse 61   Temp 97.8 °F (36.6 °C) (Oral)   Resp 15   Ht 1.651 m (5' 5\")   Wt 123.7 kg (272 lb 11.3 oz)   SpO2 100%   BMI 45.38 kg/m²    Review of Systems - History obtained from chart review and the patient  General ROS: positive for  - fatigue  Respiratory ROS: positive for - shortness of breath  Cardiovascular ROS: negative for - chest pain  Gastrointestinal ROS: positive for - melena   Physical Exam:     General Appearance: ill appearing alert and oriented to person, place and time, well-developed and well-nourished, in no acute distress  Skin: warm and dry, no rash or erythema  Head: normocephalic and atraumatic  Eyes: pupils equal, round, and reactive to light, extraocular eye movements intact, conjunctivae normal  ENT: hearing grossly normal bilaterally  Neck: neck supple and non tender without mass, no thyromegaly or thyroid nodules, no cervical lymphadenopathy   Pulmonary/Chest: clear to auscultation bilaterally- no wheezes, rales or 
   12/16/23 0810   Encounter Summary   Support System [facility] community   Spiritual/Emotional needs   Type Spiritual Support   Plan and Referrals   Plan/Referrals Contacted support as requested per patient/family request Plan  ([facility])       
   12/18/23 0827   Encounter Summary   Encounter Overview/Reason   Encounter   Service Provided For: Patient   Referral/Consult From: Aramis   Last Encounter  12/17/23   Complexity of Encounter Low   Begin Time 1000   End Time  1015   Total Time Calculated 15 min   Spiritual/Emotional needs   Type Spiritual Support   Rituals, Rites and Sacraments   Type Sacrament of Sick       
   12/20/23 2010   Encounter Summary   Encounter Overview/Reason  Volunteer Encounter   Service Provided For: Patient   Referral/Consult From: Aramis   Last Encounter  12/20/23   Complexity of Encounter Low   Spiritual/Emotional needs   Type Spiritual Support   Rituals, Rites and Sacraments   Type Mu-ism Communion   Assessment/Intervention/Outcome   Intervention Prayer (assurance of)/[place]       
   12/22/23 1250   Encounter Summary   Encounter Overview/Reason  Volunteer Encounter   Service Provided For: Patient and family together   Referral/Consult From: Rounding   Last Encounter  12/22/23   Complexity of Encounter Low   Spiritual/Emotional needs   Type Spiritual Support   Rituals, Rites and Sacraments   Type Moravian Communion  (x2)       
   12/24/23 1300   Encounter Summary   Encounter Overview/Reason  Volunteer Encounter   Service Provided For: Patient and family together   Referral/Consult From: Rounding   Last Encounter  12/24/23   Complexity of Encounter Low   Begin Time 1000   End Time  1015   Total Time Calculated 15 min   Spiritual/Emotional needs   Type Spiritual Support   Rituals, Rites and Sacraments   Type Orthodoxy Communion  (X2)       
   12/25/23 1053   Encounter Summary   Encounter Overview/Reason  Volunteer Encounter   Service Provided For: Patient and family together   Referral/Consult From: Rounding   Last Encounter  12/25/23   Complexity of Encounter Low   Begin Time 1000   End Time  1015   Total Time Calculated 15 min   Spiritual/Emotional needs   Type Spiritual Support   Rituals, Rites and Sacraments   Type Pentecostal Communion  (X2)       
   12/26/23 1224   Encounter Summary   Encounter Overview/Reason  Volunteer Encounter   Service Provided For: Patient and family together   Referral/Consult From: Rounding   Last Encounter  12/26/23   Complexity of Encounter Low   Begin Time 1000   End Time  1015   Total Time Calculated 15 min   Spiritual/Emotional needs   Type Spiritual Support   Rituals, Rites and Sacraments   Type Baptism Communion  (X2)       
   12/27/23 1404   Encounter Summary   Encounter Overview/Reason  Volunteer Encounter   Service Provided For: Patient   Referral/Consult From: Rounding   Last Encounter  12/27/23   Complexity of Encounter Low   Spiritual/Emotional needs   Type Spiritual Support   Rituals, Rites and Sacraments   Type Mandaen Communion       
   12/28/23 1442   Encounter Summary   Encounter Overview/Reason  Volunteer Encounter   Service Provided For: Patient   Referral/Consult From: Rounding   Last Encounter  12/28/23   Complexity of Encounter Low   Spiritual/Emotional needs   Type Spiritual Support   Rituals, Rites and Sacraments   Type Anglican Communion        12/28/23 1442   Encounter Summary   Encounter Overview/Reason  Volunteer Encounter   Service Provided For: Patient   Referral/Consult From: Rounding   Last Encounter  12/28/23   Complexity of Encounter Low   Spiritual/Emotional needs   Type Spiritual Support   Rituals, Rites and Sacraments   Type Anglican Communion       
   12/29/23 1245   Encounter Summary   Encounter Overview/Reason  Volunteer Encounter   Service Provided For: Patient   Referral/Consult From: Aramis   Last Encounter  12/29/23   Complexity of Encounter Low   Spiritual/Emotional needs   Type Spiritual Support   Rituals, Rites and Sacraments   Type Restorationist Communion   Assessment/Intervention/Outcome   Intervention Prayer (assurance of)/[place]       
   12/29/23 1247   Encounter Summary   Encounter Overview/Reason  Volunteer Encounter   Service Provided For: Patient   Referral/Consult From: Aramis   Last Encounter  12/29/23   Complexity of Encounter Low   Spiritual/Emotional needs   Type Spiritual Support   Rituals, Rites and Sacraments   Type Christian Communion   Assessment/Intervention/Outcome   Intervention Prayer (assurance of)/[place]       
   GI Progress notes    12/23/2023   11:09 AM    Name:  Rosi Haley  MRN:    [id number]     Acct:     [id number]   Room:  2116/2116-01   Day: 8     Admit Date: 12/15/2023 12:01 PM  PCP: Radha Wynn MD    Subjective:     C/C:   Chief Complaint   Patient presents with    Melena     Reporting black stool that began today. Was recently hospitalized for hematochezia (suspects related to hemorrhoids) but states it was lighter in appearance.  Had a blood transfusion and iron supplements during admission  Has upper GI scope scheduled for Tuesday  Denies ABD pain N/V       Interval History: Status: not changed.     Patient seen and examined  No acute events overnight  Hgb stabilized  No abdominal pain, nausea, vomiting  No BM x 3 days per patient    ROS:  Constitutional: negative for chills, fevers and sweats  Gastrointestinal: negative for abdominal pain, constipation, diarrhea, nausea and vomiting  Neurological: negative for dizziness and headaches    Medications:     Allergies:   Allergies   Allergen Reactions    Amlodipine Other (See Comments)     Ankle and leg swelling    Aspirin Itching and Rash     Pt can only take 81mg       Current Meds: hydrALAZINE (APRESOLINE) tablet 25 mg, 3 times per day  potassium chloride (KLOR-CON M) extended release tablet 40 mEq, PRN   Or  potassium bicarb-citric acid (EFFER-K) effervescent tablet 40 mEq, PRN   Or  potassium chloride 10 mEq/100 mL IVPB (Peripheral Line), PRN  potassium chloride 10 mEq/100 mL IVPB (Peripheral Line), PRN  HYDROcodone-acetaminophen (NORCO) 5-325 MG per tablet 1 tablet, Q6H PRN  sodium chloride flush 0.9 % injection 10 mL, PRN  sodium chloride flush 0.9 % injection 10 mL, PRN  magnesium oxide (MAG-OX) tablet 400 mg, BID  albuterol (PROVENTIL) (2.5 MG/3ML) 0.083% nebulizer solution 2.5 mg, Q6H PRN  magnesium sulfate 2000 mg in water 50 mL IVPB, PRN  0.9 % sodium chloride infusion, PRN  0.9 % sodium chloride infusion, PRN  0.9 % sodium chloride 
  Physician Progress Note      PATIENT:               AMELIA GUTIERREZ  CSN #:                  [id number]  :                       1945  ADMIT DATE:       12/15/2023 12:01 PM  DISCH DATE:  RESPONDING  PROVIDER #:        Lita Raymond MD          QUERY TEXT:    Pt admitted with GI bleed and has anemia documented. If possible, please   document in progress notes and discharge summary further specificity regarding   the acuity and type of anemia:    The medical record reflects the following:  Risk Factors: GI bleed  Clinical Indicators: Per HP \"Severe anemia due to blood loss\", Hgb of 6.9 on   admission, prior Hgb in  of 12.4, admitted for GI bleed - EGD found   gastritis  Treatment: IVF, labs, RBCs, GI consult, EGD    Thank you,  Roma ARROYO RN  Options provided:  -- Anemia due to acute blood loss  -- Other - I will add my own diagnosis  -- Disagree - Not applicable / Not valid  -- Disagree - Clinically unable to determine / Unknown  -- Refer to Clinical Documentation Reviewer    PROVIDER RESPONSE TEXT:    This patient has acute blood loss anemia.    Query created by: Sandra Delgado on 2023 4:26 PM      QUERY TEXT:    Patient admitted with GI Bleed, noted to have atrial fibrillation and is   maintained on Eliquis. If possible, please document in progress notes and   discharge summary if you are evaluating and/or treating any of the following:    The medical record reflects the following:  Risk Factors: Advanced age, Female, HTN, CHF, CAD  Clinical Indicators: Juanjose Vasc of 6, patient with PAF - on Eliquis  Treatment: Eliquis    Thank you,  Roma ARROYO RN  Options provided:  -- Secondary hypercoagulable state in a patient with atrial fibrillation  -- Other - I will add my own diagnosis  -- Disagree - Not applicable / Not valid  -- Disagree - Clinically unable to determine / Unknown  -- Refer to Clinical Documentation Reviewer    PROVIDER RESPONSE TEXT:    This patient has secondary hypercoagulable state in 
0900: hgb 6.8 Dr. Raymond made aware   1200: orders placed for transfusion but orders needed clarification and fixed prior to transfusion  1400: transfusion orders reordered and clarified   1430: hgb 6.3, type and screen complete.  1600: blood transfusion began    Pt showed no signs of distress. VSS.   
ABG drawn on 3L nasal cannula with results given to Lorena Dupree and Naye HUTSON. Pt is lethargic - A&O x 2, but answers some questions albeit slowly.  
ABG resulted communicated to DOMINIC Carrillo. Orders received for pulmonology consult.   
Adult Non-Invasive Positive Pressure Ventilation for Acute Respiratory Distress  Patient & Family Education Note     Patient: Rosi Haley  Age: 78 y.o.     The patient and/or family has been educated on the following items and have verbalized understanding and agreement:    [x]Patient and/or family have been educated on the purpose and advantages of NIV and have verbalized understanding and agreement.  [x]Patient and/or family is in agreement that the patient will be NPO (Nothing by Mouth) for the duration of NIV use.  [x]Patient and/or  family is in agreement that NIV will not be routinely disrupted except to complete oral care.  [x]Patient and/or family have been educated on the level of care, vital signs frequency and monitoring requirements for NIV and are in agreement.  [x]Patient and/or family have been educated on reporting any nausea, chest discomfort, sudden increase in shortness of breath, or a severe headache to the staff immediately.      
Alana Gil MD  Patient:  SAM GUTIERREZSPRING ROWLEY   YOB: 1945  MRN:  [id number]  Location: [facility]    2107-01 12/20/23 10:23 AM  [phone] From: Becky Mathias [facility] PROG CARE RE: AMELIA GUTIERREZ Debility    Unrea  
BRONCHOSPASM/BRONCHOCONSTRICTION   [x]  IMPROVE AERATION/BREATH SOUNDS  [x]   ADMINISTER BRONCHODILATOR THERAPY AS APPROPRIATE  [x]   ASSESS BREATH SOUNDS  []   IMPLEMENT AEROSOL/MDI PROTOCOL  [x]   PATIENT EDUCATION AS NEEDED    
Bipap on standby at this time.  Pulse Ox-91% 4lpm-  Pt wore last night.  Skin score--  0    Nasal gel pad available at bedside.  Pt awake/alert/no distress noted.          
Call received from RN, requesting that writer come to evaluate patient due to excessive drowsiness, which has been progressively worsening throughout the course of the day.  Upon entering room, patient noted to be awake and talking; however, patient looks tired and lethargic.  It was reported the patient was admitted for rectal bleeding and has received multiple transfusions and IV fluids.  Stat chest x-ray and ABGs ordered.  Patient noted to have an audible cardiac wheeze.  Abnormal results reviewed.  Critical care consult initiated.  Patient transferred to ICU for BiPAP due to fluid overload.    Lorena Dupree, ADITYA - CNP  12/20/2023  8:46 PM    
[facility]   INPATIENT OCCUPATIONAL THERAPY  PROGRESS NOTE  Date: 2023  Patient Name: Rosi Haley       Room:   MRN: [id number]    : 1945  (78 y.o.)  Gender: female   Referring Practitioner: Lita Raymond MD  Diagnosis: Acute GI bleeding    Discharge Recommendations:  Further Occupational Therapy is recommended upon facility discharge.    OT Equipment Recommendations  Other: TBD    Restrictions/Precautions  Restrictions/Precautions  Restrictions/Precautions: Fall Risk;General Precautions  Required Braces or Orthoses?: No  Implants present? : Metal implants (Cardiac stent)  Position Activity Restriction  Other position/activity restrictions: RUE doppler ordered and completed - offical results not released but preliminary results report that all vessels were compressible.  Pt is already on anti-coagulation and RN ok'd treatment session.    SpO2: 91 %  O2 Device: Nasal cannula    Subjective  Subjective  Subjective: \"I don't know, but I guess we can try\"  Subjective  Pain: 710 pain in upper thighs/groin after standing, described as \"burning\"  Comments: Pt willing to attempt standing with RW this date vs Nicole Arellano from previous session.    Objective  Orientation  Overall Orientation Status: Within Functional Limits  Cognition  Overall Cognitive Status: WFL  Arousal/Alertness: Inconsistent responses to stimuli  Following Commands: Follows one step commands with increased time  Attention Span: Difficulty attending to directions;Attends with cues to redirect  Safety Judgement: Decreased awareness of need for safety  Problem Solving: Decreased awareness of errors;Assistance required to correct errors made;Assistance required to identify errors made;Assistance required to implement solutions;Assistance required to generate solutions  Insights: Decreased awareness of deficits  Initiation: Requires cues for all  Sequencing: Requires cues for all  Cognition Comment: Pt 
Comprehensive Nutrition Assessment    Type and Reason for Visit:  Initial, RD Nutrition Re-Screen/LOS    Nutrition Recommendations/Plan:   Continue current diet.      Malnutrition Assessment:  Malnutrition Status:  At risk for malnutrition (Comment) (12/22/23 4424)    Context:  Acute Illness     Findings of the 6 clinical characteristics of malnutrition:  Energy Intake:  Mild decrease in energy intake (Comment)  Weight Loss:  Unable to assess (Not suspected)     Body Fat Loss:  Unable to assess     Muscle Mass Loss:  Unable to assess    Fluid Accumulation:  Moderate to Severe (mat mask wt loss)     Strength:  Not Performed    Nutrition Assessment:    Pt admitted with melena, hypokalemia, acute GI bleeding, anemia. Pt initially was NPO with gradulal advancement to Regular diet. Nurse reports pt has been eating fairly well. Pt not available at time of attempted visit.    Nutrition Related Findings:    Edema: +1 generalized; +2 RUE; +3 pitting RLE. Hgb: 8.1, Hct: 25.0, Glucose: 120. Other labs and meds reviewed.         Current Nutrition Intake & Therapies:    Average Meal Intake: 51-75%, % (estimated)     ADULT DIET; Regular    Anthropometric Measures:  Height: 165.1 cm (5' 5\")  Ideal Body Weight (IBW): 125 lbs (57 kg)    Admission Body Weight: 123.7 kg (272 lb 11.3 oz) (12/16)  Current Body Weight: 130.4 kg (287 lb 7.7 oz), 230 % IBW.    Current BMI (kg/m2): 47.8                          BMI Categories: Obese Class 3 (BMI 40.0 or greater)    Estimated Daily Nutrient Needs:  Energy Requirements Based On: Formula  Weight Used for Energy Requirements: Admission  Energy (kcal/day): 6922-9186 based on Banner-St. Jeor with 1-1.1 factor  Weight Used for Protein Requirements: Ideal  Protein (g/day): 114 based on 2 gm per kg     Fluid (ml/day): per physician    Nutrition Diagnosis:   Overweight/Obese related to excessive energy intake as evidenced by  (prior to admission)    Nutrition Interventions:   Food and/or 
Date:   12/20/2023  Patient name: Rosi Haley  Date of admission:  12/15/2023 12:01 PM  MRN:   [id number]  YOB: 1945  PCP: Radha Wynn MD    Reason for Admission: Melena [K92.1]  Hypokalemia [E87.6]  Acute GI bleeding [K92.2]  Anemia, unspecified type [D64.9]    Cardiology : Ventricular tachyarrhythmia        Referring physician: Dr Lita Raymond     Impression  12/18/2023 episode of nonsustained V. tach 7 beats, patient had low potassium on admission, magnesium 1.4  Inpatient at [facility] with a CHF, paroxysmal A-fib ,4 weeks ago started on Eliquis  History of congestive heart failure/diastolic  History of coronary artery disease, coronary intervention 2017  Conduction disorder, first-degree heart block, [place] interval 216, right bundle branch block,  ms  Hypertension  Blood pressure difference in both arms higher than the left arm  Hyperlipidemia  Obesity  Ex smoker  Degenerative joint disease  History of gout, episode of acute gout 12/8/2023 left big toe  History of hysterectomy     Previous admission 12/5/2023 with a GI bleeding, drop in hemoglobin from 12 to 7  EGD 12/6/2023 showed gastric antrum few small erosions no bleeding, fundus normal, duodenum normal  Colonoscopy 12/10/2023 normal terminal ileum, cecum, ascending colon, transverse colon, descending colon, sigmoid colon, mild sigmoid colon diverticulosis, rectal examination showed large and protruding hemorrhoids with congestion and edema  Nuclear medicine RBC scan negative for GI bleeding 12/20/2023     Drug allergy: Not known     Investigation workup    ECG 12/9/2023  Sinus rhythm heart rate 74, first-degree heart block, right bundle branch     ECG 12/6/2023  Sinus rhythm heart rate 64, first-degree heart block AZ interval 216, right bundle branch block, QRS duration 130     ECG 11/11/2022  Sinus bradycardia heart rate 55 first-degree heart block, right bundle branch block     2D echo 11/14/2023  Normal LV size, 
Date:   12/22/2023  Patient name: Rosi Haley  Date of admission:  12/15/2023 12:01 PM  MRN:   [id number]  YOB: 1945  PCP: Radha Wynn MD    Reason for Admission: Melena [K92.1]  Hypokalemia [E87.6]  Acute GI bleeding [K92.2]  Anemia, unspecified type [D64.9]    Cardiology : Ventricular tachyarrhythmia          Referring physician: Dr Lita Raymond     Impression     Acute hypoxic and hypercapnic respiratory failure 12/21/2023, pH 7.27, pCO2 70.4, pO2 75.3    12/18/2023 episode of nonsustained V. tach 7 beats, patient had low potassium on admission, magnesium 1.4  12/15/2023 readmission with GI bleeding with dark black stool, generalized weakness, hemoglobin 7.1  Inpatient at [facility] with a CHF, paroxysmal A-fib ,4 weeks ago started on Eliquis     History of congestive heart failure/diastolic, ejection fraction 55 to 60% 2D echo 11/14/2023  History of coronary artery disease, coronary intervention 2017  Conduction disorder, first-degree heart block, [place] interval 216, right bundle branch block,  ms  Hypertension  Blood pressure difference in both arms, right arm higher than the left arm  Hyperlipidemia  Hypoalbuminemia serum albumin 2.6  Morbid obesity BMI 48  Ex smoker  Degenerative joint disease  History of gout, episode of acute gout 12/8/2023 left big toe  History of hysterectomy     Previous admission 12/5/2023 with a GI bleeding, drop in hemoglobin from 12 to 7       EGD 12/6/2023 showed gastric antrum few small erosions no bleeding, fundus normal, duodenum normal  Colonoscopy 12/10/2023 normal terminal ileum, cecum, ascending colon, transverse colon, descending colon, sigmoid colon, mild sigmoid colon diverticulosis, rectal examination showed large and protruding hemorrhoids with congestion and edema  Nuclear medicine RBC scan negative for GI bleeding 12/20/2023     Drug allergy: Not known     Investigation workup    ECG 12/21/2023  Sinus bradycardia heart rate 55, right 
Date:   12/24/2023  Patient name: Rosi Haley  Date of admission:  12/15/2023 12:01 PM  MRN:   [id number]  YOB: 1945  PCP: Radha Wynn MD    Reason for Admission: Melena [K92.1]  Hypokalemia [E87.6]  Acute GI bleeding [K92.2]  Anemia, unspecified type [D64.9]    Cardiology follow-up: Ventricular tachyarrhythmia, congestive heart failure, coronary artery disease       Referring physician: Dr Lita Raymond     Impression     Acute hypoxic and hypercapnic respiratory failure 12/21/2023, pH 7.27, pCO2 70.4, pO2 75.3, transported down to stepdown from ICU 12/23/2023 12/18/2023 episode of nonsustained V. tach 7 beats, patient had low potassium on admission, magnesium 1.4  12/15/2023 readmission with GI bleeding with dark black stool, generalized weakness, hemoglobin 7.1  Inpatient at [facility] with a CHF, paroxysmal A-fib ,4 weeks ago started on Eliquis     Congestive heart failure/diastolic, ejection fraction 55 to 60% 2D echo 11/14/2023  History of coronary artery disease, coronary intervention 2017  Conduction disorder, first-degree heart block, AZ interval 216, right bundle branch block,  ms  Hypertension  Blood pressure difference in both arms, right arm higher than the left arm  Hyperlipidemia  Hypoalbuminemia serum albumin 2.6  Morbid obesity BMI 48  Ex smoker  Degenerative joint disease  History of gout, episode of acute gout 12/8/2023 left big toe  History of hysterectomy     Previous admission 12/5/2023 with a GI bleeding, drop in hemoglobin from 12 to 7    EGD 12/6/2023 showed gastric antrum few small erosions no bleeding, fundus normal, duodenum normal  Colonoscopy 12/10/2023 normal terminal ileum, cecum, ascending colon, transverse colon, descending colon, sigmoid colon, mild sigmoid colon diverticulosis, rectal examination showed large and protruding hemorrhoids with congestion and edema  Nuclear medicine RBC scan negative for GI bleeding 12/20/2023     Drug 
Date:   12/25/2023  Patient name: Rosi Haley  Date of admission:  12/15/2023 12:01 PM  MRN:   [id number]  YOB: 1945  PCP: Radha Wynn MD    Reason for Admission: Melena [K92.1]  Hypokalemia [E87.6]  Acute GI bleeding [K92.2]  Anemia, unspecified type [D64.9]    Cardiology follow-up: Ventricular tachyarrhythmia, congestive heart failure, coronary artery disease        Referring physician: Dr Lita Raymond     Impression     Acute hypoxic and hypercapnic respiratory failure 12/21/2023, pH 7.27, pCO2 70.4, pO2 75.3, transported down to stepdown from ICU 12/23/2023 12/18/2023 episode of nonsustained V. tach 7 beats, patient had low potassium on admission, magnesium 1.4  12/15/2023 readmission with GI bleeding with dark black stool, generalized weakness, hemoglobin 7.1  Inpatient at [facility] with a CHF, paroxysmal A-fib ,4 weeks ago started on Eliquis     Congestive heart failure/diastolic, ejection fraction 55 to 60% 2D echo 11/14/2023  History of coronary artery disease, coronary intervention 2017  Conduction disorder, first-degree heart block, [place] interval 216, right bundle branch block,  ms  Hypertension  Blood pressure difference in both arms, right arm higher than the left arm  Hyperlipidemia  Hypoalbuminemia serum albumin 2.6  Morbid obesity BMI 48  Ex smoker  Degenerative joint disease  History of gout, episode of acute gout 12/8/2023 left big toe  History of hysterectomy     Previous admission 12/5/2023 with a GI bleeding, drop in hemoglobin from 12 to 7    EGD 12/6/2023 showed gastric antrum few small erosions no bleeding, fundus normal, duodenum normal  Colonoscopy 12/10/2023 normal terminal ileum, cecum, ascending colon, transverse colon, descending colon, sigmoid colon, mild sigmoid colon diverticulosis, rectal examination showed large and protruding hemorrhoids with congestion and edema  Nuclear medicine RBC scan negative for GI bleeding 12/20/2023     Drug 
Date:   12/28/2023  Patient name: Rosi Haley  Date of admission:  12/15/2023 12:01 PM  MRN:   [id number]  YOB: 1945  PCP: Radha Wynn MD    Reason for Admission: Melena [K92.1]  Hypokalemia [E87.6]  Acute GI bleeding [K92.2]  Anemia, unspecified type [D64.9]    Cardiology follow-up: Ventricular tachyarrhythmia, congestive heart failure, coronary artery disease         Referring physician: Dr Lita Raymond     Impression     Acute hypoxic and hypercapnic respiratory failure 12/21/2023, pH 7.27, pCO2 70.4, pO2 75.3, transported down to stepdown from ICU 12/23/2023  Transferred back to ICU with hypercapnic and hypoxic respiratory failure     12/18/2023 episode of nonsustained V. tach 7 beats, patient had low potassium on admission, magnesium 1.4  12/15/2023 readmission with GI bleeding with dark black stool, generalized weakness, hemoglobin 7.1  Inpatient at [facility] with a CHF, paroxysmal A-fib ,4 weeks ago started on Eliquis     Congestive heart failure/diastolic, ejection fraction 55 to 60% 2D echo 11/14/2023  History of coronary artery disease, coronary intervention 2017  Conduction disorder, first-degree heart block, RI interval 216, right bundle branch block,  ms  Hypertension  Blood pressure difference in both arms, right arm higher than the left arm  Hyperlipidemia  Hypoalbuminemia serum albumin 2.6  Morbid obesity BMI 48  Ex smoker  Degenerative joint disease  History of gout, episode of acute gout 12/8/2023 left big toe  History of hysterectomy     Previous admission 12/5/2023 with a GI bleeding, drop in hemoglobin from 12 to 7    EGD 12/6/2023 showed gastric antrum few small erosions no bleeding, fundus normal, duodenum normal  Colonoscopy 12/10/2023 normal terminal ileum, cecum, ascending colon, transverse colon, descending colon, sigmoid colon, mild sigmoid colon diverticulosis, rectal examination showed large and protruding hemorrhoids with congestion and 
Date:   12/29/2023  Patient name: Rosi Haley  Date of admission:  12/15/2023 12:01 PM  MRN:   [id number]  YOB: 1945  PCP: Radha Wynn MD    Reason for Admission: Melena [K92.1]  Hypokalemia [E87.6]  Acute GI bleeding [K92.2]  Anemia, unspecified type [D64.9]       Cardiology follow-up: Ventricular tachyarrhythmia, congestive heart failure, coronary artery disease         Referring physician: Dr Lita Raymond     Impression     Acute hypoxic and hypercapnic respiratory failure 12/21/2023, pH 7.27, pCO2 70.4, pO2 75.3, transported down to stepdown from ICU 12/23/2023  Transferred back to ICU with hypercapnic and hypoxic respiratory failure     12/18/2023 episode of nonsustained V. tach 7 beats, patient had low potassium on admission, magnesium 1.4  12/15/2023 readmission with GI bleeding with dark black stool, generalized weakness, hemoglobin 7.1  Inpatient at [facility] with a CHF, paroxysmal A-fib ,4 weeks ago started on Eliquis     Congestive heart failure/diastolic, ejection fraction 55 to 60% 2D echo 11/14/2023  General anasarca massive abdominal wall edema and thigh edema  History of coronary artery disease, coronary intervention 2017  Conduction disorder, first-degree heart block, [place] interval 216, right bundle branch block,  ms  Hypertension  Blood pressure difference in both arms, right arm higher than the left arm  Hyperlipidemia  Hypoalbuminemia serum albumin 2.6  Morbid obesity BMI 48  Ex smoker  Degenerative joint disease  History of gout, episode of acute gout 12/8/2023 left big toe  History of hysterectomy     Previous admission 12/5/2023 with a GI bleeding, drop in hemoglobin from 12 to 7      EGD 12/6/2023 showed gastric antrum few small erosions no bleeding, fundus normal, duodenum normal  Colonoscopy 12/10/2023 normal terminal ileum, cecum, ascending colon, transverse colon, descending colon, sigmoid colon, mild sigmoid colon diverticulosis, rectal examination 
Date:   12/30/2023  Patient name: Rosi Haley  Date of admission:  12/15/2023 12:01 PM  MRN:   [id number]  YOB: 1945  PCP: Radha Wynn MD    Reason for Admission: Melena [K92.1]  Hypokalemia [E87.6]  Acute GI bleeding [K92.2]  Anemia, unspecified type [D64.9]    Cardiology follow-up: Ventricular tachyarrhythmia, congestive heart failure, coronary artery disease         Referring physician: Dr Lita Raymond     Impression     Acute hypoxic and hypercapnic respiratory failure 12/21/2023, pH 7.27, pCO2 70.4, pO2 75.3, transported down to stepdown from ICU 12/23/2023  Transferred back to ICU with hypercapnic and hypoxic respiratory failure     12/18/2023 episode of nonsustained V. tach 7 beats, patient had low potassium on admission, magnesium 1.4  12/15/2023 readmission with GI bleeding with dark black stool, generalized weakness, hemoglobin 7.1  Inpatient at [facility] with a CHF, paroxysmal A-fib ,4 weeks ago started on Eliquis     Congestive heart failure/diastolic, ejection fraction 55 to 60% 2D echo 11/14/2023  General anasarca massive abdominal wall edema and thigh edema  History of coronary artery disease, coronary intervention 2017  Conduction disorder, first-degree heart block, [place] interval 216, right bundle branch block,  ms  Hypertension  Blood pressure difference in both arms, right arm higher than the left arm  Hyperlipidemia  Hypoalbuminemia serum albumin 2.6  Morbid obesity BMI 48  Ex smoker  Degenerative joint disease  History of gout, episode of acute gout 12/8/2023 left big toe  History of hysterectomy     Previous admission 12/5/2023 with a GI bleeding, drop in hemoglobin from 12 to 7          EGD 12/6/2023 showed gastric antrum few small erosions no bleeding, fundus normal, duodenum normal  Colonoscopy 12/10/2023 normal terminal ileum, cecum, ascending colon, transverse colon, descending colon, sigmoid colon, mild sigmoid colon diverticulosis, rectal examination 
Dr Bullard called to check on patient. Update given. States to keep bipap continuous for now.  
Dr Frost updated on patient status, patient just transferred from PCU to ICU intermediate 2014. Patient on bipap, ordered continuous ABG's and vitals reviewed. Ok to come off for breaks and to eat when mentally appropriate.   
Dr. Benson notified of recent dark, tarry stool and recent hgb level. Instructed to re-consult/notify GI provider. Suresh Vargas NP with GI notified of hgb level and recent stool via [employer]. Ok to administer SQ heparin dose per Dr. Benson.  
Dr. Bullard calls up to unit. Updated on patient condition and recent ABG's. Notified that patient is alert and following commands. Orders received for CXR.   
Dr. Bullard consulted. See orders.   
Dr. Bullard notified via [employer] regarding patient's worsening mentation and family's concern regarding CO2.  Orders received for ABGs.  
Dr. Manuel at bedside. MD would like EKG to be completed. MD stated that if patient is not in afib, he will be taking her off Eliquis.   
Dr. Manuel notified of pause found on monitor at 8pm this evening of 4.5 seconds.  Current VS of HR 57 and /73 reviewed with Dr. Manuel. Coreg dose decreased from 25mg BID to 6.25mg BID. Patient currently on CPAP.. Patient denies any pain.  
Dr. Manuel notified regarding irregular rhythm, possible block.  Orders received to hold coreg, get EKG.  
Dr. Manuel saw pt at bedside. Pt okay to eat from cardiology standpoint.     1745:Page sent to Dr. Monroe () for diet orders.     1815:No response.  Second page sent to Dr. Monroe for diet orders.     1840: no answer. Paged Dr. Raymond. Dr. Raymond to place orders.    
Dr. Manuel wants 400mg of magnesium twice a day for patient.     Also, Dr. Manuel saw patient at bedside and wants 40mg of IV lasix given tomorrow morning. Hold PO lasix tomorrow 12/21    See Orders   
Dr. Raymond notified that patient's potassium is 3.4 and only has oral-IV potassium sliding scale replacement. Notified that patient is currently NPO due to continuous bipap. Orders received for IV potassium replacement scale.   
Dr. Raymond placed orders for discharge to home. Latest hgb is 7.7. Pt and  aware of outpatient capsule study tomorrow 12/19/2023. PICC Is okay to come out.       1525: Writer and nurses aid went into pt room to assist her to commode at 1440. Pt was very unsteady. Pt was also shaking/ having tremors. Pt states this is new this admission. Writer made clinical lead aware that pt was a heavy 2 assist with a nevaeh steady. PT eval placed.   Dr. Raymond also made aware iof 7 beat run of vtach. Dr. Manuel consulted.     Pt will not be discharged today. Pt and pt  made aware.       
Labs noted, hemolyzed sample and hence abnormal. Repeat in am.  
[facility]  IN-PATIENT SERVICE  [facility]    PROGRESS NOTE             Date:   12/29/2023  Patient name:  Rosi Haley  Date of admission:  12/15/2023 12:01 PM  MRN:   [id number]  YOB: 1945    INTERVAL HISTORY:  78-year-old female presented to the hospital with chief concerns of melena.  The patient underwent by luminal exam, was noted to have nonbleeding ulcers of the gastric antrum, colonoscopy revealed hemorrhoids.  Plans are for outpatient pill study.    SUBJECTIVE:  Patient reports doing well, stools were brown in color.  She does not like the food however denies nausea or vomiting.  Denies abdominal pain.  Breathing is improving.  She reports that she has put out a lot of urine yesterday, charted at -600.  1+    Objective   Vitals:    12/28/23 2300 12/28/23 2335 12/29/23 0326 12/29/23 0400   BP: (!) 145/78   139/72   Pulse: 60 61 95 92   Resp: 26 25 24 24   Temp: 98.1 °F (36.7 °C)   97.2 °F (36.2 °C)   TempSrc: Axillary   Axillary   SpO2: 96% 96% 95% 94%   Weight:    128.6 kg (283 lb 8.2 oz)   Height:             Intake/Output Summary (Last 24 hours) at 12/29/2023 0709  Last data filed at 12/29/2023 0152  Gross per 24 hour   Intake --   Output 600 ml   Net -600 ml     General appearance: well nourished.   HEENT: Normocephalic, no lesions, without obvious abnormality.pupils equal and reactive, EOM normal  Lungs: clear to auscultation bilaterally  Heart: regular rate and rhythm, S1, S2 normal, no murmur, click, rub or gallop  Abdomen: soft, non-tender; bowel sounds normal; no masses,  no organomegaly  Extremities: extremities normal, atraumatic, bilateral lower extremity pitting edema 1+.  Right upper extremity also swollen. Pulses 2+ and symmetrical in all 4 extremities  Neurological: Alert oriented x 3, moves all 4 extremities spontaneously.  Makes adequate conversation    CBC:   Recent Labs     12/27/23  1602 12/28/23  0146 12/28/23  0932 
[facility]   OCCUPATIONAL THERAPY MISSED TREATMENT NOTE   INPATIENT   Date: 23  Patient Name: Rosi Haley       Room: -  MRN: [id number]   Account #: [id number]    : 1945  (78 y.o.)  Gender: female   Referring Practitioner: Lita Raymond MD  Diagnosis: Acute GI bleeding             REASON FOR MISSED TREATMENT:  Hold treatment per nursing request   -   Blood transfusion finished however pt currently resting on bipap, nursing requesting therapy try back after lunch if time permits    Electronically signed by KEY Gamboa on 23 at 11:39 AM EST  
Message sent to Lorena Dupree NP about patient's SBP in the 160s - 170s. See new orders.  
NONINVASIVE VENTILATION    PROVIDE OPTIMAL VENTILATION/ACCEPTABLE SPO2   IMPLEMENT NONINVASIVE VENTILATION PROTOCOL   MAINTAIN ACCEPTABLE SPO2   ASSESS SKIN INTEGRITY/BREAKDOWN SCORE   PATIENT EDUCATION AS NEEDED   BIPAP AS NEEDED        
NONINVASIVE VENTILATION    PROVIDE OPTIMAL VENTILATION/ACCEPTABLE SPO2   IMPLEMENT NONINVASIVE VENTILATION PROTOCOL   MAINTAIN ACCEPTABLE SPO2   ASSESS SKIN INTEGRITY/BREAKDOWN SCORE   PATIENT EDUCATION AS NEEDED   BIPAP AS NEEDED        
[facility]  IN-PATIENT SERVICE  [facility]    PROGRESS NOTE             Date:   12/30/2023  Patient name:  Rosi Haley  Date of admission:  12/15/2023 12:01 PM  MRN:   [id number]  YOB: 1945    INTERVAL HISTORY:  78-year-old female presented to the hospital with chief concerns of melena.  The patient underwent by luminal exam, was noted to have nonbleeding ulcers of the gastric antrum, colonoscopy revealed hemorrhoids.  Plans are for outpatient pill study.    SUBJECTIVE:  Patient reports doing well, stools were brown in color.      Objective   Vitals:    12/30/23 1205 12/30/23 1446 12/30/23 1517 12/30/23 1714   BP: (!) 157/64 (!) 173/66 (!) 166/72 (!) 152/96   Pulse: 59 57 58 86   Resp: 20 21 20   Temp: 97.3 °F (36.3 °C)  98.3 °F (36.8 °C) 97.6 °F (36.4 °C)   TempSrc: Oral  Oral Oral   SpO2: 96%  93% 92%   Weight:       Height:             Intake/Output Summary (Last 24 hours) at 12/30/2023 1946  Last data filed at 12/30/2023 1733  Gross per 24 hour   Intake 1633.75 ml   Output 400 ml   Net 1233.75 ml     General appearance: Fair nourished, on 3 l oxygen which is close to her baseline  HEENT: Normocephalic, no lesions, without obvious abnormality.pupils equal and reactive, EOM normal  Lungs: clear to auscultation bilaterally  Heart: regular rate and rhythm, S1, S2 normal, no murmur, click, rub or gallop  Abdomen: soft, non-tender; bowel sounds normal; no masses,  no organomegaly  Extremities: extremities normal, atraumatic, bilateral lower extremity pitting edema 1+.  Right upper extremity also swollen. Pulses 2+ and symmetrical in all 4 extremities  Neurological: Alert oriented x 3, moves all 4 extremities spontaneously.  Makes adequate conversation    CBC:   Recent Labs     12/28/23  0932 12/28/23  1651 12/29/23  0146 12/29/23  0431 12/30/23  0521 12/30/23  1606   WBC  --   --   --  6.0 6.7  --    HGB 7.6* 7.7* 7.4* 7.4* 7.0* 8.4*   PLT  --   --   -- 
Occupational Therapy  [facility]   INPATIENT OCCUPATIONAL THERAPY  PROGRESS NOTE  Date: 2023  Patient Name: Rosi Haley       Room: 4-01  MRN: [id number]    : 1945  (78 y.o.)  Gender: female   Referring Practitioner: Lita Raymond MD  Diagnosis: Acute GI bleeding      Discharge Recommendations:  Further Occupational Therapy is recommended upon facility discharge.    OT Equipment Recommendations  Other: TBD    Restrictions/Precautions  Restrictions/Precautions  Restrictions/Precautions: Fall Risk;General Precautions  Required Braces or Orthoses?: No  Implants present? : Metal implants (Cardiac stent)    SpO2: 91 %  O2 Device: Nasal cannula  Comment: 2.5 L. Increased SOB with bed mobility and transfer. 85% lowest noted with fair time to increase.    Subjective  Subjective  Subjective: \"I've been wanting to get to this chair for so long\"  Subjective  Pain: 7/10 B calf pain upon standing.  Comments: Pt pleasant and motivated to participate, co-tx with Reina PTA for optima pt safety and perfomance.    Objective  Orientation  Overall Orientation Status: Within Functional Limits  Cognition  Overall Cognitive Status: WFL    Activities of Daily Living  ADL  Feeding: Setup  Grooming: Setup  UE Bathing: Moderate assistance  LE Bathing: Maximum assistance  UE Dressing: Moderate assistance  LE Dressing: Dependent/Total  Toileting: Dependent/Total  Additional Comments: Above levels based on pt report, skilled observation, and clinical reasoning unless otherwise noted.  Skin Care: Soap and water    Balance  Balance  Sitting Balance: Stand by assistance (seated EOB x25, increased time for controlled breathing.)  Standing Balance: Dependent/Total (min A x 2)  Standing Balance  Time: 20 seconds, 45 seconds  Activity: static stand at EOB with BUE, functional transfer to recliner  Comment: pt reported B calf pain in standing, diminished once seated.    Transfers/Mobility  Bed 
Patient complaining of pain in her feet from gout and requesting something for pain. Notified that patient does not usually take anything at home for gout. Awaiting orders to be placed by MD.   
Patient is requesting to stay on nasal cannula at this time. Patient wore Bipap for a total of 6 hours overnight.   
Patient still pretty lethargic during writer assessment. BiPAP kept on at this time and patient educated.   
Patient transferred to 2124  
Patient transferred to RM 2014 on monitor/bipap to intermediate ICU.  Report called, spoke with Elsa HUTSON.  All belongings sent with patient.   and family present and updated regarding POC.  
Pharmacy Medication History Note      List of current medications patient is taking is complete.     Source of information: patient, dispense report, [place]AR[place]    Changes made to medication list:  Medications flagged for removal (include reason, ex. noncompliance):  None     Medications removed (include reason, ex. therapy complete or physician discontinued):  Hydrocortisone suppository - changed to rectal cream due to cost    Medications added/doses adjusted:  Hydrocortisone 2.5% rectal cream apply twice daily for 10 days    Other notes (ex. Recent course of antibiotics, Coumadin dosing):  Per OARRS, last fill of gabapentin was on 12/6/23 with quantity 60 for 30 days.     Denies use of other OTC or herbal medications.      Allergies clarified    Medication list provided to the patient: no   Medication education provided to the patient: none      Electronically signed by Ladonna Whittaker RPH on 12/15/2023 at 3:11 PM                                                     
Physical Therapy  Facility/Department: [facility] PROGRESSIVE CARE  Daily Treatment Note  NAME: Rosi Haley  : 1945  MRN: [id number]    Date of Service: 2023    Discharge Recommendations:  Patient would benefit from continued therapy after discharge, Therapy recommended at discharge   PT Equipment Recommendations  Equipment Needed: No    Patient Diagnosis(es): The primary encounter diagnosis was Melena. Diagnoses of Anemia, unspecified type and Hypokalemia were also pertinent to this visit.    Assessment   Activity Tolerance: Patient limited by endurance (patient requesting to end treatment early citing fatigue)  Equipment Needed: No     Plan    Physical Therapy Plan  General Plan: 5-7 times per week  Current Treatment Recommendations: Strengthening;Balance training;Functional mobility training;Transfer training;Endurance training;Therapeutic activities     Restrictions  Restrictions/Precautions  Restrictions/Precautions: Fall Risk, General Precautions  Required Braces or Orthoses?: No  Implants present? : Metal implants (cardiac stent)     Subjective    Subjective  Subjective: Pateint resting in bed with family member bedside, agreeable to PT/OT co-treat at this time. CARYL Myles approved treatment. Patient requesting to stop treatment while seated EOB stating \"therpay took it out of me, I can't do anymore.\"  Pain: Denies pain, however, reports left LE is tender with touch and mobility.  Orientation  Overall Orientation Status: Within Functional Limits  Cognition  Overall Cognitive Status: WFL   Arousal/Alertness: Inconsistent responses to stimuli  Following Commands: Does not follow commands  Attention Span: Difficulty attending to directions;Unable to maintain attention  Safety Judgement: Decreased awareness of need for safety;Decreased awareness of need for assistance  Problem Solving: Decreased awareness of errors;Assistance required to correct errors made;Assistance required to identify errors 
Physical Therapy  Facility/Department: [facility] ICU  Daily Treatment Note  NAME: Rosi Haley  : 1945  MRN: [id number]    Date of Service: 2023    Discharge Recommendations:  Patient would benefit from continued therapy after discharge, Therapy recommended at discharge        Patient Diagnosis(es): The primary encounter diagnosis was Chronic congestive heart failure, unspecified heart failure type (HCC). Diagnoses of Melena, Anemia, unspecified type, Hypokalemia, and Edema of right upper arm were also pertinent to this visit.      Activity Tolerance: Patient limited by endurance     Plan    Physical Therapy Plan  General Plan: 5-7 times per week  Current Treatment Recommendations: Strengthening;Balance training;Functional mobility training;Transfer training;Endurance training;Therapeutic activities     Restrictions  Restrictions/Precautions  Restrictions/Precautions: Fall Risk, General Precautions  Required Braces or Orthoses?: No  Implants present? : Metal implants (cardiac stent)     Subjective    Pt lying in bed upon entering room, Doppler of R UE was odrdered, nursing  ok'd therapy, as patient has be on anticoagulation, co-treatment ac [facility] OT.    Pain: 7/10 groin pain/upper legs    Cognition  Overall Cognitive Status: WFL  Arousal/Alertness: Inconsistent responses to stimuli  Following Commands: Follows one step commands with increased time  Attention Span: Difficulty attending to directions;Attends with cues to redirect  Safety Judgement: Decreased awareness of need for safety  Problem Solving: Decreased awareness of errors;Assistance required to correct errors made;Assistance required to identify errors made;Assistance required to implement solutions;Assistance required to generate solutions  Insights: Decreased awareness of deficits  Initiation: Requires cues for all  Sequencing: Requires cues for all  Cognition Comment: Pt demonstrates improved attention to task this date, attended to cues for 
Physical Therapy  Facility/Department: [facility] PROGRESSIVE CARE  Physical Therapy Initial Assessment    Name: Rosi Haley  : 1945  MRN: [id number]  Date of Service: 2023    Discharge Recommendations:  Patient would benefit from continued therapy after discharge, Therapy recommended at discharge   PT Equipment Recommendations  Equipment Needed: No      Patient Diagnosis(es): The primary encounter diagnosis was Melena. Diagnoses of Anemia, unspecified type and Hypokalemia were also pertinent to this visit.  Past Medical History:  has a past medical history of Atrial fibrillation (HCC), CAD (coronary artery disease), Chronic bilateral low back pain with left-sided sciatica, Colitis, Dyslipidemia, Hypertension, LLQ pain, Short of breath on exertion, and Wound of left leg.  Past Surgical History:  has a past surgical history that includes Coronary angioplasty with stent; Hysterectomy; Tubal ligation; Tonsillectomy; Colonoscopy; Leg Surgery (Left, 2017); pr i&d below fascia foot 1 bursal space (Left, 2017); Upper gastrointestinal endoscopy (N/A, 2023); Colonoscopy (N/A, 12/10/2023); and Upper gastrointestinal endoscopy (N/A, 2023).    Assessment   Body Structures, Functions, Activity Limitations Requiring Skilled Therapeutic Intervention: Decreased functional mobility ;Decreased balance;Decreased endurance;Decreased strength  Assessment: Impaired mobility due to decreased tolerance to activity and safety concerns of decreased balance and strength  Decision Making: Medium Complexity  History: GI Bleed  Exam: decreased balance, endurance, mobility, strength  Clinical Presentation: evolving  Requires PT Follow-Up: Yes  Activity Tolerance  Activity Tolerance: Patient tolerated evaluation without incident;Patient limited by endurance     Plan   Physical Therapy Plan  General Plan: 5-7 times per week  Current Treatment Recommendations: Strengthening, Balance training, Functional mobility 
Physical Therapy  [facility]    Date: 23  Patient Name: Rosi Haley       Room: 4/2124-01  MRN: [id number]   Account: [id number]   : 1945  (78 y.o.) Gender: female     Referring Practitioner: Lita Raymond MD  Diagnosis: Acute GI bleeding  Past Medical History:  has a past medical history of Atrial fibrillation (HCC), CAD (coronary artery disease), Chronic bilateral low back pain with left-sided sciatica, Colitis, Dyslipidemia, Hypertension, LLQ pain, Short of breath on exertion, and Wound of left leg.   Past Surgical History:   has a past surgical history that includes Coronary angioplasty with stent; Hysterectomy; Tubal ligation; Tonsillectomy; Colonoscopy; Leg Surgery (Left, 2017); pr i&d below fascia foot 1 bursal space (Left, 2017); Upper gastrointestinal endoscopy (N/A, 2023); Colonoscopy (N/A, 12/10/2023); and Upper gastrointestinal endoscopy (N/A, 2023).       Overall Orientation Status: Within Functional Limits  Restrictions/Precautions  Restrictions/Precautions: Fall Risk;General Precautions  Required Braces or Orthoses?: No  Implants present? : Metal implants (Cardiac stent)    Subjective: pt pleasant and eager to try moving. lying in bed upon arrival  Comments: co-treat with PETAR Aldridge       Pain Assessment: 0-10  Pain Level: 10  Pain Location: Leg (calves)  Pain Orientation: Right;Left     Oxygen Therapy  SpO2: 93 %  O2 Device: Nasal cannula  O2 Flow Rate (L/min): 2.5 L/min    Bed Mobility  Supine to Sit: Stand by assistance  Sit to Supine: Unable to assess  Scooting: Moderate assistance (to EOB)  Bed mobility  Scooting: Moderate assistance (to EOB)    Transfers:  Sit to Stand: Moderate Assistance;2 Person Assistance  Stand to Sit: Moderate Assistance;2 Person Assistance                 Ambulation  Comments: pt took 3-4 steps from bed to recliner with RW and Anita x 2        Stairs/Curb  Stairs?: No    EXERCISES    Other exercises?: 
Pt began having coarse crackles and coughing up phlegm, pt experiencing SOB. RN turned off IVF, notified Dr. Manuel. New orders placed for 40 mg IV lasix now and another 40 mg IV lasix at 0700.   
Pt discharged to [facility], Pt picked up by transport in stretcher. Pt telemetry/ PICC line removed. Pt family has all personal belongings. Pt in no signs of distress.  
Pt had 5 beat run of vtach.     Dr. Manuel made aware.    Dr. Manuel wants 2 g of mag ordered per patient mag level. Following sliding scale, mag of 1.6 is to give 2g.   No new orders- follow sliding scale.     Night RN made aware   
Pt has been full liquid diet. Camera procedure outpatient canceled for today. Writer asked Shruthi Guidry for diet order.     See Orders- Regular diet   
Pt is now awake and off of the BiPAP. RN states she just came off a few minutes ago, but that she did have a 30 minute break from it around 0100.  
Pt lethargic and responsive only to sternal rub. RN notified NP Lorena Dupree who presented to bedside. Orders placed for ABGs and STAT chest x ray.   
Pt placed on BiPAP per Dr. Bullard. Settings are 16/8, 35%. Pt tolerating at this time.  
Pt screaming \"save me\" and \"take this thing off of me\". Writer educated pt on importance of bipap and bipap taken off and 1.5 L nasal cannula on.   
Pulmonary Progress Note  [facility]      Patient - Rosi Haley,  Age - 78 y.o.    - 1945      Room Number -    MRN -  [id number]   [facility] # - [id number]  Date of Admission -  12/15/2023 12:01 PM    Consulting Service/Physician   Consulting - Lita Raymond MD  Primary Care Physician - Radha Wynn MD     SUBJECTIVE   Patient appears to be in better spirits.  Currently off the BiPAP.  She looks comfortable with no increased respiratory difficulty.    OBJECTIVE   VITALS    height is 1.651 m (5' 5\") and weight is 130.4 kg (287 lb 6.4 oz). Her axillary temperature is 97.7 °F (36.5 °C). Her blood pressure is 154/49 (abnormal) and her pulse is 70. Her respiration is 36 (abnormal) and oxygen saturation is 96%.     Body mass index is 47.83 kg/m².  Temperature Range: Temp: 97.7 °F (36.5 °C) Temp  Av.9 °F (36.6 °C)  Min: 97.5 °F (36.4 °C)  Max: 98.7 °F (37.1 °C)  BP Range:  Systolic (24hrs), Av , Min:126 , Max:174     Diastolic (24hrs), Av, Min:28, Max:104    Pulse Range: Pulse  Av  Min: 47  Max: 76  Respiration Range: Resp  Av.9  Min: 13  Max: 36  Current Pulse Ox::  SpO2: 96 %  24HR Pulse Ox Range:  SpO2  Av.4 %  Min: 89 %  Max: 99 %  Oxygen Amount and Delivery: O2 Flow Rate (L/min): 1.5 L/min    Wt Readings from Last 3 Encounters:   23 130.4 kg (287 lb 6.4 oz)   23 121.1 kg (267 lb)   11/10/23 123.3 kg (271 lb 12.8 oz)       I/O (24 Hours)    Intake/Output Summary (Last 24 hours) at 2023 1405  Last data filed at 2023 0610  Gross per 24 hour   Intake 949.06 ml   Output 600 ml   Net 349.06 ml       EXAM     General Appearance  Awake, alert, oriented, in no acute distress  HEENT - normocephalic, atraumatic.  Neck - Supple,  trachea midline   Lungs -coarse breath sounds no crackles no rales or wheezes.  Heart Exam:PMI normal. No lifts, heaves, or thrills. RRR. No murmurs, clicks, gallops, or rubs  Abdomen Exam: Abdomen 
Pulmonary Progress Note  [facility]      Patient - Rosi Haley,  Age - 78 y.o.    - 1945      Room Number -    MRN -  [id number]   [facility] # - [id number]  Date of Admission -  12/15/2023 12:01 PM    Consulting Service/Physician   Consulting - Lita Raymond MD  Primary Care Physician - Radha Wynn MD     SUBJECTIVE   Patient appears to be in decent spirits.  She is a bit more lucid although sometimes confused.  Son Gopal is at bedside.    OBJECTIVE   VITALS    height is 1.651 m (5' 5\") and weight is 134.2 kg (295 lb 13.7 oz). Her oral temperature is 97.3 °F (36.3 °C). Her blood pressure is 156/44 (abnormal) and her pulse is 66. Her respiration is 16 and oxygen saturation is 98%.     Body mass index is 49.23 kg/m².  Temperature Range: Temp: 97.3 °F (36.3 °C) Temp  Av.6 °F (37 °C)  Min: 97.3 °F (36.3 °C)  Max: 99.3 °F (37.4 °C)  BP Range:  Systolic (24hrs), Avg:160 , Min:110 , Max:182     Diastolic (24hrs), Av, Min:36, Max:91    Pulse Range: Pulse  Av.9  Min: 59  Max: 74  Respiration Range: Resp  Av.2  Min: 15  Max: 39  Current Pulse Ox::  SpO2: 98 %  24HR Pulse Ox Range:  SpO2  Av %  Min: 94 %  Max: 100 %  Oxygen Amount and Delivery: O2 Flow Rate (L/min): 4 L/min    Wt Readings from Last 3 Encounters:   23 134.2 kg (295 lb 13.7 oz)   23 121.1 kg (267 lb)   11/10/23 123.3 kg (271 lb 12.8 oz)       I/O (24 Hours)    Intake/Output Summary (Last 24 hours) at 2023 1355  Last data filed at 2023 1300  Gross per 24 hour   Intake 565.18 ml   Output 2425 ml   Net -1859.82 ml       EXAM     General Appearance  Awake, alert, oriented, in no acute distress  HEENT - normocephalic, atraumatic.  Neck - Supple,  trachea midline   Lungs -coarse breath sounds no crackles rales or wheezes  Heart Exam:PMI normal. No lifts, heaves, or thrills. RRR. No murmurs, clicks, gallops, or rubs  Abdomen Exam: Abdomen soft, non-tender. 
Pulmonology NP Kaelyn Lara notified of CO2 of 43, instructed to ensure patient wears bi-pap for naps and sleeping and BMP in AM.  
RN gave transfer report to Savita HUTSON on PCU.  
RN received critical lab result of Hgb 6.0 during epic downtime. Paper charting was done and orders were placed for the transfusion of 1u PRBCs. Order was received from Eileen Yeager NP. Charting before, during, and after blood transfusion was done on paper and can be found in pt's chart. Vitals taken during this time were also recorded in epic once downtime was over.   
RN spoke with Dr. Manuel during rounds. New orders for mag level and sliding scale. If mag is normal still give 1 gram of mag, if parameters are met use sliding scale.   
RT checked on pt, wearing BiPAP- sleeping comfortably. RN states the pt was placed on BiPAP around 2000 after med pass.   
Rehabilitation Physical Therapy    Date: 2023  Patient Name: Rosi Haley        MRN: [id number]    : 1945  (78 y.o.)  Gender: female          Discharge Recommendations:  Patient would benefit from continued therapy after discharge, Therapy recommended at discharge  Equipment Needed: No    Assessment  Assessment  Activity Tolerance: Patient tolerated treatment well;Treatment limited secondary to medical complications (Low Hgb 7.0, pt receiving blood at this time.) (Pt reports feeling HOT, per CARYL Moreno, pt has been hot all day. Offered pt 3 ice packs to help cool down. Pt reports ice packs working.)  Discharge Recommendations: Patient would benefit from continued therapy after discharge;Therapy recommended at discharge    Plan  Physical Therapy Plan  General Plan: 5-7 times per week  Current Treatment Recommendations: Strengthening, Balance training, Functional mobility training, Transfer training, Endurance training, Therapeutic activities     Restrictions  Restrictions/Precautions: Fall Risk, General Precautions  Implants present? : Metal implants (Cardiac stent)    Subjective  Subjective: CARYL Moreno reports pt Hgb 7.0 and pt is receiving blood. Okay to work with pt bedside. Pt agreeable to a little exercises in bed to keep the joints moving. No transfers/gait this date.    Overall Cognitive Status: WFL  Arousal/Alertness: Inconsistent responses to stimuli  Following Commands: Follows one step commands with increased time  Attention Span: Difficulty attending to directions, Attends with cues to redirect  Safety Judgement: Decreased awareness of need for safety  Problem Solving: Decreased awareness of errors, Assistance required to correct errors made, Assistance required to identify errors made, Assistance required to implement solutions, Assistance required to generate solutions  Insights: Decreased awareness of deficits  Initiation: Requires cues for all  Sequencing: Requires cues for 
Rn called Jl to give report, stated the nurse would call back she went to bathroom , Rn awaiting call back.  
Rn called down to blood bank to check on unit of RBCs, stated they did not see order. Rn spoke with Dr. Latham on unit an orders were put in again. Rn then called blood bank to confirm order, states they see order will call when blood is ready.  
Rn notified Dr. Alvarez in regards to pt being very fatigue this morning, barely could stay up to take medications, Pt AOX4, Pt recent vitals 136/68 HR 58 Temp 97.9. New order for STAT ABGs.  
Rn notified Dr. Latham of hgb 7.0, new orders to transfuse 1 unit of RBCs.  
Rn received call back from Nurse over at [facility], report given, all questions an concerns answered.  
[facility]   Occupational Therapy Evaluation  Date: 23  Patient Name: Rosi Haley       Room: 7-01  MRN: [id number]  Account: [id number]   : 1945  (78 y.o.) Gender: female     Discharge Recommendations:  Further Occupational Therapy is recommended upon facility discharge.    OT Equipment Recommendations  Other: TBD    Referring Practitioner: Lita Raymond MD  Diagnosis: Acute GI bleeding Additional Pertinent Hx: Per chart review: 78 old female with underlying history of atrial fibrillation on Eliquis, coronary disease, chronic back pain, hypertension dyslipidemia has been having GI bleed, multiple EGD colonoscopies, seen by gastroenterology was scheduled to have outpatient capsule endoscopy presented with black stools, hemoglobin was 7.1 on admission, given unit of blood, subsequent hemoglobin check was 6, gastroenterology was on board, few attempts to check the hemoglobin failed with multiple phlebotomist on board    Treatment Diagnosis: Impaired self-care status    Past Medical History:  has a past medical history of Atrial fibrillation (HCC), CAD (coronary artery disease), Chronic bilateral low back pain with left-sided sciatica, Colitis, Dyslipidemia, Hypertension, LLQ pain, Short of breath on exertion, and Wound of left leg.    Past Surgical History:   has a past surgical history that includes Coronary angioplasty with stent; Hysterectomy; Tubal ligation; Tonsillectomy; Colonoscopy; Leg Surgery (Left, 2017); pr i&d below fascia foot 1 bursal space (Left, 2017); Upper gastrointestinal endoscopy (N/A, 2023); Colonoscopy (N/A, 12/10/2023); and Upper gastrointestinal endoscopy (N/A, 2023).    Restrictions  Restrictions/Precautions  Restrictions/Precautions: Fall Risk;General Precautions  Required Braces or Orthoses?: No  Implants present? : Metal implants (Cardiac stent)      Vitals  Vitals  Pulse: 57  Heart Rate Source: Monitor  BP: (!) 
Unable to flush both ports of PICC line. Lorena Durpee NP notified. Awaiting response.       New orders received for Cathflo for both ports.   
Writer called Dr. Manuel to clarify lasix orders. Nurse over night had orders to give two doses of 40mg.  Pt has scheduled lasix 20mg.     Dr. Manuel stated to not give the PO lasix today.   
Writer messaged Lorena Dupree NP about pt's morning potassium and pro bnp levels. See new orders.   
Writer spoke with Dr. Manuel via phone. If Mag is 1.8 or less give 1g of mag to replace. Misc order placed   
Writer spoke with patient's son Gopal. Writer provided update on pt's status and treatments currently being used. Gopal and his father will be up to visit later.   
Assistance required to implement solutions, Assistance required to generate solutions  Insights: Decreased awareness of deficits  Initiation: Requires cues for all  Sequencing: Requires cues for all  Cognition Comment: Pt frequently states \"I can't\" when asked to perform any task. Extra encouragement to try and attempt activity. Pt is very negative about abilities.  Patient affect:: Blunted    Overall Orientation Status: Within Functional Limits    Bed Mobility  Yes  Interventions: Tactile cues, Verbal cues, Safety awareness training, Manual cues  Rolling: Additional time, Maximum assistance, Assist X1  Supine to Sit: Maximum assistance, Assist X1, Additional time, Adaptive equipment  Sit to Supine: Maximum assistance, Assist X2, Additional time, Adaptive equipment  Scooting: Total assistance (to HOB and to EOB)    Transfer Training  Transfer Training: No (Not ready this date)    Gait Training  Gait Training: No (Not ready this date.)    Balance  Sitting: Impaired  Sitting - Static: Good (unsupported)  Sitting - Dynamic: Poor (constant support)  Standing:  (Not tested this date.)     PT Exercises  Exercise Treatment: Education with handout for Log rolling technique to help pt improve position in bed. Also Handout out to pt and  Home Safety Fall Risk Prevention Booklet from [facility] to prepare home for return.  A/AROM Exercises: Seated EOB bilat LE LAQ [id number] (pt frequently stops and reports \"I can't\", extra time to complete)  Static Sitting Balance Exercises: Dangle EOB 20 min.  Dynamic Sitting Balance Exercises: Seated reaching slightly out of base of support, pt fearful of falling.  Postural Correction Exercises: Cues for upright posture while sitting to improve core strength. Kyphotic posture.  Breathing Techniques: Cues for Pursed Lip Breathing. Pt easily panic and needs frequent reassurance and improved technique.      Education  Education Given To: Patient;Family;Staff  Education Provided: Plan of Care 
clear to auscultation bilaterally- no wheezes, rales or rhonchi, normal air movement, no respiratory distress on nasal cannula oxygen  Cardiovascular: normal rate, regular rhythm, normal S1 and S2, no murmurs, rubs, clicks or gallops, distal pulses intact, no carotid bruits  Abdomen: soft,obese non-tender, non-distended, normal bowel sounds, no masses or organomegaly  Extremities: no cyanosis, clubbing or edema  Musculoskeletal: normal range of motion, no joint swelling, deformity or tenderness  Neurologic: no cranial nerve deficit and muscle strength normal    Lab and Imaging Review     CBC  Recent Labs     12/18/23  0750 12/18/23  1322 12/19/23  1426 12/19/23  2107 12/20/23  0311   WBC 7.2  --   --   --   --    HGB 7.4*   < > 6.3* 8.4* 8.3*   HCT 23.5*   < > 20.0* 25.9* 26.1*   MCV 96.3  --   --   --   --    *  --   --   --   --     < > = values in this interval not displayed.       BMP  Recent Labs     12/18/23  1732 12/18/23  1912 12/19/23  0317 12/19/23  1426     --  143  --    K 4.1  --  4.0  --      --  108*  --    CO2 31  --  29  --    BUN 26*  --  24*  --    CREATININE 1.4*  --  1.4*  --    GLUCOSE 144*  --  128*  --    CALCIUM 8.7  --  9.0  --    MG  --  1.5*  --  1.6     FINDINGS:ct 12/7/23  Lower Chest: Visualized portion of the lower chest demonstrates no acute  abnormality.     Organs: Limited evaluation of the intra-abdominal organs without intravenous  contrast.  Within this limitation, the liver, gallbladder, spleen, pancreas,  adrenal glands, and kidneys demonstrate no acute abnormality.  There are  simple cysts in the liver, for which no follow-up imaging is recommended.  There is also a simple cyst in the right kidney.     GI/Bowel: Colonic diverticulosis.  No bowel obstruction is seen.  Normal  appendix.     Pelvis: The urinary bladder is unremarkable.     Peritoneum/Retroperitoneum: No retroperitoneal hematoma.  Vascular  calcifications are seen.  No lymphadenopathy.  The 
inpatient       Goals:  Previous Goal Met: Progressing toward Goal(s)  Goals: PO intake 75% or greater (with avoidance of wt gain)       Nutrition Monitoring and Evaluation:      Food/Nutrient Intake Outcomes: Food and Nutrient Intake  Physical Signs/Symptoms Outcomes: Biochemical Data, Fluid Status or Edema, Weight    Discharge Planning:    Continue current diet     Juli Rodarte RD, LD  Contact: [phone]      
made;Assistance required to implement solutions;Assistance required to generate solutions  Insights: Decreased awareness of deficits  Initiation: Requires cues for all  Sequencing: Requires cues for all  Cognition Comment: Pateint demonstrating improvement folowing verbal instruction/commands, demonstrating improved carryover of cueing.     Objective   Vitals  O2 Device: Nasal cannula  Comment: SpO2 WNL with activity on 2 Lt     Bed Mobility Training  Bed Mobility Training: Yes  Overall Level of Assistance: Maximum assistance;Assist X2;Additional time (with HOB elevated)  Interventions: Tactile cues;Verbal cues;Safety awareness training (cueing for ahnd placement and sequencing)  Supine to Sit: Maximum assistance;Assist X2;Additional time  Sit to Supine: Maximum assistance;Assist X2  Scooting: Maximum assistance;Assist X2;Additional time (seated scooting to EOB)    Balance  Sitting: Impaired  Sitting - Static: Fair (EOB)  Sitting - Dynamic: Fair- (EOB)  Standing:  With support    Transfer Training  Transfer Training: Yes  Overall Level of Assistance: Maximum assistance;Assist X2;Additional time;Adaptive equipment (Nicole Steady)  Interventions: Safety awareness training;Tactile cues;Verbal cues  Sit to Stand: Maximum assistance;Assist X2;Additional time;Adaptive equipment (with bed elevated>Nicole Steady)  Stand to Sit: Maximum assistance;Assist X2 (assist provided for eccentric control from Nicole Steady to elevated EOB)    Gait Training  Gait Training: No    Wheelchair Management  Wheelchair Management: No    PT Exercises  Exercise Treatment: bed mobility  Pressure Relief Exercises: assist x2 person provided for positional changes  Functional Mobility Circuit Training: STS x2 reps; supine<>sit (STS from elevated EOB<>Nicole Steady)  Static Sitting Balance Exercises: dangled EOB with unilateral UE support ~20 min (demonstrating upright posture)  Dynamic Sitting Balance Exercises: anterior reaching with bilat UE, weight 
open containers  Grooming: Stand by assistance  UE Bathing: Maximum assistance  LE Bathing: Dependent/Total  UE Dressing: Maximum assistance  LE Dressing: Dependent/Total  Toileting: Dependent/Total  Toileting Skilled Clinical Factors: brief/external catheter  Additional Comments: ADL scores based on skilled observation and clinical reasoning, unless otherwise noted. Pt is primarily limited due to generalized weakness, impaired balance, and dyspnea with minimal exertion impacting performance during self care tasks  Skin Care: Chlorhexidine solution;Soap and water    Balance  Balance  Sitting Balance: Dependent/Total (Max x2- Min A)  Standing Balance: Unable to assess(comment) (pt refused)  Standing Balance  Comment: Pt refused standing in SS this date    Transfers/Mobility  Bed mobility  Supine to Sit: Maximum assistance;2 Person assistance  Sit to Supine: 2 Person assistance;Dependent/Total  Scooting: Dependent/Total (Dept to progress hip to EOB, DEP x4 to scoot to HOB after sit-sup)  Bed Mobility Comments: Max vc for bed mobility throughout therapy session, poor carryover, pt often working against therapist assist    Functional Mobility  Functional Mobility Comments: inapropriate at this time.      OT Exercises  Static Sitting Balance Exercises: Pt Max x2-min A sitting EOB. Poor carryover of vc and impulsive movements throughout. Heavy posterior and R lean this date. Pt reorts the R lean was to assist in getting R foot off the ground d/t gout pain, however R lean continued with pt then demanding that R foot touch the ground, bed returned to the lowest level. ~15mins seated EOB pt demanding return to bed stating \"therapy took it out of me I can't do it anymore.\"    Patient Education  Patient Education  Education Given To: Patient  Education Provided: Role of Therapy, Plan of Care, Precautions, Transfer Training  Education Method: Verbal  Barriers to Learning: Hearing  Education Outcome: Verbalized understanding, 
tender without mass, no thyromegaly or thyroid nodules, no cervical lymphadenopathy   Pulmonary/Chest: clear to auscultation bilaterally- no wheezes, rales or rhonchi, normal air movement sob with minimal exertion on nasal cannula o2  Cardiovascular: bradycardic rate, regular rhythm, normal S1 and S2, no murmurs, rubs, clicks or gallops, distal pulses intact, no carotid bruits  Abdomen: soft, obese non-tender, non-distended, normal bowel sounds, no masses or organomegaly  Extremities: no cyanosis, clubbing or edema  Musculoskeletal: normal range of motion, no joint swelling, deformity or tenderness  Neurologic: no cranial nerve deficit and muscle strength normal    Lab and Imaging Review     CBC  Recent Labs     12/20/23  1515 12/20/23  2225 12/21/23  0411   WBC  --   --  6.1   HGB 8.4* 7.9* 8.0*   HCT 25.8* 24.9* 24.7*   MCV  --   --  96.2   PLT  --   --  146*       BMP  Recent Labs     12/18/23  1732 12/18/23  1912 12/19/23  0317 12/19/23  1426 12/21/23  0411     --  143  --  146*   K 4.1  --  4.0  --  3.4*     --  108*  --  106   CO2 31  --  29  --  34*   BUN 26*  --  24*  --  35*   CREATININE 1.4*  --  1.4*  --  1.7*   GLUCOSE 144*  --  128*  --  100*   CALCIUM 8.7  --  9.0  --  9.8   MG  --  1.5*  --  1.6  --        LFTS  Recent Labs     12/21/23  0411   ALKPHOS 51   ALT 17   AST 14   PROT 5.6*   BILITOT 0.6   LABALBU 2.6*     FINDINGS:ct 12/7/23  Lower Chest: Visualized portion of the lower chest demonstrates no acute  abnormality.     Organs: Limited evaluation of the intra-abdominal organs without intravenous  contrast.  Within this limitation, the liver, gallbladder, spleen, pancreas,  adrenal glands, and kidneys demonstrate no acute abnormality.  There are  simple cysts in the liver, for which no follow-up imaging is recommended.  There is also a simple cyst in the right kidney.     GI/Bowel: Colonic diverticulosis.  No bowel obstruction is seen.  Normal  appendix.     Pelvis: The urinary 
  She was transferred back to ICU because of worsening of mental status, hypercapnic and hypoxic respiratory failure venous blood gas showed pH 7.26, pCO2 81.8, pO2 84.8  She was awake and alert  She still has marked edema over the abdomen and lower extremity  ECG monitor sinus rhythm  Blood pressure 170/60, heart rate 66, oxygen saturation 99% on 4 L  Hemoglobin 7.7: WBC 5.3, platelets 214  Sodium 146, potassium 3.4, CO2 38, BUN 35, creatinine 1.0, calcium 10.5  ABG 7.465, pCO2 59.4, pO2 40.3, oxygen saturation 75.1%    Medications:   Scheduled Meds:   heparin (porcine)  5,000 Units SubCUTAneous 3 times per day    carvedilol  12.5 mg Oral BID WC    furosemide  40 mg IntraVENous BID    docusate sodium  100 mg Oral BID    albumin human 25%  25 g IntraVENous Daily    hydrALAZINE  25 mg Oral 3 times per day    magnesium oxide  400 mg Oral BID    [Held by provider] apixaban  5 mg Oral BID    [Held by provider] aspirin  81 mg Oral Daily    budesonide-formoterol  2 puff Inhalation BID RT    gabapentin  300 mg Oral BID    pravastatin  40 mg Oral Daily    sodium chloride flush  5-40 mL IntraVENous 2 times per day     Continuous Infusions:   sodium chloride      sodium chloride      sodium chloride      sodium chloride       CBC:   Recent Labs     12/24/23  1659 12/25/23  0603 12/26/23  0443   WBC 5.1 5.8 5.3   HGB 8.0* 8.3* 7.7*    227 214     BMP:    Recent Labs     12/25/23  0603 12/26/23  0443   * 146*   K 4.2 3.4*    100   CO2 35* 38*   BUN 33* 35*   CREATININE 1.1* 1.0*   GLUCOSE 116* 82     Hepatic: No results for input(s): \"AST\", \"ALT\", \"ALB\", \"BILITOT\", \"ALKPHOS\" in the last 72 hours.  Troponin: No results for input(s): \"TROPONINI\" in the last 72 hours.  BNP: No results for input(s): \"BNP\" in the last 72 hours.  Lipids: No results for input(s): \"CHOL\", \"HDL\" in the last 72 hours.    Invalid input(s): \"LDLCALCU\"  INR: No results for input(s): \"INR\" in the last 72 hours.    Objective: 
8.0, WBC 6.1, platelets 146  Sodium 146, potassium 3.4, BUN 35, creatinine 1.7  proBNP 17,312  Albumin 2.6      Medications:   Scheduled Meds:   magnesium oxide  400 mg Oral BID    [START ON 12/22/2023] furosemide  20 mg Oral Daily    [Held by provider] apixaban  5 mg Oral BID    [Held by provider] aspirin  81 mg Oral Daily    carvedilol  25 mg Oral BID WC    budesonide-formoterol  2 puff Inhalation BID RT    gabapentin  300 mg Oral BID    hydrALAZINE  25 mg Oral 2 times per day    pravastatin  40 mg Oral Daily    sodium chloride flush  5-40 mL IntraVENous 2 times per day     Continuous Infusions:   sodium chloride      sodium chloride      sodium chloride      sodium chloride Stopped (12/20/23 0025)    sodium chloride       CBC:   Recent Labs     12/20/23  1515 12/20/23  2225 12/21/23  0411   WBC  --   --  6.1   HGB 8.4* 7.9* 8.0*   PLT  --   --  146*     BMP:    Recent Labs     12/18/23  1732 12/19/23  0317 12/21/23  0411    143 146*   K 4.1 4.0 3.4*    108* 106   CO2 31 29 34*   BUN 26* 24* 35*   CREATININE 1.4* 1.4* 1.7*   GLUCOSE 144* 128* 100*     Hepatic:   Recent Labs     12/21/23  0411   AST 14   ALT 17   BILITOT 0.6   ALKPHOS 51     Troponin: No results for input(s): \"TROPONINI\" in the last 72 hours.  BNP: No results for input(s): \"BNP\" in the last 72 hours.  Lipids: No results for input(s): \"CHOL\", \"HDL\" in the last 72 hours.    Invalid input(s): \"LDLCALCU\"  INR: No results for input(s): \"INR\" in the last 72 hours.    Objective:   Vitals: BP (!) 152/49   Pulse 66   Temp 98.7 °F (37.1 °C) (Axillary)   Resp 29   Ht 1.651 m (5' 5\")   Wt 130.4 kg (287 lb 6.4 oz)   SpO2 94%   BMI 47.83 kg/m²   General appearance: alert and cooperative with exam  HEENT: Head: Normal, normocephalic, atraumatic.  Neck: no JVD and supple, symmetrical, trachea midline  Lungs: diminished breath sounds bilaterally  Heart: regular rate and rhythm  Abdomen:  Very obese soft bowel sounds present  Extremities: Very 
92.4%  Chest x-ray showed cardiomegaly and vascular congestion without evidence of interstitial edema, no confluent airspace consolidation  Gradually stabilized, transferred out of ICU 12/23/2023  She remains bedbound  She is eating and drinking well  She is on oxygen by nasal cannula  She has massive edema over abdominal wall and lower extremity  ECG monitor sinus rhythm heart rate 60, occasional PVC    Sodium 146, potassium 3.7, BUN 34, creatinine 1.2, albumin 2.6  Hemoglobin 8.1, WBC 5.7, platelets 190    Medications:   Scheduled Meds:   docusate sodium  100 mg Oral BID    hydrALAZINE  25 mg Oral 3 times per day    magnesium oxide  400 mg Oral BID    [Held by provider] apixaban  5 mg Oral BID    [Held by provider] aspirin  81 mg Oral Daily    carvedilol  25 mg Oral BID WC    budesonide-formoterol  2 puff Inhalation BID RT    gabapentin  300 mg Oral BID    pravastatin  40 mg Oral Daily    sodium chloride flush  5-40 mL IntraVENous 2 times per day     Continuous Infusions:   sodium chloride      sodium chloride      sodium chloride      sodium chloride 50 mL/hr at 12/22/23 1921    sodium chloride       CBC:   Recent Labs     12/21/23  0411 12/21/23  1037 12/21/23  2123 12/22/23  0737 12/23/23  0458   WBC 6.1  --   --  6.4 5.7   HGB 8.0*   < > 8.0* 8.1* 8.1*   *  --   --  180 190    < > = values in this interval not displayed.     BMP:    Recent Labs     12/21/23  0411 12/22/23  0737 12/23/23  0458   * 144 145*   K 3.4* 3.7 3.7    105 103   CO2 34* 36* 35*   BUN 35* 37* 34*   CREATININE 1.7* 1.5* 1.2*   GLUCOSE 100* 120* 121*     Hepatic:   Recent Labs     12/21/23 0411   AST 14   ALT 17   BILITOT 0.6   ALKPHOS 51     Troponin: No results for input(s): \"TROPONINI\" in the last 72 hours.  BNP: No results for input(s): \"BNP\" in the last 72 hours.  Lipids: No results for input(s): \"CHOL\", \"HDL\" in the last 72 hours.    Invalid input(s): \"LDLCALCU\"  INR: No results for input(s): \"INR\" in the last 72 
PPI bid. Despite transfusion yesterday an appropriate rise in hemoglobin the patient's hemoglobin has dropped once again.  Doubt the patient has intravascular hemolysis however we will check LDH haptoglobin and LFTs on the patient.  irregular rhythm, possible AV block, cardiology on board.  Paroxysmal atrial fibrillation, continue aspirin 81 mg, Coreg.  Chronic hypoxemic respiratory insufficiency, on 2 L oxygen.  Breathing now improving.  Patient appears euvolemic on exam, switch to oral diuretics.  Discontinue albumin   Acute hypoxemic hypercarbic type II respiratory failure, was transferred to ICU on BiPAP.  Now improved and at baseline  Acute on chronic chronic diastolic heart failure, improved.  Patient to oral diuretics, continue patient on Coreg, consider OP jardiance and spironolactone   NSVT, none over the last 24 hours  History of CAD  Normocytic normochromic anemia secondary to above  Hypernatremia, improving  JANN, continue BiPAP at nighttime      DVT PROPHYLAXIS: Will hold subcutaneous heparin, although no obvious bleeding will watch hemoglobin in a.m.  GI PROPHYLAXIS: PPI twice daily  CODE STATUS: Full Code   DISPOSITION: Rehab tomorrow    Velvet Latham MD, [place] [address].   Phone [phone]   Fax: [phone]  Answering Service: [phone]   
functional mobility , Decreased ADL status, Decreased strength, Decreased endurance, Decreased balance  Treatment Diagnosis: Impaired self-care status  Prognosis: Good  Decision Making: Medium Complexity  Discharge Recommendations: Patient would benefit from continued therapy after discharge  OT Equipment Recommendations  Other: TBD  Safety Devices  Type of Devices: All fall risk precautions in place, Call light within reach, Gait belt, Patient at risk for falls, Nurse notified, Left in bed    AM-PAC Daily Activities Inpatient  AM-PAC Daily Activity - Inpatient   How much help is needed for putting on and taking off regular lower body clothing?: Total  How much help is needed for bathing (which includes washing, rinsing, drying)?: A Lot  How much help is needed for toileting (which includes using toilet, bedpan, or urinal)?: Total  How much help is needed for putting on and taking off regular upper body clothing?: A Lot  How much help is needed for taking care of personal grooming?: A Little  How much help for eating meals?: A Little  AM-PAC Inpatient Daily Activity Raw Score: 12  AM-PAC Inpatient ADL T-Scale Score : 30.6  ADL Inpatient CMS 0-100% Score: 66.57  ADL Inpatient CMS G-Code Modifier : CL    OT Minutes  OT Individual Minutes  Time In: 0833  Time Out: 0908  Minutes: 35      Electronically signed by KEY Reece on 1/1/24 at 3:10 PM EST   
Medium Complexity  Discharge Recommendations: Patient would benefit from continued therapy after discharge  OT Equipment Recommendations  Other: TBD  Safety Devices  Type of Devices: All fall risk precautions in place, Bed alarm in place, Call light within reach, Gait belt, Patient at risk for falls, Left in bed, Nurse notified    AM-PAC Daily Activities Inpatient  AM-PAC Daily Activity - Inpatient   How much help is needed for putting on and taking off regular lower body clothing?: Total  How much help is needed for bathing (which includes washing, rinsing, drying)?: A Lot  How much help is needed for toileting (which includes using toilet, bedpan, or urinal)?: Total  How much help is needed for putting on and taking off regular upper body clothing?: A Lot  How much help is needed for taking care of personal grooming?: A Little  How much help for eating meals?: A Little  AM-PAC Inpatient Daily Activity Raw Score: 12  AM-PAC Inpatient ADL T-Scale Score : 30.6  ADL Inpatient CMS 0-100% Score: 66.57  ADL Inpatient CMS G-Code Modifier : CL    OT Minutes  OT Individual Minutes  Time In: 0938  Time Out: 1022  Minutes: 44      Electronically signed by KEY Schneider on 12/22/23 at 10:49 AM EST  
Method: Verbal  Barriers to Learning: Cognition;Hearing  Education Outcome: Continued education needed    Therapy Time   Individual Concurrent Group Co-treatment   Time In 0938         Time Out 1022         Minutes 44             Devi Posey, MAGDALENA           
Systems:     Positive and Negative as described in HPI.    CONSTITUTIONAL:  negative for fevers, chills, sweats, fatigue, weight loss  HEENT:  negative for vision, hearing changes, runny nose, throat pain  RESPIRATORY:  negative for shortness of breath, cough, congestion, wheezing  CARDIOVASCULAR:  negative for chest pain, palpitations  GASTROINTESTINAL:  negative for nausea, vomiting, diarrhea, constipation, change in bowel habits, abdominal pain   GENITOURINARY:  negative for difficulty of urination, burning with urination, frequency   INTEGUMENT:  negative for rash, skin lesions, easy bruising   HEMATOLOGIC/LYMPHATIC:  negative for swelling/edema   ALLERGIC/IMMUNOLOGIC:  negative for urticaria , itching  ENDOCRINE:  negative increase in drinking, increase in urination, hot or cold intolerance  MUSCULOSKELETAL:  negative joint pains, muscle aches, swelling of joints  NEUROLOGICAL:  negative for headaches, dizziness, lightheadedness, numbness, pain, tingling extremities  BEHAVIOR/PSYCH:  negative for depression, anxiety    Physical Exam:   /73   Pulse 62   Temp 98.1 °F (36.7 °C) (Axillary)   Resp (!) 32   Ht 1.651 m (5' 5\")   Wt 134.2 kg (295 lb 13.7 oz)   SpO2 96%   BMI 49.23 kg/m²   Temp (24hrs), Av.8 °F (36.6 °C), Min:97.1 °F (36.2 °C), Max:98.2 °F (36.8 °C)    No results for input(s): \"POCGLU\" in the last 72 hours.      Intake/Output Summary (Last 24 hours) at 2023 1200  Last data filed at 2023 0621  Gross per 24 hour   Intake 1368 ml   Output 1150 ml   Net 218 ml       General Appearance: alert, well appearing, and in no acute distress  Mental status: oriented to person, place, and time  Head: normocephalic, atraumatic  Eye: no icterus, redness, pupils equal and reactive, extraocular eye movements intact, conjunctiva clear  Ear: normal external ear, no discharge, hearing intact  Nose: no drainage noted  Mouth: mucous membranes moist  Neck: supple, no carotid bruits, thyroid not 
help is needed for putting on and taking off regular lower body clothing?: Total  How much help is needed for bathing (which includes washing, rinsing, drying)?: Total  How much help is needed for toileting (which includes using toilet, bedpan, or urinal)?: Total  How much help is needed for putting on and taking off regular upper body clothing?: A Lot  How much help is needed for taking care of personal grooming?: A Little  How much help for eating meals?: A Little  AM-[facility] Inpatient Daily Activity Raw Score: 11  AM-PAC Inpatient ADL T-Scale Score : 29.04  ADL Inpatient CMS 0-100% Score: 70.42  ADL Inpatient CMS G-Code Modifier : CL    OT Minutes  OT Individual Minutes  Time In: 1543  Time Out: 1615  Minutes: 32  Time Code Minutes   Timed Code Treatment Minutes: 32 Minutes    Electronically signed by DARYA Jacinto on 12/26/23 at 4:36 PM EST   
hypoxic and hypercapnic respiratory failure 12/21/2023  Episode of nonsustained V. Tach/magnesium 1.5 12/18/2023  History of paroxysmal A-fib, no A-fib recorded since admission  Diastolic CHF   Morbid obesity BMI 48  History of CAD, stent placement times 1/2017 at [facility]  Different blood pressure in both arms higher on the left arm  GI bleeding requiring blood transfusion, hemoglobin stable, off the Eliquis    Patient Active Problem List:     Essential hypertension     Dyslipidemia     Chronic midline low back pain without sciatica     Diverticulitis of large intestine without bleeding     Shingles (herpes zoster) polyneuropathy     Thrombocytopenia (HCC)     CKD (chronic kidney disease)     LLQ pain     APC (atrial premature contractions)     Coronary artery disease involving native coronary artery of native heart without angina pectoris     Presence of bare metal stent in left circumflex coronary artery     Dyspnea on exertion     Wheezing     Class 3 severe obesity with body mass index (BMI) of 40.0 to 44.9 in adult ([facility])     Chronic kidney disease, stage III (moderate) ([facility])     Arthritis of lumbar spine     Sleep disorder     Restrictive lung disease     Lumbar radiculopathy     Lumbar degenerative disc disease     Lumbosacral spondylosis without myelopathy     Chronic obstructive pulmonary disease, unspecified     Chronic systolic (congestive) heart failure     Upper GI bleed     Melena     Acute GI bleeding     Anemia     Hypokalemia     Weakness     Chronic congestive heart failure (HCC)     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 tach 7 beats, magnesium 1.5, check magnesium daily, keep magnesium above 2  Replace as per sliding scale protocol, start magnesium p.o. 400 mg twice daily  2: History of paroxysmal A-fib, Eliquis and aspirin on hold/GI bleed no A-fib recorded since admission, reduced carvedilol to 12.5 mg a day  because of 
input(s): \"CHOL\", \"HDL\" in the last 72 hours.    Invalid input(s): \"LDLCALCU\"  INR: No results for input(s): \"INR\" in the last 72 hours.    Objective:   Vitals: BP (!) 148/66   Pulse 56   Temp 97.6 °F (36.4 °C) (Axillary)   Resp 20   Ht 1.651 m (5' 5\")   Wt 132 kg (291 lb 0.1 oz)   SpO2 96%   BMI 48.43 kg/m²   General appearance: alert and cooperative with exam  HEENT: Head: Normal, normocephalic, atraumatic.  Neck: supple, symmetrical, trachea midline  Lungs:  Poor chest expansion diminished breath sounds both side  Heart: regular rate and rhythm  Abdomen:  Obese soft bowel sounds present  Extremities:  All extremities are very obese  Neurologic: Mental status: Alert, oriented, thought content appropriate    EKG: Sinus rhythm, first-degree heart block, right bundle branch block.  PAC  ECHO: reviewed.   Ejection fraction: 60%  Stress Test: not obtained.  Cardiac Angiography: not obtained.    Assessment / Acute Cardiac Problems:     Episode of acute hypoxic and hypercapnic respiratory failure 12/21/2023 on oxygen 24 hours a day  Episode of nonsustained V. Tach/magnesium 1.5 12/18/2023  History of paroxysmal A-fib, no A-fib recorded since admission  Diastolic CHF, general anasarca massive abdominal wall and thigh edema  Morbid obesity BMI 48, very poor mobility  History of CAD, stent placement times 1/2017 at [facility]  Different blood pressure in both arms higher on the left arm  GI bleeding requiring blood transfusion, hemoglobin stable, off the Eliquis  Patient Active Problem List:     Essential hypertension     Dyslipidemia     Chronic midline low back pain without sciatica     Diverticulitis of large intestine without bleeding     Shingles (herpes zoster) polyneuropathy     Thrombocytopenia (HCC)     CKD (chronic kidney disease)     LLQ pain     APC (atrial premature contractions)     Coronary artery disease involving native coronary artery of native heart without angina pectoris     Presence of bare 
left arm  GI bleeding requiring blood transfusion    Patient Active Problem List:     Essential hypertension     Dyslipidemia     Chronic midline low back pain without sciatica     Diverticulitis of large intestine without bleeding     Shingles (herpes zoster) polyneuropathy     Thrombocytopenia (HCC)     CKD (chronic kidney disease)     LLQ pain     APC (atrial premature contractions)     Coronary artery disease involving native coronary artery of native heart without angina pectoris     Presence of bare metal stent in left circumflex coronary artery     Dyspnea on exertion     Wheezing     Class 3 severe obesity with body mass index (BMI) of 40.0 to 44.9 in adult ([facility])     Chronic kidney disease, stage III (moderate) ([facility])     Arthritis of lumbar spine     Sleep disorder     Restrictive lung disease     Lumbar radiculopathy     Lumbar degenerative disc disease     Lumbosacral spondylosis without myelopathy     Chronic obstructive pulmonary disease, unspecified     Chronic systolic (congestive) heart failure     Upper GI bleed     Melena     Acute GI bleeding     Anemia     Hypokalemia     Weakness      Plan of Treatment:   Medications reviewed  1: Nonsustained V. tach 7 beats, magnesium 1.5, check magnesium daily, keep magnesium above 2  Replace as per sliding scale protocol, start magnesium p.o. 400 mg twice daily  2: History of paroxysmal A-fib, Eliquis and aspirin on hold/GI bleed  3: Coronary artery disease status post stent placement continue current dose of pravastatin, carvedilol  4: Hypertension continue current dose of hydralazine 25 mg twice daily, carvedilol 25 mg twice daily, Lasix 20 mg  5: GI bleeding, on Protonix by infusion  Continue ECG monitoring    Electronically signed by Mary Manuel MD on 12/19/2023 at 9:14 AM   
mg, 650 mg, Oral, Q6H PRN, 650 mg at 12/23/23 0608 **OR** acetaminophen (TYLENOL) suppository 650 mg, 650 mg, Rectal, Q6H PRN, [place] Monroe MD    Lab Results:     Lab Results   Component Value Date    WBC 5.7 12/23/2023    HGB 8.1 (L) 12/23/2023    HCT 25.7 (L) 12/23/2023    MCV 96.8 12/23/2023     12/23/2023     Lab Results   Component Value Date    CALCIUM 9.7 12/23/2023     (H) 12/23/2023    K 3.7 12/23/2023    CO2 35 (H) 12/23/2023     12/23/2023    BUN 34 (H) 12/23/2023    CREATININE 1.2 (H) 12/23/2023       Lab Results   Component Value Date    INR 1.8 12/15/2023    PROTIME 21.3 (H) 12/15/2023       Radiology:   No new chest imaging today    ASSESSMENT:       Acute on chronic respiratory failure with hypoxia, now at baseline of 2 L  Acute on chronic hypercapnic respiratory failure  Acute on chronic diastolic heart failure  Acute blood loss anemia with melena, status post EGD showing erosive gastritis, colonoscopy showing diverticulosis and large hemorrhoids  History of A-fib on Eliquis  History of coronary artery disease with stent placement 2010  Chronic kidney disease  PLAN:   Eventually needs video capsule endoscopy, possibly being arranged for Tuesday  On PPI  Monitor H&H  Eliquis remains on hold  Encourage BiPAP at at bedtime and with naps  Avoid sedating medications  Eventual discharge to extended-care facility noted  Discussed with  at bedside      Electronically signed by ADITYA Ochoa - CNP on 12/23/23     This progress note was completed using a voice transcription system. Every effort was made to ensure accuracy. However, inadvertent computerized transcription errors may be present.    SEVERINO BURGESS AGACNP-BC, NP-C  [facility]  
she has JANN/OHS  I will stop her Symbicort as this may make her cardiac status worse in terms of tachycardia  Continue to monitor another 24 hours in the intermediate ICU  Continue subcu heparin for DVT prophylaxis, defer to cardiology and GI when she can resume her Eliquis  Discussed with patient and her       Critical Care Time   0 min    Electronically signed by Dane Alvarez MD on 12/27/2023 at 11:31 AM          
bare metal stent in left circumflex coronary artery     Dyspnea on exertion     Wheezing     Class 3 severe obesity with body mass index (BMI) of 40.0 to 44.9 in adult ([facility])     Chronic kidney disease, stage III (moderate) ([facility])     Arthritis of lumbar spine     Sleep disorder     Restrictive lung disease     Lumbar radiculopathy     Lumbar degenerative disc disease     Lumbosacral spondylosis without myelopathy     Chronic obstructive pulmonary disease, unspecified     Chronic systolic (congestive) heart failure     Upper GI bleed     Melena     Acute GI bleeding     Anemia     Hypokalemia     Weakness     Chronic congestive heart failure ([facility])     JANN and COPD overlap syndrome ([facility])      Plan of Treatment:   Medications reviewed  1: Nonsustained V. tach 7 beats, magnesium 1.5, check magnesium daily, keep magnesium above 2  Replace as per sliding scale protocol, start magnesium p.o. 400 mg twice daily  2: History of paroxysmal A-fib, Eliquis and aspirin on hold/GI bleed no A-fib recorded since admission, reduced carvedilol to 6.25 mg twice a day because of bradycardia  3: Coronary artery disease status post stent placement continue current dose of pravastatin,   4: Hypertension continue current dose of hydralazine 100 mg three daily, carvedilol 6.25 mg twice daily,   5: GI bleeding, on Protonix 40 mg twice daily p.o.  6: Congestive heart failure with marked dependent edema , significant improvement, change IV Lasix to p.o. Bumex 2 mg a day continue IV albumin   7: Morbid obesity, hypoxic and hypercapnic respiratory failure continue with oxygen, BiPAP at night  8: Anemia had 1 unit blood transfusion 12/30/2023 hemoglobin dropped to 7 again  Probable discharge to nursing home tomorrow  Check BMP and CBC before discharge    Continue ECG monitoring  Long-term prognosis guarded    Electronically signed by Mary Manuel MD on 12/31/2023 at 2:21 PM  
current NIV settings  No objection to rehab from pulmonary standpoint  BiPAP at night and with naps  Monitor for volume contracture alkalosis  Would benefit from outpatient pulmonary follow-up      Electronically signed by ADITYA Martínez CNP on 12/30/23     This progress note was completed using a voice transcription system. Every effort was made to ensure accuracy. However, inadvertent computerized transcription errors may be present.    Mira Lara, NP-C, MSN  [facility] Pulmonary, Critical Care & Sleep  
discharged, was not able to get out of the bed to needing to assist,  Also had a 7 beat of nonsustained V. tach, stat cardiology was consulted,  Discussed with gastroenterology again, capsule endoscopy which was post to be done tomorrow canceled at this time,  Overall prognosis seems to be poor      Consultations:   IP CONSULT TO INTERNAL MEDICINE  IP CONSULT TO GI     Patient is admitted as inpatient status because of co-morbidities listed above, severity of signs and symptoms as outlined, requirement for current medical therapies and most importantly because of direct risk to patient if care not provided in a hospital setting.  Expected length of stay > 48 hours.    Lita Raymond MD  12/18/2023  5:53 PM    Copy sent to Radha Iglesias MD    Please note that this chart was generated using voice recognition Dragon dictation software.  Although every effort was made to ensure the accuracy of this automated transcription, some errors in transcription may have occurred.

## 2024-01-01 NOTE — CARE COORDINATION
Faxed referral to [facility].  Also left a message for Rosa to call writer back if she can accept patient.  If so, need Pre cert started ASAP.    Electronically signed by Crystal Hewitt RN on 12/20/2023 at 6:09 PM     Writer had already spoke with Patients spouse Ihsan via telephone just minutes prior to my note listed above.  He would like [facility] as his SNF choice for his spouse.    Electronically signed by Crystal Hewitt RN on 12/20/2023 at 6:10 PM   
ONGOING DISCHARGE PLAN:        Spoke with patient's spouse Ihsan regarding discharge plan and Ihsan preferred I call his son Gopal to discuss SNF Placement.      Writer called Son Gopal and explained [facility].  Gopal said he wants to do alittle more rfesearch on the 2 facilities and will call writer back in a hour or so and will confirm choice.  Writer explained that we need a decision today.  He says he understands   Will continue to follow for additional discharge needs.    If patient is discharged prior to next notation, then this note serves as note for discharge by case management.    Electronically signed by Crystal Hewitt RN on 12/22/2023 at 2:16 PM     Gopal called back and he advised he is going to [place] tomorrow morning to tour the facility and then will call back and advise of this.  Writer told him to ask for Yarelis the [facility] as she is covering the weekend.  A Pre cert needs to be started one he makes his decision tomorrow morning.    Electronically signed by Crystal Hewitt RN on 12/22/2023 at 4:50 PM   
ONGOING DISCHARGE PLAN:    Patient is alert and oriented x4.    Spoke with patient and sonGopal at bedside regarding discharge plan and patient confirms that plan is still [facility]. Rosa with [facility] is submitting for auth after therapy sees.   Requested updated therapy notes.    IV lasix twice daily  98% on 2L    GI consult  Hgb 7.7, recommend OP capsule study    PT/OT    Will continue to follow for additional discharge needs.    If patient is discharged prior to next notation, then this note serves as note for discharge by case management.    Electronically signed by Shannon Erwin RN on 12/26/2023 at 2:16 PM   
ONGOING DISCHARGE PLAN:    Patient is alert and oriented x4.    Spoke with patient and spouse regarding discharge plan and patient confirms that plan is still [facility] today. Judit with [facility] stated they do have room and bipap ready.      [facility]    Call report to [phone]    Hens Completed  Document ID : [id number]     IMM letter provided to patient.  Patient offered four hours to make informed decision regarding appeal process; patient agreeable to discharge.      Will continue to follow for additional discharge needs.    If patient is discharged prior to next notation, then this note serves as note for discharge by case management.    Electronically signed by Shannon Erwin RN on 1/1/2024 at 1:45 PM   
ONGOING DISCHARGE PLAN:    Patient is alert and oriented x4.    Spoke with patient regarding discharge plan and patient confirms that plan is still [facility]. Auth pending.      91% on 3L/Bipap  IV lasix daily    PT/OT      Will continue to follow for additional discharge needs.    If patient is discharged prior to next notation, then this note serves as note for discharge by case management.    Electronically signed by Shannon Erwin RN on 12/28/2023 at 4:11 PM   
ONGOING DISCHARGE PLAN:    Patient is alert and oriented x4.    Spoke with patient regarding discharge plan and patient confirms that plan is still to [facility]     Patient is now unable to discharge to home     Patient states that she needs rehab would like ours explained that she may not qualify and we will need a 2nd choice.    [facility] [address] will send a referral        Will continue to follow for additional discharge needs.    If patient is discharged prior to next notation, then this note serves as note for discharge by case management.    Electronically signed by Whitley Sanchez RN on 12/18/2023 at 4:13 PM    
ONGOING DISCHARGE PLAN:    Patient is alert and oriented x4.    Spoke with patient regarding discharge plan and patient confirms that plan is still to return home with spouse and VNS.    VNS: [facility], referral sent.    DME: Rollator, cane, W/C, O2 at 2L 24/7 (port/conc [facility]).     POD #1 EGD w/biopsy. Capsule study scheduled for Tuesday. Likely d/c tomorrow per GI.     Will continue to follow for additional discharge needs.    If patient is discharged prior to next notation, then this note serves as note for discharge by case management.    Electronically signed by Yessenia Farley RN on 12/17/2023 at 12:00 PM    
ONGOING DISCHARGE PLAN:    Patient is alert but on bipap.     Spoke with patient's  regarding discharge plan and confirms that plan is still [facility] is approved until 1/3/2024.    12/30 1 Unit PRBCs given  Hgb 7.0, will get another unit today    IV lasix twice daily  IV albumin 3x daily    PT/OT    96% on 3L/Bipap     ABGs    Will continue to follow for additional discharge needs.    If patient is discharged prior to next notation, then this note serves as note for discharge by case management.    Electronically signed by Shannon Erwin RN on 12/31/2023 at 10:52 AM   
ONGOING DISCHARGE PLAN:    Patient is resting quietly in bed.     Spoke with patient's , Sandip,regarding discharge plan and patient confirms that plan is still to [facility] to [facility].     Per Judit, they can accept, have a bed. Will need Pre-Cert started on Tuesday, R/T Holiday.     Son, Gopal, did tour Facility Yesterday.     Will continue to follow for additional discharge needs.    If patient is discharged prior to next notation, then this note serves as note for discharge by case management.    Electronically signed by Umm Cardona RN on 12/24/2023 at 12:29 PM   
ONGOING DISCHARGE PLAN:    Writer was notified, by Pt's Son, Gopal, that after touring [facility], today, he would like his Mom, to DC there.     Pt. Will need Pre-cert.     Left VM, for Judit, from [facility], in regards to above.    Will continue to follow for additional discharge needs.    If patient is discharged prior to next notation, then this note serves as note for discharge by case management.    Electronically signed by Umm Cardona RN on 12/23/2023 at 3:36 PM   
Writer is following for potential discharge to [facility].  Writer [facility], confirmed authorization was started this morning.  Writer faxed pt bipap settings to [phone].  Electronically signed by MILAGROS Redmond on 12/27/2023 at 12:22 PM    
Writer met with pt and spouse regarding [facility] being out of network with pt insurance.    Agreeable to referral sent to [facility]. Writer provided facility of choice list for further review.  Electronically signed by MILAGROS Redmond on 12/21/2023 at 2:57 PM    Writer received call from Rosa with [facility]. Facility can accept this pt. Writer met with pt and family in room. Reviewing facility of choice list and unsure about ratings, request [facility] sent [facility]. Writer advised [facility] not have bed availability often and another facility will need to be picked as well.  Referral sent. Call placed to [facility] with [facility]. No answer, voicemail left.  Electronically signed by MILAGROS Redmond on 12/21/2023 at 3:26 PM    Pt family would like referrals also sent to [facility] and [facility].  Referrals sent.  Electronically signed by MILAGROS Redmond on 12/21/2023 at 3:47 PM    
Writer placed call to Daylin with [facility] regarding referral. Daylin is currently in route to [place]. Writer provided pt social security number for review.  Electronically signed by MILAGROS Redmond on 12/21/2023 at 9:49 AM    Daylin confirmed facility is out of network with pt insurance.  Electronically signed by MILAGROS Redmond on 12/21/2023 at 2:56 PM    
Writer received call from Ledy with [facility] regarding referral.  Facility can accept this pt.  Electronically signed by MILAGROS Redmond on 12/22/2023 at 8:58 AM    
Writer received message from Rosa with [facility] after hours. Rosa states pt authorization has been approved 12/28-1/3 and facility was waiting on bipap delivery.    Writer placed perfectserve message to bedside CARYL Hunter regarding facility authorization.  Electronically signed by MILAGROS Redmond on 12/29/2023 at 9:54 AM    Writer spoke to Elsa, will reach out regarding discharge.  Electronically signed by MILAGROS Redmond on 12/29/2023 at 11:34 AM    Per RN Elsa, pt is not medically ready at this time. Writer placed message to Rosa with [facility] regarding this.  Electronically signed by MILAGROS Redmond on 12/29/2023 at 1:05 PM    
you like Case Management to discuss the discharge plan with any other family members/significant others, and if so, who? Yes (Spouse; Ihsan)  Plans to Return to Present Housing: Yes  Other Identified Issues/Barriers to RETURNING to current housing: NA  Potential Assistance needed at discharge: Home Care            Potential DME:    Patient expects to discharge to: House  Plan for transportation at discharge: Family    Financial    Payor: [employer] MEDICARE / Plan: [employer] MEDICARE-ADVANTAGE PPO / Product Type: Medicare /     Does insurance require precert for SNF: Yes    Potential assistance Purchasing Medications: No  Meds-to-Beds request:        [facility] [address] [place] [address] [address]  P [phone] - F [phone]  [address] [facility] [address]  Phone: [phone] Fax: [phone]      Notes:    Factors facilitating achievement of predicted outcomes: Family support, Friend support, Motivated, Cooperative, and Pleasant    Barriers to discharge: Anemia.    Additional Case Management Notes: The patient is from a 1 story home with spouse, independent, drives.     DME: Rollator, cane, W/C, nebulizer, O2 at 2L 24/7 (port/conc [facility]).    VNS: [facility], referral sent.    Hgb 6.0; GI on board. POD #0 EGD with biopsy.     The Plan for Transition of Care is related to the following treatment goals of Melena [K92.1]  Hypokalemia [E87.6]  Acute GI bleeding [K92.2]  Anemia, unspecified type [D64.9]    IF APPLICABLE: The Patient and/or patient representative Rosi and her family were provided with a choice of provider and agrees with the discharge plan. Freedom of choice list with basic dialogue that supports the patient's individualized plan of care/goals and shares the quality data associated with the providers was provided to: Patient   Patient Representative Name:       The Patient and/or Patient Representative Agree with the Discharge Plan? Yes    Yessenia Farley RN  Case Management

## 2024-01-01 NOTE — DISCHARGE SUMMARY
medications prescribed for you. Read the directions carefully, and ask your doctor or other care provider to review them with you.                STOP taking these medications      acetaminophen 500 MG tablet  Commonly known as: TYLENOL     apixaban 5 MG Tabs tablet  Commonly known as: ELIQUIS     furosemide 20 MG tablet  Commonly known as: LASIX     hydrocortisone 2.5 % Crea rectal cream  Commonly known as: ANUSOL-HC               Where to Get Your Medications        These medications were sent to [id number] - [place], [place] - [address] [place] [address] -  [phone] - F [phone]  [address] [place], [place] [id number]      Phone: [phone]   aspirin 81 MG EC tablet  bumetanide 1 MG tablet  carvedilol 6.25 MG tablet  docusate 100 MG Caps  hydrALAZINE 100 MG tablet  methyl salicylate-menthol 10-15 % Crea  pantoprazole 40 MG tablet       You can get these medications from any pharmacy    Bring a paper prescription for each of these medications  HYDROcodone-acetaminophen 5-325 MG per tablet         Time Spent on discharge is  36 mins in patient examination, evaluation, counseling as well as medication reconciliation, prescriptions for required medications, discharge plan and follow up.    Electronically signed by   Velvet Latham MD  1/1/2024  2:16 PM      Thank you Radha Iglesias MD for the opportunity to be involved in this patient's care.    Please note that this chart was generated using voice recognition Dragon dictation software.  Although every effort was made to ensure the accuracy of this automated transcription, some errors in transcription may have occurred.

## 2024-01-02 ENCOUNTER — CARE COORDINATION (OUTPATIENT)
Dept: CARE COORDINATION | Age: 79
End: 2024-01-02

## 2024-01-02 NOTE — CARE COORDINATION
Patient had 17 day admission for acute GI bleed, discharged 1/1/24 to [facility]. Signed off from care management.

## 2024-02-15 ENCOUNTER — CARE COORDINATION (OUTPATIENT)
Dept: CARE COORDINATION | Age: 79
End: 2024-02-15

## 2024-02-15 NOTE — CARE COORDINATION
She had admission 1/2-/13 for ac/chronic resp failur, acute heart failure, discharged to [facility]. Writer spoke with , patient is still there and plans to eventually discharge home but no date scheduled yet. Will call again in a few weeks.    No future appointments.

## 2024-02-28 ENCOUNTER — HOSPITAL ENCOUNTER (INPATIENT)
Age: 79
LOS: 6 days | Discharge: NURSING, REHAB OR LONG-TERM CARE | DRG: 378 | End: 2024-03-05
Attending: EMERGENCY MEDICINE | Admitting: STUDENT IN AN ORGANIZED HEALTH CARE EDUCATION/TRAINING PROGRAM
Payer: MEDICARE

## 2024-02-28 ENCOUNTER — APPOINTMENT (OUTPATIENT)
Dept: GENERAL RADIOLOGY | Age: 79
DRG: 378 | End: 2024-02-28
Payer: MEDICARE

## 2024-02-28 ENCOUNTER — APPOINTMENT (OUTPATIENT)
Dept: CT IMAGING | Age: 79
DRG: 378 | End: 2024-02-28
Payer: MEDICARE

## 2024-02-28 DIAGNOSIS — K92.2 UPPER GASTROINTESTINAL BLEEDING: Primary | ICD-10-CM

## 2024-02-28 DIAGNOSIS — N30.00 ACUTE CYSTITIS WITHOUT HEMATURIA: ICD-10-CM

## 2024-02-28 DIAGNOSIS — B96.5 PSEUDOMONAL BACTEREMIA: ICD-10-CM

## 2024-02-28 DIAGNOSIS — R78.81 PSEUDOMONAL BACTEREMIA: ICD-10-CM

## 2024-02-28 PROBLEM — D70.9 NEUTROPENIA (HCC): Status: ACTIVE | Noted: 2024-02-28

## 2024-02-28 LAB
ALBUMIN SERPL-MCNC: 2.7 G/DL (ref 3.5–5.2)
ALBUMIN/GLOB SERPL: 0.8 {RATIO} (ref 1–2.5)
ALP SERPL-CCNC: 63 U/L (ref 35–104)
ALT SERPL-CCNC: 26 U/L (ref 5–33)
AMORPH SED URNS QL MICRO: ABNORMAL
ANION GAP SERPL CALCULATED.3IONS-SCNC: 11 MMOL/L (ref 9–17)
AST SERPL-CCNC: 38 U/L
BACTERIA URNS QL MICRO: ABNORMAL
BASOPHILS # BLD: 0 K/UL (ref 0–0.2)
BASOPHILS NFR BLD: 0 % (ref 0–2)
BILIRUB SERPL-MCNC: 0.5 MG/DL (ref 0.3–1.2)
BILIRUB UR QL STRIP: NEGATIVE
BUN SERPL-MCNC: 36 MG/DL (ref 8–23)
CALCIUM SERPL-MCNC: 8.4 MG/DL (ref 8.6–10.4)
CHARACTER UR: ABNORMAL
CHLORIDE SERPL-SCNC: 96 MMOL/L (ref 98–107)
CLARITY UR: CLEAR
CO2 SERPL-SCNC: 28 MMOL/L (ref 20–31)
COLOR UR: YELLOW
CREAT SERPL-MCNC: 1.9 MG/DL (ref 0.5–0.9)
EOSINOPHIL # BLD: 0 K/UL (ref 0–0.4)
EOSINOPHILS RELATIVE PERCENT: 0 % (ref 1–4)
EPI CELLS #/AREA URNS HPF: ABNORMAL /HPF (ref 0–5)
ERYTHROCYTE [DISTWIDTH] IN BLOOD BY AUTOMATED COUNT: 17.9 % (ref 12.5–15.4)
FLUAV AG SPEC QL: NEGATIVE
FLUBV AG SPEC QL: NEGATIVE
GFR SERPL CREATININE-BSD FRML MDRD: 27 ML/MIN/1.73M2
GLUCOSE SERPL-MCNC: 98 MG/DL (ref 70–99)
GLUCOSE UR STRIP-MCNC: NEGATIVE MG/DL
HCT VFR BLD AUTO: 19.2 % (ref 36–46)
HCT VFR BLD AUTO: 23 % (ref 36–46)
HGB BLD-MCNC: 6.2 G/DL (ref 12–16)
HGB BLD-MCNC: 7.7 G/DL (ref 12–16)
HGB UR QL STRIP.AUTO: NEGATIVE
KETONES UR STRIP-MCNC: NEGATIVE MG/DL
LACTATE BLDV-SCNC: 1.4 MMOL/L (ref 0.5–1.9)
LEUKOCYTE ESTERASE UR QL STRIP: ABNORMAL
LYMPHOCYTES NFR BLD: 0.98 K/UL (ref 1–4.8)
LYMPHOCYTES RELATIVE PERCENT: 41 % (ref 24–44)
MCH RBC QN AUTO: 28.8 PG (ref 26–34)
MCHC RBC AUTO-ENTMCNC: 32.3 G/DL (ref 31–37)
MCV RBC AUTO: 89.1 FL (ref 80–100)
MONOCYTES NFR BLD: 1.32 K/UL (ref 0.1–0.8)
MONOCYTES NFR BLD: 55 % (ref 1–7)
MORPHOLOGY: NORMAL
NEUTROPHILS NFR BLD: 4 % (ref 36–66)
NEUTS SEG NFR BLD: 0.1 K/UL (ref 1.8–7.7)
NITRITE UR QL STRIP: NEGATIVE
PH UR STRIP: 6 [PH] (ref 5–8)
PLATELET # BLD AUTO: 297 K/UL (ref 140–450)
PMV BLD AUTO: 7.2 FL (ref 6–12)
POTASSIUM SERPL-SCNC: 3 MMOL/L (ref 3.7–5.3)
PROT SERPL-MCNC: 6 G/DL (ref 6.4–8.3)
PROT UR STRIP-MCNC: ABNORMAL MG/DL
RBC # BLD AUTO: 2.15 M/UL (ref 4–5.2)
RBC #/AREA URNS HPF: ABNORMAL /HPF (ref 0–2)
SARS-COV-2 RDRP RESP QL NAA+PROBE: NOT DETECTED
SODIUM SERPL-SCNC: 135 MMOL/L (ref 135–144)
SP GR UR STRIP: 1.01 (ref 1–1.03)
SPECIMEN DESCRIPTION: NORMAL
TROPONIN I SERPL HS-MCNC: 79 NG/L (ref 0–14)
UROBILINOGEN UR STRIP-ACNC: NORMAL EU/DL (ref 0–1)
WBC #/AREA URNS HPF: ABNORMAL /HPF (ref 0–5)
WBC OTHER # BLD: 2.4 K/UL (ref 3.5–11)

## 2024-02-28 PROCEDURE — 86920 COMPATIBILITY TEST SPIN: CPT

## 2024-02-28 PROCEDURE — 87635 SARS-COV-2 COVID-19 AMP PRB: CPT

## 2024-02-28 PROCEDURE — 80053 COMPREHEN METABOLIC PANEL: CPT

## 2024-02-28 PROCEDURE — 36430 TRANSFUSION BLD/BLD COMPNT: CPT

## 2024-02-28 PROCEDURE — 2580000003 HC RX 258: Performed by: NURSE PRACTITIONER

## 2024-02-28 PROCEDURE — 1210000000 HC MED SURG R&B

## 2024-02-28 PROCEDURE — 6360000002 HC RX W HCPCS: Performed by: NURSE PRACTITIONER

## 2024-02-28 PROCEDURE — 6370000000 HC RX 637 (ALT 250 FOR IP)

## 2024-02-28 PROCEDURE — 87077 CULTURE AEROBIC IDENTIFY: CPT

## 2024-02-28 PROCEDURE — 87086 URINE CULTURE/COLONY COUNT: CPT

## 2024-02-28 PROCEDURE — 86901 BLOOD TYPING SEROLOGIC RH(D): CPT

## 2024-02-28 PROCEDURE — 2580000003 HC RX 258: Performed by: EMERGENCY MEDICINE

## 2024-02-28 PROCEDURE — P9016 RBC LEUKOCYTES REDUCED: HCPCS

## 2024-02-28 PROCEDURE — 36415 COLL VENOUS BLD VENIPUNCTURE: CPT

## 2024-02-28 PROCEDURE — 6360000002 HC RX W HCPCS

## 2024-02-28 PROCEDURE — 87040 BLOOD CULTURE FOR BACTERIA: CPT

## 2024-02-28 PROCEDURE — 87154 CUL TYP ID BLD PTHGN 6+ TRGT: CPT

## 2024-02-28 PROCEDURE — 94640 AIRWAY INHALATION TREATMENT: CPT

## 2024-02-28 PROCEDURE — 81001 URINALYSIS AUTO W/SCOPE: CPT

## 2024-02-28 PROCEDURE — 86850 RBC ANTIBODY SCREEN: CPT

## 2024-02-28 PROCEDURE — 74176 CT ABD & PELVIS W/O CONTRAST: CPT

## 2024-02-28 PROCEDURE — 6370000000 HC RX 637 (ALT 250 FOR IP): Performed by: HOSPITALIST

## 2024-02-28 PROCEDURE — 85014 HEMATOCRIT: CPT

## 2024-02-28 PROCEDURE — 96360 HYDRATION IV INFUSION INIT: CPT

## 2024-02-28 PROCEDURE — 85018 HEMOGLOBIN: CPT

## 2024-02-28 PROCEDURE — 84484 ASSAY OF TROPONIN QUANT: CPT

## 2024-02-28 PROCEDURE — C9113 INJ PANTOPRAZOLE SODIUM, VIA: HCPCS | Performed by: NURSE PRACTITIONER

## 2024-02-28 PROCEDURE — 87205 SMEAR GRAM STAIN: CPT

## 2024-02-28 PROCEDURE — 83605 ASSAY OF LACTIC ACID: CPT

## 2024-02-28 PROCEDURE — 99222 1ST HOSP IP/OBS MODERATE 55: CPT | Performed by: HOSPITALIST

## 2024-02-28 PROCEDURE — 99223 1ST HOSP IP/OBS HIGH 75: CPT | Performed by: INTERNAL MEDICINE

## 2024-02-28 PROCEDURE — 6360000002 HC RX W HCPCS: Performed by: EMERGENCY MEDICINE

## 2024-02-28 PROCEDURE — 93005 ELECTROCARDIOGRAM TRACING: CPT | Performed by: EMERGENCY MEDICINE

## 2024-02-28 PROCEDURE — 85025 COMPLETE CBC W/AUTO DIFF WBC: CPT

## 2024-02-28 PROCEDURE — 99285 EMERGENCY DEPT VISIT HI MDM: CPT

## 2024-02-28 PROCEDURE — 71045 X-RAY EXAM CHEST 1 VIEW: CPT

## 2024-02-28 PROCEDURE — 02HV33Z INSERTION OF INFUSION DEVICE INTO SUPERIOR VENA CAVA, PERCUTANEOUS APPROACH: ICD-10-PCS | Performed by: HOSPITALIST

## 2024-02-28 PROCEDURE — 87186 SC STD MICRODIL/AGAR DIL: CPT

## 2024-02-28 PROCEDURE — 87804 INFLUENZA ASSAY W/OPTIC: CPT

## 2024-02-28 PROCEDURE — 86900 BLOOD TYPING SEROLOGIC ABO: CPT

## 2024-02-28 PROCEDURE — 2580000003 HC RX 258

## 2024-02-28 RX ORDER — PREGABALIN 50 MG/1
50 CAPSULE ORAL 2 TIMES DAILY
COMMUNITY

## 2024-02-28 RX ORDER — GABAPENTIN 300 MG/1
300 CAPSULE ORAL 2 TIMES DAILY
Status: DISCONTINUED | OUTPATIENT
Start: 2024-02-28 | End: 2024-02-28

## 2024-02-28 RX ORDER — POTASSIUM CHLORIDE 20 MEQ/1
40 TABLET, EXTENDED RELEASE ORAL PRN
Status: DISCONTINUED | OUTPATIENT
Start: 2024-02-28 | End: 2024-03-05 | Stop reason: HOSPADM

## 2024-02-28 RX ORDER — ACETAMINOPHEN 650 MG/1
650 SUPPOSITORY RECTAL EVERY 6 HOURS PRN
Status: DISCONTINUED | OUTPATIENT
Start: 2024-02-28 | End: 2024-03-05 | Stop reason: HOSPADM

## 2024-02-28 RX ORDER — ALBUTEROL SULFATE 2.5 MG/3ML
2.5 SOLUTION RESPIRATORY (INHALATION) EVERY 4 HOURS PRN
Status: DISCONTINUED | OUTPATIENT
Start: 2024-02-28 | End: 2024-03-05 | Stop reason: HOSPADM

## 2024-02-28 RX ORDER — POLYETHYLENE GLYCOL 3350 17 G/17G
17 POWDER, FOR SOLUTION ORAL DAILY
Status: DISCONTINUED | OUTPATIENT
Start: 2024-02-28 | End: 2024-03-05 | Stop reason: HOSPADM

## 2024-02-28 RX ORDER — MAGNESIUM SULFATE IN WATER 40 MG/ML
2000 INJECTION, SOLUTION INTRAVENOUS PRN
Status: DISCONTINUED | OUTPATIENT
Start: 2024-02-28 | End: 2024-03-05 | Stop reason: HOSPADM

## 2024-02-28 RX ORDER — LIDOCAINE HYDROCHLORIDE 10 MG/ML
5 INJECTION, SOLUTION EPIDURAL; INFILTRATION; INTRACAUDAL; PERINEURAL ONCE
Status: DISCONTINUED | OUTPATIENT
Start: 2024-02-28 | End: 2024-03-05 | Stop reason: HOSPADM

## 2024-02-28 RX ORDER — ONDANSETRON 4 MG/1
4 TABLET, FILM COATED ORAL EVERY 8 HOURS PRN
COMMUNITY

## 2024-02-28 RX ORDER — MENTHOL AND METHYL SALICYLATE 10; 30 G/100G; G/100G
CREAM TOPICAL 3 TIMES DAILY PRN
Status: DISCONTINUED | OUTPATIENT
Start: 2024-02-28 | End: 2024-03-05 | Stop reason: HOSPADM

## 2024-02-28 RX ORDER — NITROGLYCERIN 0.4 MG/1
0.4 TABLET SUBLINGUAL EVERY 5 MIN PRN
Status: DISCONTINUED | OUTPATIENT
Start: 2024-02-28 | End: 2024-03-05 | Stop reason: HOSPADM

## 2024-02-28 RX ORDER — LANOLIN ALCOHOL/MO/W.PET/CERES
3 CREAM (GRAM) TOPICAL NIGHTLY PRN
Status: DISCONTINUED | OUTPATIENT
Start: 2024-02-28 | End: 2024-03-05 | Stop reason: HOSPADM

## 2024-02-28 RX ORDER — POTASSIUM CHLORIDE 7.45 MG/ML
10 INJECTION INTRAVENOUS PRN
Status: DISCONTINUED | OUTPATIENT
Start: 2024-02-28 | End: 2024-03-05 | Stop reason: HOSPADM

## 2024-02-28 RX ORDER — POLYETHYLENE GLYCOL 3350 17 G/17G
17 POWDER, FOR SOLUTION ORAL DAILY PRN
Status: DISCONTINUED | OUTPATIENT
Start: 2024-02-28 | End: 2024-03-05 | Stop reason: HOSPADM

## 2024-02-28 RX ORDER — POTASSIUM CHLORIDE 750 MG/1
10 CAPSULE, EXTENDED RELEASE ORAL DAILY
Status: DISCONTINUED | OUTPATIENT
Start: 2024-02-28 | End: 2024-03-05 | Stop reason: HOSPADM

## 2024-02-28 RX ORDER — SODIUM CHLORIDE 9 MG/ML
25 INJECTION, SOLUTION INTRAVENOUS PRN
Status: DISCONTINUED | OUTPATIENT
Start: 2024-02-28 | End: 2024-03-05 | Stop reason: HOSPADM

## 2024-02-28 RX ORDER — SODIUM CHLORIDE 9 MG/ML
INJECTION, SOLUTION INTRAVENOUS PRN
Status: DISCONTINUED | OUTPATIENT
Start: 2024-02-28 | End: 2024-03-05 | Stop reason: HOSPADM

## 2024-02-28 RX ORDER — IPRATROPIUM BROMIDE AND ALBUTEROL SULFATE 2.5; .5 MG/3ML; MG/3ML
1 SOLUTION RESPIRATORY (INHALATION)
Status: DISCONTINUED | OUTPATIENT
Start: 2024-02-28 | End: 2024-02-28

## 2024-02-28 RX ORDER — SODIUM CHLORIDE 9 MG/ML
INJECTION, SOLUTION INTRAVENOUS CONTINUOUS
Status: ACTIVE | OUTPATIENT
Start: 2024-02-28 | End: 2024-03-01

## 2024-02-28 RX ORDER — ONDANSETRON 4 MG/1
4 TABLET, ORALLY DISINTEGRATING ORAL EVERY 8 HOURS PRN
Status: DISCONTINUED | OUTPATIENT
Start: 2024-02-28 | End: 2024-03-05 | Stop reason: HOSPADM

## 2024-02-28 RX ORDER — IPRATROPIUM BROMIDE AND ALBUTEROL SULFATE 2.5; .5 MG/3ML; MG/3ML
1 SOLUTION RESPIRATORY (INHALATION) EVERY 4 HOURS
COMMUNITY

## 2024-02-28 RX ORDER — 0.9 % SODIUM CHLORIDE 0.9 %
1000 INTRAVENOUS SOLUTION INTRAVENOUS ONCE
Status: COMPLETED | OUTPATIENT
Start: 2024-02-28 | End: 2024-02-28

## 2024-02-28 RX ORDER — PRAVASTATIN SODIUM 20 MG
40 TABLET ORAL DAILY
Status: DISCONTINUED | OUTPATIENT
Start: 2024-02-29 | End: 2024-03-05 | Stop reason: HOSPADM

## 2024-02-28 RX ORDER — SODIUM CHLORIDE 0.9 % (FLUSH) 0.9 %
5-40 SYRINGE (ML) INJECTION EVERY 12 HOURS SCHEDULED
Status: DISCONTINUED | OUTPATIENT
Start: 2024-02-28 | End: 2024-03-05 | Stop reason: HOSPADM

## 2024-02-28 RX ORDER — OMEPRAZOLE 20 MG/1
20 CAPSULE, DELAYED RELEASE ORAL DAILY
Status: ON HOLD | COMMUNITY
End: 2024-03-04 | Stop reason: HOSPADM

## 2024-02-28 RX ORDER — ALBUTEROL SULFATE 90 UG/1
2 AEROSOL, METERED RESPIRATORY (INHALATION) EVERY 4 HOURS PRN
Status: DISCONTINUED | OUTPATIENT
Start: 2024-02-28 | End: 2024-03-05 | Stop reason: HOSPADM

## 2024-02-28 RX ORDER — IPRATROPIUM BROMIDE AND ALBUTEROL SULFATE 2.5; .5 MG/3ML; MG/3ML
1 SOLUTION RESPIRATORY (INHALATION)
Status: DISCONTINUED | OUTPATIENT
Start: 2024-02-29 | End: 2024-02-28

## 2024-02-28 RX ORDER — PREGABALIN 25 MG/1
50 CAPSULE ORAL 2 TIMES DAILY
Status: DISCONTINUED | OUTPATIENT
Start: 2024-02-28 | End: 2024-03-05 | Stop reason: HOSPADM

## 2024-02-28 RX ORDER — IPRATROPIUM BROMIDE AND ALBUTEROL SULFATE 2.5; .5 MG/3ML; MG/3ML
1 SOLUTION RESPIRATORY (INHALATION)
Status: DISCONTINUED | OUTPATIENT
Start: 2024-02-28 | End: 2024-03-05 | Stop reason: HOSPADM

## 2024-02-28 RX ORDER — HYDRALAZINE HYDROCHLORIDE 50 MG/1
100 TABLET, FILM COATED ORAL EVERY 8 HOURS SCHEDULED
Status: DISCONTINUED | OUTPATIENT
Start: 2024-02-28 | End: 2024-03-04

## 2024-02-28 RX ORDER — LANOLIN ALCOHOL/MO/W.PET/CERES
3 CREAM (GRAM) TOPICAL NIGHTLY PRN
COMMUNITY

## 2024-02-28 RX ORDER — ACETAMINOPHEN 325 MG/1
650 TABLET ORAL EVERY 6 HOURS PRN
Status: DISCONTINUED | OUTPATIENT
Start: 2024-02-28 | End: 2024-03-05 | Stop reason: HOSPADM

## 2024-02-28 RX ORDER — ONDANSETRON 2 MG/ML
4 INJECTION INTRAMUSCULAR; INTRAVENOUS EVERY 6 HOURS PRN
Status: DISCONTINUED | OUTPATIENT
Start: 2024-02-28 | End: 2024-03-05 | Stop reason: HOSPADM

## 2024-02-28 RX ORDER — CINACALCET 30 MG/1
90 TABLET, FILM COATED ORAL 2 TIMES DAILY
COMMUNITY

## 2024-02-28 RX ORDER — NITROGLYCERIN 0.4 MG/1
0.4 TABLET SUBLINGUAL EVERY 5 MIN PRN
COMMUNITY

## 2024-02-28 RX ORDER — SODIUM CHLORIDE 0.9 % (FLUSH) 0.9 %
5-40 SYRINGE (ML) INJECTION PRN
Status: DISCONTINUED | OUTPATIENT
Start: 2024-02-28 | End: 2024-03-05 | Stop reason: HOSPADM

## 2024-02-28 RX ORDER — BUMETANIDE 1 MG/1
1 TABLET ORAL DAILY
Status: DISCONTINUED | OUTPATIENT
Start: 2024-02-28 | End: 2024-03-05 | Stop reason: HOSPADM

## 2024-02-28 RX ORDER — DOCUSATE SODIUM 100 MG/1
100 CAPSULE, LIQUID FILLED ORAL 2 TIMES DAILY
Status: DISCONTINUED | OUTPATIENT
Start: 2024-02-28 | End: 2024-03-05 | Stop reason: HOSPADM

## 2024-02-28 RX ORDER — POTASSIUM CHLORIDE 750 MG/1
10 CAPSULE, EXTENDED RELEASE ORAL DAILY
COMMUNITY

## 2024-02-28 RX ORDER — SODIUM CHLORIDE 9 MG/ML
INJECTION, SOLUTION INTRAVENOUS PRN
Status: DISCONTINUED | OUTPATIENT
Start: 2024-02-28 | End: 2024-03-02

## 2024-02-28 RX ORDER — POLYETHYLENE GLYCOL 3350 17 G/17G
17 POWDER, FOR SOLUTION ORAL DAILY
COMMUNITY

## 2024-02-28 RX ORDER — ACETAMINOPHEN 325 MG/1
650 TABLET ORAL EVERY 6 HOURS PRN
Status: DISCONTINUED | OUTPATIENT
Start: 2024-02-28 | End: 2024-02-28 | Stop reason: SDUPTHER

## 2024-02-28 RX ORDER — CARVEDILOL 3.12 MG/1
6.25 TABLET ORAL 2 TIMES DAILY WITH MEALS
Status: DISCONTINUED | OUTPATIENT
Start: 2024-02-28 | End: 2024-03-05 | Stop reason: HOSPADM

## 2024-02-28 RX ORDER — ACETAMINOPHEN 325 MG/1
650 TABLET ORAL EVERY 6 HOURS PRN
COMMUNITY

## 2024-02-28 RX ORDER — LANOLIN ALCOHOL/MO/W.PET/CERES
400 CREAM (GRAM) TOPICAL DAILY
Status: DISCONTINUED | OUTPATIENT
Start: 2024-02-28 | End: 2024-03-05 | Stop reason: HOSPADM

## 2024-02-28 RX ORDER — ALBUTEROL SULFATE 90 UG/1
2 AEROSOL, METERED RESPIRATORY (INHALATION)
Status: DISCONTINUED | OUTPATIENT
Start: 2024-02-28 | End: 2024-02-28

## 2024-02-28 RX ORDER — BISACODYL 10 MG
10 SUPPOSITORY, RECTAL RECTAL DAILY
Status: DISCONTINUED | OUTPATIENT
Start: 2024-02-28 | End: 2024-03-05 | Stop reason: HOSPADM

## 2024-02-28 RX ORDER — LANOLIN ALCOHOL/MO/W.PET/CERES
400 CREAM (GRAM) TOPICAL DAILY
COMMUNITY

## 2024-02-28 RX ORDER — BUDESONIDE AND FORMOTEROL FUMARATE DIHYDRATE 160; 4.5 UG/1; UG/1
2 AEROSOL RESPIRATORY (INHALATION)
Status: DISCONTINUED | OUTPATIENT
Start: 2024-02-28 | End: 2024-03-05 | Stop reason: HOSPADM

## 2024-02-28 RX ORDER — CINACALCET 30 MG/1
90 TABLET, FILM COATED ORAL DAILY
Status: DISCONTINUED | OUTPATIENT
Start: 2024-02-29 | End: 2024-03-05 | Stop reason: HOSPADM

## 2024-02-28 RX ORDER — BISACODYL 10 MG
10 SUPPOSITORY, RECTAL RECTAL DAILY
COMMUNITY

## 2024-02-28 RX ADMIN — POTASSIUM CHLORIDE 10 MEQ: 7.46 INJECTION, SOLUTION INTRAVENOUS at 20:16

## 2024-02-28 RX ADMIN — POTASSIUM CHLORIDE 10 MEQ: 7.46 INJECTION, SOLUTION INTRAVENOUS at 15:20

## 2024-02-28 RX ADMIN — PANTOPRAZOLE SODIUM 8 MG/HR: 40 INJECTION, POWDER, FOR SOLUTION INTRAVENOUS at 18:58

## 2024-02-28 RX ADMIN — SODIUM CHLORIDE: 9 INJECTION, SOLUTION INTRAVENOUS at 19:00

## 2024-02-28 RX ADMIN — SODIUM CHLORIDE 1000 ML: 9 INJECTION, SOLUTION INTRAVENOUS at 11:20

## 2024-02-28 RX ADMIN — BUDESONIDE AND FORMOTEROL FUMARATE DIHYDRATE 2 PUFF: 160; 4.5 AEROSOL RESPIRATORY (INHALATION) at 23:35

## 2024-02-28 RX ADMIN — DOCUSATE SODIUM 100 MG: 100 CAPSULE, LIQUID FILLED ORAL at 20:39

## 2024-02-28 RX ADMIN — CEFTRIAXONE SODIUM 1000 MG: 1 INJECTION, POWDER, FOR SOLUTION INTRAMUSCULAR; INTRAVENOUS at 13:22

## 2024-02-28 RX ADMIN — PREGABALIN 50 MG: 25 CAPSULE ORAL at 20:39

## 2024-02-28 RX ADMIN — POTASSIUM CHLORIDE 10 MEQ: 7.46 INJECTION, SOLUTION INTRAVENOUS at 23:20

## 2024-02-28 RX ADMIN — VANCOMYCIN HYDROCHLORIDE 2000 MG: 1 INJECTION, POWDER, LYOPHILIZED, FOR SOLUTION INTRAVENOUS at 23:17

## 2024-02-28 RX ADMIN — POTASSIUM CHLORIDE 10 MEQ: 7.46 INJECTION, SOLUTION INTRAVENOUS at 21:16

## 2024-02-28 RX ADMIN — IPRATROPIUM BROMIDE AND ALBUTEROL SULFATE 1 DOSE: 2.5; .5 SOLUTION RESPIRATORY (INHALATION) at 23:37

## 2024-02-28 RX ADMIN — POTASSIUM CHLORIDE 10 MEQ: 7.46 INJECTION, SOLUTION INTRAVENOUS at 19:12

## 2024-02-28 ASSESSMENT — PAIN DESCRIPTION - LOCATION: LOCATION: LEG

## 2024-02-28 ASSESSMENT — PAIN SCALES - GENERAL: PAINLEVEL_OUTOF10: 5

## 2024-02-28 ASSESSMENT — PAIN - FUNCTIONAL ASSESSMENT: PAIN_FUNCTIONAL_ASSESSMENT: 0-10

## 2024-02-28 ASSESSMENT — PAIN DESCRIPTION - ORIENTATION: ORIENTATION: RIGHT;LEFT

## 2024-02-28 NOTE — CONSENT
Informed Consent for Blood Component Transfusion Note    I have discussed with the patient the rationale for blood component transfusion; its benefits in treating or preventing fatigue, organ damage, or death; and its risk which includes mild transfusion reactions, rare risk of blood borne infection, or more serious but rare reactions. I have discussed the alternatives to transfusion, including the risk and consequences of not receiving transfusion. The patient had an opportunity to ask questions and had agreed to proceed with transfusion of blood components.    Electronically signed by Vineet Thorpe DO on 2/28/24 at 1:01 PM EST

## 2024-02-28 NOTE — H&P
conjunctiva clear  Ear: normal external ear, no discharge, hearing intact  Nose:  no drainage noted  Mouth: mucous membranes moist  Neck: supple, no carotid bruits, thyroid not palpable  Lungs: Bilateral equal air entry, clear to ausculation, no wheezing, rales or rhonchi, normal effort  Cardiovascular: normal rate, regular rhythm, no murmur, gallop, rub  Abdomen: Soft, nontender, nondistended, normal bowel sounds, no hepatomegaly or splenomegaly  Neurologic: There are no new focal motor or sensory deficits, normal muscle tone and bulk, no abnormal sensation, normal speech, cranial nerves II through XII grossly intact  Skin: No gross lesions, rashes, bruising or bleeding on exposed skin area  Extremities:  peripheral pulses palpable, no pedal edema or calf pain with palpation  Psych: normal affect     Investigations:      Laboratory Testing:  Recent Results (from the past 24 hour(s))   COVID-19, Rapid    Collection Time: 02/28/24  9:13 AM    Specimen: Nasopharyngeal Swab   Result Value Ref Range    Specimen Description .NASOPHARYNGEAL SWAB     SARS-CoV-2, Rapid Not Detected Not Detected   Rapid Influenza A/B Antigens    Collection Time: 02/28/24  9:13 AM    Specimen: Nasopharyngeal   Result Value Ref Range    Flu A Antigen NEGATIVE NEGATIVE    Flu B Antigen NEGATIVE NEGATIVE   CBC with Auto Differential    Collection Time: 02/28/24  9:37 AM   Result Value Ref Range    WBC 2.4 (L) 3.5 - 11.0 k/uL    RBC 2.15 (L) 4.0 - 5.2 m/uL    Hemoglobin 6.2 (LL) 12.0 - 16.0 g/dL    Hematocrit 19.2 (L) 36 - 46 %    MCV 89.1 80 - 100 fL    MCH 28.8 26 - 34 pg    MCHC 32.3 31 - 37 g/dL    RDW 17.9 (H) 12.5 - 15.4 %    Platelets 297 140 - 450 k/uL    MPV 7.2 6.0 - 12.0 fL    Neutrophils % 4 (L) 36 - 66 %    Lymphocytes % 41 24 - 44 %    Monocytes % 55 (H) 1 - 7 %    Eosinophils % 0 (L) 1 - 4 %    Basophils % 0 0 - 2 %    Neutrophils Absolute 0.10 (LL) 1.8 - 7.7 k/uL    Lymphocytes Absolute 0.98 (L) 1.0 - 4.8 k/uL    Monocytes     Nitrite, Urine NEGATIVE NEGATIVE    Leukocyte Esterase, Urine SMALL (A) NEGATIVE   Microscopic Urinalysis    Collection Time: 02/28/24 11:19 AM   Result Value Ref Range    WBC, UA 10 TO 20 0 - 5 /HPF    RBC, UA 0 TO 2 0 - 2 /HPF    Epithelial Cells UA 0 TO 2 0 - 5 /HPF    Bacteria, UA MANY (A) None    Amorphous, UA 1+ (A) None    Other Observations UA Culture ordered based on defined criteria. (A) NOT REQ.       Imaging/Diagnostics:  CT ABDOMEN PELVIS WO CONTRAST Additional Contrast? None    Result Date: 2/28/2024  No acute intra-abdominal or intrapelvic abnormalities are noted.       Assessment :      Hospital Problems             Last Modified POA    * (Principal) GI bleed 2/28/2024 Yes       Plan:     Fever/acute cystitis without hematuria  Continue Rocephin  Anemia/leukopenia  Patient has recently had an EGD in December 2023 which per discharge summary from [facility] demonstrated small erosions.  She subsequently underwent colonoscopy which demonstrated hemorrhoids.  Patient's Eliquis apparently was to be resumed on discharge however she needed to follow-up with gastroenterology for capsule endoscopy along with hemorrhoidectomy  Consult gastroenterology and initiate empiric PPI  Given patient's new leukopenia and abnormal differential somewhat concerning for possible marrow issue  Consult oncology  Transfuse 1 unit of blood and reassess  Acute kidney injury on stage IIIb chronic kidney disease   Clinically secondary to intravascular depletion due to anemia, avoid nephrotoxic agents  Check repeat labs in the morning  Essential hypertension  Resume home medications, monitor and titrate accordingly  Neuropathy  Continue Neurontin    Patient is admitted as inpatient status because of co-morbidities listed above, severity of signs and symptoms as outlined, requirement for current medical therapies and most importantly because of direct risk to patient if care not provided in a hospital setting.  Expected

## 2024-02-28 NOTE — RT PROTOCOL NOTE
RT Inhaler-Nebulizer Bronchodilator Protocol Note    There is a bronchodilator order in the chart from a provider indicating to follow the RT Bronchodilator Protocol and there is an “Initiate RT Inhaler-Nebulizer Bronchodilator Protocol” order as well (see protocol at bottom of note).    CXR Findings:  No results found.    The findings from the last RT Protocol Assessment were as follows:   History Pulmonary Disease: Chronic pulmonary disease  Respiratory Pattern: Dyspnea on exertion or RR 21-25 bpm  Breath Sounds: Slightly diminished and/or crackles  Cough: Strong, spontaneous, non-productive  Indication for Bronchodilator Therapy:    Bronchodilator Assessment Score: 6    Aerosolized bronchodilator medication orders have been revised according to the RT Inhaler-Nebulizer Bronchodilator Protocol below.    Respiratory Therapist to perform RT Therapy Protocol Assessment initially then follow the protocol.  Repeat RT Therapy Protocol Assessment PRN for score 0-3 or on second treatment, BID, and PRN for scores above 3.    No Indications - adjust the frequency to every 6 hours PRN wheezing or bronchospasm, if no treatments needed after 48 hours then discontinue using Per Protocol order mode.     If indication present, adjust the RT bronchodilator orders based on the Bronchodilator Assessment Score as indicated below.  Use Inhaler orders unless patient has one or more of the following: on home nebulizer, not able to hold breath for 10 seconds, is not alert and oriented, cannot activate and use MDI correctly, or respiratory rate 25 breaths per minute or more, then use the equivalent nebulizer order(s) with same Frequency and PRN reasons based on the score.  If a patient is on this medication at home then do not decrease Frequency below that used at home.    0-3 - enter or revise RT bronchodilator order(s) to equivalent RT Bronchodilator order with Frequency of every 4 hours PRN for wheezing or increased work of breathing

## 2024-02-28 NOTE — PLAN OF CARE
Case d/w dr mtz possible egd tomorrow, continue ppi drip and trend hh q 6 hours. ..Shruthi Guidry, APRN - CNP

## 2024-02-28 NOTE — ED PROVIDER NOTES
[facility] Emergency Department  [address]  [facility] [address]  Phone: [phone]  Fax: [phone]      Pt Name: Rosi Haley  MRN:[id number]  Birthdate 1945  Date of evaluation: 2/28/2024      CHIEF COMPLAINT       Chief Complaint   Patient presents with    Fever     PT presents via EMS from [place] for a fever that began yesterday. PT reports a high of 100.7F. PT is also experiencing BL lower extremity pain/tenderness, reports this is chronic.       HISTORY OF PRESENT ILLNESS    78-year-old female presents to the emergency department today from the nursing home because they documented a temperature of 100.7 °F at their facility.  Patient complains of dysuria to me and she reports she been complaining of at the nursing home for greater than 1 month.  Is also noticed by EMS that she had peripheral edema.  She reports that her legs are chronically swollen at this level.  She denies any orthopnea or PND.  No chest pain.  No nausea vomiting.  Is been no other contemporaneous evaluation or management.  She does complain of bilateral leg pain but again that is chronic in terms of nature.  Is been no other contemporaneous evaluation or management of the symptoms prior to arrival.  No nasal congestion sore throat rhinorrhea otalgia nausea vomiting diarrhea or cough.     REVIEW OF SYSTEMS     Review of Systems   All other systems reviewed and are negative.        PAST MEDICAL HISTORY    has a past medical history of Atrial fibrillation (HCC), CAD (coronary artery disease), Chronic bilateral low back pain with left-sided sciatica, Colitis, Dyslipidemia, Hypertension, LLQ pain, Short of breath on exertion, and Wound of left leg.    SURGICAL HISTORY      has a past surgical history that includes Coronary angioplasty with stent; Hysterectomy; Tubal ligation; Tonsillectomy; Colonoscopy; Leg Surgery (Left, 11/01/2017); pr i&d below fascia foot 1 bursal space (Left, 11/1/2017); Upper  colitis. Right renal cysts and some atrophy of the right kidney All CT scans at this facility use dose modulation, iterative reconstruction, and/or weight based dosing when appropriate to reduce radiation dose to as low as reasonably achievable. Workstation:[id number] Finalized by Adeel Joyce MD on 2/11/2024 10:39 AM    XR CHEST 1 VIEW    Result Date: 2/11/2024  XR CHEST 1 VW 2/11/2024 9:22 AM INDICATION: chest pain COMPARISON: Priors dating back to 12/5/2023 TECHNIQUE: AP portable upright view of the chest was obtained. FINDINGS: The lungs are clear. There is no pneumothorax. There is no pleural effusion. The cardiomediastinal silhouette is unremarkable. No acute osseous abnormalities. IMPRESSION: No acute cardiopulmonary process. Workstation:[id number] Finalized by Vineet Flannery on 2/11/2024 9:25 AM       LABS:  Results for orders placed or performed during the hospital encounter of 02/28/24   COVID-19, Rapid    Specimen: Nasopharyngeal Swab   Result Value Ref Range    Specimen Description .NASOPHARYNGEAL SWAB     SARS-CoV-2, Rapid Not Detected Not Detected   Rapid Influenza A/B Antigens    Specimen: Nasopharyngeal   Result Value Ref Range    Flu A Antigen NEGATIVE NEGATIVE    Flu B Antigen NEGATIVE NEGATIVE   CBC with Auto Differential   Result Value Ref Range    WBC 2.4 (L) 3.5 - 11.0 k/uL    RBC 2.15 (L) 4.0 - 5.2 m/uL    Hemoglobin 6.2 (LL) 12.0 - 16.0 g/dL    Hematocrit 19.2 (L) 36 - 46 %    MCV 89.1 80 - 100 fL    MCH 28.8 26 - 34 pg    MCHC 32.3 31 - 37 g/dL    RDW 17.9 (H) 12.5 - 15.4 %    Platelets 297 140 - 450 k/uL    MPV 7.2 6.0 - 12.0 fL    Neutrophils % 4 (L) 36 - 66 %    Lymphocytes % 41 24 - 44 %    Monocytes % 55 (H) 1 - 7 %    Eosinophils % 0 (L) 1 - 4 %    Basophils % 0 0 - 2 %    Neutrophils Absolute 0.10 (LL) 1.8 - 7.7 k/uL    Lymphocytes Absolute 0.98 (L) 1.0 - 4.8 k/uL    Monocytes Absolute 1.32 (H) 0.1 - 0.8 k/uL    Eosinophils Absolute 0.00 0.0 - 0.4 k/uL    Basophils Absolute 0.00 0.0 -

## 2024-02-28 NOTE — PROCEDURES
Picc placement note:  Dynamic Access RN procedure    Consent signed and obtained by proceduralist, from patient. See consent form in paper chart.    Prescribed IV Therapy = blood, atbx, elytes  Peripheral ultrasound assessment done. Plan for left basilic vein insertion.   CVR measurement = 34 % (Linear CVR is preferred to be less than 45%).  Product type: Bard 5 fr triple lumen Power PICC.  History/Labs/Allergies Reviewed  Placed By: GONSALO Millan RN (Dynamic Access)  Time out Performed using Two Identifiers  Lot # [id number]  Expiration date = 05/31/2024  Trimmed at 42 cm total  External catheter length 0 cm  Number of attempts 1  Special equipment used- Bard 3cg tip confirmation system, ultrasound, and micro-introducer (MST) technique   Catheter securement = adhesive 3M securement device  Dressing applied= Tegaderm CHG  Lidocaine administered intradermally conc.1%, approx 1 ml (Lidocaine Lot# - [id number] and Exp date - 11/30/2025    RN aware CXR needed prior to using picc. CXR ordered and awaiting report. Rn aware new iv tubing required.     PICC education:     [ x ] Discussed with patient/Family or POA prior to procedure.  Risks and Benefits along with reason for procedure were discussed and teaching was reinforced with an education handout on line  insertion. CDC FAQ Catheter Associated Blood Stream Infections and [facility] [address] REV. 7/13 Nursing and Booklet left at bedside or in chart. Patient (Family or POA) acknowledged understanding of information taught and agreed to procedure.

## 2024-02-29 LAB
ANION GAP SERPL CALCULATED.3IONS-SCNC: 12 MMOL/L (ref 9–17)
BASOPHILS # BLD: 0 K/UL (ref 0–0.2)
BASOPHILS NFR BLD: 0 % (ref 0–2)
BUN SERPL-MCNC: 38 MG/DL (ref 8–23)
CALCIUM SERPL-MCNC: 8.1 MG/DL (ref 8.6–10.4)
CHLORIDE SERPL-SCNC: 100 MMOL/L (ref 98–107)
CO2 SERPL-SCNC: 25 MMOL/L (ref 20–31)
CREAT SERPL-MCNC: 1.8 MG/DL (ref 0.5–0.9)
EKG ATRIAL RATE: 115 BPM
EKG Q-T INTERVAL: 346 MS
EKG QRS DURATION: 138 MS
EKG QTC CALCULATION (BAZETT): 463 MS
EKG R AXIS: 80 DEGREES
EKG T AXIS: -24 DEGREES
EKG VENTRICULAR RATE: 108 BPM
EOSINOPHIL # BLD: 0 K/UL (ref 0–0.4)
EOSINOPHILS RELATIVE PERCENT: 0 % (ref 1–4)
ERYTHROCYTE [DISTWIDTH] IN BLOOD BY AUTOMATED COUNT: 17.4 % (ref 12.5–15.4)
FERRITIN SERPL-MCNC: 155 NG/ML (ref 13–150)
FOLATE SERPL-MCNC: 3.6 NG/ML
GFR SERPL CREATININE-BSD FRML MDRD: 29 ML/MIN/1.73M2
GLUCOSE SERPL-MCNC: 66 MG/DL (ref 70–99)
HAPTOGLOB SERPL-MCNC: 231 MG/DL (ref 30–200)
HCT VFR BLD AUTO: 21 % (ref 36–46)
HCT VFR BLD AUTO: 24.6 % (ref 36–46)
HCT VFR BLD AUTO: 25.7 % (ref 36–46)
HCT VFR BLD AUTO: 26.5 % (ref 36–46)
HGB BLD-MCNC: 6.9 G/DL (ref 12–16)
HGB BLD-MCNC: 8.2 G/DL (ref 12–16)
HGB BLD-MCNC: 8.4 G/DL (ref 12–16)
HGB BLD-MCNC: 8.5 G/DL (ref 12–16)
IMM RETICS NFR: 26.5 % (ref 2.7–18.3)
IRON SATN MFR SERPL: 21 % (ref 20–55)
IRON SERPL-MCNC: 29 UG/DL (ref 37–145)
LACTATE BLDV-SCNC: 1.2 MMOL/L (ref 0.5–1.9)
LDH SERPL-CCNC: 182 U/L (ref 135–214)
LYMPHOCYTES NFR BLD: 1.68 K/UL (ref 1–4.8)
LYMPHOCYTES RELATIVE PERCENT: 56 % (ref 24–44)
MCH RBC QN AUTO: 29.2 PG (ref 26–34)
MCHC RBC AUTO-ENTMCNC: 32.9 G/DL (ref 31–37)
MCV RBC AUTO: 88.6 FL (ref 80–100)
MONOCYTES NFR BLD: 0.57 K/UL (ref 0.1–0.8)
MONOCYTES NFR BLD: 19 % (ref 1–7)
MORPHOLOGY: ABNORMAL
NEUTROPHILS NFR BLD: 25 % (ref 36–66)
NEUTS SEG NFR BLD: 0.75 K/UL (ref 1.8–7.7)
PLATELET # BLD AUTO: 242 K/UL (ref 140–450)
PMV BLD AUTO: 7.3 FL (ref 6–12)
POTASSIUM SERPL-SCNC: 4.1 MMOL/L (ref 3.7–5.3)
RBC # BLD AUTO: 2.37 M/UL (ref 4–5.2)
RETIC HEMOGLOBIN: 22 PG (ref 28.2–35.7)
RETICS # AUTO: 0.1 M/UL (ref 0.03–0.08)
RETICS/RBC NFR AUTO: 4.2 % (ref 0.5–1.9)
SODIUM SERPL-SCNC: 137 MMOL/L (ref 135–144)
TIBC SERPL-MCNC: 139 UG/DL (ref 250–450)
UNSATURATED IRON BINDING CAPACITY: 110 UG/DL (ref 112–347)
VANCOMYCIN SERPL-MCNC: 17.8 UG/ML
VIT B12 SERPL-MCNC: 753 PG/ML (ref 232–1245)
WBC OTHER # BLD: 3 K/UL (ref 3.5–11)

## 2024-02-29 PROCEDURE — 6360000002 HC RX W HCPCS

## 2024-02-29 PROCEDURE — 84165 PROTEIN E-PHORESIS SERUM: CPT

## 2024-02-29 PROCEDURE — 84155 ASSAY OF PROTEIN SERUM: CPT

## 2024-02-29 PROCEDURE — 2580000003 HC RX 258: Performed by: NURSE PRACTITIONER

## 2024-02-29 PROCEDURE — 2580000003 HC RX 258

## 2024-02-29 PROCEDURE — 85014 HEMATOCRIT: CPT

## 2024-02-29 PROCEDURE — 99223 1ST HOSP IP/OBS HIGH 75: CPT | Performed by: NURSE PRACTITIONER

## 2024-02-29 PROCEDURE — 82746 ASSAY OF FOLIC ACID SERUM: CPT

## 2024-02-29 PROCEDURE — 99232 SBSQ HOSP IP/OBS MODERATE 35: CPT | Performed by: INTERNAL MEDICINE

## 2024-02-29 PROCEDURE — P9040 RBC LEUKOREDUCED IRRADIATED: HCPCS

## 2024-02-29 PROCEDURE — 99232 SBSQ HOSP IP/OBS MODERATE 35: CPT | Performed by: HOSPITALIST

## 2024-02-29 PROCEDURE — 85018 HEMOGLOBIN: CPT

## 2024-02-29 PROCEDURE — 88184 FLOWCYTOMETRY/ TC 1 MARKER: CPT

## 2024-02-29 PROCEDURE — 82525 ASSAY OF COPPER: CPT

## 2024-02-29 PROCEDURE — 83010 ASSAY OF HAPTOGLOBIN QUANT: CPT

## 2024-02-29 PROCEDURE — 83615 LACTATE (LD) (LDH) ENZYME: CPT

## 2024-02-29 PROCEDURE — 36415 COLL VENOUS BLD VENIPUNCTURE: CPT

## 2024-02-29 PROCEDURE — 94761 N-INVAS EAR/PLS OXIMETRY MLT: CPT

## 2024-02-29 PROCEDURE — 6360000002 HC RX W HCPCS: Performed by: HOSPITALIST

## 2024-02-29 PROCEDURE — 80048 BASIC METABOLIC PNL TOTAL CA: CPT

## 2024-02-29 PROCEDURE — 94640 AIRWAY INHALATION TREATMENT: CPT

## 2024-02-29 PROCEDURE — 84630 ASSAY OF ZINC: CPT

## 2024-02-29 PROCEDURE — 6370000000 HC RX 637 (ALT 250 FOR IP): Performed by: HOSPITALIST

## 2024-02-29 PROCEDURE — 85045 AUTOMATED RETICULOCYTE COUNT: CPT

## 2024-02-29 PROCEDURE — 83550 IRON BINDING TEST: CPT

## 2024-02-29 PROCEDURE — 6370000000 HC RX 637 (ALT 250 FOR IP)

## 2024-02-29 PROCEDURE — C9113 INJ PANTOPRAZOLE SODIUM, VIA: HCPCS | Performed by: NURSE PRACTITIONER

## 2024-02-29 PROCEDURE — 88185 FLOWCYTOMETRY/TC ADD-ON: CPT

## 2024-02-29 PROCEDURE — 2700000000 HC OXYGEN THERAPY PER DAY

## 2024-02-29 PROCEDURE — 1210000000 HC MED SURG R&B

## 2024-02-29 PROCEDURE — 86225 DNA ANTIBODY NATIVE: CPT

## 2024-02-29 PROCEDURE — 6360000002 HC RX W HCPCS: Performed by: NURSE PRACTITIONER

## 2024-02-29 PROCEDURE — 83521 IG LIGHT CHAINS FREE EACH: CPT

## 2024-02-29 PROCEDURE — 36430 TRANSFUSION BLD/BLD COMPNT: CPT

## 2024-02-29 PROCEDURE — 82728 ASSAY OF FERRITIN: CPT

## 2024-02-29 PROCEDURE — 2580000003 HC RX 258: Performed by: HOSPITALIST

## 2024-02-29 PROCEDURE — 86038 ANTINUCLEAR ANTIBODIES: CPT

## 2024-02-29 PROCEDURE — 85025 COMPLETE CBC W/AUTO DIFF WBC: CPT

## 2024-02-29 PROCEDURE — 83605 ASSAY OF LACTIC ACID: CPT

## 2024-02-29 PROCEDURE — 83540 ASSAY OF IRON: CPT

## 2024-02-29 PROCEDURE — 80202 ASSAY OF VANCOMYCIN: CPT

## 2024-02-29 PROCEDURE — 82607 VITAMIN B-12: CPT

## 2024-02-29 PROCEDURE — 86334 IMMUNOFIX E-PHORESIS SERUM: CPT

## 2024-02-29 RX ORDER — SODIUM CHLORIDE 9 MG/ML
INJECTION, SOLUTION INTRAVENOUS PRN
Status: DISCONTINUED | OUTPATIENT
Start: 2024-02-29 | End: 2024-03-02

## 2024-02-29 RX ORDER — 0.9 % SODIUM CHLORIDE 0.9 %
500 INTRAVENOUS SOLUTION INTRAVENOUS ONCE
Status: COMPLETED | OUTPATIENT
Start: 2024-02-29 | End: 2024-02-29

## 2024-02-29 RX ORDER — SODIUM CHLORIDE 9 MG/ML
INJECTION, SOLUTION INTRAVENOUS CONTINUOUS
Status: DISCONTINUED | OUTPATIENT
Start: 2024-02-29 | End: 2024-03-02

## 2024-02-29 RX ADMIN — BUMETANIDE 1 MG: 1 TABLET ORAL at 09:07

## 2024-02-29 RX ADMIN — BUDESONIDE AND FORMOTEROL FUMARATE DIHYDRATE 2 PUFF: 160; 4.5 AEROSOL RESPIRATORY (INHALATION) at 19:52

## 2024-02-29 RX ADMIN — BUDESONIDE AND FORMOTEROL FUMARATE DIHYDRATE 2 PUFF: 160; 4.5 AEROSOL RESPIRATORY (INHALATION) at 09:34

## 2024-02-29 RX ADMIN — IPRATROPIUM BROMIDE AND ALBUTEROL SULFATE 1 DOSE: 2.5; .5 SOLUTION RESPIRATORY (INHALATION) at 19:52

## 2024-02-29 RX ADMIN — POLYETHYLENE GLYCOL 3350 17 G: 17 POWDER, FOR SOLUTION ORAL at 09:08

## 2024-02-29 RX ADMIN — PREGABALIN 50 MG: 25 CAPSULE ORAL at 20:45

## 2024-02-29 RX ADMIN — POTASSIUM CHLORIDE 10 MEQ: 7.46 INJECTION, SOLUTION INTRAVENOUS at 00:26

## 2024-02-29 RX ADMIN — SODIUM CHLORIDE 500 ML: 9 INJECTION, SOLUTION INTRAVENOUS at 00:50

## 2024-02-29 RX ADMIN — POTASSIUM CHLORIDE 10 MEQ: 750 CAPSULE, EXTENDED RELEASE ORAL at 09:07

## 2024-02-29 RX ADMIN — PREGABALIN 50 MG: 25 CAPSULE ORAL at 09:07

## 2024-02-29 RX ADMIN — CINACALCET HYDROCHLORIDE 90 MG: 30 TABLET, FILM COATED ORAL at 09:07

## 2024-02-29 RX ADMIN — CEFEPIME 2000 MG: 2 INJECTION, POWDER, FOR SOLUTION INTRAVENOUS at 20:43

## 2024-02-29 RX ADMIN — PRAVASTATIN SODIUM 40 MG: 20 TABLET ORAL at 09:07

## 2024-02-29 RX ADMIN — IPRATROPIUM BROMIDE AND ALBUTEROL SULFATE 1 DOSE: 2.5; .5 SOLUTION RESPIRATORY (INHALATION) at 09:34

## 2024-02-29 RX ADMIN — DOCUSATE SODIUM 100 MG: 100 CAPSULE, LIQUID FILLED ORAL at 09:07

## 2024-02-29 RX ADMIN — PANTOPRAZOLE SODIUM 8 MG/HR: 40 INJECTION, POWDER, FOR SOLUTION INTRAVENOUS at 05:07

## 2024-02-29 RX ADMIN — Medication 400 MG: at 09:07

## 2024-02-29 RX ADMIN — SODIUM CHLORIDE: 9 INJECTION, SOLUTION INTRAVENOUS at 05:08

## 2024-02-29 RX ADMIN — PANTOPRAZOLE SODIUM 8 MG/HR: 40 INJECTION, POWDER, FOR SOLUTION INTRAVENOUS at 15:35

## 2024-02-29 RX ADMIN — CEFEPIME 2000 MG: 2 INJECTION, POWDER, FOR SOLUTION INTRAVENOUS at 09:14

## 2024-02-29 NOTE — PROGRESS NOTES
[facility]   Pharmacy Pharmacokinetic Monitoring Service - Vancomycin     Rosi Haley is a 78 y.o. female starting on vancomycin therapy for neutropenic fever. Pharmacy consulted by Natalie Wu for monitoring and adjustment.    Target Concentration: Goal AUC/MARLIN 400-600 mg*hr/L    Additional Antimicrobials: ceftriaxone    Pertinent Laboratory Values:   Wt Readings from Last 1 Encounters:   02/28/24 106.6 kg (235 lb)     Temp Readings from Last 1 Encounters:   02/28/24 99.5 °F (37.5 °C) (Oral)     Estimated Creatinine Clearance: 31 mL/min (A) (based on SCr of 1.9 mg/dL (H)).  Recent Labs     02/28/24  0937   CREATININE 1.9*   BUN 36*   WBC 2.4*     Procalcitonin:     Pertinent Cultures:  Culture Date Source Results   pending     MRSA Nasal Swab:       Plan:  Concentration-guided dosing due to renal impairment/insufficiency  Start vancomycin 2000 mg once.  Additional doses will be based on levels due to poor renal function.  Renal labs as indicated     Pharmacy will continue to monitor patient and adjust therapy as indicated    Thank you for the consult,  Roger Calix RPH  2/28/2024 10:18 PM

## 2024-02-29 NOTE — PROGRESS NOTES
[facility]  Office: [phone]  Marty Thorpe DO, Alvaro Brannon DO, Jefferson Lacy DO, Josh Licona DO, Richard Chinchilla MD, Margarita Mullins MD, Ilsa Monroe MD, Dorothea Perez MD,  Maycol Castanon MD, Levi Sweeney MD, Andi Myles MD,  Dinh Olson DO, Elyse Griffin MD, Wyatt Kelley MD, Vineet Thorpe DO, Melinda Fernandez MD,  Diogo Parr DO, Taylor Biswas MD, Tala Ruiz MD, Radha Whitt MD, Erik Bird MD,  Erasmo De La O MD, Maribell Leigh MD, Rupali Randall MD, Raissa Moran MD, Ru Silva MD, Winston Guaman MD, Jonathon Walters DO, Justice Abreu DO, Scott Blum MD,  Kapil Aguilar MD, Shirley Waterhouse, CNP,  Catherine Johnson, CNP, Roger Sierra, CNP,  Kaelyn Alegre, DNP, Gillian Bey, CNP, Ely Delgado, CNP, Lilia Agarwal CNP, Victorina Bear, CNP, Eileen Yeager, CNP, Stacey Mccrary, PA-C, Charity Ramachandran PA-C, Yany Beltran, CNP, Jen Zhao, CNP, Yajaira Suarez, CNP, Piedad Preston, CNS, Kathi Bauer, CNP, Natalie Wu, CNP, Tracy Schwab, CNP         [facility]   IN-PATIENT SERVICE   [facility]    Progress Note    2/29/2024    12:04 PM    Name:   Rosi Haley  MRN:     [id number]     Acct:      [id number]   Room:   342/342-01   Day:  1  Admit Date:  2/28/2024  8:55 AM    PCP:   Radha Wynn MD  Code Status:  Full Code    Subjective:     Patient seen in follow-up for anemia, confusion.  Patient states \"I feel better\"    Injuries noted, appreciate all services.  Oncology input noted and appreciated and I did transition to cefepime given concerns of neutropenic fever.  Since that time the patient does have gram-negative bacteremia.  PCR has demonstrated Pseudomonas.  Will continue with cefepime at this point in time.  Urine cultures in progress.  Depending on findings patient may need further evaluation regarding her Pseudomonas bacteremia.  Meanwhile I did have a long discussion with her regarding bone marrow biopsy.  I

## 2024-02-29 NOTE — PLAN OF CARE
Problem: Safety - Adult  Goal: Free from fall injury  Outcome: Progressing  Flowsheets (Taken 2/28/2024 1430 by Mela aRmirez, RN)  Free From Fall Injury: Instruct family/caregiver on patient safety     Problem: Discharge Planning  Goal: Discharge to home or other facility with appropriate resources  Outcome: Progressing  Flowsheets (Taken 2/28/2024 1530 by Mela Ramirez, RN)  Discharge to home or other facility with appropriate resources:   Identify barriers to discharge with patient and caregiver   Arrange for needed discharge resources and transportation as appropriate   Identify discharge learning needs (meds, wound care, etc)   Refer to discharge planning if patient needs post-hospital services based on physician order or complex needs related to functional status, cognitive ability or social support system     Problem: Pain  Goal: Verbalizes/displays adequate comfort level or baseline comfort level  Outcome: Progressing     Problem: ABCDS Injury Assessment  Goal: Absence of physical injury  Outcome: Progressing  Flowsheets (Taken 2/28/2024 1430 by Mela Ramirez, RN)  Absence of Physical Injury: Implement safety measures based on patient assessment     Problem: Skin/Tissue Integrity  Goal: Absence of new skin breakdown  Description: 1.  Monitor for areas of redness and/or skin breakdown  2.  Assess vascular access sites hourly  3.  Every 4-6 hours minimum:  Change oxygen saturation probe site  4.  Every 4-6 hours:  If on nasal continuous positive airway pressure, respiratory therapy assess nares and determine need for appliance change or resting period.  Outcome: Progressing      How Severe Is Your Skin Lesion?: mild Have Your Skin Lesions Been Treated?: not been treated Is This A New Presentation, Or A Follow-Up?: Skin Lesions

## 2024-02-29 NOTE — RT PROTOCOL NOTE
RT Inhaler-Nebulizer Bronchodilator Protocol Note    There is a bronchodilator order in the chart from a provider indicating to follow the RT Bronchodilator Protocol and there is an “Initiate RT Inhaler-Nebulizer Bronchodilator Protocol” order as well (see protocol at bottom of note).    CXR Findings:  No results found.    The findings from the last RT Protocol Assessment were as follows:   History Pulmonary Disease: Chronic pulmonary disease  Respiratory Pattern: Dyspnea on exertion or RR 21-25 bpm  Breath Sounds: Slightly diminished and/or crackles  Cough: Strong, spontaneous, non-productive  Indication for Bronchodilator Therapy: None  Bronchodilator Assessment Score: 6    Aerosolized bronchodilator medication orders have been revised according to the RT Inhaler-Nebulizer Bronchodilator Protocol below.    Respiratory Therapist to perform RT Therapy Protocol Assessment initially then follow the protocol.  Repeat RT Therapy Protocol Assessment PRN for score 0-3 or on second treatment, BID, and PRN for scores above 3.    No Indications - adjust the frequency to every 6 hours PRN wheezing or bronchospasm, if no treatments needed after 48 hours then discontinue using Per Protocol order mode.     If indication present, adjust the RT bronchodilator orders based on the Bronchodilator Assessment Score as indicated below.  Use Inhaler orders unless patient has one or more of the following: on home nebulizer, not able to hold breath for 10 seconds, is not alert and oriented, cannot activate and use MDI correctly, or respiratory rate 25 breaths per minute or more, then use the equivalent nebulizer order(s) with same Frequency and PRN reasons based on the score.  If a patient is on this medication at home then do not decrease Frequency below that used at home.    0-3 - enter or revise RT bronchodilator order(s) to equivalent RT Bronchodilator order with Frequency of every 4 hours PRN for wheezing or increased work of

## 2024-02-29 NOTE — CARE COORDINATION
Case Management Assessment  Initial Evaluation    Date/Time of Evaluation: 2/29/2024 9:33 AM  Assessment Completed by: PAUL HOLLIDAY CARO    If patient is discharged prior to next notation, then this note serves as note for discharge by case management.    Patient Name: Rosi Haley                   YOB: 1945  Diagnosis: Upper gastrointestinal bleeding [K92.2]  GI bleed [K92.2]  Acute cystitis without hematuria [N30.00]                   Date / Time: 2/28/2024  8:55 AM    Patient Admission Status: Inpatient   Readmission Risk (Low < 19, Mod (19-27), High > 27): Readmission Risk Score: 26    Current PCP: Radha Wynn MD  PCP verified by CM? Yes    Chart Reviewed: Yes      History Provided by: Patient  Patient Orientation: Alert and Oriented    Patient Cognition: Alert    Hospitalization in the last 30 days (Readmission):  No    If yes, Readmission Assessment in CM Navigator will be completed.    Advance Directives:      Code Status: Full Code   Patient's Primary Decision Maker is: Named in Scanned ACP Document    Primary Decision Maker: Ihsan Haley - Spouse - [phone]    Primary Decision Maker: Gopal Haley - Child - [phone]    Discharge Planning:    Patient lives with: Spouse/Significant Other Type of Home: Assisted living  Primary Care Giver: Self  Patient Support Systems include: Spouse/Significant Other, Children   Current Financial resources: Medicare  Current community resources: None  Current services prior to admission: Durable Medical Equipment            Current DME: Wheelchair, Shower Chair, Other (Comment) (Georgina lift)            Type of Home Care services:  None    ADLS  Prior functional level: Assistance with the following:, Bathing, Dressing, Toileting, Cooking, Housework, Mobility  Current functional level: Assistance with the following:, Mobility, Bathing, Dressing, Toileting, Cooking, Housework    PT AM-PAC:   /24  OT AM-PAC:   /24    Family can provide assistance at

## 2024-02-29 NOTE — CONSULTS
[facility]    GASTROENTEROLOGY CONSULT    Patient:   Rosi Haley   :    1945   Facility:   [facility]  Date:    2024  Admission Dx:  Upper gastrointestinal bleeding [K92.2]  GI bleed [K92.2]  Acute cystitis without hematuria [N30.00]  Requesting physician: Vineet Thorpe DO  Reason for consult:   Melena with positive fobt     SUBJECTIVE:  History of Present Illness:  This is a 78 y.o.   female who was admitted 2024 with Upper gastrointestinal bleeding [K92.2]  GI bleed [K92.2]  Acute cystitis without hematuria [N30.00]. We have been asked to see the patient in consultation by Vineet Thorpe DO for  melena with positive fobt. This is a 78 year old female with pmh of afib on eliquis cad htn hld colitis and sciatica who presented to the ED for fever. She is being treated for acute cystitis. She was found to have hgb of 6.2 in the ED. Her FOBT was positive. She stats she has had melena but unable to state when it started. Per the rn she did have one small melanic stool last night but none so far today. She has not had hematemesis abdominal pain or hematochezia. She denies NSAID use. She had a EGD on 24 that is discussed below. It was recommended that she have a videocapsule study to evaluate the small bowel but that has not been completed yet. She does take aspirin and prilosec at home. Has not had dyspepsia. Ct abd shows no acute findings. Labs show creat 1.8 wbc 3.0 plt normal  anemia profile from 23 shows iron at 146 with saturation 88% ast 38 no recent inr. She denies dysphagia weight loss rash.    ENDOSCOPY HX egd 24 mild gastritis HH 12/10/23 colonoscopy diverticulosis hemorrhoids  FAMILY HX no hx of liver pancreatic stomach or colon cancer no uc/crohns    OBJECTIVE:    PAST MEDICAL/SURGICAL HISTORY  Past Medical History:   Diagnosis Date    Atrial fibrillation (HCC) 10/31/2017    New onset atrial fibrillation rate well  02/28/24  0937 02/29/24  0140    137   K 3.0* 4.1   CL 96* 100   CO2 28 25   BUN 36* 38*   CREATININE 1.9* 1.8*   GLUCOSE 98 66*   CALCIUM 8.4* 8.1*       LFTS  Recent Labs     02/28/24  0937   ALKPHOS 63   ALT 26   AST 38*   PROT 6.0*   BILITOT 0.5   LABALBU 2.7*     FINDINGS:ct abd 2/28/24  Lower Chest: Lung bases are clear.     Organs: Liver is normal in size and density.  Stable cysts in the right and  left hepatic lobe.  No focal masses identified. No evidence of intrahepatic  ductal dilatation.    Spleen is normal size.  The gallbladder is  unremarkable.  Both adrenal glands are stable.  Pancreas is normal in  appearance. Stable right renal cyst.  Right kidney is smaller than the left..  The kidneys are otherwise normal in  attenuation without evidence of  hydronephrosis or renal calculi.     GI/Bowel: The visualized bowel and mesentery show no mass lesions. Mild  colonic diverticulosis. No evidence of diverticulitis.  Normal appendix     Pelvis: No intrapelvic mass is identified.  Bladder and rectum are intact.     Peritoneum/Retroperitoneum: No free fluid. No lymphadenopathy. No evidence of  pneumoperitoneum.     Bones/Soft Tissues: Small fat containing umbilical hernia.  Vascular  calcifications are seen compatible with atherosclerotic disease.   Stable  ectasia of the abdominal aorta measuring 2.8 cm.  Degenerative changes seen  in the visualized spine .  No acute bony abnormalities.  Postsurgical changes  in the left inguinal canal.     IMPRESSION:  No acute intra-abdominal or intrapelvic abnormalities are noted        IMPRESSION:/plan  1. Anemia with leukopenia, melena  D/w dr mtz, pt will need outpt videocapsule study to r/o bleeding in the small bowel. Will hold on egd today  Continue ppi  Trend hh  Hematology following ?possible bone marrow bx  Ok to eat from a gi standpoint    2.LUPE mgt per primary service    Case d/w dr ribeiro    This plan was formulated in collaboration with Dr. mtz

## 2024-02-29 NOTE — PLAN OF CARE
BRONCHOSPASM/BRONCHOCONSTRICTION     [x]         IMPROVE AERATION/BREATH SOUNDS  [x]   ADMINISTER BRONCHODILATOR THERAPY AS APPROPRIATE  [x]   ASSESS BREATH SOUNDS  [x]   IMPLEMENT AEROSOL/MDI PROTOCOL  [x]   PATIENT EDUCATION AS NEEDED  Inhaler / Aerosol Education        [x] Served spacer    [] Provided and reviewed booklet   [x] Good return demonstration per patient   [x] Aerosolized Medications:     Verbal education has been provided in the use, benefits and possible adverse reactions of aerosolized medications used in the treatment of this patient.    [x] Other:PROVIDE ADEQUATE OXYGENATION WITH ACCEPTABLE SP02/ABG'S    [x]  IDENTIFY APPROPRIATE OXYGEN THERAPY  [x]   MONITOR SP02/ABG'S AS NEEDED   [x]   PATIENT EDUCATION AS NEEDED        Problem: Respiratory - Adult  Goal: Achieves optimal ventilation and oxygenation  Outcome: Progressing

## 2024-02-29 NOTE — CONSULTS
mg in sodium chloride 0.9 % 100 mL infusion  8 mg/hr IntraVENous Continuous Channingivett Shruthi, APRN - CNP 10 mL/hr at 02/28/24 1858 8 mg/hr at 02/28/24 1858    lidocaine PF 1 % injection 5 mL  5 mL IntraDERmal Once Vineet Thorpe,         sodium chloride flush 0.9 % injection 5-40 mL  5-40 mL IntraVENous 2 times per day Vineet Thorpe,         sodium chloride flush 0.9 % injection 5-40 mL  5-40 mL IntraVENous PRN Vineet Thorpe,         0.9 % sodium chloride infusion  25 mL IntraVENous PRN Vineet Thorpe,         albuterol sulfate HFA (PROVENTIL;VENTOLIN;PROAIR) 108 (90 Base) MCG/ACT inhaler 2 puff  2 puff Inhalation Q4H PRN Vineet Thorpe,         albuterol (PROVENTIL) (2.5 MG/3ML) 0.083% nebulizer solution 2.5 mg  2.5 mg Nebulization Q4H PRN Vineet Thorpe,         ipratropium 0.5 mg-albuterol 2.5 mg (DUONEB) nebulizer solution 1 Dose  1 Dose Inhalation BID RT Vineet Thorpe,         acetaminophen (TYLENOL) tablet 650 mg  650 mg Oral Q6H PRN Vineet Thorpe,         budesonide-formoterol (SYMBICORT) 160-4.5 MCG/ACT inhaler 2 puff  2 puff Inhalation BID RT Vineet Thorpe,         melatonin tablet 3 mg  3 mg Oral Nightly PRN Vineet Thorpe,         nitroGLYCERIN (NITROSTAT) SL tablet 0.4 mg  0.4 mg SubLINGual Q5 Min PRN Vineet Thorpe,         polyethylene glycol (GLYCOLAX) packet 17 g  17 g Oral Daily Vineet Thorpe,         potassium chloride (MICRO-K) extended release capsule 10 mEq  10 mEq Oral Daily Vineet Thorpe, DO        pregabalin (LYRICA) capsule 50 mg  50 mg Oral BID Vineet Thorpe, DO           Allergies:  Amlodipine and Aspirin    Social History:   reports that she quit smoking about 22 years ago. Her smoking use included cigarettes. She started smoking about 62 years ago. She has a 80.0 pack-year smoking history. She has never used smokeless tobacco. She reports that she does not drink alcohol and does not use  Blood Bank [id number]     Blood Bank Unit Type and Rh O NEG     Blood Bank ISBT Product Blood Type 9500     Blood Bank Blood Product Expiration Date [id number]     Transfusion Status OK TO TRANSFUSE     Crossmatch Result COMPATIBLE          IMAGING DATA:    XR CHEST (SINGLE VIEW FRONTAL)    Result Date: 2/28/2024  EXAMINATION: ONE XRAY VIEW OF THE CHEST 2/28/2024 5:57 pm COMPARISON: 12/29/2023 HISTORY: ORDERING SYSTEM PROVIDED HISTORY: post PICC placement TECHNOLOGIST PROVIDED HISTORY: post PICC placement Reason for Exam: post picc placement FINDINGS: Left-sided PICC terminates in the SVC. The lungs are without acute focal process.  There is no effusion or pneumothorax. The cardiomediastinal silhouette is stable. The osseous structures are stable.     No acute process. Left-sided PICC terminates in the SVC     CT ABDOMEN PELVIS WO CONTRAST Additional Contrast? None    Result Date: 2/28/2024  EXAMINATION: CT OF THE ABDOMEN AND PELVIS WITHOUT CONTRAST 2/28/2024 8:55 am TECHNIQUE: CT of the abdomen and pelvis was performed without the administration of intravenous contrast. Multiplanar reformatted images are provided for review. Automated exposure control, iterative reconstruction, and/or weight based adjustment of the mA/kV was utilized to reduce the radiation dose to as low as reasonably achievable. COMPARISON: 12/07/2023 HISTORY: ORDERING SYSTEM PROVIDED HISTORY: abd pain TECHNOLOGIST PROVIDED HISTORY: abd pain Decision Support Exception - unselect if not a suspected or confirmed emergency medical condition->Emergency Medical Condition (MA) Reason for Exam: fever; abdominal pain; GFR: 27 FINDINGS: Lower Chest: Lung bases are clear. Organs: Liver is normal in size and density.  Stable cysts in the right and left hepatic lobe.  No focal masses identified. No evidence of intrahepatic ductal dilatation.    Spleen is normal size.  The gallbladder is unremarkable.  Both adrenal glands are stable.  Pancreas is normal in

## 2024-02-29 NOTE — PLAN OF CARE
Problem: Safety - Adult  Goal: Free from fall injury  2/29/2024 1037 by Amaris Rosenbaum RN  Outcome: Progressing  2/28/2024 2337 by Linda Villalobos RN  Outcome: Progressing  Flowsheets (Taken 2/28/2024 1430 by Mela Ramirez, RN)  Free From Fall Injury: Instruct family/caregiver on patient safety     Problem: Discharge Planning  Goal: Discharge to home or other facility with appropriate resources  2/29/2024 1037 by Amaris Rosenbaum RN  Outcome: Progressing  Flowsheets (Taken 2/29/2024 0919)  Discharge to home or other facility with appropriate resources: Identify barriers to discharge with patient and caregiver  2/28/2024 2337 by Linda Villalobos RN  Outcome: Progressing  Flowsheets (Taken 2/28/2024 1530 by Mela Ramirez RN)  Discharge to home or other facility with appropriate resources:   Identify barriers to discharge with patient and caregiver   Arrange for needed discharge resources and transportation as appropriate   Identify discharge learning needs (meds, wound care, etc)   Refer to discharge planning if patient needs post-hospital services based on physician order or complex needs related to functional status, cognitive ability or social support system     Problem: Pain  Goal: Verbalizes/displays adequate comfort level or baseline comfort level  2/29/2024 1037 by Amaris Rosenbaum RN  Outcome: Progressing  2/28/2024 2337 by Linda Villalobos RN  Outcome: Progressing     Problem: ABCDS Injury Assessment  Goal: Absence of physical injury  2/29/2024 1037 by Amaris Rosenbaum RN  Outcome: Progressing  2/28/2024 2337 by Linda Villalobos RN  Outcome: Progressing  Flowsheets (Taken 2/28/2024 1430 by Mela Ramirez RN)  Absence of Physical Injury: Implement safety measures based on patient assessment     Problem: Skin/Tissue Integrity  Goal: Absence of new skin breakdown  Description: 1.  Monitor for areas of redness and/or skin breakdown  2.  Assess vascular access sites hourly  3.  Every 4-6 hours  minimum:  Change oxygen saturation probe site  4.  Every 4-6 hours:  If on nasal continuous positive airway pressure, respiratory therapy assess nares and determine need for appliance change or resting period.  2/29/2024 1037 by Amaris Rosenbaum RN  Outcome: Progressing  2/28/2024 2337 by Linda Villalobos RN  Outcome: Progressing     Problem: Respiratory - Adult  Goal: Achieves optimal ventilation and oxygenation  2/29/2024 1037 by Amaris Rosenbaum RN  Outcome: Progressing  2/28/2024 2346 by Noris Hemphill RCP  Outcome: Progressing     Problem: Hematologic - Adult  Goal: Maintains hematologic stability  Outcome: Progressing    Care plan reviewed with patient.  Patient verbalized understanding of the plan of care at this time.

## 2024-02-29 NOTE — PLAN OF CARE
Problem: Respiratory - Adult  Goal: Achieves optimal ventilation and oxygenation  2/29/2024 1611 by Arabella Terrazas RCP  Outcome: Progressing  Flowsheets (Taken 2/29/2024 1611)  Achieves optimal ventilation and oxygenation:   Assess for changes in respiratory status   Respiratory therapy support as indicated   Assess for changes in mentation and behavior   Assess and instruct to report shortness of breath or any respiratory difficulty

## 2024-02-29 NOTE — PROGRESS NOTES
Today's Date: 2/29/2024  Patient Name: Rosi Haley  Date of admission: 2/28/2024  8:55 AM  Patient's age: 78 y.o., 1945  Admission Dx: Upper gastrointestinal bleeding [K92.2]  GI bleed [K92.2]  Acute cystitis without hematuria [N30.00]    Reason for Consult: management recommendations  Requesting Physician: Vineet Thorpe DO    CHIEF COMPLAINT: Anemia.  Neutropenia.    Interim history  Patient is seen and evaluated.  Hemoglobin improved up to 8.4.  No evidence of active bleeding right now.  HISTORY OF PRESENT ILLNESS:      The patient is a 78 y.o.  female who is admitted to the hospital chief complaints of GI bleed.  Patient brought in from assisted living secondary to fever.  Diagnosed with acute cholecystitis without hematuria.  Was also noted to be significantly anemic hemoglobin 6.2.  Fecal occult blood test positive.  Additionally patient is also leukopenic.  Review of chart shows patient has longstanding history of anemia with hemoglobin around being 8.  Patient does complain of being tired.  GI team consulted.  Hematology oncology team consulted due to chronic anemia.  Patient does take Eliquis.  Other medical problems include CKD.    Patient recent EGD showed gastric antrum small erosions no bleeding.  Colonoscopy from 12/6 showed normal ileum and some large hemorrhoids.  Patient even has had a push enteroscopy done on 1/8/2024 which was unremarkable.    Patient hemoglobin is 6.2.  MCV 89.  Hemoglobin noted to be 12.4 back in November 2023 and has never really recovered.  Patient WBC count is 2.4 which is a new finding.  Patient's ANC is 0.1.  No peripheral blasts reported.  Monocyte count 1.3.  CT abdomen pelvis does not show any any abnormalities    Past Medical History:   has a past medical history of Atrial fibrillation (HCC), CAD (coronary artery disease), Chronic bilateral low back pain with left-sided sciatica, Colitis, Dyslipidemia, Hypertension, LLQ pain, Short of breath  bleed    Active Hospital Problems    Diagnosis Date Noted    GI bleed [K92.2] 02/28/2024    Neutropenia (HCC) [D70.9] 02/28/2024    Acute cystitis without hematuria [N30.00] 02/28/2024    Upper gastrointestinal bleeding [K92.2] 12/05/2023       Anemia, normocytic  Severe neutropenia  Fecal occult blood test/GI bleed  Neutropenic fever    RECOMMENDATIONS:  I reviewed the labs/imaging available to me,outside records and discussed with the patient.I explained to the patient the nature of this problem. I explained the significance of these abnormalities and possible etiology and management optionsReviewed previous medical record  Reviewed records once at facility  It seems patient has had extensive workup for GI bleed done including EGD colonoscopy as well as push enteroscopy with no obvious source of bleeding noted.  Workup suggested blood loss anemia.  However the source of this bleeding has not been discovered  Workup ordered yesterday include normal ferritin, normal LDH slightly elevated reticulocyte count  White count is slowly rising  Patient may need bone marrow biopsy, likely after resolution of acute illness  We will follow-up on blood cultures  Continue broad-spectrum antibiotic  Neutropenic precautions  Transfuse to keep hemoglobin above 7      Discussed with patient and Nurse.      Thank you for asking us to see this patient.        Dinh Pfeiffer MD  Hematologist/Medical Oncologist  [facility]            This note is created with the assistance of a speech recognition program.  While intending to generate a document that actually reflects the content of the visit, the document can still have some errors including those of syntax and sound a like substitutions which may escape proof reading.  It such instances, actual meaning can be extrapolated by contextual diversion.

## 2024-02-29 NOTE — PROGRESS NOTES
SPIRITUAL CARE DEPARTMENT - [facility]  PROGRESS NOTE    Room # 342/342-01   Name: Rosi Haley              Reason for visit: Routine    I visited the patient.    Admit Date & Time: 2/28/2024  8:55 AM    Assessment:  Rosi Haley is a 78 y.o. female in the hospital because \"GI bleed\". Upon entering the room Pt shared about her current and ongoing medical challenges. PT shared that her spouse is currently hospitalized in another hospital. Pt shared that her son comes to visit both her and her spouse frequently as well as her grandchildren. Pt shared with writer about her desire \"to walk again\" and requested writer to pray for her.       Intervention:  I introduced myself and my title as  I offered space for patient  to express feelings, needs, and concerns and provided a ministry presence. Writer actively listened to pt and offered words of affirmation. Writer also offered a prayer for pt.     Outcome:  PT expressed gratitude for visit     Plan:  Chaplains will remain available to offer spiritual and emotional support as needed.    Electronically signed by Chaplain Mati, on 2/29/2024 at 2:47 PM.  Spiritual Care Department  [facility]       02/29/24 1446   Encounter Summary   Encounter Overview/Reason  Initial Encounter   Service Provided For: Patient   Referral/Consult From: [facility]   Support System Children;Spouse   Last Encounter  02/29/24   Complexity of Encounter Low   Begin Time 1400   End Time  1415   Total Time Calculated 15 min   Spiritual/Emotional needs   Type Spiritual Support   Assessment/Intervention/Outcome   Assessment Calm;Coping   Intervention Active listening;Sustaining Presence/Ministry of presence;Prayer (assurance of)/[place];Explored/Affirmed feelings, thoughts, concerns   Outcome Expressed feelings, needs, and concerns;Engaged in conversation;Expressed Gratitude;Coping   Plan and Referrals   Plan/Referrals Continue Support (comment)

## 2024-03-01 ENCOUNTER — APPOINTMENT (OUTPATIENT)
Age: 79
DRG: 378 | End: 2024-03-01
Attending: HOSPITALIST
Payer: MEDICARE

## 2024-03-01 LAB
ABO/RH: NORMAL
ANION GAP SERPL CALCULATED.3IONS-SCNC: 10 MMOL/L (ref 9–17)
ANTIBODY SCREEN: NEGATIVE
ARM BAND NUMBER: NORMAL
BLOOD BANK BLOOD PRODUCT EXPIRATION DATE: NORMAL
BLOOD BANK BLOOD PRODUCT EXPIRATION DATE: NORMAL
BLOOD BANK DISPENSE STATUS: NORMAL
BLOOD BANK DISPENSE STATUS: NORMAL
BLOOD BANK ISBT PRODUCT BLOOD TYPE: 9500
BLOOD BANK ISBT PRODUCT BLOOD TYPE: 9500
BLOOD BANK PRODUCT CODE: NORMAL
BLOOD BANK PRODUCT CODE: NORMAL
BLOOD BANK SAMPLE EXPIRATION: NORMAL
BLOOD BANK UNIT TYPE AND RH: NORMAL
BLOOD BANK UNIT TYPE AND RH: NORMAL
BPU ID: NORMAL
BPU ID: NORMAL
BUN SERPL-MCNC: 37 MG/DL (ref 8–23)
CALCIUM SERPL-MCNC: 8.2 MG/DL (ref 8.6–10.4)
CHLORIDE SERPL-SCNC: 105 MMOL/L (ref 98–107)
CO2 SERPL-SCNC: 25 MMOL/L (ref 20–31)
COMPONENT: NORMAL
COMPONENT: NORMAL
CREAT SERPL-MCNC: 1.8 MG/DL (ref 0.5–0.9)
CROSSMATCH RESULT: NORMAL
CROSSMATCH RESULT: NORMAL
ECHO BSA: 2.23 M2
ECHO LV FRACTIONAL SHORTENING: 42 % (ref 28–44)
ECHO LV INTERNAL DIMENSION DIASTOLE INDEX: 2.48 CM/M2
ECHO LV INTERNAL DIMENSION DIASTOLIC: 5.3 CM (ref 3.9–5.3)
ECHO LV INTERNAL DIMENSION SYSTOLIC INDEX: 1.45 CM/M2
ECHO LV INTERNAL DIMENSION SYSTOLIC: 3.1 CM
ECHO LV IVSD: 0.9 CM (ref 0.6–0.9)
ECHO LV MASS 2D: 174.5 G (ref 67–162)
ECHO LV MASS INDEX 2D: 81.5 G/M2 (ref 43–95)
ECHO LV POSTERIOR WALL DIASTOLIC: 0.9 CM (ref 0.6–0.9)
ECHO LV RELATIVE WALL THICKNESS RATIO: 0.34
ERYTHROCYTE [DISTWIDTH] IN BLOOD BY AUTOMATED COUNT: 17.6 % (ref 12.5–15.4)
ERYTHROCYTE [DISTWIDTH] IN BLOOD BY AUTOMATED COUNT: NORMAL % (ref 12.5–15.4)
GFR SERPL CREATININE-BSD FRML MDRD: 29 ML/MIN/1.73M2
GLUCOSE SERPL-MCNC: 93 MG/DL (ref 70–99)
HCT VFR BLD AUTO: 23.3 % (ref 36–46)
HCT VFR BLD AUTO: 25.8 % (ref 36–46)
HCT VFR BLD AUTO: 27.2 % (ref 36–46)
HCT VFR BLD AUTO: 28.8 % (ref 36–46)
HCT VFR BLD AUTO: 28.8 % (ref 36–46)
HCT VFR BLD AUTO: NORMAL % (ref 36–46)
HGB BLD-MCNC: 7.7 G/DL (ref 12–16)
HGB BLD-MCNC: 8.4 G/DL (ref 12–16)
HGB BLD-MCNC: 8.9 G/DL (ref 12–16)
HGB BLD-MCNC: 9.3 G/DL (ref 12–16)
HGB BLD-MCNC: 9.3 G/DL (ref 12–16)
HGB BLD-MCNC: NORMAL G/DL (ref 12–16)
MCH RBC QN AUTO: 29.2 PG (ref 26–34)
MCH RBC QN AUTO: NORMAL PG (ref 26–34)
MCHC RBC AUTO-ENTMCNC: 32.4 G/DL (ref 31–37)
MCHC RBC AUTO-ENTMCNC: NORMAL G/DL (ref 31–37)
MCV RBC AUTO: 90.1 FL (ref 80–100)
MCV RBC AUTO: NORMAL FL (ref 80–100)
MICROORGANISM SPEC CULT: ABNORMAL
PLATELET # BLD AUTO: 224 K/UL (ref 140–450)
PLATELET # BLD AUTO: NORMAL K/UL (ref 140–450)
PMV BLD AUTO: 7.7 FL (ref 6–12)
PMV BLD AUTO: NORMAL FL (ref 6–12)
POTASSIUM SERPL-SCNC: 3.2 MMOL/L (ref 3.7–5.3)
RBC # BLD AUTO: 3.19 M/UL (ref 4–5.2)
RBC # BLD AUTO: NORMAL M/UL (ref 4–5.2)
SERVICE CMNT-IMP: ABNORMAL
SODIUM SERPL-SCNC: 140 MMOL/L (ref 135–144)
SPECIMEN DESCRIPTION: ABNORMAL
SPECIMEN DESCRIPTION: ABNORMAL
TRANSFUSION STATUS: NORMAL
TRANSFUSION STATUS: NORMAL
UNIT DIVISION: 0
UNIT DIVISION: 0
UNIT ISSUE DATE/TIME: NORMAL
UNIT ISSUE DATE/TIME: NORMAL
WBC OTHER # BLD: 3.3 K/UL (ref 3.5–11)
WBC OTHER # BLD: NORMAL K/UL (ref 3.5–11)
ZINC SERPL-MCNC: 26 UG/DL (ref 60–120)

## 2024-03-01 PROCEDURE — 2580000003 HC RX 258: Performed by: NURSE PRACTITIONER

## 2024-03-01 PROCEDURE — 6360000002 HC RX W HCPCS: Performed by: NURSE PRACTITIONER

## 2024-03-01 PROCEDURE — 99222 1ST HOSP IP/OBS MODERATE 55: CPT | Performed by: NURSE PRACTITIONER

## 2024-03-01 PROCEDURE — 85027 COMPLETE CBC AUTOMATED: CPT

## 2024-03-01 PROCEDURE — 93308 TTE F-UP OR LMTD: CPT | Performed by: INTERNAL MEDICINE

## 2024-03-01 PROCEDURE — 6370000000 HC RX 637 (ALT 250 FOR IP)

## 2024-03-01 PROCEDURE — 2700000000 HC OXYGEN THERAPY PER DAY

## 2024-03-01 PROCEDURE — 6370000000 HC RX 637 (ALT 250 FOR IP): Performed by: HOSPITALIST

## 2024-03-01 PROCEDURE — 6370000000 HC RX 637 (ALT 250 FOR IP): Performed by: INTERNAL MEDICINE

## 2024-03-01 PROCEDURE — 85014 HEMATOCRIT: CPT

## 2024-03-01 PROCEDURE — 80048 BASIC METABOLIC PNL TOTAL CA: CPT

## 2024-03-01 PROCEDURE — 93308 TTE F-UP OR LMTD: CPT

## 2024-03-01 PROCEDURE — 2580000003 HC RX 258

## 2024-03-01 PROCEDURE — 99232 SBSQ HOSP IP/OBS MODERATE 35: CPT | Performed by: INTERNAL MEDICINE

## 2024-03-01 PROCEDURE — 99231 SBSQ HOSP IP/OBS SF/LOW 25: CPT | Performed by: NURSE PRACTITIONER

## 2024-03-01 PROCEDURE — 1210000000 HC MED SURG R&B

## 2024-03-01 PROCEDURE — 6360000002 HC RX W HCPCS: Performed by: HOSPITALIST

## 2024-03-01 PROCEDURE — 93325 DOPPLER ECHO COLOR FLOW MAPG: CPT | Performed by: INTERNAL MEDICINE

## 2024-03-01 PROCEDURE — 94761 N-INVAS EAR/PLS OXIMETRY MLT: CPT

## 2024-03-01 PROCEDURE — 36415 COLL VENOUS BLD VENIPUNCTURE: CPT

## 2024-03-01 PROCEDURE — 2580000003 HC RX 258: Performed by: HOSPITALIST

## 2024-03-01 PROCEDURE — C9113 INJ PANTOPRAZOLE SODIUM, VIA: HCPCS | Performed by: NURSE PRACTITIONER

## 2024-03-01 PROCEDURE — 85018 HEMOGLOBIN: CPT

## 2024-03-01 PROCEDURE — 99232 SBSQ HOSP IP/OBS MODERATE 35: CPT | Performed by: HOSPITALIST

## 2024-03-01 PROCEDURE — 94640 AIRWAY INHALATION TREATMENT: CPT

## 2024-03-01 RX ORDER — FOLIC ACID 1 MG/1
2 TABLET ORAL DAILY
Status: DISCONTINUED | OUTPATIENT
Start: 2024-03-01 | End: 2024-03-05 | Stop reason: HOSPADM

## 2024-03-01 RX ADMIN — PANTOPRAZOLE SODIUM 8 MG/HR: 40 INJECTION, POWDER, FOR SOLUTION INTRAVENOUS at 16:51

## 2024-03-01 RX ADMIN — PRAVASTATIN SODIUM 40 MG: 20 TABLET ORAL at 08:24

## 2024-03-01 RX ADMIN — IPRATROPIUM BROMIDE AND ALBUTEROL SULFATE 1 DOSE: 2.5; .5 SOLUTION RESPIRATORY (INHALATION) at 20:02

## 2024-03-01 RX ADMIN — Medication 400 MG: at 08:24

## 2024-03-01 RX ADMIN — DOCUSATE SODIUM 100 MG: 100 CAPSULE, LIQUID FILLED ORAL at 21:11

## 2024-03-01 RX ADMIN — BUDESONIDE AND FORMOTEROL FUMARATE DIHYDRATE 2 PUFF: 160; 4.5 AEROSOL RESPIRATORY (INHALATION) at 09:13

## 2024-03-01 RX ADMIN — PANTOPRAZOLE SODIUM 8 MG/HR: 40 INJECTION, POWDER, FOR SOLUTION INTRAVENOUS at 02:18

## 2024-03-01 RX ADMIN — SODIUM CHLORIDE: 9 INJECTION, SOLUTION INTRAVENOUS at 21:17

## 2024-03-01 RX ADMIN — BUDESONIDE AND FORMOTEROL FUMARATE DIHYDRATE 2 PUFF: 160; 4.5 AEROSOL RESPIRATORY (INHALATION) at 20:02

## 2024-03-01 RX ADMIN — CEFEPIME 2000 MG: 2 INJECTION, POWDER, FOR SOLUTION INTRAVENOUS at 08:23

## 2024-03-01 RX ADMIN — SODIUM CHLORIDE, PRESERVATIVE FREE 10 ML: 5 INJECTION INTRAVENOUS at 19:38

## 2024-03-01 RX ADMIN — PREGABALIN 50 MG: 25 CAPSULE ORAL at 08:23

## 2024-03-01 RX ADMIN — FOLIC ACID 2 MG: 1 TABLET ORAL at 08:23

## 2024-03-01 RX ADMIN — CINACALCET HYDROCHLORIDE 90 MG: 30 TABLET, FILM COATED ORAL at 08:24

## 2024-03-01 RX ADMIN — IPRATROPIUM BROMIDE AND ALBUTEROL SULFATE 1 DOSE: 2.5; .5 SOLUTION RESPIRATORY (INHALATION) at 09:14

## 2024-03-01 RX ADMIN — VANCOMYCIN HYDROCHLORIDE 1000 MG: 1 INJECTION, POWDER, LYOPHILIZED, FOR SOLUTION INTRAVENOUS at 06:51

## 2024-03-01 RX ADMIN — SODIUM CHLORIDE: 9 INJECTION, SOLUTION INTRAVENOUS at 06:49

## 2024-03-01 RX ADMIN — POTASSIUM CHLORIDE 40 MEQ: 1500 TABLET, EXTENDED RELEASE ORAL at 16:52

## 2024-03-01 RX ADMIN — SODIUM CHLORIDE, PRESERVATIVE FREE 10 ML: 5 INJECTION INTRAVENOUS at 08:24

## 2024-03-01 RX ADMIN — POTASSIUM CHLORIDE 10 MEQ: 750 CAPSULE, EXTENDED RELEASE ORAL at 08:23

## 2024-03-01 RX ADMIN — PREGABALIN 50 MG: 25 CAPSULE ORAL at 21:11

## 2024-03-01 RX ADMIN — PIPERACILLIN AND TAZOBACTAM 3375 MG: 3; .375 INJECTION, POWDER, FOR SOLUTION INTRAVENOUS at 19:37

## 2024-03-01 NOTE — PROGRESS NOTES
Today's Date: 3/1/2024  Patient Name: Rosi Haley  Date of admission: 2/28/2024  8:55 AM  Patient's age: 78 y.o., 1945  Admission Dx: Upper gastrointestinal bleeding [K92.2]  GI bleed [K92.2]  Acute cystitis without hematuria [N30.00]    Reason for Consult: management recommendations  Requesting Physician: Vineet Thorpe DO    CHIEF COMPLAINT: Anemia.  Neutropenia.    History Obtained From:  patient, electronic medical record          Interval history:    Patient seen and examined  Laboratory reviewed  Hemoglobin down to 7.7.  No obvious bleeding noted per nurse.  ANC 0.75 today.  Patient on broad-spectrum antibiotics                HISTORY OF PRESENT ILLNESS:      The patient is a 78 y.o.  female who is admitted to the hospital chief complaints of GI bleed.  Patient brought in from assisted living secondary to fever.  Diagnosed with acute cholecystitis without hematuria.  Was also noted to be significantly anemic hemoglobin 6.2.  Fecal occult blood test positive.  Additionally patient is also leukopenic.  Review of chart shows patient has longstanding history of anemia with hemoglobin around being 8.  Patient does complain of being tired.  GI team consulted.  Hematology oncology team consulted due to chronic anemia.  Patient does take Eliquis.  Other medical problems include CKD.    Patient recent EGD showed gastric antrum small erosions no bleeding.  Colonoscopy from 12/6 showed normal ileum and some large hemorrhoids.  Patient even has had a push enteroscopy done on 1/8/2024 which was unremarkable.    Patient hemoglobin is 6.2.  MCV 89.  Hemoglobin noted to be 12.4 back in November 2023 and has never really recovered.  Patient WBC count is 2.4 which is a new finding.  Patient's ANC is 0.1.  No peripheral blasts reported.  Monocyte count 1.3.  CT abdomen pelvis does not show any any abnormalities    Past Medical History:   has a past medical history of Atrial fibrillation (HCC), CAD     melatonin tablet 3 mg  3 mg Oral Nightly PRN Vineet Thorpe DO        nitroGLYCERIN (NITROSTAT) SL tablet 0.4 mg  0.4 mg SubLINGual Q5 Min PRN Vineet Thorpe DO        polyethylene glycol (GLYCOLAX) packet 17 g  17 g Oral Daily Vineet Thorpe DO   17 g at 02/29/24 0908    potassium chloride (MICRO-K) extended release capsule 10 mEq  10 mEq Oral Daily Vineet Thorpe DO   10 mEq at 02/29/24 0907    pregabalin (LYRICA) capsule 50 mg  50 mg Oral BID Vineet Thorpe DO   50 mg at 02/29/24 2045    vancomycin (VANCOCIN) intermittent dosing (placeholder)   Other RX Placeholder Natalie Wu APRN - CNP           Allergies:  Amlodipine and Aspirin    Social History:   reports that she quit smoking about 22 years ago. Her smoking use included cigarettes. She started smoking about 62 years ago. She has a 80.0 pack-year smoking history. She has never used smokeless tobacco. She reports that she does not drink alcohol and does not use drugs.     Family History: family history includes Heart Disease in her father and mother; Stroke in her father and mother.    REVIEW OF SYSTEMS:      Constitutional: No fever or chills. No night sweats, no weight loss positive fatigue.  Eyes: No eye discharge, double vision, or eye pain   HEENT: negative for sore mouth, sore throat, hoarseness and voice change   Respiratory: negative for cough , sputum, dyspnea, wheezing, hemoptysis, chest pain   Cardiovascular: negative for chest pain, dyspnea, palpitations, orthopnea, PND   Gastrointestinal: negative for nausea, vomiting, diarrhea, constipation, abdominal pain, Dysphagia, hematemesis and hematochezia   Genitourinary: negative for frequency, dysuria, nocturia, urinary incontinence, and hematuria   Integument: negative for rash, skin lesions, bruises.   Hematologic/Lymphatic: negative for easy bruising, bleeding, lymphadenopathy, or petechiae   Endocrine: negative for heat or cold intolerance,weight changes,

## 2024-03-01 NOTE — PROGRESS NOTES
[facility]   Pharmacy Pharmacokinetic Monitoring Service - Vancomycin    Consulting Provider: Natalie Wu   Indication: Neutropenic fever  Target Concentration: Goal AUC/MARLIN 400-600 mg*hr/L  Day of Therapy: 2  Additional Antimicrobials: Cefepime    Pertinent Laboratory Values:   Wt Readings from Last 1 Encounters:   02/28/24 106.6 kg (235 lb)     Temp Readings from Last 1 Encounters:   02/29/24 97.3 °F (36.3 °C) (Oral)     Estimated Creatinine Clearance: 33 mL/min (A) (based on SCr of 1.8 mg/dL (H)).  Recent Labs     02/28/24  0937 02/29/24  0140   CREATININE 1.9* 1.8*   BUN 36* 38*   WBC 2.4* 3.0*       Recent vancomycin administrations                     vancomycin (VANCOCIN) 2,000 mg in sodium chloride 0.9 % 500 mL IVPB (mg) 2,000 mg New Bag 02/28/24 9847                    Assessment:  Date/Time Current Dose Concentration Timing of Concentration (h) AUC   2/29/2024 2000 x 1 dose 17.8 22 hrs post dose    Note: Serum concentrations collected for AUC dosing may appear elevated if collected in close proximity to the dose administered, this is not necessarily an indication of toxicity    Plan:  Will give additional 1000 mg as a single dose.  Dosing will continue to be based on levels for now due to poor renal function.  Pharmacy will continue to monitor patient and adjust therapy as indicated    Thank you for the consult,  Roger Calix RPH  3/1/2024 1:19 AM

## 2024-03-01 NOTE — PLAN OF CARE
Problem: Respiratory - Adult  Goal: Achieves optimal ventilation and oxygenation  3/1/2024 0919 by Arabella Terrazas RCP  Outcome: Progressing  Flowsheets (Taken 3/1/2024 0919)  Achieves optimal ventilation and oxygenation:   Assess for changes in respiratory status   Respiratory therapy support as indicated   Assess for changes in mentation and behavior   Assess and instruct to report shortness of breath or any respiratory difficulty

## 2024-03-01 NOTE — PLAN OF CARE
PROVIDE ADEQUATE OXYGENATION WITH ACCEPTABLE SP02/ABG'S    [x]  IDENTIFY APPROPRIATE OXYGEN THERAPY  [x]   MONITOR SP02/ABG'S AS NEEDED   [x]   PATIENT EDUCATION AS NEEDED  BRONCHOSPASM/BRONCHOCONSTRICTION    IMPROVE  AERATION/BREATHSOUNDS  ADMINISTER BRONCHODILATOR THERAPY AS APPROPRIATE  ASSESS BREATH SOUNDS  PATIENT EDUCATION AS NEEDED    BRONCHOSPASM/BRONCHOCONSTRICTION     [x]         IMPROVE AERATION/BREATH SOUNDS  [x]   ADMINISTER BRONCHODILATOR THERAPY AS APPROPRIATE  [x]   ASSESS BREATH SOUNDS  [x]   IMPLEMENT AEROSOL/MDI PROTOCOL  [x]   PATIENT EDUCATION AS NEEDED      Problem: Respiratory - Adult  Goal: Achieves optimal ventilation and oxygenation  2/29/2024 1959 by Noris Hemphill, JOSE MIGUEL  Outcome: Progressing

## 2024-03-01 NOTE — CONSULTS
[facility]  Initial Consult Note  Date: 3/1/2024    Hospital day :2     Impression:   Pseudomonas aeruginosa sepsis, source is not entirely clear  Anemia  Neutropenia with fever  Polymicrobial urinary tract infection, urine culture positive for E. coli, Enterococcus  faecalis, Proteus mirabilis  Acute kidney injury on chronic kidney disease  Suspected COPD  Essential hypertension  Morbid obesity    Recommendations   The patient was initially on IV ceftriaxone then transitioned to cefepime starting 2/29/2024  Patient was transitioned to IV Zosyn, which will provide coverage for the Pseudomonas isolated on the blood culture as well as the bacteria isolated in the urine including E. coli, Enterococcus faecalis, Proteus mirabilis.  The source of the Pseudomonas aeruginosa is not entirely clear at this time, the urine culture was positive; however, surprisingly, not for Pseudomonas  CT abdomen and pelvis was unremarkable  Echocardiogram has been ordered to rule out endocarditis.  Chest x-ray was unremarkable  Given the unknown source of the Pseudomonas bacteremia, I will repeat blood cultures to document clearance of the bacteremia  We will continue to follow culture data, clinical progress, and adjust therapy accordingly    Chief complaint/reason for consultation:   Pseudomonas sepsis    History of Present Illness:   Rosi Haley is a 78 y.o.-year-old female who was initially admitted on 2/28/2024.  Patient has a known history of atrial fibrillation, coronary artery disease, hypertension, left leg wound.  Patient currently resides at a nursing facility, she tells me that she was admitted to the hospital in November 2023 with GI bleeding and has been at a nursing facility since.  She also reports that since that time she has not been able to walk, she is unable to bear weight but does occasionally get to the wheelchair with assistance.  She has had multiple hospitalizations over the last several  on file   Housing Stability: Low Risk  (2024)    Housing Stability Vital Sign     Unable to Pay for Housing in the Last Year: No     Number of Places Lived in the Last Year: 2     Unstable Housing in the Last Year: No     Family History:     Family History   Problem Relation Age of Onset    Heart Disease Mother     Stroke Mother     Heart Disease Father     Stroke Father       Allergies:   Amlodipine and Aspirin     Review of Systems:   General: No fevers or chills.  Eyes: No double vision or blurry vision.  ENT: No sore throat or runny nose.  Cardiovascular: No chest pain or palpitations.  Lung: No shortness of breath or cough.  Abdomen: No nausea, vomiting, diarrhea, or abdominal pain.  Genitourinary:  Dysuria  Musculoskeletal: No muscle aches or pains.  Hematologic: No bleeding or bruising.  Neurologic: No headache, weakness, numbness, or tingling.    Physical Examination :   BP 98/67   Pulse 86   Temp 97.4 °F (36.3 °C)   Resp 20   Ht 1.676 m (5' 6\")   Wt 106.6 kg (235 lb)   SpO2 98%   BMI 37.93 kg/m²     Temperature Range: Temp: 97.4 °F (36.3 °C) Temp  Av.7 °F (36.5 °C)  Min: 97.3 °F (36.3 °C)  Max: 98.3 °F (36.8 °C)  General Appearance: Awake, alert, and in no apparent distress  Head: Normocephalic, without obvious abnormality, atraumatic  Eyes: Pupils equal, round, reactive, to light and accommodation; extraocular movements intact; sclera anicteric; conjunctivae pink  ENT: Oropharynx clear, without erythema, exudate, or thrush.  Neck: Supple, without lymphadenopathy.   Pulmonary/Chest: Clear to auscultation, without wheezes, rales, or rhonchi  Cardiovascular: Regular rate and rhythm without murmurs, rubs, or gallops.   Abdomen: Soft, nontender, nondistended.  Extremities: Bilateral lower extremity edema.  Scars x 2 on left lower extremity  Neurologic: No gross sensory or motor deficits.    Skin: Warm and dry with no rash.        Medical Decision Making:   I have independently reviewed/ordered

## 2024-03-01 NOTE — PLAN OF CARE
Problem: Safety - Adult  Goal: Free from fall injury  3/1/2024 0828 by Amaris Rosenbaum RN  Outcome: Progressing  3/1/2024 0352 by Madai Mariano RN  Outcome: Progressing     Problem: Discharge Planning  Goal: Discharge to home or other facility with appropriate resources  3/1/2024 0828 by Amaris Rosenbaum RN  Outcome: Progressing  3/1/2024 0352 by Madai Mariano RN  Outcome: Progressing     Problem: Pain  Goal: Verbalizes/displays adequate comfort level or baseline comfort level  3/1/2024 0828 by Amaris Rosenbaum RN  Outcome: Progressing  3/1/2024 0352 by Madai Mariano RN  Outcome: Progressing     Problem: ABCDS Injury Assessment  Goal: Absence of physical injury  3/1/2024 0828 by Amaris Rosenbaum RN  Outcome: Progressing  3/1/2024 0352 by Madai Mariano RN  Outcome: Progressing     Problem: Skin/Tissue Integrity  Goal: Absence of new skin breakdown  Description: 1.  Monitor for areas of redness and/or skin breakdown  2.  Assess vascular access sites hourly  3.  Every 4-6 hours minimum:  Change oxygen saturation probe site  4.  Every 4-6 hours:  If on nasal continuous positive airway pressure, respiratory therapy assess nares and determine need for appliance change or resting period.  3/1/2024 0828 by Amaris Rosenbaum RN  Outcome: Progressing  3/1/2024 0352 by Madai Mariano RN  Outcome: Progressing     Problem: Respiratory - Adult  Goal: Achieves optimal ventilation and oxygenation  3/1/2024 0828 by Amaris Rosenbaum RN  Outcome: Progressing  3/1/2024 0352 by Madai Mariano RN  Outcome: Progressing  2/29/2024 1959 by Noris Hemphill RCP  Outcome: Progressing     Problem: Hematologic - Adult  Goal: Maintains hematologic stability  3/1/2024 0828 by Amaris Rosenbaum RN  Outcome: Progressing  3/1/2024 0352 by Madai Mariano RN  Outcome: Progressing     Care plan reviewed with patient.  Patient verbalized understanding of the plan of care at this time.

## 2024-03-01 NOTE — RT PROTOCOL NOTE
RT Nebulizer Bronchodilator Protocol Note    There is a bronchodilator order in the chart from a provider indicating to follow the RT Bronchodilator Protocol and there is an “Initiate RT Bronchodilator Protocol” order as well (see protocol at bottom of note).    CXR Findings:  No results found.    The findings from the last RT Protocol Assessment were as follows:  Smoking: Chronic pulmonary disease  Respiratory Pattern: Dyspnea on exertion or RR 21-25 bpm  Breath Sounds: Slightly diminished and/or crackles  Cough: Strong, spontaneous, non-productive  Indication for Bronchodilator Therapy: On home bronchodilators  Bronchodilator Assessment Score: 6    Aerosolized bronchodilator medication orders have been revised according to the RT Nebulizer Bronchodilator Protocol below.    Respiratory Therapist to perform RT Therapy Protocol Assessment initially then follow the protocol.  Repeat RT Therapy Protocol Assessment PRN for score 0-3 or on second treatment, BID, and PRN for scores above 3.    No Indications - adjust the frequency to every 6 hours PRN wheezing or bronchospasm, if no treatments needed after 48 hours then discontinue using Per Protocol order mode.     If indication present, adjust the RT bronchodilator orders based on the Bronchodilator Assessment Score as indicated below.  If a patient is on this medication at home then do not decrease Frequency below that used at home.    0-3 - enter or revise RT bronchodilator order(s) to equivalent RT Bronchodilator order with Frequency of every 4 hours PRN for wheezing or increased work of breathing using Per Protocol order mode.       4-6 - enter or revise RT Bronchodilator order(s) to two equivalent RT bronchodilator orders with one order with BID Frequency and one order with Frequency of every 4 hours PRN wheezing or increased work of breathing using Per Protocol order mode.         7-10 - enter or revise RT Bronchodilator order(s) to two equivalent RT

## 2024-03-01 NOTE — PROGRESS NOTES
[facility]    Gastroenterology Daily Progress Note      Patient:   Rosi Haley   :    1945   Facility:   [facility]  Date:     3/1/2024  Consultant:   ADITYA Stephens - CNP, CNP      SUBJECTIVE  78 y.o. female admitted 2024 with Upper gastrointestinal bleeding [K92.2]  GI bleed [K92.2]  Acute cystitis without hematuria [N30.00] and seen for anemia with melena, positive fobt. The pt was seen and examined. Hgb 7.7 no BM overnight per primary rn. The pt denies abdominal pain and nausea. .        OBJECTIVE  Scheduled Meds:   vancomycin  1,000 mg IntraVENous Once    cefepime  2,000 mg IntraVENous Q12H    bisacodyl  10 mg Rectal Daily    [Held by provider] bumetanide  1 mg Oral Daily    [Held by provider] carvedilol  6.25 mg Oral BID WC    cinacalcet  90 mg Oral Daily    docusate sodium  100 mg Oral BID    [Held by provider] hydrALAZINE  100 mg Oral 3 times per day    magnesium oxide  400 mg Oral Daily    pravastatin  40 mg Oral Daily    sodium chloride flush  5-40 mL IntraVENous 2 times per day    pantoprazole  80 mg IntraVENous Once    lidocaine 1 % injection  5 mL IntraDERmal Once    sodium chloride flush  5-40 mL IntraVENous 2 times per day    ipratropium 0.5 mg-albuterol 2.5 mg  1 Dose Inhalation BID RT    budesonide-formoterol  2 puff Inhalation BID RT    polyethylene glycol  17 g Oral Daily    potassium chloride  10 mEq Oral Daily    pregabalin  50 mg Oral BID    vancomycin (VANCOCIN) intermittent dosing (placeholder)   Other RX Placeholder       Vital Signs:  BP 94/63   Pulse 86   Temp 97.8 °F (36.6 °C) (Oral)   Resp 20   Ht 1.676 m (5' 6\")   Wt 106.6 kg (235 lb)   SpO2 96%   BMI 37.93 kg/m²    Review of Systems - History obtained from chart review and the patient  General ROS: negative  Respiratory ROS: no cough, shortness of breath, or wheezing  Cardiovascular ROS: no chest pain or dyspnea on exertion  Gastrointestinal ROS: negative for - abdominal pain,  3.6*     FINDINGS:ct abd 2/28/24  Lower Chest: Lung bases are clear.     Organs: Liver is normal in size and density.  Stable cysts in the right and  left hepatic lobe.  No focal masses identified. No evidence of intrahepatic  ductal dilatation.    Spleen is normal size.  The gallbladder is  unremarkable.  Both adrenal glands are stable.  Pancreas is normal in  appearance. Stable right renal cyst.  Right kidney is smaller than the left..  The kidneys are otherwise normal in  attenuation without evidence of  hydronephrosis or renal calculi.     GI/Bowel: The visualized bowel and mesentery show no mass lesions. Mild  colonic diverticulosis. No evidence of diverticulitis.  Normal appendix     Pelvis: No intrapelvic mass is identified.  Bladder and rectum are intact.     Peritoneum/Retroperitoneum: No free fluid. No lymphadenopathy. No evidence of  pneumoperitoneum.     Bones/Soft Tissues: Small fat containing umbilical hernia.  Vascular  calcifications are seen compatible with atherosclerotic disease.   Stable  ectasia of the abdominal aorta measuring 2.8 cm.  Degenerative changes seen  in the visualized spine .  No acute bony abnormalities.  Postsurgical changes  in the left inguinal canal.     IMPRESSION:  No acute intra-abdominal or intrapelvic abnormalities are noted    ASSESSMENT/plan  Anemia with leukopenia, melena -no BM overnight, hgb trending down  Will defer repeating endoscopy at this time  Continue ppi drip for now  Trend   Hematology eval ongoing  Will need outpt pill cam to eval the small bowel  AC is currently on hold will defer management of this to primary service      2.LUPE mgt per primary service    3.bacteremia, UTI on abx per primary service     Case d/w dr ribeiro and dr mtz      This plan was formulated in collaboration with Dr. mtz .    Electronically signed by: ADITYA Stephnes - CNP on 3/1/2024 at 6:58 AM

## 2024-03-01 NOTE — DISCHARGE INSTR - COC
Continuity of Care Form    Patient Name: Rosi Haley   :  1945  MRN:  [id number]    Admit date:  2024  Discharge date:  2024    Code Status Order: Full Code   Advance Directives:     Admitting Physician:  Vineet Thorpe DO  PCP: Radha Wynn MD    Discharging Nurse: KENNEY Devries rn  Discharging Hospital Unit/Room#: 342/342-01  Discharging Unit Phone Number: [phone]    Emergency Contact:   Extended Emergency Contact Information  Primary Emergency Contact: Ihsan Haley  Address: [address] [address]  Home Phone: [phone]  Work Phone: [phone]  Mobile Phone: [phone]  Relation: Spouse  Secondary Emergency Contact: Gopal Haley  Home Phone: [phone]  Relation: Child    Past Surgical History:  Past Surgical History:   Procedure Laterality Date    COLONOSCOPY      COLONOSCOPY N/A 12/10/2023    COLONOSCOPY DIAGNOSTIC performed by Carlos Rodriguez MD at [facility] ENDO    CORONARY ANGIOPLASTY WITH STENT PLACEMENT        patient reports one stent    HYSTERECTOMY (CERVIX STATUS UNKNOWN)      COMPLETE    LEG SURGERY Left 2017    TN I&D BELOW FASCIA FOOT 1 BURSAL SPACE Left 2017    DEBRIDEMENT LEFT LOWER EXTREMITY WITH APPLICATION OF APPLIGRAFT performed by Gideon Wasserman DPM at [facility] OR    TONSILLECTOMY      TUBAL LIGATION      UPPER GASTROINTESTINAL ENDOSCOPY N/A 2023    EGD BIOPSY performed by Deisy Andre MD at [facility] ENDO    UPPER GASTROINTESTINAL ENDOSCOPY N/A 2023    EGD ESOPHAGOGASTRODUODENOSCOPY performed by Washington Monroe MD at [facility] OR       Immunization History:   Immunization History   Administered Date(s) Administered    COVID-19, MODERNA BLUE border, Primary or Immunocompromised, (age 12y+), IM, 100 mcg/0.5mL 2021    COVID-19, PFIZER Bivalent, DO NOT Dilute, (age 12y+), IM, 30 mcg/0.3 mL 10/18/2022    COVID-19, [place] Vaccine, Vaccine Unspecified 2021, 10/27/2021    Influenza Virus Vaccine  Treatments: Iv care    Patient's personal belongings (please select all that are sent with patient):  Pt sent with clothing and cell phone/    RN SIGNATURE:  Electronically signed by Eileen Devries RN on 3/5/24 at 1:23 PM EST    CASE MANAGEMENT/SOCIAL WORK SECTION    Inpatient Status Date: 2/28/24    Readmission Risk Assessment Score:  Readmission Risk              Risk of Unplanned Readmission:  37           Discharging to Facility/ Agency   Facility: [facility]  Address: [address] [place] [address]  Phone: [phone]  Fax: [phone]         / signature: Electronically signed by RAVINDRA Cabrera, AMYW on 3/5/24 at 9:51 AM EST    PHYSICIAN SECTION    Prognosis: Good    Condition at Discharge: Stable    Rehab Potential (if transferring to Rehab): Good    Recommended Labs or Other Treatments After Discharge: CBC, BMP Weekly on Monday    Physician Certification: I certify the above information and transfer of Rosi Haley  is necessary for the continuing treatment of the diagnosis listed and that she requires Skilled Nursing Facility for greater 30 days.     Update Admission H&P: No change in H&P    PHYSICIAN SIGNATURE:  Electronically signed by Vineet Thorpe DO on 3/1/24 at 9:33 AM EST

## 2024-03-01 NOTE — PROGRESS NOTES
[facility]  Office: [phone]  Marty Thorpe DO, Alvaro Brannon DO, Jefferson Lacy DO, Josh Licona DO, Richard Chinchilla MD, Margarita Mullins MD, Ilsa Monroe MD, Dorothea Perez MD,  Maycol Castanon MD, Levi Sweeney MD, Andi Myles MD,  Dinh Olson DO, Elyse Griffin MD, Wyatt Kelley MD, Vineet Thorpe DO, Melinda Fernandez MD,  Diogo Parr DO, Taylor Biswas MD, Tala Ruiz MD, Radha Whitt MD, Erik Bird MD,  Erasmo De La O MD, Maribell Leigh MD, Rupali Randall MD, Raissa Moran MD, Ru Silva MD, Winston Guaman MD, Jonathon Walters DO, Justice Abreu DO, Scott Blum MD,  Kapil Aguilar MD, Shirley Waterhouse, CNP,  Catherine Johnson, CNP, Roger Sierra, CNP,  Kaelyn Alegre, DNP, Gillian Bey, CNP, Ely Delgado, CNP, Lilia Agarwal CNP, Victorina Bear, CNP, Eileen Yeager, CNP, Stacey Mccrary, PA-C, Charity Ramachandran PA-C, Yany Beltran, CNP, Jen Zhao, CNP, Yajaira Suarez, CNP, Piedad Preston, CNS, Kathi Bauer, CNP, Natalie Wu, CNP, Tracy Schwab, CNP         [facility]   IN-PATIENT SERVICE   [facility]    Progress Note    3/1/2024    11:06 AM    Name:   Rosi Haley  MRN:     [id number]     Acct:      [id number]   Room:   342/342-01   Day:  2  Admit Date:  2/28/2024  8:55 AM    PCP:   Radha Wynn MD  Code Status:  Full Code    Subjective:     Patient seen in follow-up for anemia and fatigue.  Patient states \"I am feeling better\"    Cultures reviewed, patient has Pseudomonas bacteremia but urine culture has shown Proteus, E. coli and Enterococcus.  The patient has a polymicrobial infection at this point in time I am going to obtain an echocardiogram as the source of her Pseudomonas bacteremia is not readily identified and endocarditis should be ruled out.  If her urinalysis had shown Pseudomonas this would have streamlined treatment however given her polymicrobial infection I am going to consult infectious disease for     PBEA 20.2 12/31/2023 10:15 AM    R3IZVPMP 93.2 12/31/2023 10:15 AM     Lab Results   Component Value Date/Time    SPECIAL [id number] 02/28/2024 09:43 AM     Lab Results   Component Value Date/Time    CULTURE ESCHERICHIA COLI 50 ,000 CFU/ML (A) 02/28/2024 11:19 AM    CULTURE ENTEROCOCCUS FAECALIS 50 ,000 CFU/ML (A) 02/28/2024 11:19 AM    CULTURE PROTEUS MIRABILIS <10,000 CFU/ML (A) 02/28/2024 11:19 AM       Radiology:  XR CHEST (SINGLE VIEW FRONTAL)    Result Date: 2/28/2024  No acute process. Left-sided PICC terminates in the SVC     CT ABDOMEN PELVIS WO CONTRAST Additional Contrast? None    Result Date: 2/28/2024  No acute intra-abdominal or intrapelvic abnormalities are noted.       Physical Examination:     General appearance:  alert, cooperative and no distress  Mental Status:  oriented to person, place and time and normal affect  Lungs:  clear to auscultation bilaterally, normal effort  Heart:  regular rate and rhythm, no murmur  Abdomen:  soft, nontender, nondistended, normal bowel sounds, no masses, hepatomegaly, splenomegaly  Extremities:  no edema, redness, tenderness in the calves  Skin:  no gross lesions, rashes, induration    Assessment:     Hospital Problems             Last Modified POA    * (Principal) GI bleed 2/28/2024 Yes    Upper gastrointestinal bleeding 2/28/2024 Yes    Neutropenia (HCC) 2/28/2024 Yes    Acute cystitis without hematuria 2/28/2024 Yes       Plan:     Neutropenic fever/Pseudomonas bacteremia  Check repeat CBC  Echocardiogram  Changed to Zosyn  Anemia  Outpatient follow-up with gastroenterology/hematology  See above discussion  Hemoglobin stable  Polymicrobial urinary tract infection  Transition to Zosyn to provide coverage for Enterococcus  Pseudomonas likely covered by Zosyn  Urine sensitivity demonstrates E. coli, Proteus and Enterococcus all sensitive to Zosyn  Acute kidney injury on chronic kidney disease  Check BMP    Medical Decision Making:

## 2024-03-01 NOTE — PROGRESS NOTES
[facility]   Pharmacy Pharmacokinetic Monitoring Service - Vancomycin    Consulting Provider: Natalie Wu   Indication: Neutropenic fever  Target Concentration: Goal AUC/MARLIN 400-600 mg*hr/L  Day of Therapy: 2  Additional Antimicrobials: Cefepime    Pertinent Laboratory Values:   Wt Readings from Last 1 Encounters:   02/28/24 106.6 kg (235 lb)     Temp Readings from Last 1 Encounters:   02/29/24 97.3 °F (36.3 °C) (Oral)     Estimated Creatinine Clearance: 33 mL/min (A) (based on SCr of 1.8 mg/dL (H)).  Recent Labs     02/28/24  0937 02/29/24  0140   CREATININE 1.9* 1.8*   BUN 36* 38*   WBC 2.4* 3.0*       Recent vancomycin administrations                     vancomycin (VANCOCIN) 2,000 mg in sodium chloride 0.9 % 500 mL IVPB (mg) 2,000 mg New Bag 02/28/24 1627                    Assessment:  Date/Time Current Dose Concentration Timing of Concentration (h) AUC   2/29/2024 2000 x 1 dose 17.8 22 hrs post dose    Note: Serum concentrations collected for AUC dosing may appear elevated if collected in close proximity to the dose administered, this is not necessarily an indication of toxicity    Plan:  Will give additional 1000 mg as a single dose.  Dosing will continue to be based on levels for now due to poor renal function.  Pharmacy will continue to monitor patient and adjust therapy as indicated    Thank you for the consult,  Roger Calix RPH  3/1/2024 1:19 AM

## 2024-03-01 NOTE — PLAN OF CARE
Problem: Safety - Adult  Goal: Free from fall injury  Outcome: Progressing     Problem: Discharge Planning  Goal: Discharge to home or other facility with appropriate resources  Outcome: Progressing     Problem: Pain  Goal: Verbalizes/displays adequate comfort level or baseline comfort level  Outcome: Progressing     Problem: ABCDS Injury Assessment  Goal: Absence of physical injury  Outcome: Progressing     Problem: Skin/Tissue Integrity  Goal: Absence of new skin breakdown  Description: 1.  Monitor for areas of redness and/or skin breakdown  2.  Assess vascular access sites hourly  3.  Every 4-6 hours minimum:  Change oxygen saturation probe site  4.  Every 4-6 hours:  If on nasal continuous positive airway pressure, respiratory therapy assess nares and determine need for appliance change or resting period.  Outcome: Progressing     Problem: Respiratory - Adult  Goal: Achieves optimal ventilation and oxygenation  3/1/2024 0352 by Madai Mariano RN  Outcome: Progressing  2/29/2024 1959 by Noris Hemphill RCP  Outcome: Progressing  2/29/2024 1611 by Arabella Terrazas RCP  Outcome: Progressing  Flowsheets (Taken 2/29/2024 1611)  Achieves optimal ventilation and oxygenation:   Assess for changes in respiratory status   Respiratory therapy support as indicated   Assess for changes in mentation and behavior   Assess and instruct to report shortness of breath or any respiratory difficulty     Problem: Hematologic - Adult  Goal: Maintains hematologic stability  Outcome: Progressing

## 2024-03-01 NOTE — RT PROTOCOL NOTE
increased work of breathing using Per Protocol order mode.        4-6 - enter or revise RT Bronchodilator order(s) to two equivalent RT bronchodilator orders with one order with BID Frequency and one order with Frequency of every 4 hours PRN wheezing or increased work of breathing using Per Protocol order mode.        7-10 - enter or revise RT Bronchodilator order(s) to two equivalent RT bronchodilator orders with one order with TID Frequency and one order with Frequency of every 4 hours PRN wheezing or increased work of breathing using Per Protocol order mode.       11-13 - enter or revise RT Bronchodilator order(s) to one equivalent RT bronchodilator order with QID Frequency and an Albuterol order with Frequency of every 4 hours PRN wheezing or increased work of breathing using Per Protocol order mode.      Greater than 13 - enter or revise RT Bronchodilator order(s) to one equivalent RT bronchodilator order with every 4 hours Frequency and an Albuterol order with Frequency of every 2 hours PRN wheezing or increased work of breathing using Per Protocol order mode.     RT to enter RT Home Evaluation for COPD & MDI Assessment order using Per Protocol order mode.    Electronically signed by Arabella Terrazas RCP on 3/1/2024 at 9:19 AM

## 2024-03-02 PROBLEM — A41.50: Status: ACTIVE | Noted: 2024-03-02

## 2024-03-02 PROBLEM — N17.9 ACUTE KIDNEY INJURY SUPERIMPOSED ON CKD (HCC): Status: ACTIVE | Noted: 2024-03-02

## 2024-03-02 PROBLEM — A41.9 SEPTICEMIA (HCC): Status: ACTIVE | Noted: 2024-03-02

## 2024-03-02 PROBLEM — N18.9 ACUTE KIDNEY INJURY SUPERIMPOSED ON CKD (HCC): Status: ACTIVE | Noted: 2024-03-02

## 2024-03-02 LAB
ANION GAP SERPL CALCULATED.3IONS-SCNC: 9 MMOL/L (ref 9–17)
BUN SERPL-MCNC: 31 MG/DL (ref 8–23)
CALCIUM SERPL-MCNC: 8.1 MG/DL (ref 8.6–10.4)
CHLORIDE SERPL-SCNC: 106 MMOL/L (ref 98–107)
CO2 SERPL-SCNC: 25 MMOL/L (ref 20–31)
COPPER SERPL-MCNC: 73.4 UG/DL (ref 80–155)
CREAT SERPL-MCNC: 1.4 MG/DL (ref 0.5–0.9)
ERYTHROCYTE [DISTWIDTH] IN BLOOD BY AUTOMATED COUNT: 17.5 % (ref 12.5–15.4)
GFR SERPL CREATININE-BSD FRML MDRD: 39 ML/MIN/1.73M2
GLUCOSE SERPL-MCNC: 114 MG/DL (ref 70–99)
HCT VFR BLD AUTO: 24.5 % (ref 36–46)
HCT VFR BLD AUTO: 24.5 % (ref 36–46)
HGB BLD-MCNC: 7.9 G/DL (ref 12–16)
HGB BLD-MCNC: 7.9 G/DL (ref 12–16)
MCH RBC QN AUTO: 29.1 PG (ref 26–34)
MCHC RBC AUTO-ENTMCNC: 32.4 G/DL (ref 31–37)
MCV RBC AUTO: 89.8 FL (ref 80–100)
PLATELET # BLD AUTO: 221 K/UL (ref 140–450)
PMV BLD AUTO: 7.8 FL (ref 6–12)
POTASSIUM SERPL-SCNC: 3.3 MMOL/L (ref 3.7–5.3)
RBC # BLD AUTO: 2.73 M/UL (ref 4–5.2)
SODIUM SERPL-SCNC: 140 MMOL/L (ref 135–144)
WBC OTHER # BLD: 2.7 K/UL (ref 3.5–11)

## 2024-03-02 PROCEDURE — 6370000000 HC RX 637 (ALT 250 FOR IP): Performed by: INTERNAL MEDICINE

## 2024-03-02 PROCEDURE — C9113 INJ PANTOPRAZOLE SODIUM, VIA: HCPCS | Performed by: NURSE PRACTITIONER

## 2024-03-02 PROCEDURE — 36415 COLL VENOUS BLD VENIPUNCTURE: CPT

## 2024-03-02 PROCEDURE — 2580000003 HC RX 258

## 2024-03-02 PROCEDURE — 6360000002 HC RX W HCPCS: Performed by: HOSPITALIST

## 2024-03-02 PROCEDURE — 2580000003 HC RX 258: Performed by: NURSE PRACTITIONER

## 2024-03-02 PROCEDURE — 99232 SBSQ HOSP IP/OBS MODERATE 35: CPT | Performed by: HOSPITALIST

## 2024-03-02 PROCEDURE — 85014 HEMATOCRIT: CPT

## 2024-03-02 PROCEDURE — 99232 SBSQ HOSP IP/OBS MODERATE 35: CPT | Performed by: INTERNAL MEDICINE

## 2024-03-02 PROCEDURE — 87040 BLOOD CULTURE FOR BACTERIA: CPT

## 2024-03-02 PROCEDURE — 2700000000 HC OXYGEN THERAPY PER DAY

## 2024-03-02 PROCEDURE — 6370000000 HC RX 637 (ALT 250 FOR IP): Performed by: HOSPITALIST

## 2024-03-02 PROCEDURE — 99231 SBSQ HOSP IP/OBS SF/LOW 25: CPT | Performed by: NURSE PRACTITIONER

## 2024-03-02 PROCEDURE — 1210000000 HC MED SURG R&B

## 2024-03-02 PROCEDURE — 94640 AIRWAY INHALATION TREATMENT: CPT

## 2024-03-02 PROCEDURE — 85018 HEMOGLOBIN: CPT

## 2024-03-02 PROCEDURE — 6370000000 HC RX 637 (ALT 250 FOR IP)

## 2024-03-02 PROCEDURE — 6360000002 HC RX W HCPCS: Performed by: NURSE PRACTITIONER

## 2024-03-02 PROCEDURE — 94761 N-INVAS EAR/PLS OXIMETRY MLT: CPT

## 2024-03-02 PROCEDURE — 80048 BASIC METABOLIC PNL TOTAL CA: CPT

## 2024-03-02 PROCEDURE — 85027 COMPLETE CBC AUTOMATED: CPT

## 2024-03-02 PROCEDURE — 2580000003 HC RX 258: Performed by: HOSPITALIST

## 2024-03-02 RX ORDER — PANTOPRAZOLE SODIUM 40 MG/1
40 TABLET, DELAYED RELEASE ORAL
Status: DISCONTINUED | OUTPATIENT
Start: 2024-03-03 | End: 2024-03-05 | Stop reason: HOSPADM

## 2024-03-02 RX ADMIN — SODIUM CHLORIDE, PRESERVATIVE FREE 10 ML: 5 INJECTION INTRAVENOUS at 20:24

## 2024-03-02 RX ADMIN — Medication 400 MG: at 09:05

## 2024-03-02 RX ADMIN — PIPERACILLIN AND TAZOBACTAM 3375 MG: 3; .375 INJECTION, POWDER, FOR SOLUTION INTRAVENOUS at 20:21

## 2024-03-02 RX ADMIN — PRAVASTATIN SODIUM 40 MG: 20 TABLET ORAL at 09:05

## 2024-03-02 RX ADMIN — IPRATROPIUM BROMIDE AND ALBUTEROL SULFATE 1 DOSE: 2.5; .5 SOLUTION RESPIRATORY (INHALATION) at 08:40

## 2024-03-02 RX ADMIN — PANTOPRAZOLE SODIUM 8 MG/HR: 40 INJECTION, POWDER, FOR SOLUTION INTRAVENOUS at 04:06

## 2024-03-02 RX ADMIN — POTASSIUM CHLORIDE 10 MEQ: 750 CAPSULE, EXTENDED RELEASE ORAL at 09:06

## 2024-03-02 RX ADMIN — POTASSIUM BICARBONATE 40 MEQ: 782 TABLET, EFFERVESCENT ORAL at 13:52

## 2024-03-02 RX ADMIN — IPRATROPIUM BROMIDE AND ALBUTEROL SULFATE 1 DOSE: 2.5; .5 SOLUTION RESPIRATORY (INHALATION) at 19:58

## 2024-03-02 RX ADMIN — PIPERACILLIN AND TAZOBACTAM 3375 MG: 3; .375 INJECTION, POWDER, FOR SOLUTION INTRAVENOUS at 04:08

## 2024-03-02 RX ADMIN — BUDESONIDE AND FORMOTEROL FUMARATE DIHYDRATE 2 PUFF: 160; 4.5 AEROSOL RESPIRATORY (INHALATION) at 19:59

## 2024-03-02 RX ADMIN — PREGABALIN 50 MG: 25 CAPSULE ORAL at 20:23

## 2024-03-02 RX ADMIN — SODIUM CHLORIDE, PRESERVATIVE FREE 10 ML: 5 INJECTION INTRAVENOUS at 09:06

## 2024-03-02 RX ADMIN — FOLIC ACID 2 MG: 1 TABLET ORAL at 09:05

## 2024-03-02 RX ADMIN — CINACALCET HYDROCHLORIDE 90 MG: 30 TABLET, FILM COATED ORAL at 09:05

## 2024-03-02 RX ADMIN — BUDESONIDE AND FORMOTEROL FUMARATE DIHYDRATE 2 PUFF: 160; 4.5 AEROSOL RESPIRATORY (INHALATION) at 08:40

## 2024-03-02 RX ADMIN — PREGABALIN 50 MG: 25 CAPSULE ORAL at 09:05

## 2024-03-02 RX ADMIN — PIPERACILLIN AND TAZOBACTAM 3375 MG: 3; .375 INJECTION, POWDER, FOR SOLUTION INTRAVENOUS at 13:57

## 2024-03-02 NOTE — RT PROTOCOL NOTE
RT Inhaler-Nebulizer Bronchodilator Protocol Note    There is a bronchodilator order in the chart from a provider indicating to follow the RT Bronchodilator Protocol and there is an “Initiate RT Inhaler-Nebulizer Bronchodilator Protocol” order as well (see protocol at bottom of note).    CXR Findings:  No results found.    The findings from the last RT Protocol Assessment were as follows:   History Pulmonary Disease: Chronic pulmonary disease  Respiratory Pattern: Dyspnea on exertion or RR 21-25 bpm  Breath Sounds: Slightly diminished and/or crackles  Cough: Strong, spontaneous, non-productive  Indication for Bronchodilator Therapy: On home bronchodilators  Bronchodilator Assessment Score: 6    Aerosolized bronchodilator medication orders have been revised according to the RT Inhaler-Nebulizer Bronchodilator Protocol below.    Respiratory Therapist to perform RT Therapy Protocol Assessment initially then follow the protocol.  Repeat RT Therapy Protocol Assessment PRN for score 0-3 or on second treatment, BID, and PRN for scores above 3.    No Indications - adjust the frequency to every 6 hours PRN wheezing or bronchospasm, if no treatments needed after 48 hours then discontinue using Per Protocol order mode.     If indication present, adjust the RT bronchodilator orders based on the Bronchodilator Assessment Score as indicated below.  Use Inhaler orders unless patient has one or more of the following: on home nebulizer, not able to hold breath for 10 seconds, is not alert and oriented, cannot activate and use MDI correctly, or respiratory rate 25 breaths per minute or more, then use the equivalent nebulizer order(s) with same Frequency and PRN reasons based on the score.  If a patient is on this medication at home then do not decrease Frequency below that used at home.    0-3 - enter or revise RT bronchodilator order(s) to equivalent RT Bronchodilator order with Frequency of every 4 hours PRN for wheezing or

## 2024-03-02 NOTE — PLAN OF CARE
Problem: Safety - Adult  Goal: Free from fall injury  Outcome: Progressing     Problem: Discharge Planning  Goal: Discharge to home or other facility with appropriate resources  Outcome: Progressing     Problem: Pain  Goal: Verbalizes/displays adequate comfort level or baseline comfort level  Outcome: Progressing     Problem: ABCDS Injury Assessment  Goal: Absence of physical injury  Outcome: Progressing     Problem: Skin/Tissue Integrity  Goal: Absence of new skin breakdown  Description: 1.  Monitor for areas of redness and/or skin breakdown  2.  Assess vascular access sites hourly  3.  Every 4-6 hours minimum:  Change oxygen saturation probe site  4.  Every 4-6 hours:  If on nasal continuous positive airway pressure, respiratory therapy assess nares and determine need for appliance change or resting period.  Outcome: Progressing     Problem: Respiratory - Adult  Goal: Achieves optimal ventilation and oxygenation  Outcome: Progressing  Flowsheets (Taken 3/1/2024 2003 by Rosa Singleton, JOSE MIGUEL)  Achieves optimal ventilation and oxygenation:   Assess for changes in respiratory status   Assess for changes in mentation and behavior   Position to facilitate oxygenation and minimize respiratory effort   Oxygen supplementation based on oxygen saturation or arterial blood gases     Problem: Hematologic - Adult  Goal: Maintains hematologic stability  Outcome: Progressing

## 2024-03-02 NOTE — PROGRESS NOTES
Physician Progress Note      PATIENT:               AMELIA GUTIERREZ  [facility] #:                  [id number]  :                       1945  ADMIT DATE:       2024 8:55 AM  DISCH DATE:  RESPONDING  PROVIDER #:        Vineet COHEN DO          QUERY TEXT:    Patient admitted with melena, noted to have atrial fibrillation. If possible,   please document in progress notes and discharge summary further specificity   regarding the type of atrial fibrillation:    The medical record reflects the following:  Risk Factors: female, age, HTN, CAD  Clinical Indicators: - EKG- shows AFib with RVR.  Treatment: PO- Eliquis, Coreg, Bumex    Chronic: nonspecific term that could be referring to paroxysmal, persistent,   or permanent  Longstanding persistent: persistent and continuous, lasting > 1 year.  Paroxysmal - self-terminating or intermittent; resolves with or without   intervention within 7 days of onset; may recur with various frequency.  Persistent - Fails to terminate within 7 days; Often requires meds or   cardioversion to restore to NSR.  Permanent - longstanding & persistent; Medication has been ineffective in   restoring NSR &/or cardioversion is contraindicated    Definitions per MS-DR[employer] Training Guide and Quick Reference Guide, MDC 5   Diseases and Disorders of the Circulatory System; 2019; . Software content   from the [employer]? Advanced CDI Transformation Program    Thank you, please contact me for any questions.  Vineet Azevedo RN, [employer]  nfub- [phone]  office hours - 647A-748H  Options provided:  -- Paroxysmal Atrial Fibrillation  -- Longstanding Persistent Atrial Fibrillation  -- Permanent Atrial Fibrillation  -- Persistent Atrial Fibrillation  -- Chronic Atrial Fibrillation, unspecified  -- Other - I will add my own diagnosis  -- Disagree - Not applicable / Not valid  -- Disagree - Clinically unable to determine / Unknown  -- Refer to Clinical Documentation Reviewer    PROVIDER RESPONSE  TEXT:    This patient has longstanding persistent atrial fibrillation.    Query created by: Vineet Azevedo on 3/1/2024 11:20 AM      QUERY TEXT:    Pt admitted with melena and has anemia documented. If possible, please   document in progress notes and discharge summary further specificity regarding   the acuity and type of anemia:    The medical record reflects the following:  Risk Factors: EGD in 12/2023 with small erosions noted anc a colonoscopy with   hemorrhoids. HTN.  Clinical Indicators: HBG - 6.2, 6.9, 8.2, 8.4, 8.5, 7.7.  Melena with fecal   occult blood stool testing positive.  Treatment: PRBC - 2/28, 2/29, IVF - NS  @ 75 ml/hr.  IV - Protonix. GI   consulted - plan for OP capsule endoscopy along with hemorrhoidectomy.    Thank you, please contact me for any questions.  Vineet Azevedo RN, [facility]  cell- [phone]  office hours - 630A-300P  Options provided:  -- Anemia due to acute on chronic blood loss  -- Anemia due to chronic blood loss  -- Anemia due to CKD  -- Dilutional anemia  -- Other - I will add my own diagnosis  -- Disagree - Not applicable / Not valid  -- Disagree - Clinically unable to determine / Unknown  -- Refer to Clinical Documentation Reviewer    PROVIDER RESPONSE TEXT:    Provider is clinically unable to determine a response to this query.  Unknown at present time. Needs outpatient VCE    Query created by: Vineet Azevedo on 3/1/2024 11:36 AM      Electronically signed by:  Vineet COHEN DO 3/2/2024 6:46 PM

## 2024-03-02 NOTE — PROGRESS NOTES
GI Progress notes    3/2/2024   9:15 AM    Name:  Rosi Haley  MRN:    [id number]     Acct:     [id number]   Room:  [address] [place] [address] Day: 3     Admit Date: 2/28/2024  8:55 AM  PCP: Radha Wynn MD    Subjective:     C/C:   Chief Complaint   Patient presents with    Fever     PT presents via EMS from [place] for a fever that began yesterday. PT reports a high of 100.7F. PT is also experiencing BL lower extremity pain/tenderness, reports this is chronic.       Interval History: Status: not changed.     Patient seen and examined.  No acute events overnight  Eager to d/c back to [facility].  No overt GI bleeding.  Plans for outpatient capsule    ROS:  Constitutional: negative for chills, fevers and sweats  Gastrointestinal: negative for abdominal pain, constipation, diarrhea, nausea and vomiting  Neurological: negative for dizziness and headaches    Medications:     Allergies:   Allergies   Allergen Reactions    Amlodipine Other (See Comments)     Ankle and leg swelling    Aspirin Itching and Rash     Pt can only take 81mg       Current Meds: folic acid (FOLVITE) tablet 2 mg, Daily  piperacillin-tazobactam (ZOSYN) 3,375 mg in sodium chloride 0.9 % 50 mL IVPB (mini-bag), Q8H  0.9 % sodium chloride infusion, Continuous  bisacodyl (DULCOLAX) suppository 10 mg, Daily  [Held by provider] bumetanide (BUMEX) tablet 1 mg, Daily  [Held by provider] carvedilol (COREG) tablet 6.25 mg, BID WC  cinacalcet (SENSIPAR) tablet 90 mg, Daily  docusate sodium (COLACE) capsule 100 mg, BID  [Held by provider] hydrALAZINE (APRESOLINE) tablet 100 mg, 3 times per day  magnesium oxide (MAG-OX) tablet 400 mg, Daily  menthol-methyl salicylate (ICY HOT) 10-30 % external cream, TID PRN  pravastatin (PRAVACHOL) tablet 40 mg, Daily  sodium chloride flush 0.9 % injection 5-40 mL, 2 times per day  sodium chloride flush 0.9 % injection 5-40 mL, PRN  0.9 % sodium chloride infusion, PRN  potassium chloride (KLOR-CON M) extended release tablet 40 mEq,

## 2024-03-02 NOTE — PROGRESS NOTES
[facility]  Office: [phone]  Marty Thorpe DO, Alvaro Brannon DO, Jefferson Lacy DO, Josh Licona DO, Richard Chinchilla MD, Margarita Mullins MD, Ilsa Monroe MD, Dorothea Perez MD,  Maycol Castanon MD, Levi Sweeney MD, Andi Myles MD,  Dinh Olson DO, Elyse Griffin MD, Wyatt Kelley MD, Vineet Thorpe DO, Melinda Fernandez MD,  Diogo Parr DO, Taylor Biswas MD, Tala Ruiz MD, Radha Whitt MD, Erik Bird MD,  Erasmo De La O MD, Maribell Leigh MD, Rupali Randall MD, Raissa Moran MD, Ru Silva MD, Winston Guaman MD, Jonathon Walters DO, Justice Abreu DO, Scott Blum MD,  Kapil Aguilar MD, Shirley Waterhouse, CNP,  Catherine Johnson, CNP, Roger Sierra, CNP,  Kaelyn Alegre, MARIO, Gillian Bey, CNP, Ely Delgado, CNP, Lilia Agarwal CNP, Victorina Bear, CNP, Eileen eYager, CNP, Stacey Mccrary, PA-C, Charity Ramachandran PA-C, Yany Beltran, CNP, Jen Zhao, CNP, Yajaira Suarez, CNP, Piedad Preston, CNS, Kathi Bauer, ABBY, Natalie Wu, CNP, Tracy Schwab, CNP         [facility]   IN-PATIENT SERVICE   [facility]    Progress Note    3/2/2024    8:28 AM    Name:   Rosi Haley  MRN:     [id number]     Acct:      [id number]   Room:   342/342-01   Day:  3  Admit Date:  2/28/2024  8:55 AM    PCP:   Radha Wynn MD  Code Status:  Full Code    Subjective:     Patient seen in follow-up for anemia and polymicrobial urinary tract infection/Pseudomonas bacteremia.  Patient states \"I feel fine\"    Patient feels well and expresses that she wishes to be discharged home.  She is presently residing at a skilled nursing facility.  That said she is still undergoing workup regarding her polymicrobial infection.  Repeat blood cultures are pending.  Ultimately if workup is unrevealing I anticipate she can be discharged back to her skilled nursing facility on Zosyn for 2 weeks.  Her echocardiogram does not demonstrate any vegetations.  CT scan of the abdomen and  pelvis is unremarkable.  The patient appreciates the information and denies any questions or concerns at this point in time.    Medications:     Allergies:    Allergies   Allergen Reactions    Amlodipine Other (See Comments)     Ankle and leg swelling    Aspirin Itching and Rash     Pt can only take 81mg       Current Meds:   Scheduled Meds:    folic acid  2 mg Oral Daily    piperacillin-tazobactam  3,375 mg IntraVENous Q8H    bisacodyl  10 mg Rectal Daily    [Held by provider] bumetanide  1 mg Oral Daily    [Held by provider] carvedilol  6.25 mg Oral BID WC    cinacalcet  90 mg Oral Daily    docusate sodium  100 mg Oral BID    [Held by provider] hydrALAZINE  100 mg Oral 3 times per day    magnesium oxide  400 mg Oral Daily    pravastatin  40 mg Oral Daily    sodium chloride flush  5-40 mL IntraVENous 2 times per day    pantoprazole  80 mg IntraVENous Once    lidocaine 1 % injection  5 mL IntraDERmal Once    sodium chloride flush  5-40 mL IntraVENous 2 times per day    ipratropium 0.5 mg-albuterol 2.5 mg  1 Dose Inhalation BID RT    budesonide-formoterol  2 puff Inhalation BID RT    polyethylene glycol  17 g Oral Daily    potassium chloride  10 mEq Oral Daily    pregabalin  50 mg Oral BID    vancomycin (VANCOCIN) intermittent dosing (placeholder)   Other RX Placeholder     Continuous Infusions:    sodium chloride 75 mL/hr at 03/01/24 2117    sodium chloride      sodium chloride       PRN Meds: menthol-methyl salicylate, sodium chloride flush, sodium chloride, potassium chloride **OR** potassium alternative oral replacement **OR** potassium chloride, magnesium sulfate, ondansetron **OR** ondansetron, polyethylene glycol, [DISCONTINUED] acetaminophen **OR** acetaminophen, sodium chloride flush, sodium chloride, albuterol sulfate HFA, albuterol, acetaminophen, melatonin, nitroGLYCERIN    Data:     Vitals:  BP 94/62   Pulse 92   Temp 98 °F (36.7 °C) (Oral)   Resp 22   Ht 1.676 m (5' 6\")   Wt 106.6 kg (235 lb)

## 2024-03-02 NOTE — PROGRESS NOTES
[facility]  Progress Note    Date: 3/2/2024    Hospital day :3     Impression:   Pseudomonas aeruginosa septicemia, source is not entirely clear  Anemia  Neutropenia with fever  Polymicrobial urinary tract infection, urine culture positive for E. coli, Enterococcus  faecalis, Proteus mirabilis  Acute kidney injury on chronic kidney disease  Suspected COPD  Essential hypertension  Morbid obesity    Recommendations   The patient was initially on IV ceftriaxone then transitioned to cefepime starting 2/29/2024  Patient was transitioned to IV Zosyn, which will provide coverage for the Pseudomonas isolated on the blood culture as well as the bacteria isolated in the urine including E. coli, Enterococcus faecalis, Proteus mirabilis.  The source of the Pseudomonas aeruginosa is not entirely clear at this time, the urine culture was positive; however, surprisingly, not for Pseudomonas  CT abdomen and pelvis was unremarkable  Echocardiogram has been ordered to rule out endocarditis.  Chest x-ray was unremarkable  Given the unknown source of the Pseudomonas bacteremia, I will repeat blood cultures to document clearance of the bacteremia  We will continue to follow culture data, clinical progress, and adjust therapy accordingly    Chief complaint/reason for consultation:   Pseudomonas sepsis    History of Present Illness:   Rosi Haley is a 78 y.o.-year-old female who was initially admitted on 2/28/2024.  Patient has a known history of atrial fibrillation, coronary artery disease, hypertension, left leg wound.  Patient currently resides at a nursing facility, she tells me that she was admitted to the hospital in November 2023 with GI bleeding and has been at a nursing facility since.  She also reports that since that time she has not been able to walk, she is unable to bear weight but does occasionally get to the wheelchair with assistance.  She has had multiple hospitalizations over the last several

## 2024-03-02 NOTE — PLAN OF CARE
Problem: Safety - Adult  Goal: Free from fall injury  3/2/2024 1556 by Lyndsey Quach RN  Outcome: Progressing  3/2/2024 0331 by Aleida Ayoub RN  Outcome: Progressing   Patient assessed for fall risk; fall precautions initiated. Patient and family instructed about safety devices. Environment kept free of clutter and adequate lighting provided.  Bed locked and in lowest position.  Call light within reach. Will continue to monitor.    Problem: Discharge Planning  Goal: Discharge to home or other facility with appropriate resources  3/2/2024 1556 by Lyndsey Quach, RN  Outcome: Progressing  3/2/2024 0331 by Aleida Ayoub RN  Outcome: Progressing   Patient progressing towards a discharge, not yet appropriate.    Problem: Pain  Goal: Verbalizes/displays adequate comfort level or baseline comfort level  3/2/2024 1556 by Lyndsey Quach, RN  Outcome: Progressing  3/2/2024 0331 by Aleida Ayoub RN  Outcome: Progressing  Pain scale preformed per protocol and pt treated for pain as documented. Education given.

## 2024-03-02 NOTE — PROGRESS NOTES
03/01/24 2005   RT Protocol   History Pulmonary Disease 2   Respiratory pattern 2   Breath sounds 2   Cough 0   Indications for Bronchodilator Therapy On home bronchodilators   Bronchodilator Assessment Score 6

## 2024-03-03 LAB
ANION GAP SERPL CALCULATED.3IONS-SCNC: 7 MMOL/L (ref 9–17)
BUN SERPL-MCNC: 25 MG/DL (ref 8–23)
CALCIUM SERPL-MCNC: 7.9 MG/DL (ref 8.6–10.4)
CHLORIDE SERPL-SCNC: 104 MMOL/L (ref 98–107)
CO2 SERPL-SCNC: 27 MMOL/L (ref 20–31)
CREAT SERPL-MCNC: 1.4 MG/DL (ref 0.5–0.9)
ERYTHROCYTE [DISTWIDTH] IN BLOOD BY AUTOMATED COUNT: 17.7 % (ref 12.5–15.4)
GFR SERPL CREATININE-BSD FRML MDRD: 39 ML/MIN/1.73M2
GLUCOSE SERPL-MCNC: 123 MG/DL (ref 70–99)
HCT VFR BLD AUTO: 25.1 % (ref 36–46)
HGB BLD-MCNC: 8.2 G/DL (ref 12–16)
MCH RBC QN AUTO: 29.5 PG (ref 26–34)
MCHC RBC AUTO-ENTMCNC: 32.8 G/DL (ref 31–37)
MCV RBC AUTO: 89.9 FL (ref 80–100)
PLATELET # BLD AUTO: 209 K/UL (ref 140–450)
PMV BLD AUTO: 7.8 FL (ref 6–12)
POTASSIUM SERPL-SCNC: 3.3 MMOL/L (ref 3.7–5.3)
RBC # BLD AUTO: 2.79 M/UL (ref 4–5.2)
SODIUM SERPL-SCNC: 138 MMOL/L (ref 135–144)
WBC OTHER # BLD: 3.3 K/UL (ref 3.5–11)

## 2024-03-03 PROCEDURE — 6370000000 HC RX 637 (ALT 250 FOR IP): Performed by: HOSPITALIST

## 2024-03-03 PROCEDURE — 94640 AIRWAY INHALATION TREATMENT: CPT

## 2024-03-03 PROCEDURE — 85027 COMPLETE CBC AUTOMATED: CPT

## 2024-03-03 PROCEDURE — 6370000000 HC RX 637 (ALT 250 FOR IP): Performed by: NURSE PRACTITIONER

## 2024-03-03 PROCEDURE — 94761 N-INVAS EAR/PLS OXIMETRY MLT: CPT

## 2024-03-03 PROCEDURE — 6370000000 HC RX 637 (ALT 250 FOR IP)

## 2024-03-03 PROCEDURE — 2700000000 HC OXYGEN THERAPY PER DAY

## 2024-03-03 PROCEDURE — 6360000002 HC RX W HCPCS: Performed by: HOSPITALIST

## 2024-03-03 PROCEDURE — 36415 COLL VENOUS BLD VENIPUNCTURE: CPT

## 2024-03-03 PROCEDURE — 6370000000 HC RX 637 (ALT 250 FOR IP): Performed by: INTERNAL MEDICINE

## 2024-03-03 PROCEDURE — 2580000003 HC RX 258: Performed by: HOSPITALIST

## 2024-03-03 PROCEDURE — 2580000003 HC RX 258

## 2024-03-03 PROCEDURE — 99232 SBSQ HOSP IP/OBS MODERATE 35: CPT | Performed by: HOSPITALIST

## 2024-03-03 PROCEDURE — 80048 BASIC METABOLIC PNL TOTAL CA: CPT

## 2024-03-03 PROCEDURE — 1210000000 HC MED SURG R&B

## 2024-03-03 RX ADMIN — CINACALCET HYDROCHLORIDE 90 MG: 30 TABLET, FILM COATED ORAL at 08:22

## 2024-03-03 RX ADMIN — IPRATROPIUM BROMIDE AND ALBUTEROL SULFATE 1 DOSE: 2.5; .5 SOLUTION RESPIRATORY (INHALATION) at 20:22

## 2024-03-03 RX ADMIN — POTASSIUM CHLORIDE 10 MEQ: 750 CAPSULE, EXTENDED RELEASE ORAL at 08:22

## 2024-03-03 RX ADMIN — POTASSIUM BICARBONATE 40 MEQ: 782 TABLET, EFFERVESCENT ORAL at 08:23

## 2024-03-03 RX ADMIN — BUDESONIDE AND FORMOTEROL FUMARATE DIHYDRATE 2 PUFF: 160; 4.5 AEROSOL RESPIRATORY (INHALATION) at 07:56

## 2024-03-03 RX ADMIN — PANTOPRAZOLE SODIUM 40 MG: 40 TABLET, DELAYED RELEASE ORAL at 05:45

## 2024-03-03 RX ADMIN — Medication 400 MG: at 08:23

## 2024-03-03 RX ADMIN — IPRATROPIUM BROMIDE AND ALBUTEROL SULFATE 1 DOSE: 2.5; .5 SOLUTION RESPIRATORY (INHALATION) at 07:55

## 2024-03-03 RX ADMIN — FOLIC ACID 2 MG: 1 TABLET ORAL at 08:23

## 2024-03-03 RX ADMIN — PIPERACILLIN AND TAZOBACTAM 3375 MG: 3; .375 INJECTION, POWDER, FOR SOLUTION INTRAVENOUS at 20:44

## 2024-03-03 RX ADMIN — BUDESONIDE AND FORMOTEROL FUMARATE DIHYDRATE 2 PUFF: 160; 4.5 AEROSOL RESPIRATORY (INHALATION) at 20:22

## 2024-03-03 RX ADMIN — SODIUM CHLORIDE, PRESERVATIVE FREE 10 ML: 5 INJECTION INTRAVENOUS at 08:27

## 2024-03-03 RX ADMIN — PIPERACILLIN AND TAZOBACTAM 3375 MG: 3; .375 INJECTION, POWDER, FOR SOLUTION INTRAVENOUS at 04:13

## 2024-03-03 RX ADMIN — SODIUM CHLORIDE, PRESERVATIVE FREE 10 ML: 5 INJECTION INTRAVENOUS at 20:45

## 2024-03-03 RX ADMIN — PIPERACILLIN AND TAZOBACTAM 3375 MG: 3; .375 INJECTION, POWDER, FOR SOLUTION INTRAVENOUS at 14:39

## 2024-03-03 RX ADMIN — PREGABALIN 50 MG: 25 CAPSULE ORAL at 21:11

## 2024-03-03 RX ADMIN — PREGABALIN 50 MG: 25 CAPSULE ORAL at 08:23

## 2024-03-03 RX ADMIN — PRAVASTATIN SODIUM 40 MG: 20 TABLET ORAL at 08:23

## 2024-03-03 NOTE — PROGRESS NOTES
[facility]  Office: [phone]  Marty Thorpe DO, Alvaro Brannon DO, Jefferson Lacy DO, Josh Licona DO, Richard Chinchilla MD, Margarita Mullins MD, Ilsa Monroe MD, Dorothea Perez MD,  Maycol Castanon MD, Levi Sweeney MD, Andi Myles MD,  Dinh Olson DO, Elyse Griffin MD, Wyatt Kelley MD, Vineet Thorpe DO, Melinda Frenandez MD,  Diogo Parr DO, Taylor Biswas MD, Tala Ruiz MD, Radha Whitt MD, Erik Bird MD,  Erasmo De La O MD, Maribell Leigh MD, Rupali Randall MD, Raissa Moran MD, Ru Silva MD, Wintson Guaman MD, Jonathon Walters DO, Justice Abreu DO, Scott Blum MD,  Kapil Aguilar MD, Shirley Waterhouse, CNP,  Catherine Johnson, CNP, Roger Sierra, CNP,  Kaelyn Alegre, DNP, Gillian Bey, CNP, Ely Delgado, CNP, Lilia Agarwal CNP, Victorina Bear, CNP, Eileen Yeager, CNP, Stacey Mccrary, PA-C, Charity Ramachandran PA-C, Yany Beltran, CNP, Jen Zhao, CNP, Yajaira Suarez, CNP, Piedad Preston, CNS, Kathi Bauer, CNP, Natalie Wu, CNP, Tracy Schwab, CNP         [facility]   IN-PATIENT SERVICE   [facility]    Progress Note    3/3/2024    9:25 AM    Name:   Rosi Haley  MRN:     [id number]     Acct:      [id number]   Room:   342/342-01   Day:  4  Admit Date:  2/28/2024  8:55 AM    PCP:   Radha Wynn MD  Code Status:  Full Code    Subjective:     Patient seen in follow-up for weakness, fatigue secondary to polymicrobial urinary tract infection, Pseudomonas bacteremia, anemia.  Patient states \"I feel better\"    Patient continues to state that she feels well.  No major issues overnight.  Providing patient's repeat blood cultures are unremarkable anticipate patient can be discharged to skilled nursing facility in the next 24 hours on IV Zosyn.  I suspect given the bacteremia she will be treated for 2 weeks of antibiotics however will await final recommendations per infectious disease.  Patient's acute kidney injury has resolved and at  Contrast? None    Result Date: 2/28/2024  No acute intra-abdominal or intrapelvic abnormalities are noted.       Physical Examination:     General appearance:  alert, cooperative and no distress  Mental Status:  oriented to person, place and time and normal affect  Lungs:  clear to auscultation bilaterally, normal effort  Heart:  regular rate and rhythm, no murmur  Abdomen:  soft, nontender, nondistended, normal bowel sounds, no masses, hepatomegaly, splenomegaly  Extremities:  no edema, redness, tenderness in the calves  Skin:  no gross lesions, rashes, induration    Assessment:     Hospital Problems             Last Modified POA    * (Principal) GI bleed 2/28/2024 Yes    Upper gastrointestinal bleeding 2/28/2024 Yes    Neutropenia ([facility]) 2/28/2024 Yes    Acute cystitis without hematuria 2/28/2024 Yes    Septicemia ([facility]) 3/2/2024 Yes    Gram-negative septicemia ([facility]) 3/2/2024 Yes    Acute kidney injury superimposed on CKD ([facility]) 3/2/2024 Yes       Plan:     Anemia  Outpatient video capsule endoscopy  Patient has undergone extensive workup in the hospital including EGD and colonoscopy  Transfusional support  Hemoglobin stable  Leukopenia  Outpatient follow-up with hematology  Patient may require bone marrow biopsy  Polymicrobial urinary tract infection with E. coli, Proteus and Enterococcus  Continue Zosyn  Pseudomonas bacteremia  Await final blood cultures  Infectious disease following  Echocardiogram negative for endocarditis  Hypokalemia  Electrolyte replacement  Acute kidney injury on chronic kidney disease  Resolved  Resume diuretics starting tomorrow    Anticipate discharge to skilled nursing facility tomorrow if okay with ID    Medical Decision Making: Taj Thorpe DO  3/3/2024  9:25 AM

## 2024-03-03 NOTE — RT PROTOCOL NOTE
RT Inhaler-Nebulizer Bronchodilator Protocol Note    There is a bronchodilator order in the chart from a provider indicating to follow the RT Bronchodilator Protocol and there is an “Initiate RT Inhaler-Nebulizer Bronchodilator Protocol” order as well (see protocol at bottom of note).    CXR Findings:  No results found.    The findings from the last RT Protocol Assessment were as follows:   History Pulmonary Disease: Chronic pulmonary disease  Respiratory Pattern: Dyspnea on exertion or RR 21-25 bpm  Breath Sounds: Slightly diminished and/or crackles  Cough: Strong, spontaneous, non-productive  Indication for Bronchodilator Therapy: On home bronchodilators  Bronchodilator Assessment Score: 6    Aerosolized bronchodilator medication orders have been revised according to the RT Inhaler-Nebulizer Bronchodilator Protocol below.    Respiratory Therapist to perform RT Therapy Protocol Assessment initially then follow the protocol.  Repeat RT Therapy Protocol Assessment PRN for score 0-3 or on second treatment, BID, and PRN for scores above 3.    No Indications - adjust the frequency to every 6 hours PRN wheezing or bronchospasm, if no treatments needed after 48 hours then discontinue using Per Protocol order mode.     If indication present, adjust the RT bronchodilator orders based on the Bronchodilator Assessment Score as indicated below.  Use Inhaler orders unless patient has one or more of the following: on home nebulizer, not able to hold breath for 10 seconds, is not alert and oriented, cannot activate and use MDI correctly, or respiratory rate 25 breaths per minute or more, then use the equivalent nebulizer order(s) with same Frequency and PRN reasons based on the score.  If a patient is on this medication at home then do not decrease Frequency below that used at home.    0-3 - enter or revise RT bronchodilator order(s) to equivalent RT Bronchodilator order with Frequency of every 4 hours PRN for wheezing or  increased work of breathing using Per Protocol order mode.        4-6 - enter or revise RT Bronchodilator order(s) to two equivalent RT bronchodilator orders with one order with BID Frequency and one order with Frequency of every 4 hours PRN wheezing or increased work of breathing using Per Protocol order mode.        7-10 - enter or revise RT Bronchodilator order(s) to two equivalent RT bronchodilator orders with one order with TID Frequency and one order with Frequency of every 4 hours PRN wheezing or increased work of breathing using Per Protocol order mode.       11-13 - enter or revise RT Bronchodilator order(s) to one equivalent RT bronchodilator order with QID Frequency and an Albuterol order with Frequency of every 4 hours PRN wheezing or increased work of breathing using Per Protocol order mode.      Greater than 13 - enter or revise RT Bronchodilator order(s) to one equivalent RT bronchodilator order with every 4 hours Frequency and an Albuterol order with Frequency of every 2 hours PRN wheezing or increased work of breathing using Per Protocol order mode.     RT to enter RT Home Evaluation for COPD & MDI Assessment order using Per Protocol order mode.    Electronically signed by Madai Monzon RCP on 3/3/2024 at 8:04 AM

## 2024-03-03 NOTE — PLAN OF CARE
Problem: Safety - Adult  Goal: Free from fall injury  Outcome: Progressing   Patient assessed for fall risk; fall precautions initiated. Patient and family instructed about safety devices. Environment kept free of clutter and adequate lighting provided.  Bed locked and in lowest position.  Call light within reach. Will continue to monitor.    Problem: Pain  Goal: Verbalizes/displays adequate comfort level or baseline comfort level  Outcome: Progressing   Pain scale preformed per protocol and pt treated for pain as documented. Education given.     Problem: Skin/Tissue Integrity  Goal: Absence of new skin breakdown  Description: 1.  Monitor for areas of redness and/or skin breakdown  2.  Assess vascular access sites hourly  3.  Every 4-6 hours minimum:  Change oxygen saturation probe site  4.  Every 4-6 hours:  If on nasal continuous positive airway pressure, respiratory therapy assess nares and determine need for appliance change or resting period.  Outcome: Progressing   Skin assessment preformed. Pt turned every 2 hours with heals elevated off bed. Inwood mattress in place. Will continue to monitor.

## 2024-03-03 NOTE — PLAN OF CARE
Problem: Discharge Planning  Goal: Discharge to home or other facility with appropriate resources  3/3/2024 0327 by Aleida Ayoub RN  Outcome: Progressing     Problem: Safety - Adult  Goal: Free from fall injury  3/3/2024 0327 by Aleida Ayoub RN  Outcome: Progressing     Problem: Pain  Goal: Verbalizes/displays adequate comfort level or baseline comfort level  3/3/2024 0327 by Aleida Ayoub RN  Outcome: Progressing     Problem: Skin/Tissue Integrity  Goal: Absence of new skin breakdown  Description: 1.  Monitor for areas of redness and/or skin breakdown  2.  Assess vascular access sites hourly  3.  Every 4-6 hours minimum:  Change oxygen saturation probe site  4.  Every 4-6 hours:  If on nasal continuous positive airway pressure, respiratory therapy assess nares and determine need for appliance change or resting period.  3/3/2024 0327 by Aleida Ayoub RN  Outcome: Progressing     Problem: Respiratory - Adult  Goal: Achieves optimal ventilation and oxygenation  3/3/2024 0327 by Aleida Ayoub RN  Outcome: Progressing  Flowsheets (Taken 3/2/2024 1959 by Rosa Singleton, [facility])  Achieves optimal ventilation and oxygenation:   Assess for changes in respiratory status   Assess for changes in mentation and behavior   Position to facilitate oxygenation and minimize respiratory effort   Oxygen supplementation based on oxygen saturation or arterial blood gases     Problem: Hematologic - Adult  Goal: Maintains hematologic stability  3/3/2024 0327 by Aleida Ayoub RN  Outcome: Progressing

## 2024-03-04 LAB
ALBUMIN PERCENT: 47 % (ref 45–65)
ALBUMIN SERPL-MCNC: 2.3 G/DL (ref 3.2–5.2)
ALPHA 2 PERCENT: 15 % (ref 6–13)
ALPHA1 GLOB SERPL ELPH-MCNC: 0.3 G/DL (ref 0.1–0.4)
ALPHA1 GLOB SERPL ELPH-MCNC: 5 % (ref 3–6)
ALPHA2 GLOB SERPL ELPH-MCNC: 0.8 G/DL (ref 0.5–0.9)
ANION GAP SERPL CALCULATED.3IONS-SCNC: 10 MMOL/L (ref 9–17)
B-GLOBULIN SERPL ELPH-MCNC: 0.6 G/DL (ref 0.5–1.1)
B-GLOBULIN SERPL ELPH-MCNC: 12 % (ref 11–19)
BUN SERPL-MCNC: 22 MG/DL (ref 8–23)
CALCIUM SERPL-MCNC: 7.5 MG/DL (ref 8.6–10.4)
CHLORIDE SERPL-SCNC: 103 MMOL/L (ref 98–107)
CO2 SERPL-SCNC: 25 MMOL/L (ref 20–31)
CREAT SERPL-MCNC: 1.3 MG/DL (ref 0.5–0.9)
FREE KAPPA/LAMBDA RATIO: 0.95 (ref 0.26–1.65)
GAMMA GLOB SERPL ELPH-MCNC: 1 G/DL (ref 0.5–1.5)
GAMMA GLOBULIN %: 21 % (ref 9–20)
GFR SERPL CREATININE-BSD FRML MDRD: 43 ML/MIN/1.73M2
GLUCOSE SERPL-MCNC: 169 MG/DL (ref 70–99)
INTERPRETATION SERPL IFE-IMP: ABNORMAL
KAPPA LC FREE SER-MCNC: 70.1 MG/L (ref 3.7–19.4)
LAMBDA LC FREE SERPL-MCNC: 74 MG/L (ref 5.7–26.3)
MICROORGANISM SPEC CULT: NORMAL
PATH REV: ABNORMAL
PATHOLOGIST: ABNORMAL
POTASSIUM SERPL-SCNC: 3.7 MMOL/L (ref 3.7–5.3)
PROT PATTERN SERPL ELPH-IMP: ABNORMAL
PROT SERPL-MCNC: 4.9 G/DL (ref 6.6–8.7)
SERVICE CMNT-IMP: NORMAL
SODIUM SERPL-SCNC: 138 MMOL/L (ref 135–144)
SPECIMEN DESCRIPTION: NORMAL
SURGICAL PATHOLOGY REPORT: NORMAL
TOTAL PROT. SUM,%: 100 % (ref 98–102)
TOTAL PROT. SUM: 5 G/DL (ref 6.3–8.2)

## 2024-03-04 PROCEDURE — 99232 SBSQ HOSP IP/OBS MODERATE 35: CPT | Performed by: HOSPITALIST

## 2024-03-04 PROCEDURE — 94760 N-INVAS EAR/PLS OXIMETRY 1: CPT

## 2024-03-04 PROCEDURE — 6360000002 HC RX W HCPCS: Performed by: HOSPITALIST

## 2024-03-04 PROCEDURE — 2580000003 HC RX 258: Performed by: HOSPITALIST

## 2024-03-04 PROCEDURE — 97166 OT EVAL MOD COMPLEX 45 MIN: CPT

## 2024-03-04 PROCEDURE — 97535 SELF CARE MNGMENT TRAINING: CPT

## 2024-03-04 PROCEDURE — 6370000000 HC RX 637 (ALT 250 FOR IP): Performed by: INTERNAL MEDICINE

## 2024-03-04 PROCEDURE — 97530 THERAPEUTIC ACTIVITIES: CPT

## 2024-03-04 PROCEDURE — 2580000003 HC RX 258

## 2024-03-04 PROCEDURE — 1210000000 HC MED SURG R&B

## 2024-03-04 PROCEDURE — 6370000000 HC RX 637 (ALT 250 FOR IP): Performed by: HOSPITALIST

## 2024-03-04 PROCEDURE — 6370000000 HC RX 637 (ALT 250 FOR IP)

## 2024-03-04 PROCEDURE — 6370000000 HC RX 637 (ALT 250 FOR IP): Performed by: NURSE PRACTITIONER

## 2024-03-04 PROCEDURE — 36415 COLL VENOUS BLD VENIPUNCTURE: CPT

## 2024-03-04 PROCEDURE — 2700000000 HC OXYGEN THERAPY PER DAY

## 2024-03-04 PROCEDURE — 94640 AIRWAY INHALATION TREATMENT: CPT

## 2024-03-04 PROCEDURE — 99232 SBSQ HOSP IP/OBS MODERATE 35: CPT | Performed by: INTERNAL MEDICINE

## 2024-03-04 PROCEDURE — 80048 BASIC METABOLIC PNL TOTAL CA: CPT

## 2024-03-04 PROCEDURE — 97162 PT EVAL MOD COMPLEX 30 MIN: CPT

## 2024-03-04 RX ORDER — ALBUTEROL SULFATE 90 UG/1
2 AEROSOL, METERED RESPIRATORY (INHALATION) EVERY 4 HOURS PRN
Qty: 18 G | Refills: 3
Start: 2024-03-04

## 2024-03-04 RX ORDER — PIPERACILLIN SODIUM, TAZOBACTAM SODIUM 3; .375 G/15ML; G/15ML
3375 INJECTION, POWDER, LYOPHILIZED, FOR SOLUTION INTRAVENOUS EVERY 8 HOURS
Qty: 33 EACH | Refills: 0
Start: 2024-03-04 | End: 2024-03-15

## 2024-03-04 RX ORDER — MIDODRINE HYDROCHLORIDE 5 MG/1
5 TABLET ORAL
Qty: 90 TABLET | Refills: 3
Start: 2024-03-04

## 2024-03-04 RX ORDER — CARVEDILOL 6.25 MG/1
6.25 TABLET ORAL 2 TIMES DAILY WITH MEALS
Qty: 60 TABLET | Refills: 3
Start: 2024-03-04

## 2024-03-04 RX ORDER — MIDODRINE HYDROCHLORIDE 5 MG/1
5 TABLET ORAL
Status: DISCONTINUED | OUTPATIENT
Start: 2024-03-04 | End: 2024-03-05 | Stop reason: HOSPADM

## 2024-03-04 RX ORDER — ALBUTEROL SULFATE 2.5 MG/3ML
2.5 SOLUTION RESPIRATORY (INHALATION) EVERY 4 HOURS PRN
Qty: 120 EACH | Refills: 3
Start: 2024-03-04

## 2024-03-04 RX ORDER — PANTOPRAZOLE SODIUM 40 MG/1
40 TABLET, DELAYED RELEASE ORAL
Qty: 30 TABLET | Refills: 3
Start: 2024-03-05

## 2024-03-04 RX ORDER — FOLIC ACID 1 MG/1
2 TABLET ORAL DAILY
Qty: 30 TABLET | Refills: 3
Start: 2024-03-04

## 2024-03-04 RX ADMIN — FOLIC ACID 2 MG: 1 TABLET ORAL at 09:13

## 2024-03-04 RX ADMIN — MIDODRINE HYDROCHLORIDE 5 MG: 5 TABLET ORAL at 10:57

## 2024-03-04 RX ADMIN — BUDESONIDE AND FORMOTEROL FUMARATE DIHYDRATE 2 PUFF: 160; 4.5 AEROSOL RESPIRATORY (INHALATION) at 20:51

## 2024-03-04 RX ADMIN — IPRATROPIUM BROMIDE AND ALBUTEROL SULFATE 1 DOSE: 2.5; .5 SOLUTION RESPIRATORY (INHALATION) at 20:51

## 2024-03-04 RX ADMIN — SODIUM CHLORIDE, PRESERVATIVE FREE 10 ML: 5 INJECTION INTRAVENOUS at 20:30

## 2024-03-04 RX ADMIN — CINACALCET HYDROCHLORIDE 90 MG: 30 TABLET, FILM COATED ORAL at 09:15

## 2024-03-04 RX ADMIN — SODIUM CHLORIDE, PRESERVATIVE FREE 10 ML: 5 INJECTION INTRAVENOUS at 09:17

## 2024-03-04 RX ADMIN — PIPERACILLIN AND TAZOBACTAM 3375 MG: 3; .375 INJECTION, POWDER, FOR SOLUTION INTRAVENOUS at 12:41

## 2024-03-04 RX ADMIN — PREGABALIN 50 MG: 25 CAPSULE ORAL at 09:14

## 2024-03-04 RX ADMIN — PRAVASTATIN SODIUM 40 MG: 20 TABLET ORAL at 09:13

## 2024-03-04 RX ADMIN — BUDESONIDE AND FORMOTEROL FUMARATE DIHYDRATE 2 PUFF: 160; 4.5 AEROSOL RESPIRATORY (INHALATION) at 08:28

## 2024-03-04 RX ADMIN — MIDODRINE HYDROCHLORIDE 5 MG: 5 TABLET ORAL at 16:31

## 2024-03-04 RX ADMIN — Medication 400 MG: at 09:14

## 2024-03-04 RX ADMIN — IPRATROPIUM BROMIDE AND ALBUTEROL SULFATE 1 DOSE: 2.5; .5 SOLUTION RESPIRATORY (INHALATION) at 08:28

## 2024-03-04 RX ADMIN — BUMETANIDE 1 MG: 1 TABLET ORAL at 09:29

## 2024-03-04 RX ADMIN — PIPERACILLIN AND TAZOBACTAM 3375 MG: 3; .375 INJECTION, POWDER, FOR SOLUTION INTRAVENOUS at 05:09

## 2024-03-04 RX ADMIN — POTASSIUM CHLORIDE 10 MEQ: 750 CAPSULE, EXTENDED RELEASE ORAL at 09:14

## 2024-03-04 RX ADMIN — SODIUM CHLORIDE, PRESERVATIVE FREE 10 ML: 5 INJECTION INTRAVENOUS at 09:30

## 2024-03-04 RX ADMIN — PIPERACILLIN AND TAZOBACTAM 3375 MG: 3; .375 INJECTION, POWDER, FOR SOLUTION INTRAVENOUS at 20:34

## 2024-03-04 RX ADMIN — SODIUM CHLORIDE, PRESERVATIVE FREE 10 ML: 5 INJECTION INTRAVENOUS at 20:29

## 2024-03-04 RX ADMIN — PREGABALIN 50 MG: 25 CAPSULE ORAL at 20:29

## 2024-03-04 RX ADMIN — PANTOPRAZOLE SODIUM 40 MG: 40 TABLET, DELAYED RELEASE ORAL at 05:23

## 2024-03-04 RX ADMIN — SODIUM CHLORIDE: 9 INJECTION, SOLUTION INTRAVENOUS at 12:39

## 2024-03-04 ASSESSMENT — PAIN SCALES - GENERAL
PAINLEVEL_OUTOF10: 0

## 2024-03-04 NOTE — DISCHARGE SUMMARY
[facility]  Office: [phone]  Marty Thorpe DO, Alvaro Brannon DO, Jefferson Lacy DO, Josh Licona DO, Richard Chinchilla MD, Margarita Mullins MD, Ilsa Monroe MD, Dorothea Perez MD,  Maycol Castanon MD, Levi Sweeney MD, Andi Myles MD,  Dinh Olson DO, Elyse Griffin MD, Wyatt Kelley MD, Vineet Thorpe DO, Melinda Fernandez MD,  Diogo Parr DO, Taylor Biswas MD, Tala Ruiz MD, Radha Whitt MD, Erik Bird MD,  Erasmo De La O MD, Maribell Leigh MD, Rupali Randall MD, Raissa Moran MD, Ru Silva MD, Winston Guaman MD, Jonathon Walters DO, Justice Abreu DO, Scott Blum MD,  Kapil Aguilar MD, Shirley Waterhouse, CNP,  Catherine Johnson, CNP, Roger Sierra, CNP,  Kaelyn Alegre, DNP, Gillian Bey, CNP, Ely Delgado, CNP, Lilia Agarwal CNP, Victorina Bear, CNP, Eileen Yeager, CNP, Stacey Mccrary, PA-C, Charity Ramachandran, PA-C, Yany Beltran, CNP, Jen Zhao, CNP, Yajaira Suarez, CNP, Piedad Preston, CNS, Kathi Bauer, CNP, Natalie Wu, CNP, Tracy Schwab, CNP         [facility]   IN-PATIENT SERVICE   [facility]    Discharge Summary     Patient ID: Rosi Haley  :  1945   MRN: [id number]     ACCOUNT:  [id number]   Patient's PCP: Radha Wynn MD  Admit Date: 2024   Discharge Date: 3/4/2024  Length of Stay: 5  Code Status:  Full Code  Admitting Physician: Vineet Thorpe DO  Discharge Physician: Vineet Thorpe DO     Active Discharge Diagnoses:     Hospital Problem Lists:  Principal Problem:    GI bleed  Active Problems:    Upper gastrointestinal bleeding    Neutropenia (HCC)    Acute cystitis without hematuria    Septicemia (HCC)    Gram-negative septicemia (HCC)    Acute kidney injury superimposed on CKD (HCC)  Resolved Problems:    * No resolved hospital problems. *      Admission Condition:  fair     Discharged Condition: good    Hospital Stay:     Hospital Course:  Rosi Haley is a 78 y.o. female who was admitted

## 2024-03-04 NOTE — PROGRESS NOTES
23 mg/dL    Creatinine 1.8 (H) 0.5 - 0.9 mg/dL    Est, Glom Filt Rate 29 (L) >60 mL/min/1.73m2    Calcium 8.1 (L) 8.6 - 10.4 mg/dL   CBC with Auto Differential   Result Value Ref Range    WBC 3.0 (L) 3.5 - 11.0 k/uL    RBC 2.37 (L) 4.0 - 5.2 m/uL    Hemoglobin 6.9 (LL) 12.0 - 16.0 g/dL    Hematocrit 21.0 (L) 36 - 46 %    MCV 88.6 80 - 100 fL    MCH 29.2 26 - 34 pg    MCHC 32.9 31 - 37 g/dL    RDW 17.4 (H) 12.5 - 15.4 %    Platelets 242 140 - 450 k/uL    MPV 7.3 6.0 - 12.0 fL    Neutrophils % 25 (L) 36 - 66 %    Lymphocytes % 56 (H) 24 - 44 %    Monocytes % 19 (H) 1 - 7 %    Eosinophils % 0 (L) 1 - 4 %    Basophils % 0 0 - 2 %    Neutrophils Absolute 0.75 (L) 1.8 - 7.7 k/uL    Lymphocytes Absolute 1.68 1.0 - 4.8 k/uL    Monocytes Absolute 0.57 0.1 - 0.8 k/uL    Eosinophils Absolute 0.00 0.0 - 0.4 k/uL    Basophils Absolute 0.00 0.0 - 0.2 k/uL    Morphology ANISOCYTOSIS PRESENT    Copper, Serum   Result Value Ref Range    Copper 73.4 (L) 80.0 - 155.0 ug/dL   Zinc   Result Value Ref Range    Zinc 26.0 (L) 60.0 - 120.0 ug/dL   Vitamin B12 & Folate   Result Value Ref Range    Vitamin B-12 753 232 - 1245 pg/mL    Folate 3.6 (L) >4.8 ng/mL   Iron and TIBC   Result Value Ref Range    Iron 29 (L) 37 - 145 ug/dL    TIBC 139 (L) 250 - 450 ug/dL    Iron % Saturation 21 20 - 55 %    UIBC 110 (L) 112 - 347 ug/dL   Ferritin   Result Value Ref Range    Ferritin 155 (H) 13 - 150 ng/mL   Haptoglobin   Result Value Ref Range    Haptoglobin 231 (H) 30 - 200 mg/dL   Lactate Dehydrogenase   Result Value Ref Range     135 - 214 U/L   MONOCLONAL PANEL   Result Value Ref Range    Kappa Free Light Chains QNT 70.1 (H) 3.7 - 19.4 mg/L    Lambda Free Light Chains QNT 74.0 (H) 5.7 - 26.3 mg/L    Free Kappa/Lambda Ratio 0.95 0.26 - 1.65    Serum IFX Interp       IMMUNOFIXATION IS NEGATIVE FOR MONOCLONAL IMMUNOGLOBULIN.    Pathologist Review ELECTRONICALLY SIGNED. LANEY GUZMAN M.D.     Total Protein 4.9 (L) 6.6 - 8.7 g/dL    Albumin  Bones/Soft Tissues: Small fat containing umbilical hernia.  Vascular calcifications are seen compatible with atherosclerotic disease.   Stable ectasia of the abdominal aorta measuring 2.8 cm.  Degenerative changes seen in the visualized spine .  No acute bony abnormalities.  Postsurgical changes in the left inguinal canal.     No acute intra-abdominal or intrapelvic abnormalities are noted.     CT abdomen and pelvis with contrast    Result Date: 2/11/2024  Study: CT abdomen and pelvis with contrast History:Abdominal pain acute nonlocalized Protocol:CT abdomen and pelvis with contrast Contrast 100 mL Omnipaque 300 COMPARISON:10/20/2017 Findings: Lung bases:Mild left basilar atelectasis Liver:Hypodensities in the liver most consistent with cysts unchanged Spleen:Normal Gallbladder:Normal Bile ducts:No dilatation Pancreas:Normal Adrenals:Normal Kidneys:Right renal cysts and some atrophy of the right kidney. Left kidney normal Ureters:Normal Bladder:Normal Bowel:Stomach unremarkable no small bowel dilatation. No CT evidence of appendicitis or diverticulitis. Stool in the rectum with some mild wall thickening and small amount of perirectal strand-like density could represent stercoral colitis. Reproductive organs:Uterus not visualized no adnexal cyst or mass [place]entry:No adenopathy Peritoneum:No free air or free fluid Retroperitoneum:No adenopathy Vessels:No sclerotic changes in the aorta. No aneurysm Abdominal wall:No hernia Bones:Degenerative changes at the L4-L5 level IMPRESSION: Stool in the rectum with some mild wall thickening and small amount of perirectal strand-like density could represent stercoral colitis. Right renal cysts and some atrophy of the right kidney All CT scans at this facility use dose modulation, iterative reconstruction, and/or weight based dosing when appropriate to reduce radiation dose to as low as reasonably achievable. Workstation:[id number] Finalized by Adeel Joyce MD on 2/11/2024 10:39

## 2024-03-04 NOTE — PROGRESS NOTES
Prevention, Safety with Transfers/RW Mngt, ADL Techniques, Bed Mobility Techniques, Pursed Lip Breathing Techniques)  Education Method: Demonstration;Verbal  Barriers to Learning: None  Education Outcome: Verbalized understanding;Continued education needed       AM-PAC - ADL  AM-PAC Daily Activity - Inpatient   How much help is needed for putting on and taking off regular lower body clothing?: A Lot  How much help is needed for bathing (which includes washing, rinsing, drying)?: A Lot  How much help is needed for toileting (which includes using toilet, bedpan, or urinal)?: Total  How much help is needed for putting on and taking off regular upper body clothing?: A Lot  How much help is needed for taking care of personal grooming?: A Little  How much help for eating meals?: A Little  AM-PAC Inpatient Daily Activity Raw Score: 13  AM-PAC Inpatient ADL T-Scale Score : 32.03  ADL Inpatient CMS 0-100% Score: 63.03  ADL Inpatient CMS G-Code Modifier : CL    Goals  Short Term Goals  Time Frame for Short Term Goals: 14 visits  Short Term Goal 1: Pt will perform grooming/UB ADL tasks with mod IND including setup and use of AE/DME PRN  Short Term Goal 2: Pt will perform toileting/LB ADL tasks with mod A including setup and use of AE/DME PRN  Short Term Goal 3: Pt will independently demo good safety awareness during engagement in all ADLs and functional transfers/functional mobility  Short Term Goal 4: Pt will perform functional transfers with mod A and use of LRAD  Short Term Goal 5: Pt will demo 2+ minutes standing tolerance with use of LRAD for increased engagement in ADL tasks       Therapy Time   Individual Concurrent Group Co-treatment   Time In 1126         Time Out 1213         Minutes 47         Timed Code Treatment Minutes: 25 Minutes    Co- treatment with PT warranted secondary to decreased patient safety and independence with functional mobility requiring skilled physical assistance of two professionals to  simultaneously address individualized discipline goals. OT is addressing ADL engagement/independence and activity tolerance, while PT is addressing their individualized functional mobility task.     Chelsie Calhoun, OTR/L

## 2024-03-04 NOTE — PLAN OF CARE
Problem: Safety - Adult  Goal: Free from fall injury  Outcome: Progressing     Problem: Discharge Planning  Goal: Discharge to home or other facility with appropriate resources  Outcome: Progressing  Flowsheets  Taken 3/4/2024 1600  Discharge to home or other facility with appropriate resources:   Identify barriers to discharge with patient and caregiver   Arrange for needed discharge resources and transportation as appropriate   Identify discharge learning needs (meds, wound care, etc)  Taken 3/4/2024 1300  Discharge to home or other facility with appropriate resources:   Identify barriers to discharge with patient and caregiver   Arrange for needed discharge resources and transportation as appropriate   Identify discharge learning needs (meds, wound care, etc)  Taken 3/4/2024 0900  Discharge to home or other facility with appropriate resources:   Identify barriers to discharge with patient and caregiver   Arrange for needed discharge resources and transportation as appropriate   Identify discharge learning needs (meds, wound care, etc)     Problem: Pain  Goal: Verbalizes/displays adequate comfort level or baseline comfort level  Outcome: Progressing  Flowsheets (Taken 3/4/2024 1252)  Verbalizes/displays adequate comfort level or baseline comfort level:   Encourage patient to monitor pain and request assistance   Assess pain using appropriate pain scale   Administer analgesics based on type and severity of pain and evaluate response   Implement non-pharmacological measures as appropriate and evaluate response   Notify Licensed Independent Practitioner if interventions unsuccessful or patient reports new pain   Consider cultural and social influences on pain and pain management     Problem: ABCDS Injury Assessment  Goal: Absence of physical injury  Outcome: Progressing     Problem: Skin/Tissue Integrity  Goal: Absence of new skin breakdown  Description: 1.  Monitor for areas of redness and/or skin breakdown  2.

## 2024-03-04 NOTE — PLAN OF CARE
Problem: Discharge Planning  Goal: Discharge to home or other facility with appropriate resources  3/4/2024 0257 by Aleida Ayoub RN  Outcome: Progressing   Problem: Safety - Adult  Goal: Free from fall injury  3/4/2024 0257 by Aleida Ayoub RN  Outcome: Progressing   No falls/ injuries this shift, bed in lowest position, brakes on, bed alarm on, call light in reach, side rails up x2  Problem: Pain  Goal: Verbalizes/displays adequate comfort level or baseline comfort level  3/4/2024 0257 by Aleida Ayoub RN  Outcome: Progressing   No new signs/symptoms of pain noted, pain rating < 3 on scale of 0-10, pain controlled with medication/ repositioning  Problem: Skin/Tissue Integrity  Goal: Absence of new skin breakdown  Description: 1.  Monitor for areas of redness and/or skin breakdown  2.  Assess vascular access sites hourly  3.  Every 4-6 hours minimum:  Change oxygen saturation probe site  4.  Every 4-6 hours:  If on nasal continuous positive airway pressure, respiratory therapy assess nares and determine need for appliance change or resting period.  3/4/2024 0257 by Aleida Ayoub RN  Outcome: Progressing   No new skin breakdown noted, no new signs/symptoms of infection, continue to monitor lab work including WBC, medications administered per physician orders  Problem: Respiratory - Adult  Goal: Achieves optimal ventilation and oxygenation  3/4/2024 0257 by Aleida Ayoub RN  Outcome: Progressing  HOB elevated to promote optimal oxygenation, O2 saturation maintained >90% on 2L/NC, no changes in oxygenation/mentation

## 2024-03-04 NOTE — PROGRESS NOTES
[facility]  Office: [phone]  Marty Thorpe DO, Alvaro Brannon DO, Jefferson Lacy DO, Josh Licona DO, iRchard Chinchilla MD, Margarita Mullins MD, Ilsa Monroe MD, Dorothea Perez MD,  Maycol Castanon MD, Levi Sweeney MD, Andi Myles MD,  Dinh Olson DO, Elyse Griffin MD, Wyatt Kelley MD, Vineet Thorpe DO, Melinda Fernandez MD,  Diogo Parr DO, Taylor Biswas MD, Tala Ruiz MD, Radha Whitt MD, Erik Bird MD,  Erasmo De La O MD, Maribell Leigh MD, Rupali Randall MD, Raissa Moran MD, Ru Silva MD, Winston Guaman MD, Jonathon Walters DO, Justice Abreu DO, Scott Blum MD,  Kapil Aguilar MD, Shirley Waterhouse, CNP,  Catherine Johnson CNP, Roger Sierra, CNP,  Kaelyn Alegre, MARIO, Gillian Bey, CNP, Ely Delgado, CNP, Lilia Agarwal CNP, Victorina Bear CNP, Eileen Yeager, CNP, Stacey Mccrary, PA-C, Charity Ramachandran PA-C, Yany Beltran, CNP, Jen Zhao, CNP, Yajaira Suarez, CNP, Piedad Preston, CNS, Kathi Bauer, ABBY, Natalie Wu CNP, Tracy Schwab, CNP         [facility]   IN-PATIENT SERVICE   [facility]    Progress Note    3/4/2024    8:48 AM    Name:   Rosi Haley  MRN:     [id number]     Acct:      [id number]   Room:   342/342-01   Day:  5  Admit Date:  2/28/2024  8:55 AM    PCP:   Radha Wynn MD  Code Status:  Full Code    Subjective:     Patient seen in follow-up for anemia, polymicrobial infections.  Patient states \"I feel much better\"    Patient is feeling much better at this point in time.  I am going to reach out to infectious diseases blood cultures are negative to date at this point in time.  Providing okay with infectious disease the patient can be discharged back to her skilled nursing facility.  With her bacteremia I anticipate at least 2 weeks of IV antibiotics.  Zosyn will be effective in treating her polymicrobial urinary tract infection in addition to the Pseudomonas noted in her bloodstream.  She is to follow-up  Summary (Last 24 hours) at 3/4/2024 0848  Last data filed at 3/4/2024 0522  Gross per 24 hour   Intake --   Output 750 ml   Net -750 ml       Labs:  Hematology:  Recent Labs     03/01/24  1159 03/01/24  1517 03/01/24  2102 03/02/24  0319 03/03/24  0534   WBC 3.3*  --   --  2.7* 3.3*   RBC 3.19*  --   --  2.73* 2.79*   HGB 9.3*   < > 8.4* 7.9*  7.9* 8.2*   HCT 28.8*   < > 25.8* 24.5*  24.5* 25.1*   MCV 90.1  --   --  89.8 89.9   MCH 29.2  --   --  29.1 29.5   MCHC 32.4  --   --  32.4 32.8   RDW 17.6*  --   --  17.5* 17.7*     --   --  221 209   MPV 7.7  --   --  7.8 7.8    < > = values in this interval not displayed.     Chemistry:  Recent Labs     03/02/24 0319 03/03/24  0534    138   K 3.3* 3.3*    104   CO2 25 27   GLUCOSE 114* 123*   BUN 31* 25*   CREATININE 1.4* 1.4*   ANIONGAP 9 7*   LABGLOM 39* 39*   CALCIUM 8.1* 7.9*   No results for input(s): \"PROT\", \"LABALBU\", \"LABA1C\", \"G1ZZRWH\", \"E5ZORKQ\", \"FT4\", \"TSH\", \"AST\", \"ALT\", \"LDH\", \"GGT\", \"ALKPHOS\", \"LABGGT\", \"BILITOT\", \"BILIDIR\", \"AMMONIA\", \"AMYLASE\", \"LIPASE\", \"LACTATE\", \"CHOL\", \"HDL\", \"LDLCHOLESTEROL\", \"CHOLHDLRATIO\", \"TRIG\", \"VLDL\", \"ETA20PZ\", \"PHENYTOIN\", \"PHENYF\", \"URICACID\", \"POCGLU\" in the last 72 hours.  ABG:  Lab Results   Component Value Date/Time    PHART 7.427 12/31/2023 10:15 AM    GWA1SQZ 67.7 12/31/2023 10:15 AM    PO2ART 75.8 12/31/2023 10:15 AM    KOE8GMA 44.6 12/31/2023 10:15 AM    PBEA 20.2 12/31/2023 10:15 AM    J3PXBPTF 93.2 12/31/2023 10:15 AM     Lab Results   Component Value Date/Time    SPECIAL LEFT HAND 1CC 03/02/2024 10:34 AM     Lab Results   Component Value Date/Time    CULTURE NO GROWTH 1 DAY 03/02/2024 10:34 AM       Radiology:  XR CHEST (SINGLE VIEW FRONTAL)    Result Date: 2/28/2024  No acute process. Left-sided PICC terminates in the SVC     CT ABDOMEN PELVIS WO CONTRAST Additional Contrast? None    Result Date: 2/28/2024  No acute intra-abdominal or intrapelvic abnormalities are noted.       Physical

## 2024-03-04 NOTE — PROGRESS NOTES
assist and up to mod A without UE assist.             AM-PAC - Mobility    AM-PAC Basic Mobility - Inpatient   How much help is needed turning from your back to your side while in a flat bed without using bedrails?: Total  How much help is needed moving from lying on your back to sitting on the side of a flat bed without using bedrails?: Total  How much help is needed moving to and from a bed to a chair?: Total  How much help is needed standing up from a chair using your arms?: Total  How much help is needed walking in hospital room?: Total  How much help is needed climbing 3-5 steps with a railing?: Total  AM-PAC Inpatient Mobility Raw Score : 6  AM-PAC Inpatient T-Scale Score : 23.55  Mobility Inpatient CMS 0-100% Score: 100  Mobility Inpatient CMS G-Code Modifier : CN           Goals  Short Term Goals  Time Frame for Short Term Goals: 14 visits  Short Term Goal 1: Pt to demonstrate mod A bed mobility  Short Term Goal 2: Pt to sit <> stand transfer mod A  Short Term Goal 3: Pt to ambulate max Ax2 5ft in // bars  Short Term Goal 4: Pt to demonstrate good seated and fair - standing balance to decrease risk of falls  Short Term Goal 5: Pt to tolerate 45 minutes worth of therapy for endurance       Education  Patient Education  Education Given To: Patient  Education Provided: Role of Therapy;Plan of Care  Education Provided Comments: importance of mobility/decreasing fear of falling  Education Method: Demonstration;Verbal  Barriers to Learning: None  Education Outcome: Verbalized understanding;Continued education needed      Therapy Time   Individual Concurrent Group Co-treatment   Time In 1126         Time Out 1213         Minutes 47         Timed Code Treatment Minutes: 25 Minutes   Co- treatment with OT warranted secondary to decreased patient safety and independence with functional mobility requiring skilled physical assistance of two professionals to simultaneously address individualized discipline goals. PT is  addressing LE strengthening with functional transfers, transfers, upright endurance/tolerance to activity , while OT is addressing their individualized functional mobility/self-care task.       Laurel Bui, PT

## 2024-03-04 NOTE — PLAN OF CARE
Problem: Respiratory - Adult  Goal: Achieves optimal ventilation and oxygenation  3/4/2024 0837 by Juli Estrada RCP  Outcome: Progressing

## 2024-03-04 NOTE — RT PROTOCOL NOTE
using Per Protocol order mode.        4-6 - enter or revise RT Bronchodilator order(s) to two equivalent RT bronchodilator orders with one order with BID Frequency and one order with Frequency of every 4 hours PRN wheezing or increased work of breathing using Per Protocol order mode.        7-10 - enter or revise RT Bronchodilator order(s) to two equivalent RT bronchodilator orders with one order with TID Frequency and one order with Frequency of every 4 hours PRN wheezing or increased work of breathing using Per Protocol order mode.       11-13 - enter or revise RT Bronchodilator order(s) to one equivalent RT bronchodilator order with QID Frequency and an Albuterol order with Frequency of every 4 hours PRN wheezing or increased work of breathing using Per Protocol order mode.      Greater than 13 - enter or revise RT Bronchodilator order(s) to one equivalent RT bronchodilator order with every 4 hours Frequency and an Albuterol order with Frequency of every 2 hours PRN wheezing or increased work of breathing using Per Protocol order mode.     RT to enter RT Home Evaluation for COPD & MDI Assessment order using Per Protocol order mode.    Electronically signed by Juli Esrtada RCP on 3/4/2024 at 8:36 AM

## 2024-03-04 NOTE — CARE COORDINATION
Call placed to [facility] and notified of patient having worked with therapy. Will review notes and start precert if accepted.

## 2024-03-05 VITALS
DIASTOLIC BLOOD PRESSURE: 76 MMHG | SYSTOLIC BLOOD PRESSURE: 123 MMHG | OXYGEN SATURATION: 97 % | TEMPERATURE: 98.1 F | HEIGHT: 66 IN | WEIGHT: 235 LBS | RESPIRATION RATE: 16 BRPM | BODY MASS INDEX: 37.77 KG/M2 | HEART RATE: 74 BPM

## 2024-03-05 LAB
ANA SER QL IA: NEGATIVE
DSDNA IGG SER QL IA: 1 IU/ML
FLOW CYTOMETRY BL: NORMAL
NUCLEAR IGG SER IA-RTO: 0.1 U/ML

## 2024-03-05 PROCEDURE — 6370000000 HC RX 637 (ALT 250 FOR IP): Performed by: NURSE PRACTITIONER

## 2024-03-05 PROCEDURE — 99232 SBSQ HOSP IP/OBS MODERATE 35: CPT | Performed by: INTERNAL MEDICINE

## 2024-03-05 PROCEDURE — 6370000000 HC RX 637 (ALT 250 FOR IP): Performed by: HOSPITALIST

## 2024-03-05 PROCEDURE — 6370000000 HC RX 637 (ALT 250 FOR IP): Performed by: INTERNAL MEDICINE

## 2024-03-05 PROCEDURE — 94640 AIRWAY INHALATION TREATMENT: CPT

## 2024-03-05 PROCEDURE — 94760 N-INVAS EAR/PLS OXIMETRY 1: CPT

## 2024-03-05 PROCEDURE — 2700000000 HC OXYGEN THERAPY PER DAY

## 2024-03-05 PROCEDURE — 6360000002 HC RX W HCPCS: Performed by: HOSPITALIST

## 2024-03-05 PROCEDURE — 2580000003 HC RX 258: Performed by: HOSPITALIST

## 2024-03-05 PROCEDURE — 6370000000 HC RX 637 (ALT 250 FOR IP)

## 2024-03-05 PROCEDURE — 2580000003 HC RX 258

## 2024-03-05 RX ADMIN — POLYETHYLENE GLYCOL 3350 17 G: 17 POWDER, FOR SOLUTION ORAL at 09:21

## 2024-03-05 RX ADMIN — CINACALCET HYDROCHLORIDE 90 MG: 30 TABLET, FILM COATED ORAL at 09:22

## 2024-03-05 RX ADMIN — MIDODRINE HYDROCHLORIDE 5 MG: 5 TABLET ORAL at 13:02

## 2024-03-05 RX ADMIN — FOLIC ACID 2 MG: 1 TABLET ORAL at 09:22

## 2024-03-05 RX ADMIN — BUMETANIDE 1 MG: 1 TABLET ORAL at 09:22

## 2024-03-05 RX ADMIN — SODIUM CHLORIDE, PRESERVATIVE FREE 10 ML: 5 INJECTION INTRAVENOUS at 09:21

## 2024-03-05 RX ADMIN — PREGABALIN 50 MG: 25 CAPSULE ORAL at 09:22

## 2024-03-05 RX ADMIN — IPRATROPIUM BROMIDE AND ALBUTEROL SULFATE 1 DOSE: 2.5; .5 SOLUTION RESPIRATORY (INHALATION) at 08:42

## 2024-03-05 RX ADMIN — PANTOPRAZOLE SODIUM 40 MG: 40 TABLET, DELAYED RELEASE ORAL at 04:14

## 2024-03-05 RX ADMIN — SODIUM CHLORIDE, PRESERVATIVE FREE 10 ML: 5 INJECTION INTRAVENOUS at 09:23

## 2024-03-05 RX ADMIN — POTASSIUM CHLORIDE 10 MEQ: 750 CAPSULE, EXTENDED RELEASE ORAL at 09:22

## 2024-03-05 RX ADMIN — Medication 400 MG: at 09:22

## 2024-03-05 RX ADMIN — BUDESONIDE AND FORMOTEROL FUMARATE DIHYDRATE 2 PUFF: 160; 4.5 AEROSOL RESPIRATORY (INHALATION) at 08:43

## 2024-03-05 RX ADMIN — PRAVASTATIN SODIUM 40 MG: 20 TABLET ORAL at 09:22

## 2024-03-05 RX ADMIN — PIPERACILLIN AND TAZOBACTAM 3375 MG: 3; .375 INJECTION, POWDER, FOR SOLUTION INTRAVENOUS at 13:00

## 2024-03-05 RX ADMIN — PIPERACILLIN AND TAZOBACTAM 3375 MG: 3; .375 INJECTION, POWDER, FOR SOLUTION INTRAVENOUS at 04:16

## 2024-03-05 RX ADMIN — DOCUSATE SODIUM 100 MG: 100 CAPSULE, LIQUID FILLED ORAL at 09:22

## 2024-03-05 RX ADMIN — CARVEDILOL 6.25 MG: 3.12 TABLET, FILM COATED ORAL at 09:22

## 2024-03-05 ASSESSMENT — PAIN SCALES - GENERAL
PAINLEVEL_OUTOF10: 0

## 2024-03-05 NOTE — CARE COORDINATION
Precert approved, patient can discharge to [facility] today at 1:00pm per Brie. Transport scheduled 1400 via [facility]. HENS/AURE completed and placed in transfer packet

## 2024-03-05 NOTE — PLAN OF CARE
BRONCHOSPASM/BRONCHOCONSTRICTION     [x]         IMPROVE AERATION/BREATH SOUNDS  [x]   ADMINISTER BRONCHODILATOR THERAPY AS APPROPRIATE  [x]   ASSESS BREATH SOUNDS  [x]   IMPLEMENT AEROSOL/MDI PROTOCOL  [x]   PATIENT EDUCATION AS NEEDED  Inhaler / Aerosol Education        [x] Served spacer    [] Provided and reviewed booklet   [x] Good return demonstration per patient   [x] Aerosolized Medications:     Verbal education has been provided in the use, benefits and possible adverse reactions of aerosolized medications used in the treatment of this patient.      Problem: Respiratory - Adult  Goal: Achieves optimal ventilation and oxygenation  3/4/2024 2059 by Noris Hemphill, JOSE MIGUEL  Outcome: Progressing

## 2024-03-05 NOTE — PROGRESS NOTES
Today's Date: 3/5/2024  Patient Name: Rosi Haley  Date of admission: 2/28/2024  8:55 AM  Patient's age: 78 y.o., 1945  Admission Dx: Upper gastrointestinal bleeding [K92.2]  GI bleed [K92.2]  Acute cystitis without hematuria [N30.00]    Reason for Consult: management recommendations  Requesting Physician: Raissa Moran MD    CHIEF COMPLAINT: Anemia.  Neutropenia.    History Obtained From:  patient, electronic medical record          Interval history:    Patient was seen and examined.  She is clinically stable.   No active bleeding.  No new events.  Labs were reviewed.  Currently stable.            HISTORY OF PRESENT ILLNESS:      The patient is a 78 y.o.  female who is admitted to the hospital chief complaints of GI bleed.  Patient brought in from assisted living secondary to fever.  Diagnosed with acute cholecystitis without hematuria.  Was also noted to be significantly anemic hemoglobin 6.2.  Fecal occult blood test positive.  Additionally patient is also leukopenic.  Review of chart shows patient has longstanding history of anemia with hemoglobin around being 8.  Patient does complain of being tired.  GI team consulted.  Hematology oncology team consulted due to chronic anemia.  Patient does take Eliquis.  Other medical problems include CKD.    Patient recent EGD showed gastric antrum small erosions no bleeding.  Colonoscopy from 12/6 showed normal ileum and some large hemorrhoids.  Patient even has had a push enteroscopy done on 1/8/2024 which was unremarkable.    Patient hemoglobin is 6.2.  MCV 89.  Hemoglobin noted to be 12.4 back in November 2023 and has never really recovered.  Patient WBC count is 2.4 which is a new finding.  Patient's ANC is 0.1.  No peripheral blasts reported.  Monocyte count 1.3.  CT abdomen pelvis does not show any any abnormalities    Past Medical History:   has a past medical history of Atrial fibrillation (HCC), CAD (coronary artery disease),  Chloride 103 98 - 107 mmol/L    CO2 25 20 - 31 mmol/L    Anion Gap 10 9 - 17 mmol/L    Glucose 169 (H) 70 - 99 mg/dL    BUN 22 8 - 23 mg/dL    Creatinine 1.3 (H) 0.5 - 0.9 mg/dL    Est, Glom Filt Rate 43 (L) >60 mL/min/1.73m2    Calcium 7.5 (L) 8.6 - 10.4 mg/dL   EKG 12 Lead   Result Value Ref Range    Ventricular Rate 108 BPM    Atrial Rate 115 BPM    QRS Duration 138 ms    Q-T Interval 346 ms    QTc Calculation (Bazett) 463 ms    R Axis 80 degrees    T Axis -24 degrees   TYPE AND SCREEN   Result Value Ref Range    Blood Bank Sample Expiration 03/02/2024,2359     Arm Band Number [id number]     ABO/Rh O NEGATIVE     Antibody Screen NEGATIVE     Unit Number [id number]     Component Leukocyte Reduced Red Cell     Unit Divison 00     Dispense Status Blood Bank TRANSFUSED     Unit Issue Date/Time [id number]     Product Code Blood Bank [id number]     Blood Bank Unit Type and Rh O NEG     Blood Bank ISBT Product Blood Type 9500     Blood Bank Blood Product Expiration Date 148447852829     Transfusion Status OK TO TRANSFUSE     Crossmatch Result COMPATIBLE     Unit Number [id number]     Component Leukocyte Reduced Irradiated Red Cell     Unit Divison 00     Dispense Status Blood Bank TRANSFUSED     Unit Issue Date/Time [id number]     Product Code Blood Bank [id number]     Blood Bank Unit Type and Rh O NEG     Blood Bank ISBT Product Blood Type 9500     Blood Bank Blood Product Expiration Date [id number]     Transfusion Status OK TO TRANSFUSE     Crossmatch Result COMPATIBLE    Echo (TTE) limited (PRN contrast/bubble/strain/3D)   Result Value Ref Range    Body Surface Area 2.23 m2    IVSd 0.9 0.6 - 0.9 cm    LVIDd 5.3 3.9 - 5.3 cm    LVIDs 3.1 cm    LVPWd 0.9 0.6 - 0.9 cm    Fractional Shortening 2D 42 28 - 44 %    LVIDd Index 2.48 cm/m2    LVIDs Index 1.45 cm/m2    LV RWT Ratio 0.34     LV Mass 2D 174.5 (A) 67 - 162 g    LV Mass 2D Index 81.5 43 - 95 g/m2         IMAGING DATA:    XR CHEST (SINGLE VIEW

## 2024-03-05 NOTE — PLAN OF CARE
Bacterial Vaginosis: Care Instructions  Your Care Instructions    Bacterial vaginosis is a type of vaginal infection. It is caused by excess growth of certain bacteria that are normally found in the vagina. Symptoms can include itching, swelling, pain when you urinate or have sex, and a gray or yellow discharge with a \"fishy\" odor. It is not considered an infection that is spread through sexual contact. Although symptoms can be annoying and uncomfortable, bacterial vaginosis does not usually cause other health problems. However, if you have it while you are pregnant, it can cause complications. While the infection may go away on its own, most doctors use antibiotics to treat it. You may have been prescribed pills or vaginal cream. With treatment, bacterial vaginosis usually clears up in 5 to 7 days. Follow-up care is a key part of your treatment and safety. Be sure to make and go to all appointments, and call your doctor if you are having problems. It's also a good idea to know your test results and keep a list of the medicines you take. How can you care for yourself at home? · Take your antibiotics as directed. Do not stop taking them just because you feel better. You need to take the full course of antibiotics. · Do not eat or drink anything that contains alcohol if you are taking metronidazole (Flagyl). · Keep using your medicine if you start your period. Use pads instead of tampons while using a vaginal cream or suppository. Tampons can absorb the medicine. · Wear loose cotton clothing. Do not wear nylon and other materials that hold body heat and moisture close to the skin. · Do not scratch. Relieve itching with a cold pack or a cool bath. · Do not wash your vaginal area more than once a day. Use plain water or a mild, unscented soap. Do not douche. When should you call for help?   Watch closely for changes in your health, and be sure to contact your doctor if:  ? · You have unexpected vaginal bleeding. ? · You have a fever. ? · You have new or increased pain in your vagina or pelvis. ? · You are not getting better after 1 week. ? · Your symptoms return after you finish the course of your medicine. Where can you learn more? Go to http://heather-sandra.info/. Jessie Perez in the search box to learn more about \"Bacterial Vaginosis: Care Instructions. \"  Current as of: October 13, 2016  Content Version: 11.4  © 6259-4955 [employer]. Care instructions adapted under license by [employer] (which disclaims liability or warranty for this information). If you have questions about a medical condition or this instruction, always ask your healthcare professional. [address] any warranty or liability for your use of this information. Elevated Blood Pressure: Care Instructions  Your Care Instructions    Blood pressure is a measure of how hard the blood pushes against the walls of your arteries. It's normal for blood pressure to go up and down throughout the day. But if it stays up over time, you have high blood pressure. Two numbers tell you your blood pressure. The first number is the systolic pressure. It shows how hard the blood pushes when your heart is pumping. The second number is the diastolic pressure. It shows how hard the blood pushes between heartbeats, when your heart is relaxed and filling with blood. An ideal blood pressure in adults is less than 120/80 (say \"120 over 80\"). High blood pressure is 140/90 or higher. You have high blood pressure if your top number is 140 or higher or your bottom number is 90 or higher, or both. The main test for high blood pressure is simple, fast, and painless. To diagnose high blood pressure, your doctor will test your blood pressure at different times. After testing your blood pressure, your doctor may ask you to test it again when you are home.   If you are diagnosed with high blood or arterial blood gases   Respiratory therapy support as indicated  Taken 3/4/2024 1300  Achieves optimal ventilation and oxygenation:   Assess for changes in mentation and behavior   Assess for changes in respiratory status   Position to facilitate oxygenation and minimize respiratory effort   Oxygen supplementation based on oxygen saturation or arterial blood gases   Respiratory therapy support as indicated  Taken 3/4/2024 0900  Achieves optimal ventilation and oxygenation:   Assess for changes in respiratory status   Assess for changes in mentation and behavior   Position to facilitate oxygenation and minimize respiratory effort   Oxygen supplementation based on oxygen saturation or arterial blood gases   Respiratory therapy support as indicated  3/4/2024 0837 by Juli Estrada RCP  Outcome: Progressing     Problem: Hematologic - Adult  Goal: Maintains hematologic stability  3/4/2024 1817 by Qamar Vo, RN  Outcome: Progressing  Flowsheets  Taken 3/4/2024 1600  Maintains hematologic stability: Assess for signs and symptoms of bleeding or hemorrhage  Taken 3/4/2024 1300  Maintains hematologic stability: Assess for signs and symptoms of bleeding or hemorrhage  Taken 3/4/2024 0900  Maintains hematologic stability: Assess for signs and symptoms of bleeding or hemorrhage     Problem: Chronic Conditions and Co-morbidities  Goal: Patient's chronic conditions and co-morbidity symptoms are monitored and maintained or improved  3/4/2024 1817 by Qamar Vo, RN  Outcome: Progressing  Flowsheets  Taken 3/4/2024 1600  Care Plan - Patient's Chronic Conditions and Co-Morbidity Symptoms are Monitored and Maintained or Improved: Monitor and assess patient's chronic conditions and comorbid symptoms for stability, deterioration, or improvement  Taken 3/4/2024 1300  Care Plan - Patient's Chronic Conditions and Co-Morbidity Symptoms are Monitored and Maintained or Improved: Monitor and assess patient's chronic  pressure, you can work with your doctor to make a long-term plan to manage it. Follow-up care is a key part of your treatment and safety. Be sure to make and go to all appointments, and call your doctor if you are having problems. It's also a good idea to know your test results and keep a list of the medicines you take. How can you care for yourself at home? · Do not smoke. Smoking increases your risk for heart attack and stroke. If you need help quitting, talk to your doctor about stop-smoking programs and medicines. These can increase your chances of quitting for good. · Stay at a healthy weight. · Try to limit how much sodium you eat to less than 2,300 milligrams (mg) a day. Your doctor may ask you to try to eat less than 1,500 mg a day. · Be physically active. Get at least 30 minutes of exercise on most days of the week. Walking is a good choice. You also may want to do other activities, such as running, swimming, cycling, or playing tennis or team sports. · Avoid or limit alcohol. Talk to your doctor about whether you can drink any alcohol. · Eat plenty of fruits, vegetables, and low-fat dairy products. Eat less saturated and total fats. · Learn how to check your blood pressure at home. When should you call for help? Call your doctor now or seek immediate medical care if:  ? · Your blood pressure is much higher than normal (such as 180/110 or higher). ? · You think high blood pressure is causing symptoms such as:  ¨ Severe headache. ¨ Blurry vision. ? Watch closely for changes in your health, and be sure to contact your doctor if:  ? · You do not get better as expected. Where can you learn more? Go to http://heather-sandra.info/. Enter P268 in the search box to learn more about \"Elevated Blood Pressure: Care Instructions. \"  Current as of: September 21, 2016  Content Version: 11.4  © 1328-7702 [employer].  Care instructions adapted under license by [employer] (which disclaims liability or warranty for this information). If you have questions about a medical condition or this instruction, always ask your healthcare professional. [address] any warranty or liability for your use of this information. conditions and comorbid symptoms for stability, deterioration, or improvement  Taken 3/4/2024 0900  Care Plan - Patient's Chronic Conditions and Co-Morbidity Symptoms are Monitored and Maintained or Improved: Monitor and assess patient's chronic conditions and comorbid symptoms for stability, deterioration, or improvement

## 2024-03-05 NOTE — RT PROTOCOL NOTE
RT Nebulizer Bronchodilator Protocol Note    There is a bronchodilator order in the chart from a provider indicating to follow the RT Bronchodilator Protocol and there is an “Initiate RT Bronchodilator Protocol” order as well (see protocol at bottom of note).    CXR Findings:  No results found.    The findings from the last RT Protocol Assessment were as follows:  Smoking: Chronic pulmonary disease  Respiratory Pattern: Dyspnea on exertion or RR 21-25 bpm  Breath Sounds: Slightly diminished and/or crackles  Cough: Strong, spontaneous, non-productive  Indication for Bronchodilator Therapy: On home bronchodilators  Bronchodilator Assessment Score: 6    Aerosolized bronchodilator medication orders have been revised according to the RT Nebulizer Bronchodilator Protocol below.    Respiratory Therapist to perform RT Therapy Protocol Assessment initially then follow the protocol.  Repeat RT Therapy Protocol Assessment PRN for score 0-3 or on second treatment, BID, and PRN for scores above 3.    No Indications - adjust the frequency to every 6 hours PRN wheezing or bronchospasm, if no treatments needed after 48 hours then discontinue using Per Protocol order mode.     If indication present, adjust the RT bronchodilator orders based on the Bronchodilator Assessment Score as indicated below.  If a patient is on this medication at home then do not decrease Frequency below that used at home.    0-3 - enter or revise RT bronchodilator order(s) to equivalent RT Bronchodilator order with Frequency of every 4 hours PRN for wheezing or increased work of breathing using Per Protocol order mode.       4-6 - enter or revise RT Bronchodilator order(s) to two equivalent RT bronchodilator orders with one order with BID Frequency and one order with Frequency of every 4 hours PRN wheezing or increased work of breathing using Per Protocol order mode.         7-10 - enter or revise RT Bronchodilator order(s) to two equivalent RT  bronchodilator orders with one order with TID Frequency and one order with Frequency of every 4 hours PRN wheezing or increased work of breathing using Per Protocol order mode.       11-13 - enter or revise RT Bronchodilator order(s) to one equivalent RT bronchodilator order with QID Frequency and an Albuterol order with Frequency of every 4 hours PRN wheezing or increased work of breathing using Per Protocol order mode.      Greater than 13 - enter or revise RT Bronchodilator order(s) to one equivalent RT bronchodilator order with every 4 hours Frequency and an Albuterol order with Frequency of every 2 hours PRN wheezing or increased work of breathing using Per Protocol order mode.     RT to enter RT Home Evaluation for COPD & MDI Assessment order using Per Protocol order mode.    Electronically signed by Noris Hemphill RCP on 3/4/2024 at 9:00 PM

## 2024-03-05 NOTE — DISCHARGE INSTR - DIET

## 2024-03-06 ENCOUNTER — TELEPHONE (OUTPATIENT)
Dept: INFECTIOUS DISEASES | Age: 79
End: 2024-03-06

## 2024-03-06 NOTE — TELEPHONE ENCOUNTER
Leyla Hicks, APRN - CNP  You25 minutes ago (3:11 PM)       Not sure. We only did a note on the weekend when covering for Dr. Cortez. If his NP does the consult does it go to him?

## 2024-03-06 NOTE — TELEPHONE ENCOUNTER
Leyla, can you reivew the chart. There is question as to who should follow up with the patient. D/C summary says Diego but only Jack and Dr Purcell saw the patient. Let me know, thanks.

## 2024-03-06 NOTE — TELEPHONE ENCOUNTER
Dr Cortez, Dr Purcell didn't physically see the patient, just reviewed the plan and progress.     Does the patient follow with you since Jack saw her?

## 2024-03-06 NOTE — PROGRESS NOTES
Pt discharged by ambulance to [facility]. All belongings sent with patient.  Picc line continued at discharge. Report called to Noy at [facility], no further questions or concerns.

## 2024-03-07 LAB
MICROORGANISM SPEC CULT: NORMAL
MICROORGANISM SPEC CULT: NORMAL
SERVICE CMNT-IMP: NORMAL
SERVICE CMNT-IMP: NORMAL
SPECIMEN DESCRIPTION: NORMAL
SPECIMEN DESCRIPTION: NORMAL

## 2024-03-11 ENCOUNTER — CARE COORDINATION (OUTPATIENT)
Dept: CARE COORDINATION | Age: 79
End: 2024-03-11

## 2024-03-11 NOTE — CARE COORDINATION
Per chart review she was admitted again 2/28-3/5 for GI bleed, multi-organism infection. Discharged to [facility] instead of going back to [facility], will be getting IV Zocyn until 3/15. She is to follow up with GI (for outpatient video capsule endoscopy), hematology and ID and has appts scheduled for all. Will follow up in a few weeks to check on discharge plans.    Future Appointments   Date Time Provider Department Center   3/22/2024  9:30 AM Celio Marshall MD [facility] CANCER TOLP   4/1/2024  2:30 PM Washington Monroe MD [facility]   4/2/2024  3:15 PM Rae Cortez MD  [facility]

## 2024-03-11 NOTE — PROGRESS NOTES
valid  -- Disagree - Clinically unable to determine / Unknown  -- Refer to Clinical Documentation Reviewer    PROVIDER RESPONSE TEXT:    This patient has sepsis which was present on admission.    Query created by: Uma Awan on 3/8/2024 9:50 AM      Electronically signed by:  Vineet COHEN DO 3/11/2024 11:51 AM

## 2024-03-12 ENCOUNTER — TELEPHONE (OUTPATIENT)
Dept: INFUSION THERAPY | Age: 79
End: 2024-03-12

## 2024-03-12 NOTE — TELEPHONE ENCOUNTER
Dolores RN from [facility] called stating pt's hgb was 7.1. She states the CNP there is aware, and would like to know if pt could be seen sooner. Spoke with scheduling; Fri 3/15 at 10 is available. Dolores states this will work, and pt scheduled.

## 2024-03-15 ENCOUNTER — OFFICE VISIT (OUTPATIENT)
Dept: ONCOLOGY | Age: 79
End: 2024-03-15
Payer: MEDICARE

## 2024-03-15 ENCOUNTER — TELEPHONE (OUTPATIENT)
Dept: ONCOLOGY | Age: 79
End: 2024-03-15

## 2024-03-15 VITALS
OXYGEN SATURATION: 98 % | SYSTOLIC BLOOD PRESSURE: 138 MMHG | DIASTOLIC BLOOD PRESSURE: 73 MMHG | TEMPERATURE: 96.9 F | WEIGHT: 256 LBS | HEART RATE: 67 BPM | BODY MASS INDEX: 41.32 KG/M2

## 2024-03-15 DIAGNOSIS — D64.9 ANEMIA, UNSPECIFIED TYPE: ICD-10-CM

## 2024-03-15 DIAGNOSIS — E66.01 OBESITY, CLASS III, BMI 40-49.9 (MORBID OBESITY) (HCC): ICD-10-CM

## 2024-03-15 DIAGNOSIS — D69.6 THROMBOCYTOPENIA (HCC): Primary | ICD-10-CM

## 2024-03-15 PROCEDURE — 99211 OFF/OP EST MAY X REQ PHY/QHP: CPT | Performed by: INTERNAL MEDICINE

## 2024-03-15 PROCEDURE — 99214 OFFICE O/P EST MOD 30 MIN: CPT | Performed by: INTERNAL MEDICINE

## 2024-03-15 PROCEDURE — 3078F DIAST BP <80 MM HG: CPT | Performed by: INTERNAL MEDICINE

## 2024-03-15 PROCEDURE — 1123F ACP DISCUSS/DSCN MKR DOCD: CPT | Performed by: INTERNAL MEDICINE

## 2024-03-15 PROCEDURE — 3075F SYST BP GE 130 - 139MM HG: CPT | Performed by: INTERNAL MEDICINE

## 2024-03-15 NOTE — TELEPHONE ENCOUNTER
Bone marrow with with ngs testing per IR   Rv after biopsy     Bone Marrow Biopsy scheduled for [facility] on 3/25/24 @ 10:00am    Rv scheduled for 4/10/24 @ 1:00pm    PT was given AVS and appt schedule    Electronically signed by Jennifer Roland on 3/15/2024 at 11:33 AM

## 2024-03-15 NOTE — PROGRESS NOTES
Today's Date: 3/15/2024  Patient Name: Rosi Haley  Date of admission: No admission date for patient encounter.  Patient's age: 78 y.o., 1945  Admission Dx: No admission diagnoses are documented for this encounter.    Reason for Consult: management recommendations  Requesting Physician: No admitting provider for patient encounter.    CHIEF COMPLAINT:  Chief Complaint   Patient presents with    Follow-Up from Hospital     Patient was in [facility] with gi issues and low hbg   Posthospital discharge follow-up visit.      History Obtained From:  patient, electronic medical record      Interval history:    Patient presents to the clinic for a posthospital discharge follow-up visit.  Most recent CBC shows hemoglobin of 7.7.  WBC count has however improved.  Patient continues to be on folic acid supplementation.  Complains of leg swelling.  Patient is currently residing at a nursing facility.  She has not followed up with GI yet..  Patient is on a blood thinner.    During this visit patient's allergy, social, medical, surgical history and medications were reviewed and updated.    HISTORY OF PRESENT ILLNESS:      The patient is a 78 y.o.  female who is admitted to the hospital chief complaints of GI bleed.  Patient brought in from assisted living secondary to fever.  Diagnosed with acute cholecystitis without hematuria.  Was also noted to be significantly anemic hemoglobin 6.2.  Fecal occult blood test positive.  Additionally patient is also leukopenic.  Review of chart shows patient has longstanding history of anemia with hemoglobin around being 8.  Patient does complain of being tired.  GI team consulted.  Hematology oncology team consulted due to chronic anemia.  Patient does take Eliquis.  Other medical problems include CKD.    Patient recent EGD showed gastric antrum small erosions no bleeding.  Colonoscopy from 12/6 showed normal ileum and some large hemorrhoids.  Patient even has had

## 2024-03-25 ENCOUNTER — CARE COORDINATION (OUTPATIENT)
Dept: CARE COORDINATION | Age: 79
End: 2024-03-25

## 2024-03-25 ENCOUNTER — TELEPHONE (OUTPATIENT)
Dept: GASTROENTEROLOGY | Age: 79
End: 2024-03-25

## 2024-03-25 NOTE — CARE COORDINATION
Spoke with patient. She is still at [facility] for rehab. No planned discharge yet, stated \"things aren't going good\" but didn't give any explanation. Stated getting therapy but not getting much stronger.    Will call again in a few weeks to check on discharge plans.    Future Appointments   Date Time Provider Department Center   3/26/2024  1:00 PM [facility] CT ROOM 2 [facility] CT SCAN [facility] Radiolog   4/2/2024  3:15 PM Rae Cortez MD [facility]   4/10/2024  1:00 PM Celio Marshall MD [facility] CANCER TOP

## 2024-03-25 NOTE — TELEPHONE ENCOUNTER
Writer called pt and M canc appt on 4/1/24 as the pt was krish with the wrong provider, and needs to be krish with Jes. Writer asked pt to call the office to caio her appt.

## 2024-03-26 ENCOUNTER — HOSPITAL ENCOUNTER (OUTPATIENT)
Dept: CT IMAGING | Age: 79
Discharge: HOME | End: 2024-03-28
Payer: MEDICARE

## 2024-03-26 VITALS
HEIGHT: 66 IN | SYSTOLIC BLOOD PRESSURE: 129 MMHG | TEMPERATURE: 97.9 F | OXYGEN SATURATION: 100 % | BODY MASS INDEX: 41.14 KG/M2 | DIASTOLIC BLOOD PRESSURE: 57 MMHG | HEART RATE: 86 BPM | WEIGHT: 256 LBS | RESPIRATION RATE: 15 BRPM

## 2024-03-26 LAB
INR PPP: 1.5
PARTIAL THROMBOPLASTIN TIME: 34.1 SEC (ref 23–36.5)
PLATELET # BLD AUTO: 191 K/UL (ref 138–453)
PROTHROMBIN TIME: 17.6 SEC (ref 11.7–14.9)

## 2024-03-26 PROCEDURE — 88313 SPECIAL STAINS GROUP 2: CPT

## 2024-03-26 PROCEDURE — 88365 INSITU HYBRIDIZATION (FISH): CPT

## 2024-03-26 PROCEDURE — 88360 TUMOR IMMUNOHISTOCHEM/MANUAL: CPT

## 2024-03-26 PROCEDURE — 6360000002 HC RX W HCPCS: Performed by: RADIOLOGY

## 2024-03-26 PROCEDURE — 88364 INSITU HYBRIDIZATION (FISH): CPT

## 2024-03-26 PROCEDURE — 88237 TISSUE CULTURE BONE MARROW: CPT

## 2024-03-26 PROCEDURE — 77012 CT SCAN FOR NEEDLE BIOPSY: CPT

## 2024-03-26 PROCEDURE — 88280 CHROMOSOME KARYOTYPE STUDY: CPT

## 2024-03-26 PROCEDURE — 85730 THROMBOPLASTIN TIME PARTIAL: CPT

## 2024-03-26 PROCEDURE — 85610 PROTHROMBIN TIME: CPT

## 2024-03-26 PROCEDURE — 85049 AUTOMATED PLATELET COUNT: CPT

## 2024-03-26 PROCEDURE — 88311 DECALCIFY TISSUE: CPT

## 2024-03-26 PROCEDURE — 88305 TISSUE EXAM BY PATHOLOGIST: CPT

## 2024-03-26 PROCEDURE — 7100000040 HC SPAR PHASE II RECOVERY - FIRST 15 MIN: Performed by: RADIOLOGY

## 2024-03-26 PROCEDURE — 7100000041 HC SPAR PHASE II RECOVERY - ADDTL 15 MIN: Performed by: RADIOLOGY

## 2024-03-26 PROCEDURE — 88264 CHROMOSOME ANALYSIS 20-25: CPT

## 2024-03-26 PROCEDURE — 2580000003 HC RX 258: Performed by: PHYSICIAN ASSISTANT

## 2024-03-26 RX ORDER — SODIUM CHLORIDE 9 MG/ML
INJECTION, SOLUTION INTRAVENOUS CONTINUOUS
Status: DISCONTINUED | OUTPATIENT
Start: 2024-03-26 | End: 2024-03-29 | Stop reason: HOSPADM

## 2024-03-26 RX ORDER — FENTANYL CITRATE 50 UG/ML
INJECTION, SOLUTION INTRAMUSCULAR; INTRAVENOUS PRN
Status: DISCONTINUED | OUTPATIENT
Start: 2024-03-26 | End: 2024-03-29 | Stop reason: HOSPADM

## 2024-03-26 RX ORDER — ACETAMINOPHEN 325 MG/1
650 TABLET ORAL EVERY 4 HOURS PRN
Status: DISCONTINUED | OUTPATIENT
Start: 2024-03-26 | End: 2024-03-29 | Stop reason: HOSPADM

## 2024-03-26 RX ADMIN — SODIUM CHLORIDE: 9 INJECTION, SOLUTION INTRAVENOUS at 11:30

## 2024-03-26 RX ADMIN — FENTANYL CITRATE 50 MCG: 50 INJECTION, SOLUTION INTRAMUSCULAR; INTRAVENOUS at 13:39

## 2024-03-26 ASSESSMENT — PAIN SCALES - GENERAL: PAINLEVEL_OUTOF10: 0

## 2024-03-26 ASSESSMENT — PAIN - FUNCTIONAL ASSESSMENT: PAIN_FUNCTIONAL_ASSESSMENT: NONE - DENIES PAIN

## 2024-03-26 NOTE — DISCHARGE INSTRUCTIONS
No alcoholic beverages, no driving or operating machinery, no making important decisions for 24 hours.   You may have a normal diet but should eat lightly day of surgery.  Drink plenty of fluids.  Urinate within 8 hours after surgery, if unable to urinate call your doctor      Call your doctor for the following:   Chills   Temperature greater than 101   Pain that is not tolerable despite taking pain medicine as ordered   There is increased swelling, redness or warmth at surgical site   There is increased drainage or bleeding from surgical site   Do not remove surgical dressing unless instructed to do so by your surgeon           Call your doctor for the following:   Chills   Temperature greater than 101   Pain that is not tolerable despite taking pain medicine as ordered   There is increased swelling, redness or warmth at surgical site   There is increased drainage or bleeding from surgical site   Do not remove surgical dressing unless instructed to do so by your surgeon

## 2024-03-26 NOTE — H&P
MUSCULOSKELETAL:   negative for neck or back pain     NEUROLOGICAL:   weakness  negative for headaches and dizziness     PSYCHIATRIC:   negative for anxiety       OBJECTIVE:   VITALS:  height is 1.676 m (5' 6\") and weight is 116.1 kg (256 lb). Her temporal temperature is 96.8 °F (36 °C). Her blood pressure is 140/80 (abnormal) and her pulse is 66. Her respiration is 20 and oxygen saturation is 100%.   CONSTITUTIONAL:alert & oriented x 3, no acute distress. Calm and pleasant.    SKIN:  Warm and dry, no rashes to exposed areas of skin.   HEAD:  Normocephalic, atraumatic.   EYES: PERRL.  EOMs intact. Wearing glasses.   EARS:  Intact and equal bilaterally.  No edema or thickening; hearing loss.  NOSE:  Nares patent.  No rhinorrhea.   MOUTH/THROAT:  Mucous membranes pink and moist, edentulous.  NECK: Supple, no lymphadenopathy.  LUNGS: Respirations even and non-labored. Clear to auscultation bilaterally, no wheezes, rales, or rhonchi.    CARDIOVASCULAR: Irregular hate (history of atrial fib), no murmur.  ABDOMEN: soft, non-tender, rotund, bowel sounds active x 4.   EXTREMITIES: 3-4+ pitting edema to bilateral lower extremities.   NEUROLOGIC: CN II-XII are grossly intact. Gait not assessed.  IMPRESSIONS:   Neutropenia.  PLANS:   Bone marrow biopsy.     Notified IR staff regarding patient with 3-4+ pitting edema to bilateral lower extremities. Patient reports this is chronic. She does have a history of CHF, does take diuretic daily, according to medication list from F. most recent echo 2/2024 with EF = 55%, most recent cardiology visit 11/2023. Marifer from IR states she will relay this information to Dr. oGre.     ADITYA Eller - CNP   Electronically signed 3/26/2024 at 12:24 PM

## 2024-03-26 NOTE — BRIEF OP NOTE
Brief Postoperative Note for Bone Marrow Biopsy    Rosi Haley  YOB: 1945  [id number]    Pre-operative Diagnosis: Anemia     Post-operative Diagnosis: Same     Procedure: Left posterior iliac bone marrow aspiration with and without heparin & biopsy     Anesthesia: 1% lidocaine and fentanyl     Surgeons/Assistants: MD Sofía; HERMAN Pena     Estimated Blood Loss: less than 50      Complications: None     Specimens Were Obtained: from the Left posterior iliac spine using an Arrow Oncontrol Device 11 g x 6 in. 7 cc was collected with heparin and 5 cc was collected without heparin. A bone biopsy was also obtained. Specimens were received by hem/onc at the time of collection. Vital signs were reviewed and were stable after the procedure.     Electronically signed by HERMAN Pena on 3/26/2024 at 1:51 PM

## 2024-03-26 NOTE — PROGRESS NOTES
Second attempt to call the nurse at [place] unsuccessful. Nurse never came to the phone waited on hold for 10 minutes.

## 2024-03-26 NOTE — OR NURSING
Hematology present. Aspiration and bone marrow biopsy obtained and given to hemotology.. needle out. Post scan done. Dressing on left iliac crest area. Tolerated well.

## 2024-03-28 LAB
MISCELLANEOUS LAB TEST RESULT: NORMAL
TEST NAME: NORMAL

## 2024-03-29 LAB — BONE MARROW REPORT: NORMAL

## 2024-04-01 LAB
CHROMOSOME STUDY: NORMAL
FLOW CYTOMETRY, BM: NORMAL

## 2024-04-03 ENCOUNTER — CARE COORDINATION (OUTPATIENT)
Dept: CASE MANAGEMENT | Age: 79
End: 2024-04-03

## 2024-04-05 ENCOUNTER — HOSPITAL ENCOUNTER (OUTPATIENT)
Age: 79
Setting detail: SPECIMEN
Discharge: HOME | End: 2024-04-05
Payer: MEDICARE

## 2024-04-05 PROCEDURE — 87493 C DIFF AMPLIFIED PROBE: CPT

## 2024-04-06 ENCOUNTER — HOSPITAL ENCOUNTER (OUTPATIENT)
Age: 79
Setting detail: SPECIMEN
Discharge: HOME | End: 2024-04-06
Payer: MEDICARE

## 2024-04-06 ENCOUNTER — APPOINTMENT (OUTPATIENT)
Dept: GENERAL RADIOLOGY | Age: 79
DRG: 291 | End: 2024-04-06
Payer: MEDICARE

## 2024-04-06 ENCOUNTER — HOSPITAL ENCOUNTER (INPATIENT)
Age: 79
LOS: 4 days | Discharge: NURSING, REHAB OR LONG-TERM CARE | DRG: 291 | End: 2024-04-10
Attending: EMERGENCY MEDICINE | Admitting: STUDENT IN AN ORGANIZED HEALTH CARE EDUCATION/TRAINING PROGRAM
Payer: MEDICARE

## 2024-04-06 DIAGNOSIS — I50.9 CONGESTIVE HEART FAILURE, UNSPECIFIED HF CHRONICITY, UNSPECIFIED HEART FAILURE TYPE (HCC): Primary | ICD-10-CM

## 2024-04-06 DIAGNOSIS — J18.9 PNEUMONIA DUE TO INFECTIOUS ORGANISM, UNSPECIFIED LATERALITY, UNSPECIFIED PART OF LUNG: ICD-10-CM

## 2024-04-06 LAB
ALBUMIN SERPL-MCNC: 3.3 G/DL (ref 3.5–5.2)
ALBUMIN/GLOB SERPL: 1 {RATIO} (ref 1–2.5)
ALP SERPL-CCNC: 84 U/L (ref 35–104)
ALT SERPL-CCNC: 12 U/L (ref 5–33)
ANION GAP SERPL CALCULATED.3IONS-SCNC: 8 MMOL/L (ref 9–17)
ANION GAP SERPL CALCULATED.3IONS-SCNC: 8 MMOL/L (ref 9–17)
AST SERPL-CCNC: 21 U/L
BASOPHILS # BLD: 0 K/UL (ref 0–0.2)
BASOPHILS NFR BLD: 0 % (ref 0–2)
BILIRUB SERPL-MCNC: 0.3 MG/DL (ref 0.3–1.2)
BNP SERPL-MCNC: ABNORMAL PG/ML
BUN SERPL-MCNC: 22 MG/DL (ref 8–23)
BUN SERPL-MCNC: 22 MG/DL (ref 8–23)
BUN/CREAT SERPL: 17 (ref 9–20)
CALCIUM SERPL-MCNC: 7.7 MG/DL (ref 8.6–10.4)
CALCIUM SERPL-MCNC: 8.1 MG/DL (ref 8.6–10.4)
CHLORIDE SERPL-SCNC: 97 MMOL/L (ref 98–107)
CHLORIDE SERPL-SCNC: 97 MMOL/L (ref 98–107)
CO2 SERPL-SCNC: 36 MMOL/L (ref 20–31)
CO2 SERPL-SCNC: 37 MMOL/L (ref 20–31)
CREAT SERPL-MCNC: 1.3 MG/DL (ref 0.5–0.9)
CREAT SERPL-MCNC: 1.3 MG/DL (ref 0.5–0.9)
EOSINOPHIL # BLD: 0.3 K/UL (ref 0–0.4)
EOSINOPHILS RELATIVE PERCENT: 4 % (ref 1–4)
ERYTHROCYTE [DISTWIDTH] IN BLOOD BY AUTOMATED COUNT: 20.2 % (ref 11.8–14.4)
ERYTHROCYTE [DISTWIDTH] IN BLOOD BY AUTOMATED COUNT: 21.8 % (ref 12.5–15.4)
GFR SERPL CREATININE-BSD FRML MDRD: 43 ML/MIN/1.73M2
GFR SERPL CREATININE-BSD FRML MDRD: 43 ML/MIN/1.73M2
GLUCOSE SERPL-MCNC: 84 MG/DL (ref 70–99)
GLUCOSE SERPL-MCNC: 98 MG/DL (ref 70–99)
HCT VFR BLD AUTO: 24.6 % (ref 36.3–47.1)
HCT VFR BLD AUTO: 24.8 % (ref 36–46)
HGB BLD-MCNC: 6.8 G/DL (ref 11.9–15.1)
HGB BLD-MCNC: 7.9 G/DL (ref 12–16)
LACTATE BLDV-SCNC: 1.6 MMOL/L (ref 0.5–2.2)
LYMPHOCYTES NFR BLD: 1.13 K/UL (ref 1–4.8)
LYMPHOCYTES RELATIVE PERCENT: 15 % (ref 24–44)
MAGNESIUM SERPL-MCNC: 1.5 MG/DL (ref 1.6–2.6)
MCH RBC QN AUTO: 29.4 PG (ref 25.2–33.5)
MCH RBC QN AUTO: 30 PG (ref 26–34)
MCHC RBC AUTO-ENTMCNC: 27.6 G/DL (ref 28.4–34.8)
MCHC RBC AUTO-ENTMCNC: 31.8 G/DL (ref 31–37)
MCV RBC AUTO: 106.5 FL (ref 82.6–102.9)
MCV RBC AUTO: 94.3 FL (ref 80–100)
MONOCYTES NFR BLD: 0.08 K/UL (ref 0.1–0.8)
MONOCYTES NFR BLD: 1 % (ref 1–7)
MORPHOLOGY: ABNORMAL
NEUTROPHILS NFR BLD: 80 % (ref 36–66)
NEUTS SEG NFR BLD: 5.99 K/UL (ref 1.8–7.7)
NRBC BLD-RTO: 0 PER 100 WBC
PLATELET # BLD AUTO: 258 K/UL (ref 138–453)
PLATELET # BLD AUTO: 281 K/UL (ref 140–450)
PMV BLD AUTO: 10.2 FL (ref 8.1–13.5)
PMV BLD AUTO: 7.5 FL (ref 6–12)
POTASSIUM SERPL-SCNC: 3.3 MMOL/L (ref 3.7–5.3)
POTASSIUM SERPL-SCNC: 3.7 MMOL/L (ref 3.7–5.3)
PROT SERPL-MCNC: 6.5 G/DL (ref 6.4–8.3)
RBC # BLD AUTO: 2.31 M/UL (ref 3.95–5.11)
RBC # BLD AUTO: 2.63 M/UL (ref 4–5.2)
SODIUM SERPL-SCNC: 141 MMOL/L (ref 135–144)
SODIUM SERPL-SCNC: 142 MMOL/L (ref 135–144)
TROPONIN I SERPL HS-MCNC: 43 NG/L (ref 0–14)
TROPONIN I SERPL HS-MCNC: 46 NG/L (ref 0–14)
WBC OTHER # BLD: 7.2 K/UL (ref 3.5–11.3)
WBC OTHER # BLD: 7.5 K/UL (ref 3.5–11)

## 2024-04-06 PROCEDURE — 96375 TX/PRO/DX INJ NEW DRUG ADDON: CPT

## 2024-04-06 PROCEDURE — 86850 RBC ANTIBODY SCREEN: CPT

## 2024-04-06 PROCEDURE — 84484 ASSAY OF TROPONIN QUANT: CPT

## 2024-04-06 PROCEDURE — 6370000000 HC RX 637 (ALT 250 FOR IP)

## 2024-04-06 PROCEDURE — 83880 ASSAY OF NATRIURETIC PEPTIDE: CPT

## 2024-04-06 PROCEDURE — 87040 BLOOD CULTURE FOR BACTERIA: CPT

## 2024-04-06 PROCEDURE — 86920 COMPATIBILITY TEST SPIN: CPT

## 2024-04-06 PROCEDURE — 96365 THER/PROPH/DIAG IV INF INIT: CPT

## 2024-04-06 PROCEDURE — P9603 ONE-WAY ALLOW PRORATED MILES: HCPCS

## 2024-04-06 PROCEDURE — 86900 BLOOD TYPING SEROLOGIC ABO: CPT

## 2024-04-06 PROCEDURE — 85025 COMPLETE CBC W/AUTO DIFF WBC: CPT

## 2024-04-06 PROCEDURE — 6360000002 HC RX W HCPCS: Performed by: EMERGENCY MEDICINE

## 2024-04-06 PROCEDURE — 1210000000 HC MED SURG R&B

## 2024-04-06 PROCEDURE — 80048 BASIC METABOLIC PNL TOTAL CA: CPT

## 2024-04-06 PROCEDURE — 93005 ELECTROCARDIOGRAM TRACING: CPT

## 2024-04-06 PROCEDURE — 86901 BLOOD TYPING SEROLOGIC RH(D): CPT

## 2024-04-06 PROCEDURE — 36415 COLL VENOUS BLD VENIPUNCTURE: CPT

## 2024-04-06 PROCEDURE — 2580000003 HC RX 258: Performed by: EMERGENCY MEDICINE

## 2024-04-06 PROCEDURE — 71045 X-RAY EXAM CHEST 1 VIEW: CPT

## 2024-04-06 PROCEDURE — 85027 COMPLETE CBC AUTOMATED: CPT

## 2024-04-06 PROCEDURE — 80053 COMPREHEN METABOLIC PANEL: CPT

## 2024-04-06 PROCEDURE — 99285 EMERGENCY DEPT VISIT HI MDM: CPT

## 2024-04-06 PROCEDURE — 83605 ASSAY OF LACTIC ACID: CPT

## 2024-04-06 PROCEDURE — 83735 ASSAY OF MAGNESIUM: CPT

## 2024-04-06 RX ORDER — LOPERAMIDE HYDROCHLORIDE 2 MG/1
2 CAPSULE ORAL 4 TIMES DAILY PRN
COMMUNITY
Start: 2024-04-06

## 2024-04-06 RX ORDER — FUROSEMIDE 10 MG/ML
40 INJECTION INTRAMUSCULAR; INTRAVENOUS ONCE
Status: DISCONTINUED | OUTPATIENT
Start: 2024-04-06 | End: 2024-04-06

## 2024-04-06 RX ORDER — HYDROXYZINE HYDROCHLORIDE 25 MG/1
25 TABLET, FILM COATED ORAL ONCE
Status: COMPLETED | OUTPATIENT
Start: 2024-04-06 | End: 2024-04-07

## 2024-04-06 RX ORDER — LANOLIN ALCOHOL/MO/W.PET/CERES
400 CREAM (GRAM) TOPICAL ONCE
Status: COMPLETED | OUTPATIENT
Start: 2024-04-06 | End: 2024-04-06

## 2024-04-06 RX ORDER — BUMETANIDE 0.25 MG/ML
1 INJECTION INTRAMUSCULAR; INTRAVENOUS ONCE
Status: COMPLETED | OUTPATIENT
Start: 2024-04-06 | End: 2024-04-06

## 2024-04-06 RX ORDER — DOCUSATE SODIUM 100 MG/1
100 CAPSULE, LIQUID FILLED ORAL 2 TIMES DAILY PRN
COMMUNITY
Start: 2024-12-31

## 2024-04-06 RX ORDER — POTASSIUM CHLORIDE 20 MEQ/1
40 TABLET, EXTENDED RELEASE ORAL ONCE
Status: COMPLETED | OUTPATIENT
Start: 2024-04-06 | End: 2024-04-06

## 2024-04-06 RX ADMIN — BUMETANIDE 1 MG: 0.25 INJECTION INTRAMUSCULAR; INTRAVENOUS at 21:16

## 2024-04-06 RX ADMIN — POTASSIUM CHLORIDE 40 MEQ: 1500 TABLET, EXTENDED RELEASE ORAL at 22:09

## 2024-04-06 RX ADMIN — CEFEPIME 2000 MG: 2 INJECTION, POWDER, FOR SOLUTION INTRAVENOUS at 21:31

## 2024-04-06 RX ADMIN — Medication 400 MG: at 22:09

## 2024-04-07 ENCOUNTER — APPOINTMENT (OUTPATIENT)
Dept: GENERAL RADIOLOGY | Age: 79
DRG: 291 | End: 2024-04-07
Payer: MEDICARE

## 2024-04-07 PROBLEM — A04.72 C. DIFFICILE COLITIS: Status: ACTIVE | Noted: 2024-04-07

## 2024-04-07 LAB
ANION GAP SERPL CALCULATED.3IONS-SCNC: 6 MMOL/L (ref 9–17)
BASOPHILS # BLD: 0 K/UL (ref 0–0.2)
BASOPHILS NFR BLD: 0 % (ref 0–2)
BNP SERPL-MCNC: ABNORMAL PG/ML
BUN SERPL-MCNC: 20 MG/DL (ref 8–23)
C DIFFICILE TOXINS, PCR: ABNORMAL
CALCIUM SERPL-MCNC: 7.8 MG/DL (ref 8.6–10.4)
CHLORIDE SERPL-SCNC: 101 MMOL/L (ref 98–107)
CHOLEST SERPL-MCNC: 124 MG/DL (ref 0–199)
CHOLESTEROL/HDL RATIO: 3
CO2 SERPL-SCNC: 39 MMOL/L (ref 20–31)
CREAT SERPL-MCNC: 1.1 MG/DL (ref 0.5–0.9)
EOSINOPHIL # BLD: 0.95 K/UL (ref 0–0.4)
EOSINOPHILS RELATIVE PERCENT: 13 % (ref 1–4)
ERYTHROCYTE [DISTWIDTH] IN BLOOD BY AUTOMATED COUNT: 20.8 % (ref 12.5–15.4)
GFR SERPL CREATININE-BSD FRML MDRD: 52 ML/MIN/1.73M2
GLUCOSE SERPL-MCNC: 79 MG/DL (ref 70–99)
HCT VFR BLD AUTO: 22.4 % (ref 36–46)
HDLC SERPL-MCNC: 46 MG/DL
HGB BLD-MCNC: 7.2 G/DL (ref 12–16)
INR PPP: 1.2
IRON SATN MFR SERPL: 10 % (ref 20–55)
IRON SERPL-MCNC: 26 UG/DL (ref 37–145)
LDLC SERPL CALC-MCNC: 60 MG/DL (ref 0–100)
LYMPHOCYTES NFR BLD: 1.9 K/UL (ref 1–4.8)
LYMPHOCYTES RELATIVE PERCENT: 26 % (ref 24–44)
MAGNESIUM SERPL-MCNC: 1.3 MG/DL (ref 1.6–2.6)
MCH RBC QN AUTO: 30.2 PG (ref 26–34)
MCHC RBC AUTO-ENTMCNC: 32.1 G/DL (ref 31–37)
MCV RBC AUTO: 94.1 FL (ref 80–100)
MONOCYTES NFR BLD: 0.73 K/UL (ref 0.1–0.8)
MONOCYTES NFR BLD: 10 % (ref 1–7)
MORPHOLOGY: ABNORMAL
MORPHOLOGY: ABNORMAL
NEUTROPHILS NFR BLD: 51 % (ref 36–66)
NEUTS SEG NFR BLD: 3.72 K/UL (ref 1.8–7.7)
PLATELET # BLD AUTO: 237 K/UL (ref 140–450)
PMV BLD AUTO: 7.3 FL (ref 6–12)
POTASSIUM SERPL-SCNC: 3.7 MMOL/L (ref 3.7–5.3)
PROTHROMBIN TIME: 12.5 SEC (ref 9.4–12.6)
RBC # BLD AUTO: 2.38 M/UL (ref 4–5.2)
SODIUM SERPL-SCNC: 146 MMOL/L (ref 135–144)
SPECIMEN DESCRIPTION: ABNORMAL
T4 FREE SERPL-MCNC: 1.2 NG/DL (ref 0.92–1.68)
TIBC SERPL-MCNC: 251 UG/DL (ref 250–450)
TRIGL SERPL-MCNC: 87 MG/DL
TROPONIN I SERPL HS-MCNC: 43 NG/L (ref 0–14)
TSH SERPL DL<=0.05 MIU/L-ACNC: 6.63 UIU/ML (ref 0.3–5)
UNSATURATED IRON BINDING CAPACITY: 225 UG/DL (ref 112–347)
VLDLC SERPL CALC-MCNC: 17 MG/DL
WBC OTHER # BLD: 7.3 K/UL (ref 3.5–11)

## 2024-04-07 PROCEDURE — 1210000000 HC MED SURG R&B

## 2024-04-07 PROCEDURE — 6360000002 HC RX W HCPCS: Performed by: NURSE PRACTITIONER

## 2024-04-07 PROCEDURE — 2580000003 HC RX 258: Performed by: NURSE PRACTITIONER

## 2024-04-07 PROCEDURE — 80061 LIPID PANEL: CPT

## 2024-04-07 PROCEDURE — 85610 PROTHROMBIN TIME: CPT

## 2024-04-07 PROCEDURE — 6370000000 HC RX 637 (ALT 250 FOR IP): Performed by: NURSE PRACTITIONER

## 2024-04-07 PROCEDURE — 36415 COLL VENOUS BLD VENIPUNCTURE: CPT

## 2024-04-07 PROCEDURE — 84484 ASSAY OF TROPONIN QUANT: CPT

## 2024-04-07 PROCEDURE — 6370000000 HC RX 637 (ALT 250 FOR IP): Performed by: STUDENT IN AN ORGANIZED HEALTH CARE EDUCATION/TRAINING PROGRAM

## 2024-04-07 PROCEDURE — 2500000003 HC RX 250 WO HCPCS: Performed by: STUDENT IN AN ORGANIZED HEALTH CARE EDUCATION/TRAINING PROGRAM

## 2024-04-07 PROCEDURE — 85025 COMPLETE CBC W/AUTO DIFF WBC: CPT

## 2024-04-07 PROCEDURE — 83540 ASSAY OF IRON: CPT

## 2024-04-07 PROCEDURE — 80048 BASIC METABOLIC PNL TOTAL CA: CPT

## 2024-04-07 PROCEDURE — 71046 X-RAY EXAM CHEST 2 VIEWS: CPT

## 2024-04-07 PROCEDURE — 94664 DEMO&/EVAL PT USE INHALER: CPT

## 2024-04-07 PROCEDURE — 84443 ASSAY THYROID STIM HORMONE: CPT

## 2024-04-07 PROCEDURE — 94640 AIRWAY INHALATION TREATMENT: CPT

## 2024-04-07 PROCEDURE — 83550 IRON BINDING TEST: CPT

## 2024-04-07 PROCEDURE — 83880 ASSAY OF NATRIURETIC PEPTIDE: CPT

## 2024-04-07 PROCEDURE — 6370000000 HC RX 637 (ALT 250 FOR IP)

## 2024-04-07 PROCEDURE — 99222 1ST HOSP IP/OBS MODERATE 55: CPT | Performed by: STUDENT IN AN ORGANIZED HEALTH CARE EDUCATION/TRAINING PROGRAM

## 2024-04-07 PROCEDURE — 83735 ASSAY OF MAGNESIUM: CPT

## 2024-04-07 PROCEDURE — 84439 ASSAY OF FREE THYROXINE: CPT

## 2024-04-07 PROCEDURE — 2700000000 HC OXYGEN THERAPY PER DAY

## 2024-04-07 RX ORDER — MIDODRINE HYDROCHLORIDE 5 MG/1
5 TABLET ORAL
Status: DISCONTINUED | OUTPATIENT
Start: 2024-04-07 | End: 2024-04-10 | Stop reason: HOSPADM

## 2024-04-07 RX ORDER — BUMETANIDE 0.25 MG/ML
2 INJECTION INTRAMUSCULAR; INTRAVENOUS DAILY
Status: DISCONTINUED | OUTPATIENT
Start: 2024-04-07 | End: 2024-04-08

## 2024-04-07 RX ORDER — VANCOMYCIN HYDROCHLORIDE 125 MG/1
125 CAPSULE ORAL 4 TIMES DAILY
Status: DISCONTINUED | OUTPATIENT
Start: 2024-04-07 | End: 2024-04-10 | Stop reason: HOSPADM

## 2024-04-07 RX ORDER — POTASSIUM CHLORIDE 20 MEQ/1
40 TABLET, EXTENDED RELEASE ORAL PRN
Status: DISCONTINUED | OUTPATIENT
Start: 2024-04-07 | End: 2024-04-10 | Stop reason: HOSPADM

## 2024-04-07 RX ORDER — SODIUM CHLORIDE 0.9 % (FLUSH) 0.9 %
5-40 SYRINGE (ML) INJECTION EVERY 12 HOURS SCHEDULED
Status: DISCONTINUED | OUTPATIENT
Start: 2024-04-07 | End: 2024-04-10 | Stop reason: HOSPADM

## 2024-04-07 RX ORDER — IPRATROPIUM BROMIDE AND ALBUTEROL SULFATE 2.5; .5 MG/3ML; MG/3ML
1 SOLUTION RESPIRATORY (INHALATION)
Status: DISCONTINUED | OUTPATIENT
Start: 2024-04-07 | End: 2024-04-08

## 2024-04-07 RX ORDER — ACETAMINOPHEN 325 MG/1
650 TABLET ORAL EVERY 6 HOURS PRN
Status: DISCONTINUED | OUTPATIENT
Start: 2024-04-07 | End: 2024-04-10 | Stop reason: HOSPADM

## 2024-04-07 RX ORDER — ACETAMINOPHEN 650 MG/1
650 SUPPOSITORY RECTAL EVERY 6 HOURS PRN
Status: DISCONTINUED | OUTPATIENT
Start: 2024-04-07 | End: 2024-04-10 | Stop reason: HOSPADM

## 2024-04-07 RX ORDER — PANTOPRAZOLE SODIUM 40 MG/1
40 TABLET, DELAYED RELEASE ORAL
Status: DISCONTINUED | OUTPATIENT
Start: 2024-04-07 | End: 2024-04-10 | Stop reason: HOSPADM

## 2024-04-07 RX ORDER — SODIUM CHLORIDE 0.9 % (FLUSH) 0.9 %
10 SYRINGE (ML) INJECTION PRN
Status: DISCONTINUED | OUTPATIENT
Start: 2024-04-07 | End: 2024-04-10 | Stop reason: HOSPADM

## 2024-04-07 RX ORDER — POLYETHYLENE GLYCOL 3350 17 G/17G
17 POWDER, FOR SOLUTION ORAL DAILY PRN
Status: DISCONTINUED | OUTPATIENT
Start: 2024-04-07 | End: 2024-04-10 | Stop reason: HOSPADM

## 2024-04-07 RX ORDER — ONDANSETRON 2 MG/ML
4 INJECTION INTRAMUSCULAR; INTRAVENOUS EVERY 6 HOURS PRN
Status: DISCONTINUED | OUTPATIENT
Start: 2024-04-07 | End: 2024-04-10 | Stop reason: HOSPADM

## 2024-04-07 RX ORDER — MAGNESIUM SULFATE IN WATER 40 MG/ML
2000 INJECTION, SOLUTION INTRAVENOUS PRN
Status: DISCONTINUED | OUTPATIENT
Start: 2024-04-07 | End: 2024-04-10 | Stop reason: HOSPADM

## 2024-04-07 RX ORDER — POTASSIUM CHLORIDE 7.45 MG/ML
10 INJECTION INTRAVENOUS PRN
Status: DISCONTINUED | OUTPATIENT
Start: 2024-04-07 | End: 2024-04-10 | Stop reason: HOSPADM

## 2024-04-07 RX ORDER — SODIUM CHLORIDE 9 MG/ML
INJECTION, SOLUTION INTRAVENOUS PRN
Status: DISCONTINUED | OUTPATIENT
Start: 2024-04-07 | End: 2024-04-10 | Stop reason: HOSPADM

## 2024-04-07 RX ORDER — ALBUTEROL SULFATE 2.5 MG/3ML
2.5 SOLUTION RESPIRATORY (INHALATION)
Status: DISCONTINUED | OUTPATIENT
Start: 2024-04-07 | End: 2024-04-10 | Stop reason: HOSPADM

## 2024-04-07 RX ORDER — ALBUTEROL SULFATE 2.5 MG/3ML
2.5 SOLUTION RESPIRATORY (INHALATION) EVERY 4 HOURS PRN
Status: DISCONTINUED | OUTPATIENT
Start: 2024-04-07 | End: 2024-04-10 | Stop reason: HOSPADM

## 2024-04-07 RX ORDER — PRAVASTATIN SODIUM 20 MG
40 TABLET ORAL DAILY
Status: DISCONTINUED | OUTPATIENT
Start: 2024-04-07 | End: 2024-04-10 | Stop reason: HOSPADM

## 2024-04-07 RX ORDER — CINACALCET 30 MG/1
90 TABLET, FILM COATED ORAL 2 TIMES DAILY
Status: DISCONTINUED | OUTPATIENT
Start: 2024-04-07 | End: 2024-04-10 | Stop reason: HOSPADM

## 2024-04-07 RX ORDER — IPRATROPIUM BROMIDE AND ALBUTEROL SULFATE 2.5; .5 MG/3ML; MG/3ML
1 SOLUTION RESPIRATORY (INHALATION)
Status: DISCONTINUED | OUTPATIENT
Start: 2024-04-07 | End: 2024-04-07

## 2024-04-07 RX ORDER — LANOLIN ALCOHOL/MO/W.PET/CERES
400 CREAM (GRAM) TOPICAL DAILY
Status: DISCONTINUED | OUTPATIENT
Start: 2024-04-07 | End: 2024-04-10 | Stop reason: HOSPADM

## 2024-04-07 RX ORDER — FOLIC ACID 1 MG/1
2 TABLET ORAL DAILY
Status: DISCONTINUED | OUTPATIENT
Start: 2024-04-07 | End: 2024-04-10 | Stop reason: HOSPADM

## 2024-04-07 RX ORDER — PREGABALIN 25 MG/1
50 CAPSULE ORAL 2 TIMES DAILY
Status: DISCONTINUED | OUTPATIENT
Start: 2024-04-07 | End: 2024-04-10 | Stop reason: HOSPADM

## 2024-04-07 RX ORDER — ONDANSETRON 4 MG/1
4 TABLET, ORALLY DISINTEGRATING ORAL EVERY 8 HOURS PRN
Status: DISCONTINUED | OUTPATIENT
Start: 2024-04-07 | End: 2024-04-10 | Stop reason: HOSPADM

## 2024-04-07 RX ORDER — HYDROXYZINE HYDROCHLORIDE 25 MG/1
25 TABLET, FILM COATED ORAL EVERY 6 HOURS PRN
Status: DISCONTINUED | OUTPATIENT
Start: 2024-04-07 | End: 2024-04-10 | Stop reason: HOSPADM

## 2024-04-07 RX ORDER — CARVEDILOL 3.12 MG/1
6.25 TABLET ORAL 2 TIMES DAILY WITH MEALS
Status: DISCONTINUED | OUTPATIENT
Start: 2024-04-07 | End: 2024-04-10 | Stop reason: HOSPADM

## 2024-04-07 RX ADMIN — APIXABAN 5 MG: 5 TABLET, FILM COATED ORAL at 09:34

## 2024-04-07 RX ADMIN — HYDROXYZINE HYDROCHLORIDE 25 MG: 25 TABLET, FILM COATED ORAL at 03:07

## 2024-04-07 RX ADMIN — FOLIC ACID 2 MG: 1 TABLET ORAL at 13:22

## 2024-04-07 RX ADMIN — ANTI-FUNGAL POWDER MICONAZOLE NITRATE TALC FREE: 1.42 POWDER TOPICAL at 23:13

## 2024-04-07 RX ADMIN — PREGABALIN 50 MG: 25 CAPSULE ORAL at 09:35

## 2024-04-07 RX ADMIN — PRAVASTATIN SODIUM 40 MG: 20 TABLET ORAL at 13:22

## 2024-04-07 RX ADMIN — Medication 400 MG: at 09:34

## 2024-04-07 RX ADMIN — CARVEDILOL 6.25 MG: 3.12 TABLET, FILM COATED ORAL at 09:33

## 2024-04-07 RX ADMIN — IPRATROPIUM BROMIDE AND ALBUTEROL SULFATE 1 DOSE: 2.5; .5 SOLUTION RESPIRATORY (INHALATION) at 14:06

## 2024-04-07 RX ADMIN — HYDROXYZINE HYDROCHLORIDE 25 MG: 25 TABLET, FILM COATED ORAL at 00:32

## 2024-04-07 RX ADMIN — HYDROXYZINE HYDROCHLORIDE 25 MG: 25 TABLET, FILM COATED ORAL at 16:40

## 2024-04-07 RX ADMIN — PANTOPRAZOLE SODIUM 40 MG: 40 TABLET, DELAYED RELEASE ORAL at 09:34

## 2024-04-07 RX ADMIN — PREGABALIN 50 MG: 25 CAPSULE ORAL at 23:13

## 2024-04-07 RX ADMIN — SODIUM CHLORIDE, PRESERVATIVE FREE 10 ML: 5 INJECTION INTRAVENOUS at 23:14

## 2024-04-07 RX ADMIN — VANCOMYCIN HYDROCHLORIDE 125 MG: 125 CAPSULE ORAL at 23:13

## 2024-04-07 RX ADMIN — CEFEPIME 2000 MG: 2 INJECTION, POWDER, FOR SOLUTION INTRAVENOUS at 09:12

## 2024-04-07 RX ADMIN — HYDROXYZINE HYDROCHLORIDE 25 MG: 25 TABLET, FILM COATED ORAL at 10:50

## 2024-04-07 RX ADMIN — IPRATROPIUM BROMIDE AND ALBUTEROL SULFATE 1 DOSE: 2.5; .5 SOLUTION RESPIRATORY (INHALATION) at 10:36

## 2024-04-07 RX ADMIN — MIDODRINE HYDROCHLORIDE 5 MG: 5 TABLET ORAL at 10:51

## 2024-04-07 RX ADMIN — BUMETANIDE 2 MG: 0.25 INJECTION INTRAMUSCULAR; INTRAVENOUS at 09:12

## 2024-04-07 RX ADMIN — VANCOMYCIN HYDROCHLORIDE 125 MG: 125 CAPSULE ORAL at 13:21

## 2024-04-07 RX ADMIN — CINACALCET HYDROCHLORIDE 90 MG: 30 TABLET, FILM COATED ORAL at 13:23

## 2024-04-07 RX ADMIN — APIXABAN 5 MG: 5 TABLET, FILM COATED ORAL at 23:13

## 2024-04-07 RX ADMIN — MIDODRINE HYDROCHLORIDE 5 MG: 5 TABLET ORAL at 16:40

## 2024-04-07 RX ADMIN — CINACALCET HYDROCHLORIDE 90 MG: 30 TABLET, FILM COATED ORAL at 16:39

## 2024-04-07 RX ADMIN — SODIUM CHLORIDE, PRESERVATIVE FREE 10 ML: 5 INJECTION INTRAVENOUS at 09:36

## 2024-04-07 RX ADMIN — VANCOMYCIN HYDROCHLORIDE 125 MG: 125 CAPSULE ORAL at 16:40

## 2024-04-07 ASSESSMENT — PAIN SCALES - GENERAL: PAINLEVEL_OUTOF10: 0

## 2024-04-07 NOTE — ED PROVIDER NOTES
[facility] EMERGENCY DEPARTMENT  Emergency Department Encounter  Mid Level Provider     Pt Name: Rosi Haley  MRN: [id number]  Birthdate 1945  Date of evaluation: 4/6/24  PCP:  Radha Wynn MD    CHIEF COMPLAINT       Chief Complaint   Patient presents with    Abnormal Lab       HISTORY OF PRESENT ILLNESS  (Location/Symptom, Timing/Onset,Context/Setting, Quality, Duration, Modifying Factors, Severity.)      Rosi Haley is a 78 y.o. female who presents with EMS for her extended care facility for concerns for low hemoglobin.  She presently denies any shortness of breath or chest pain but reports that she has had some increase in her bilateral lower extremity edema and exertional dyspnea over the past month or so.  She does wear chronic nasal cannula oxygen. She is unsure if she is on any diuretics.  She denies a history of heart failure but has a history of A-fib, coronary artery disease, chronic kidney disease.  CHF is listed on her past medical history here and she is on Bumex 1 mg daily.  Looks like they have been trending her hemoglobin over the past few days has been slowly trending down.  Patient denies any dark tarry stools, hematemesis, hematochezia.    PAST MEDICAL /SURGICAL / SOCIAL / FAMILY HISTORY      has a past medical history of Atrial fibrillation (HCC), CAD (coronary artery disease), CHF (congestive heart failure) (HCC), Chronic bilateral low back pain with left-sided sciatica, Chronic respiratory failure (HCC), CKD (chronic kidney disease), Colitis, Dyslipidemia, GI bleed, Hypertension, Hypoxia, Leukopenia, LLQ pain, PAC (premature atrial contraction), Short of breath on exertion, Supplemental oxygen dependent, and Wound of left leg.     has a past surgical history that includes Coronary angioplasty with stent; Hysterectomy; Tubal ligation; Tonsillectomy; Colonoscopy; Leg Surgery (Left, 11/01/2017); pr i&d below fascia foot 1 bursal space (Left, 11/1/2017);

## 2024-04-07 NOTE — ED PROVIDER NOTES
[facility] Emergency Department  [address]  [place] [address]  Phone: [phone]  Fax: [phone]      Attending Physician Attestation    I performed a history and physical examination of the patient and discussed management with the mid level provider. I reviewed the mid level provider's note and agree with the documented findings and plan of care. Any areas of disagreement are noted on the chart. I was personally present for the key portions of any procedures. I have documented in the chart those procedures where I was not present during the key portions. I have reviewed the emergency nurses triage note. I agree with the chief complaint, past medical history, past surgical history, allergies, medications, social and family history as documented unless otherwise noted below. Documentation of the HPI, Physical Exam and Medical Decision Making performed by mid level providers is based on my personal performance of the HPI, PE and MDM. For Physician Assistant/ Nurse Practitioner cases/documentation I have personally evaluated this patient and have completed at least one if not all key elements of the E/M (history, physical exam, and MDM). Additional findings are as noted.      CHIEF COMPLAINT       Chief Complaint   Patient presents with   • Abnormal Lab        PAST MEDICAL HISTORY     Past Medical History:   Diagnosis Date   • Atrial fibrillation (HCC) 10/31/2017    New onset atrial fibrillation rate well controlled.   • CAD (coronary artery disease)    • CHF (congestive heart failure) ([facility])    • Chronic bilateral low back pain with left-sided sciatica 06/27/2017   • Chronic respiratory failure ([facility])    • CKD (chronic kidney disease)    • Colitis DX 2008   • Dyslipidemia 06/27/2017   • GI bleed    • Hypertension    • Hypoxia    • Leukopenia    • LLQ pain    • PAC (premature atrial contraction)    • Short of breath on exertion     cannot walk a city block without becoming  - 0.2 k/uL    Morphology ANISOCYTOSIS PRESENT    CMP   Result Value Ref Range    Sodium 142 135 - 144 mmol/L    Potassium 3.3 (L) 3.7 - 5.3 mmol/L    Chloride 97 (L) 98 - 107 mmol/L    CO2 37 (H) 20 - 31 mmol/L    Anion Gap 8 (L) 9 - 17 mmol/L    Glucose 98 70 - 99 mg/dL    BUN 22 8 - 23 mg/dL    Creatinine 1.3 (H) 0.5 - 0.9 mg/dL    Est, Glom Filt Rate 43 (L) >60 mL/min/1.73m2    Calcium 8.1 (L) 8.6 - 10.4 mg/dL    Total Protein 6.5 6.4 - 8.3 g/dL    Albumin 3.3 (L) 3.5 - 5.2 g/dL    Albumin/Globulin Ratio 1.0 1.0 - 2.5    Total Bilirubin 0.3 0.3 - 1.2 mg/dL    Alkaline Phosphatase 84 35 - 104 U/L    ALT 12 5 - 33 U/L    AST 21 <32 U/L   Brain Natriuretic Peptide   Result Value Ref Range    Pro-BNP 14,913 (H) <300 pg/mL   Lactic Acid   Result Value Ref Range    Lactic Acid 1.6 0.5 - 2.2 mmol/L   Troponin   Result Value Ref Range    Troponin, High Sensitivity 46 (H) 0 - 14 ng/L   Troponin   Result Value Ref Range    Troponin, High Sensitivity 43 (H) 0 - 14 ng/L   Magnesium   Result Value Ref Range    Magnesium 1.5 (L) 1.6 - 2.6 mg/dL   TYPE AND SCREEN   Result Value Ref Range    Blood Bank Sample Expiration 04/09/2024,2359     Arm Band Number [id number]     ABO/Rh O NEGATIVE     Antibody Screen NEGATIVE        MEDICATIONS GIVEN:  The following medications were provided, which may have required intensive monitoring:   Medications   ondansetron (ZOFRAN-ODT) disintegrating tablet 4 mg (has no administration in time range)     Or   ondansetron (ZOFRAN) injection 4 mg (has no administration in time range)   potassium chloride (KLOR-CON M) extended release tablet 40 mEq (has no administration in time range)     Or   potassium bicarb-citric acid (EFFER-K) effervescent tablet 40 mEq (has no administration in time range)     Or   potassium chloride 10 mEq/100 mL IVPB (Peripheral Line) (has no administration in time range)   magnesium sulfate 2000 mg in 50 mL IVPB premix (has no administration in time range)   albuterol  lactic acidosis.  CBC shows a hemoglobin of 7.9 we will not transfuse at this time.    Will initiate broad-spectrum antibiotics and IV diuretics.  After repeat troponin is available we will plan for admission for heart failure exacerbation []   2307 Patient accepted for admission by Natalie Wu certified nurse practitioner with internal medicine. []      ED Course User Index  [] Deric Payan, APRN - CNP       PROCEDURES:  Procedures    Critical Care:  None      Admission DISPOSITION Admitted 04/06/2024 11:14:43 PM    Admission to Hospital. Patient/Family in agreement with current treatment plan and agree to be admitted to the hospital. Shared decision making was utilized regarding plan for admission. All questions were answered to their satisfaction and they were given the opportunity to ask any questions regarding their care.    Impression:   1. Congestive heart failure, unspecified HF chronicity, unspecified heart failure type (HCC)    2. Pneumonia due to infectious organism, unspecified laterality, unspecified part of lung        CONDITION ON DISPOSITION:   Stable         This note was created using Dragon dictation software. The note was briefly reviewed and proofread, but may contain some grammatical and phonetic errors.    Dave Aquino DO,  Emergency Medicine Physician       ToDave DO  04/07/24 0213

## 2024-04-07 NOTE — PROGRESS NOTES
Patient admitted to unit via stretcher with ED RNElisha.  Transported on 2L NC. No distress noted.  Required 4 person for transfer from stretcher to bed.  Patient unable to move legs due to swelling and pain.

## 2024-04-07 NOTE — H&P
no relief after 1 dose, call 911.    Nenita Vasques MD   polyethylene glycol (GLYCOLAX) 17 GM/SCOOP powder Take 17 g by mouth daily    Nenita Vasques MD   potassium chloride (MICRO-K) 10 MEQ extended release capsule Take 1 capsule by mouth daily    Nenita Vasques MD   apixaban (ELIQUIS) 5 MG TABS tablet Take 1 tablet by mouth 2 times daily    Nenita Vasques MD   pregabalin (LYRICA) 50 MG capsule Take 1 capsule by mouth 2 times daily.    Nenita Vasques MD   acetaminophen (TYLENOL) 325 MG tablet Take 2 tablets by mouth every 6 hours as needed for Pain    Nenita Vasques MD   melatonin 3 MG TABS tablet Take 1 tablet by mouth nightly as needed    Nenita Vasques MD   ondansetron (ZOFRAN) 4 MG tablet Take 1 tablet by mouth every 8 hours as needed for Nausea or Vomiting    Nenita Vasques MD   methyl salicylate-menthol (ALEXANDR NEFF GREASELESS) 10-15 % CREA Apply topically 3 times daily as needed for Pain 24   Velvet Latham MD   bumetanide (BUMEX) 1 MG tablet Take 1 tablet by mouth daily 24   Velvet Latham MD   Humidifier/Vaporizer Supplies LIQD 480 mLs by Does not apply route continuous 23   Bri Mckinney DO   Humidifier/Vaporizer Supplies MISC 1 Box by Does not apply route daily 23   Bri Mckinney DO   pravastatin (PRAVACHOL) 40 MG tablet Take 1 tablet by mouth daily 23   Radha Wynn MD   fluticasone-salmeterol (ADVAIR DISKUS) 100-50 MCG/ACT AEPB diskus inhaler Inhale 1 puff into the lungs in the morning and 1 puff in the evening.  Patient taking differently: Inhale 1 puff into the lungs in the morning and 1 puff in the evening. Stated hasn't been using every day. 8/17/23 3/15/24  Radha Wynn MD        Allergies:     Amlodipine and Aspirin    Physical Exam:   /65   Pulse 75   Temp 97.9 °F (36.6 °C) (Oral)   Resp 24   Wt 127.9 kg (282 lb)   SpO2 96%   BMI 45.52 kg/m²   Temp (24hrs), Av.9 °F (36.6 °C), Min:97.9  results for input(s): \"POCGLU\" in the last 72 hours.  No intake or output data in the 24 hours ending 04/07/24 0738    General Appearance: Frail and chronically ill-appearing.   Mental status: Patient is lethargic and slow to respond to questioning.   Head:  normocephalic, atraumatic  Eye: no icterus, redness, pupils equal and reactive, extraocular eye movements intact, conjunctiva clear  Ear: normal external ear, no discharge, hearing intact  Nose:  no drainage noted  Mouth: mucous membranes moist  Neck: supple, no carotid bruits, thyroid not palpable  Lungs: Bibasilar crackles appreciated.   Cardiovascular: normal rate, regular rhythm, no murmur, gallop, rub  Abdomen: Soft, nontender, nondistended, normal bowel sounds, no hepatomegaly or splenomegaly  Neurologic: There are no new focal motor or sensory deficits, normal muscle tone and bulk, no abnormal sensation, normal speech, cranial nerves II through XII grossly intact  Skin: No gross lesions, rashes, bruising or bleeding on exposed skin area  Extremities:  4+ pitting edema appreciated bilaterally.   Psych: normal affect     Investigations:      Laboratory Testing:  Recent Results (from the past 24 hour(s))   Basic Metabolic Panel    Collection Time: 04/06/24 10:15 AM   Result Value Ref Range    Sodium 141 135 - 144 mmol/L    Potassium 3.7 3.7 - 5.3 mmol/L    Chloride 97 (L) 98 - 107 mmol/L    CO2 36 (H) 20 - 31 mmol/L    Anion Gap 8 (L) 9 - 17 mmol/L    Glucose 84 70 - 99 mg/dL    BUN 22 8 - 23 mg/dL    Creatinine 1.3 (H) 0.5 - 0.9 mg/dL    Est, Glom Filt Rate 43 (L) >60 mL/min/1.73m2    Bun/Cre Ratio 17 9 - 20    Calcium 7.7 (L) 8.6 - 10.4 mg/dL   CBC    Collection Time: 04/06/24 10:15 AM   Result Value Ref Range    WBC 7.2 3.5 - 11.3 k/uL    RBC 2.31 (L) 3.95 - 5.11 m/uL    Hemoglobin 6.8 (LL) 11.9 - 15.1 g/dL    Hematocrit 24.6 (L) 36.3 - 47.1 %    .5 (H) 82.6 - 102.9 fL    MCH 29.4 25.2 - 33.5 pg    MCHC 27.6 (L) 28.4 - 34.8 g/dL    RDW 20.2 (H)    -Iron and TIBC pending     Paroxsymal atrial fibrillation   -Continue home Eliquis   -Continue home carvedilol     Hyperlipidemia   -Continue home pravastatin     Advanced care planning   -The patient is somewhat lethargic on my evaluation this morning   -The patient is frail/chronically ill-appearing and has multiple hospitalizations over the past six-months   -Long-term prognosis is guarded in my opinion   -Consult palliative care; appreciate recommendations     Patient is admitted as inpatient status because of co-morbidities listed above, severity of signs and symptoms as outlined, requirement for current medical therapies and most importantly because of direct risk to patient if care not provided in a hospital setting.  Expected length of stay > 48 hours. Patient is admitted in the Progressive Unit/Step down      Wyatt Kelley MD  4/7/2024  7:38 AM    Copy sent to Radha Iglesias MD

## 2024-04-08 PROBLEM — I50.9 CONGESTIVE HEART FAILURE (HCC): Status: ACTIVE | Noted: 2024-04-08

## 2024-04-08 PROBLEM — Z51.5 ENCOUNTER FOR PALLIATIVE CARE: Status: ACTIVE | Noted: 2024-04-08

## 2024-04-08 PROBLEM — Z71.89 GOALS OF CARE, COUNSELING/DISCUSSION: Status: ACTIVE | Noted: 2024-04-08

## 2024-04-08 LAB
25(OH)D3 SERPL-MCNC: 7.5 NG/ML (ref 30–100)
ANION GAP SERPL CALCULATED.3IONS-SCNC: 8 MMOL/L (ref 9–17)
ANION GAP SERPL CALCULATED.3IONS-SCNC: 9 MMOL/L (ref 9–17)
BASOPHILS # BLD: 0.08 K/UL (ref 0–0.2)
BASOPHILS NFR BLD: 1 % (ref 0–2)
BUN SERPL-MCNC: 23 MG/DL (ref 8–23)
BUN SERPL-MCNC: 24 MG/DL (ref 8–23)
CA-I BLD-SCNC: 1.01 MMOL/L (ref 1.13–1.33)
CALCIUM SERPL-MCNC: 7.4 MG/DL (ref 8.6–10.4)
CALCIUM SERPL-MCNC: 7.5 MG/DL (ref 8.6–10.4)
CHLORIDE SERPL-SCNC: 100 MMOL/L (ref 98–107)
CHLORIDE SERPL-SCNC: 99 MMOL/L (ref 98–107)
CO2 SERPL-SCNC: 37 MMOL/L (ref 20–31)
CO2 SERPL-SCNC: 40 MMOL/L (ref 20–31)
CREAT SERPL-MCNC: 1.3 MG/DL (ref 0.5–0.9)
CREAT SERPL-MCNC: 1.6 MG/DL (ref 0.5–0.9)
EKG ATRIAL RATE: 90 BPM
EKG Q-T INTERVAL: 386 MS
EKG QRS DURATION: 112 MS
EKG QTC CALCULATION (BAZETT): 464 MS
EKG R AXIS: 99 DEGREES
EKG T AXIS: -132 DEGREES
EKG VENTRICULAR RATE: 87 BPM
EOSINOPHIL # BLD: 1.08 K/UL (ref 0–0.4)
EOSINOPHILS RELATIVE PERCENT: 13 % (ref 1–4)
ERYTHROCYTE [DISTWIDTH] IN BLOOD BY AUTOMATED COUNT: 20.9 % (ref 12.5–15.4)
GFR SERPL CREATININE-BSD FRML MDRD: 33 ML/MIN/1.73M2
GFR SERPL CREATININE-BSD FRML MDRD: 43 ML/MIN/1.73M2
GLUCOSE SERPL-MCNC: 103 MG/DL (ref 70–99)
GLUCOSE SERPL-MCNC: 97 MG/DL (ref 70–99)
HCT VFR BLD AUTO: 21.7 % (ref 36–46)
HGB BLD-MCNC: 7 G/DL (ref 12–16)
LYMPHOCYTES NFR BLD: 1.74 K/UL (ref 1–4.8)
LYMPHOCYTES RELATIVE PERCENT: 21 % (ref 24–44)
MAGNESIUM SERPL-MCNC: 1.4 MG/DL (ref 1.6–2.6)
MCH RBC QN AUTO: 30.3 PG (ref 26–34)
MCHC RBC AUTO-ENTMCNC: 32.2 G/DL (ref 31–37)
MCV RBC AUTO: 94.1 FL (ref 80–100)
MONOCYTES NFR BLD: 1.08 K/UL (ref 0.1–1.2)
MONOCYTES NFR BLD: 13 % (ref 2–11)
MORPHOLOGY: ABNORMAL
MORPHOLOGY: ABNORMAL
NEUTROPHILS NFR BLD: 52 % (ref 36–66)
NEUTS SEG NFR BLD: 4.32 K/UL (ref 1.8–7.7)
PLATELET # BLD AUTO: 233 K/UL (ref 140–450)
PMV BLD AUTO: 7.5 FL (ref 6–12)
POTASSIUM SERPL-SCNC: 3.8 MMOL/L (ref 3.7–5.3)
POTASSIUM SERPL-SCNC: 4 MMOL/L (ref 3.7–5.3)
PTH-INTACT SERPL-MCNC: 91 PG/ML (ref 15–65)
RBC # BLD AUTO: 2.3 M/UL (ref 4–5.2)
SODIUM SERPL-SCNC: 145 MMOL/L (ref 135–144)
SODIUM SERPL-SCNC: 148 MMOL/L (ref 135–144)
WBC OTHER # BLD: 8.3 K/UL (ref 3.5–11)

## 2024-04-08 PROCEDURE — 6370000000 HC RX 637 (ALT 250 FOR IP): Performed by: STUDENT IN AN ORGANIZED HEALTH CARE EDUCATION/TRAINING PROGRAM

## 2024-04-08 PROCEDURE — 82330 ASSAY OF CALCIUM: CPT

## 2024-04-08 PROCEDURE — 97166 OT EVAL MOD COMPLEX 45 MIN: CPT

## 2024-04-08 PROCEDURE — 36415 COLL VENOUS BLD VENIPUNCTURE: CPT

## 2024-04-08 PROCEDURE — 99223 1ST HOSP IP/OBS HIGH 75: CPT | Performed by: INTERNAL MEDICINE

## 2024-04-08 PROCEDURE — 83735 ASSAY OF MAGNESIUM: CPT

## 2024-04-08 PROCEDURE — 94761 N-INVAS EAR/PLS OXIMETRY MLT: CPT

## 2024-04-08 PROCEDURE — 2700000000 HC OXYGEN THERAPY PER DAY

## 2024-04-08 PROCEDURE — 94640 AIRWAY INHALATION TREATMENT: CPT

## 2024-04-08 PROCEDURE — 99232 SBSQ HOSP IP/OBS MODERATE 35: CPT | Performed by: STUDENT IN AN ORGANIZED HEALTH CARE EDUCATION/TRAINING PROGRAM

## 2024-04-08 PROCEDURE — 80048 BASIC METABOLIC PNL TOTAL CA: CPT

## 2024-04-08 PROCEDURE — 2580000003 HC RX 258: Performed by: NURSE PRACTITIONER

## 2024-04-08 PROCEDURE — 1210000000 HC MED SURG R&B

## 2024-04-08 PROCEDURE — 6370000000 HC RX 637 (ALT 250 FOR IP): Performed by: NURSE PRACTITIONER

## 2024-04-08 PROCEDURE — 97530 THERAPEUTIC ACTIVITIES: CPT

## 2024-04-08 PROCEDURE — 6360000002 HC RX W HCPCS: Performed by: INTERNAL MEDICINE

## 2024-04-08 PROCEDURE — 97162 PT EVAL MOD COMPLEX 30 MIN: CPT

## 2024-04-08 PROCEDURE — 83970 ASSAY OF PARATHORMONE: CPT

## 2024-04-08 PROCEDURE — 6360000002 HC RX W HCPCS: Performed by: STUDENT IN AN ORGANIZED HEALTH CARE EDUCATION/TRAINING PROGRAM

## 2024-04-08 PROCEDURE — 2580000003 HC RX 258: Performed by: STUDENT IN AN ORGANIZED HEALTH CARE EDUCATION/TRAINING PROGRAM

## 2024-04-08 PROCEDURE — 82306 VITAMIN D 25 HYDROXY: CPT

## 2024-04-08 PROCEDURE — 99222 1ST HOSP IP/OBS MODERATE 55: CPT

## 2024-04-08 PROCEDURE — 85027 COMPLETE CBC AUTOMATED: CPT

## 2024-04-08 RX ORDER — BUMETANIDE 0.25 MG/ML
2 INJECTION INTRAMUSCULAR; INTRAVENOUS 2 TIMES DAILY
Status: DISCONTINUED | OUTPATIENT
Start: 2024-04-08 | End: 2024-04-08

## 2024-04-08 RX ORDER — IPRATROPIUM BROMIDE AND ALBUTEROL SULFATE 2.5; .5 MG/3ML; MG/3ML
1 SOLUTION RESPIRATORY (INHALATION)
Status: DISCONTINUED | OUTPATIENT
Start: 2024-04-08 | End: 2024-04-10 | Stop reason: HOSPADM

## 2024-04-08 RX ORDER — BUMETANIDE 0.25 MG/ML
1 INJECTION INTRAMUSCULAR; INTRAVENOUS 2 TIMES DAILY
Status: DISCONTINUED | OUTPATIENT
Start: 2024-04-08 | End: 2024-04-10 | Stop reason: HOSPADM

## 2024-04-08 RX ORDER — CHLOROTHIAZIDE SODIUM 500 MG/1
500 INJECTION INTRAVENOUS ONCE
Status: COMPLETED | OUTPATIENT
Start: 2024-04-08 | End: 2024-04-08

## 2024-04-08 RX ADMIN — CINACALCET HYDROCHLORIDE 90 MG: 30 TABLET, FILM COATED ORAL at 09:24

## 2024-04-08 RX ADMIN — ANTI-FUNGAL POWDER MICONAZOLE NITRATE TALC FREE: 1.42 POWDER TOPICAL at 09:23

## 2024-04-08 RX ADMIN — MIDODRINE HYDROCHLORIDE 5 MG: 5 TABLET ORAL at 09:25

## 2024-04-08 RX ADMIN — PRAVASTATIN SODIUM 40 MG: 20 TABLET ORAL at 09:24

## 2024-04-08 RX ADMIN — CINACALCET HYDROCHLORIDE 90 MG: 30 TABLET, FILM COATED ORAL at 18:01

## 2024-04-08 RX ADMIN — Medication 400 MG: at 09:25

## 2024-04-08 RX ADMIN — SODIUM CHLORIDE, PRESERVATIVE FREE 10 ML: 5 INJECTION INTRAVENOUS at 09:33

## 2024-04-08 RX ADMIN — FOLIC ACID 2 MG: 1 TABLET ORAL at 09:25

## 2024-04-08 RX ADMIN — VANCOMYCIN HYDROCHLORIDE 125 MG: 125 CAPSULE ORAL at 09:33

## 2024-04-08 RX ADMIN — APIXABAN 5 MG: 5 TABLET, FILM COATED ORAL at 09:24

## 2024-04-08 RX ADMIN — MIDODRINE HYDROCHLORIDE 5 MG: 5 TABLET ORAL at 12:32

## 2024-04-08 RX ADMIN — MIDODRINE HYDROCHLORIDE 5 MG: 5 TABLET ORAL at 16:53

## 2024-04-08 RX ADMIN — BUMETANIDE 1 MG: 0.25 INJECTION INTRAMUSCULAR; INTRAVENOUS at 09:25

## 2024-04-08 RX ADMIN — SODIUM CHLORIDE, PRESERVATIVE FREE 10 ML: 5 INJECTION INTRAVENOUS at 21:08

## 2024-04-08 RX ADMIN — PREGABALIN 50 MG: 25 CAPSULE ORAL at 21:08

## 2024-04-08 RX ADMIN — CHLOROTHIAZIDE SODIUM 500 MG: 500 INJECTION, POWDER, LYOPHILIZED, FOR SOLUTION INTRAVENOUS at 09:31

## 2024-04-08 RX ADMIN — ANTI-FUNGAL POWDER MICONAZOLE NITRATE TALC FREE: 1.42 POWDER TOPICAL at 21:08

## 2024-04-08 RX ADMIN — VANCOMYCIN HYDROCHLORIDE 125 MG: 125 CAPSULE ORAL at 16:53

## 2024-04-08 RX ADMIN — PREGABALIN 50 MG: 25 CAPSULE ORAL at 09:24

## 2024-04-08 RX ADMIN — VANCOMYCIN HYDROCHLORIDE 125 MG: 125 CAPSULE ORAL at 21:08

## 2024-04-08 RX ADMIN — IPRATROPIUM BROMIDE AND ALBUTEROL SULFATE 1 DOSE: 2.5; .5 SOLUTION RESPIRATORY (INHALATION) at 08:15

## 2024-04-08 RX ADMIN — IRON SUCROSE 300 MG: 20 INJECTION, SOLUTION INTRAVENOUS at 12:35

## 2024-04-08 RX ADMIN — APIXABAN 5 MG: 5 TABLET, FILM COATED ORAL at 21:08

## 2024-04-08 RX ADMIN — IPRATROPIUM BROMIDE AND ALBUTEROL SULFATE 1 DOSE: 2.5; .5 SOLUTION RESPIRATORY (INHALATION) at 20:34

## 2024-04-08 RX ADMIN — PANTOPRAZOLE SODIUM 40 MG: 40 TABLET, DELAYED RELEASE ORAL at 05:48

## 2024-04-08 RX ADMIN — VANCOMYCIN HYDROCHLORIDE 125 MG: 125 CAPSULE ORAL at 12:32

## 2024-04-08 NOTE — PLAN OF CARE
Problem: Skin/Tissue Integrity  Goal: Absence of new skin breakdown  Description: 1.  Monitor for areas of redness and/or skin breakdown  2.  Assess vascular access sites hourly  3.  Every 4-6 hours minimum:  Change oxygen saturation probe site  4.  Every 4-6 hours:  If on nasal continuous positive airway pressure, respiratory therapy assess nares and determine need for appliance change or resting period.  Outcome: Progressing   No new skin breakdown noted, no new signs/symptoms of infection, continue to monitor labwork including WBC, medications administered per physician orders  Problem: ABCDS Injury Assessment  Goal: Absence of physical injury  Outcome: Progressing   No falls/injuries this shift, bed in lowest position, brakes on, bed alarm on, call light in reach, side rails up x2  Problem: Safety - Adult  Goal: Free from fall injury  Outcome: Progressing   No falls/injuries this shift, bed in lowest position, brakes on, bed alarm on, call light in reach, side rails up x2

## 2024-04-08 NOTE — PLAN OF CARE
Problem: Discharge Planning  Goal: Discharge to home or other facility with appropriate resources  4/8/2024 0950 by Rosi Hopson, RN  Outcome: Progressing  Flowsheets (Taken 4/8/2024 0940)  Discharge to home or other facility with appropriate resources:   Identify barriers to discharge with patient and caregiver   Arrange for needed discharge resources and transportation as appropriate   Identify discharge learning needs (meds, wound care, etc)   Arrange for interpreters to assist at discharge as needed   Refer to discharge planning if patient needs post-hospital services based on physician order or complex needs related to functional status, cognitive ability or social support system  4/8/2024 0118 by Mely Mills, RN  Outcome: Progressing  Flowsheets (Taken 4/7/2024 1445 by Bhavin Blanchard, RN)  Discharge to home or other facility with appropriate resources: Identify barriers to discharge with patient and caregiver     Problem: Skin/Tissue Integrity  Goal: Absence of new skin breakdown  Description: 1.  Monitor for areas of redness and/or skin breakdown  2.  Assess vascular access sites hourly  3.  Every 4-6 hours minimum:  Change oxygen saturation probe site  4.  Every 4-6 hours:  If on nasal continuous positive airway pressure, respiratory therapy assess nares and determine need for appliance change or resting period.  4/8/2024 0950 by Rosi Hopson RN  Outcome: Progressing  4/8/2024 0118 by Mely Mills RN  Outcome: Progressing     Problem: ABCDS Injury Assessment  Goal: Absence of physical injury  4/8/2024 0950 by Rosi Hopson, RN  Outcome: Progressing  4/8/2024 0118 by Mely Mills RN  Outcome: Progressing     Problem: Safety - Adult  Goal: Free from fall injury  4/8/2024 0950 by Rosi Hopson, RN  Outcome: Progressing  4/8/2024 0118 by Mely Mills RN  Outcome: Progressing     Problem: Respiratory - Adult  Goal: Achieves optimal ventilation and oxygenation  4/8/2024  lines, tubes and drains  Goal: Absence of infection during hospitalization  Outcome: Progressing  Flowsheets (Taken 4/8/2024 0940)  Absence of infection during hospitalization:   Assess and monitor for signs and symptoms of infection   Monitor lab/diagnostic results   Monitor all insertion sites i.e., indwelling lines, tubes and drains     Problem: Metabolic/Fluid and Electrolytes - Adult  Goal: Electrolytes maintained within normal limits  Outcome: Progressing  Flowsheets (Taken 4/8/2024 0940)  Electrolytes maintained within normal limits:   Monitor labs and assess patient for signs and symptoms of electrolyte imbalances   Administer electrolyte replacement as ordered     Problem: Hematologic - Adult  Goal: Maintains hematologic stability  Outcome: Progressing  Flowsheets (Taken 4/8/2024 0940)  Maintains hematologic stability:   Assess for signs and symptoms of bleeding or hemorrhage   Monitor labs for bleeding or clotting disorders   Administer blood products/factors as ordered     Problem: Anxiety  Goal: Will report anxiety at manageable levels  Description: INTERVENTIONS:  1. Administer medication as ordered  2. Teach and rehearse alternative coping skills  3. Provide emotional support with 1:1 interaction with staff  Outcome: Progressing  Flowsheets (Taken 4/8/2024 0940)  Will report anxiety at manageable levels:   Administer medication as ordered   Teach and rehearse alternative coping skills   Provide emotional support with 1:1 interaction with staff     Problem: Coping  Goal: Pt/Family able to verbalize concerns and demonstrate effective coping strategies  Description: INTERVENTIONS:  1. Assist patient/family to identify coping skills, available support systems and cultural and spiritual values  2. Provide emotional support, including active listening and acknowledgement of concerns of patient and caregivers  3. Reduce environmental stimuli, as able  4. Instruct patient/family in relaxation techniques, as

## 2024-04-08 NOTE — RT PROTOCOL NOTE
RT Nebulizer Bronchodilator Protocol Note    There is a bronchodilator order in the chart from a provider indicating to follow the RT Bronchodilator Protocol and there is an “Initiate RT Bronchodilator Protocol” order as well (see protocol at bottom of note).    CXR Findings:  XR CHEST PORTABLE    Result Date: 4/6/2024  Pulmonary venous congestion with perihilar pulmonary edema. Underlying pneumonia cannot be excluded.       The findings from the last RT Protocol Assessment were as follows:  Smoking: Chronic pulmonary disease  Respiratory Pattern: Dyspnea on exertion or RR 21-25 bpm  Breath Sounds: Slightly diminished and/or crackles  Cough: Strong, spontaneous, non-productive  Indication for Bronchodilator Therapy: Decreased or absent breath sounds, On home bronchodilators  Bronchodilator Assessment Score: 6    Aerosolized bronchodilator medication orders have been revised according to the RT Nebulizer Bronchodilator Protocol below.    Respiratory Therapist to perform RT Therapy Protocol Assessment initially then follow the protocol.  Repeat RT Therapy Protocol Assessment PRN for score 0-3 or on second treatment, BID, and PRN for scores above 3.    No Indications - adjust the frequency to every 6 hours PRN wheezing or bronchospasm, if no treatments needed after 48 hours then discontinue using Per Protocol order mode.     If indication present, adjust the RT bronchodilator orders based on the Bronchodilator Assessment Score as indicated below.  If a patient is on this medication at home then do not decrease Frequency below that used at home.    0-3 - enter or revise RT bronchodilator order(s) to equivalent RT Bronchodilator order with Frequency of every 4 hours PRN for wheezing or increased work of breathing using Per Protocol order mode.       4-6 - enter or revise RT Bronchodilator order(s) to two equivalent RT bronchodilator orders with one order with BID Frequency and one order with Frequency of every 4 hours

## 2024-04-08 NOTE — PROGRESS NOTES
[facility]  Office: [phone]  Marty Thorpe DO, Alvaro Brannon, DO, Jefferson Lacy DO, Josh Licona, DO, Richard Chinchilla MD, Margarita Mullins MD, Ilsa Monroe MD, Dorothea Perez MD,  Maycol Castanon MD, Levi Sweeney MD, Andi Myles MD,  Dinh Olson DO, Elyse Griffin MD, Wyatt Kelley MD, Vineet Thorpe DO, Melinda Fernandez MD,  Diogo Parr DO, Taylor Biswas MD, Tala Ruiz MD, Radha Whitt MD, Erik Bird MD,  Erasmo De La O MD, Maribell Leigh MD, Rupali Randall MD, Raissa Moran MD, Ru Silva MD, Winston Guaman MD, Jonathon Walters DO, Justice Abreu DO, Scott Blum MD,  Kapil Aguilar MD, Shirley Waterhouse, CNP,  Catherine Johnson CNP, Roger Sierra, CNP,  Kaelyn Alegre, DNP, Gillian Bey, CNP, Ely Delgado, CNP, Victorina Bear, CNP, Eileen Yeager, CNP, Stacey Mccrary, PA-C, Charity Ramachandran PA-C, Yany Beltran, CNP, Jen Zhao, CNP, Shamir Starks, CNP, Yajaira Suarez, CNP, Lilia Agarwal, CNP, Piedad Preston, CNS, Kathi Bauer, CNP, Natalie Wu CNP, Tracy Schwab, CNP         [facility]   IN-PATIENT SERVICE   [facility]    Progress Note    4/8/2024    8:30 AM    Name:   Rosi Haley  MRN:     [id number]     Acct:      [id number]   Room:   [facility]/321-01   Day:  2  Admit Date:  4/6/2024  7:11 PM    PCP:   Radha Wynn MD  Code Status:  Full Code    Subjective:     Patient was seen and examined at bedside this AM. She reports feeling well and is without complaint this morning. The patient continues to have significant bilateral lower extremity edema. Plan to continue diuresis and monitor for improvement. Nephrology is following; appreciate recommendations.     Medications:     Allergies:    Allergies   Allergen Reactions    Amlodipine Other (See Comments)     Ankle and leg swelling    Aspirin Itching and Rash     Pt can only take 81mg       Current Meds:   Scheduled Meds:    chlorothiazide  500 mg IntraVENous Once    bumetanide  1  mg IntraVENous BID    ipratropium 0.5 mg-albuterol 2.5 mg  1 Dose Inhalation BID RT    sodium chloride flush  5-40 mL IntraVENous 2 times per day    apixaban  5 mg Oral BID    [Held by provider] carvedilol  6.25 mg Oral BID WC    cinacalcet  90 mg Oral BID    folic acid  2 mg Oral Daily    magnesium oxide  400 mg Oral Daily    midodrine  5 mg Oral TID WC    pantoprazole  40 mg Oral QAM AC    pravastatin  40 mg Oral Daily    pregabalin  50 mg Oral BID    vancomycin  125 mg Oral 4x Daily    miconazole   Topical BID     Continuous Infusions:    sodium chloride       PRN Meds: ondansetron **OR** ondansetron, potassium chloride **OR** potassium alternative oral replacement **OR** potassium chloride, magnesium sulfate, albuterol, sodium chloride flush, sodium chloride, acetaminophen **OR** acetaminophen, polyethylene glycol, albuterol, hydrOXYzine HCl    Data:     Vitals:  BP 93/70   Pulse 87   Temp 98.1 °F (36.7 °C) (Oral)   Resp 22   Ht 1.606 m (5' 3.23\")   Wt 125.6 kg (276 lb 14.4 oz)   SpO2 98%   BMI 48.70 kg/m²   Temp (24hrs), Av.4 °F (36.9 °C), Min:98.1 °F (36.7 °C), Max:99.1 °F (37.3 °C)    No results for input(s): \"POCGLU\" in the last 72 hours.    I/O (24Hr):    Intake/Output Summary (Last 24 hours) at 2024 0830  Last data filed at 2024 0638  Gross per 24 hour   Intake 610 ml   Output 250 ml   Net 360 ml       Labs:  Hematology:  Recent Labs     24  1015 24  1929 24  0741   WBC 7.2 7.5 7.3   RBC 2.31* 2.63* 2.38*   HGB 6.8* 7.9* 7.2*   HCT 24.6* 24.8* 22.4*   .5* 94.3 94.1   MCH 29.4 30.0 30.2   MCHC 27.6* 31.8 32.1   RDW 20.2* 21.8* 20.8*    281 237   MPV 10.2 7.5 7.3   INR  --   --  1.2     Chemistry:  Recent Labs     24  0258 24  0741 24  0639     --   --  146* 145*   K 3.3*  --   --  3.7 4.0   CL 97*  --   --  101 100   CO2 37*  --   --  39* 37*   GLUCOSE 98  --   --  79 97   BUN 22  --   --  20 23

## 2024-04-08 NOTE — PROGRESS NOTES
Nutrition Education    Educated on weight monitoring daily and the importance of a low sodium diet. Patient did mention she will be doing to assisted living.   Learners: Patient  Readiness: Acceptance  Method: Explanation and Handout  Response: Needs Reinforcement  Contact name and number provided.    Gaetano Ramos RD  Contact Number: [phone]

## 2024-04-08 NOTE — CONSULTS
Nephrology Consult Note    Reason for Consult:  Elevated Creatinine with Volume overload  Requesting Physician:  Dr. Kelley    Chief Complaint:  Abnormal labs  History Obtained From:  patient, electronic medical record    History of Present Illness:              This is a 78 y.o. female who has a past medical history significant for morbid obesity, history of hypertension, history of coronary artery disease and stent placement in left circumflex around 5 years ago, patient is under the care of Dr. Taylor.patient also has a history of congestive cardiac failure, untreated obstructive sleep apnea, COPD.  Patient also has a history of chronic anemia.  She underwent bone marrow biopsy which was negative for malignancy.  Bone marrow was hypercellular.  Patient was recently admitted to [address] [place] [address] with volume overload, hyponatremia as well as hypercalcemia.  Patient does have a history of elevated creatinine of 1.3 mg/dL.  Her renal workup was essentially negative.    Patient was at assisted living.  And routine labs were drawn which shows hemoglobin of 6.8.  In ER her hemoglobin was 7.9 with platelet count of 281.  Her sodium was 142 with potassium of 3.3 and creatinine of 1.3.  Her magnesium was 1.5 and calcium was 8.1.  Her pro BNP was 12,326 and her chest x-ray shows mild CHF.  Her physical examination was suggestive of significant lower extremity swelling.  Patient was admitted to hospital and nephrology was consulted for elevated creatinine as well as to help in diuresis.  Currently patient is on 2 mg of Bumex IV twice daily.  She is receiving oxygen by nasal cannula and saturating 92 to 95%.  Patient denies any chest pain, she denies any shortness of breath at rest.  There is no history of NSAIDs use.    Past Medical History:        Diagnosis Date    Atrial fibrillation (HCC) 10/31/2017    New onset atrial fibrillation rate well controlled.    CAD (coronary artery disease)     CHF (congestive heart  PRN  sodium chloride flush 0.9 % injection 5-40 mL, 2 times per day  sodium chloride flush 0.9 % injection 10 mL, PRN  0.9 % sodium chloride infusion, PRN  acetaminophen (TYLENOL) tablet 650 mg, Q6H PRN   Or  acetaminophen (TYLENOL) suppository 650 mg, Q6H PRN  polyethylene glycol (GLYCOLAX) packet 17 g, Daily PRN  albuterol (PROVENTIL) (2.5 MG/3ML) 0.083% nebulizer solution 2.5 mg, Q4H PRN  apixaban (ELIQUIS) tablet 5 mg, BID  [Held by provider] carvedilol (COREG) tablet 6.25 mg, BID WC  cinacalcet (SENSIPAR) tablet 90 mg, BID  folic acid (FOLVITE) tablet 2 mg, Daily  magnesium oxide (MAG-OX) tablet 400 mg, Daily  midodrine (PROAMATINE) tablet 5 mg, TID WC  pantoprazole (PROTONIX) tablet 40 mg, QAM AC  pravastatin (PRAVACHOL) tablet 40 mg, Daily  hydrOXYzine HCl (ATARAX) tablet 25 mg, Q6H PRN  pregabalin (LYRICA) capsule 50 mg, BID  vancomycin (VANCOCIN) capsule 125 mg, 4x Daily  ipratropium 0.5 mg-albuterol 2.5 mg (DUONEB) nebulizer solution 1 Dose, TID RT  miconazole (MICOTIN) 2 % powder, BID        Allergies:  Amlodipine and Aspirin    Social History:   Social History     Socioeconomic History    Marital status:      Spouse name: Not on file    Number of children: Not on file    Years of education: Not on file    Highest education level: Not on file   Occupational History    Not on file   Tobacco Use    Smoking status: Former     Current packs/day: 0.00     Average packs/day: 2.0 packs/day for 40.0 years (80.0 ttl pk-yrs)     Types: Cigarettes     Start date: 1962     Quit date: 2002     Years since quittin.2    Smokeless tobacco: Never   Vaping Use    Vaping Use: Never used   Substance and Sexual Activity    Alcohol use: No    Drug use: No    Sexual activity: Not on file   Other Topics Concern    Not on file   Social History Narrative    Not on file     Social Determinants of Health     Financial Resource Strain: Low Risk  (2023)    Overall Financial Resource Strain (CARDIA)

## 2024-04-08 NOTE — CARE COORDINATION
Case Management Assessment  Initial Evaluation    Date/Time of Evaluation: 4/8/2024 4:33 PM  Assessment Completed by: PAUL HOLLIDAY CARO    If patient is discharged prior to next notation, then this note serves as note for discharge by case management.    Patient Name: Rosi Haley                   YOB: 1945  Diagnosis: Acute on chronic heart failure, unspecified heart failure type (HCC) [I50.9]  Pneumonia due to infectious organism, unspecified laterality, unspecified part of lung [J18.9]  Congestive heart failure, unspecified HF chronicity, unspecified heart failure type (HCC) [I50.9]                   Date / Time: 4/6/2024  7:11 PM    Patient Admission Status: Inpatient   Readmission Risk (Low < 19, Mod (19-27), High > 27): Readmission Risk Score: 25.6    Current PCP: Radha Wynn MD  PCP verified by CM? Yes    Chart Reviewed: Yes      History Provided by: Child/Family  Patient Orientation: Alert and Oriented    Patient Cognition: Alert    Hospitalization in the last 30 days (Readmission):  No    If yes, Readmission Assessment in CM Navigator will be completed.    Advance Directives:      Code Status: Full Code   Patient's Primary Decision Maker is: Named in Scanned ACP Document    Primary Decision Maker: Gopal Haley - Child - [phone]    Primary Decision Maker: Ihsan Haley - Spouse - [phone]    Discharge Planning:    Patient lives with: Alone Type of Home: Assisted living  Primary Care Giver: Other (Comment) ([facility])  Patient Support Systems include: Children   Current Financial resources: Medicare  Current community resources: None  Current services prior to admission: Durable Medical Equipment, Oxygen Therapy, Home Care ([facility], Unsure of oxygen provider)            Current DME: Walker, Shower Chair            Type of Home Care services:  OT, PT, Nursing Services    ADLS  Prior functional level: Assistance with the following:,

## 2024-04-08 NOTE — PLAN OF CARE
Problem: Respiratory - Adult  Goal: Achieves optimal ventilation and oxygenation  Outcome: Progressing  Flowsheets (Taken 4/8/2024 0820)  Achieves optimal ventilation and oxygenation:   Assess for changes in respiratory status   Assess for changes in mentation and behavior   Position to facilitate oxygenation and minimize respiratory effort   Oxygen supplementation based on oxygen saturation or arterial blood gases   Encourage broncho-pulmonary hygiene including cough, deep breathe, incentive spirometry   Assess the need for suctioning and aspirate as needed   Assess and instruct to report shortness of breath or any respiratory difficulty   Respiratory therapy support as indicated

## 2024-04-08 NOTE — PROGRESS NOTES
Occupational Therapy  Facility/Department: [address]  Occupational Therapy Initial Assessment    Name: Rosi Haley  : 1945  MRN: [id number]  Date of Service: 2024    Discharge Recommendations:  Patient would benefit from continued therapy after discharge  OT Equipment Recommendations  Other: TBD     Chief Complaint   Patient presents with    Abnormal Lab      Patient Diagnosis(es): The primary encounter diagnosis was Congestive heart failure, unspecified HF chronicity, unspecified heart failure type (HCC). A diagnosis of Pneumonia due to infectious organism, unspecified laterality, unspecified part of lung was also pertinent to this visit.  Past Medical History:  has a past medical history of Atrial fibrillation (HCC), CAD (coronary artery disease), CHF (congestive heart failure) (HCC), Chronic bilateral low back pain with left-sided sciatica, Chronic respiratory failure (HCC), CKD (chronic kidney disease), Colitis, Dyslipidemia, GI bleed, Hypertension, Hypoxia, Leukopenia, LLQ pain, PAC (premature atrial contraction), Short of breath on exertion, Supplemental oxygen dependent, and Wound of left leg.  Past Surgical History:  has a past surgical history that includes Coronary angioplasty with stent; Hysterectomy; Tubal ligation; Tonsillectomy; Colonoscopy; Leg Surgery (Left, 2017); pr i&d below fascia foot 1 bursal space (Left, 2017); Upper gastrointestinal endoscopy (N/A, 2023); Colonoscopy (N/A, 12/10/2023); Upper gastrointestinal endoscopy (N/A, 2023); and CT BIOPSY BONE MARROW (3/26/2024).           Assessment   Performance deficits / Impairments: Decreased functional mobility ;Decreased ADL status;Decreased balance  Assessment: Patient demonstrated decreased ADLs, balance and functional mobility following hospitalization due to LE swelling, CHF, C-Diff. Patient would benefit from skilled OT services addressing above deficits while here at hospital and following discharge to  aid    Cognition  Overall Cognitive Status: Exceptions  Arousal/Alertness: Appropriate responses to stimuli  Following Commands: Follows all commands without difficulty  Attention Span: Attends with cues to redirect  Safety Judgement: Decreased awareness of need for safety  Problem Solving: Assistance required to identify errors made;Assistance required to correct errors made  Insights: Decreased awareness of deficits  Initiation: Requires cues for some  Sequencing: Requires cues for some  Orientation  Overall Orientation Status: Within Functional Limits  Orientation Level: Oriented X4                  Education Given To: Patient  Education Provided: Role of Therapy;Plan of Care;ADL Adaptive Strategies;Transfer Training;Fall Prevention Strategies;Precautions  Education Method: Demonstration;Verbal  Barriers to Learning: Hearing;Cognition  Education Outcome: Verbalized understanding;Continued education needed                     AM-PAC - ADL  AM-PAC Daily Activity - Inpatient   How much help is needed for putting on and taking off regular lower body clothing?: Total  How much help is needed for bathing (which includes washing, rinsing, drying)?: A Lot  How much help is needed for toileting (which includes using toilet, bedpan, or urinal)?: Total  How much help is needed for putting on and taking off regular upper body clothing?: A Lot  How much help is needed for taking care of personal grooming?: A Lot  How much help for eating meals?: A Little  AM-PAC Inpatient Daily Activity Raw Score: 11  AM-PAC Inpatient ADL T-Scale Score : 29.04  ADL Inpatient CMS 0-100% Score: 70.42  ADL Inpatient CMS G-Code Modifier : CL        Goals  Short Term Goals  Time Frame for Short Term Goals: 14 visits  Short Term Goal 1: Pt will complete UB grooming in sitting with set up  Short Term Goal 2: Pt will complete bed mobility with MIN x2 in prep for ADL completion/functional transfer  Short Term Goal 3: Pt will complete SPT with MIN x2

## 2024-04-08 NOTE — PROGRESS NOTES
SPIRITUAL CARE DEPARTMENT - [facility]  PROGRESS NOTE    Room # 321/321-01   Name: Rosi Haley              Reason for visit: Routine    I visited the patient.    Admit Date & Time: 4/6/2024  7:11 PM    Assessment:  Rosi Haley is a 78 y.o. female in the hospital because \"heart failure\". Upon entering the room pt was sitting in recliner chair, eating breakfast. Pt shared about her son. Pt said she is \"close with\" her son. Pt shared about her current medical challenge. Pt appeared to be calm and coping at this time.       Intervention:  I introduced myself and my title as  I offered space for patient  to express feelings, needs, and concerns and provided a ministry presence. Writer actively listened to patient. Writer assured pt of his prayers for pt    Outcome:  Pt said \"I will call you if I need you\" Writer informed pt to ask nurse to contact  if a need arises. Pt thanked writer for visit     Plan:  Chaplains will remain available to offer spiritual and emotional support as needed.    Electronically signed by Chaplain Mati, on 4/8/2024 at 9:29 AM.  Spiritual Care Department  [facility] -        04/08/24 0928   Encounter Summary   Encounter Overview/Reason  Initial Encounter   Service Provided For: Patient   Referral/Consult From: Nurse   Support System Children   Last Encounter  04/08/24   Complexity of Encounter Low   Begin Time 0923   End Time  0930   Total Time Calculated 7 min   Spiritual/Emotional needs   Type Spiritual Support   Assessment/Intervention/Outcome   Assessment Calm;Coping   Intervention Active listening;Sustaining Presence/Ministry of presence;Explored/Affirmed feelings, thoughts, concerns   Outcome Engaged in conversation;Expressed Gratitude;Coping   Plan and Referrals   Plan/Referrals Continue Support (comment)

## 2024-04-08 NOTE — CONSULTS
Palliative Care Inpatient Consult    NAME:  Rosi Haley  MEDICAL RECORD NUMBER:  [id number]  AGE: 78 y.o.   GENDER: female  : 1945  TODAY'S DATE:  2024    Reasons for Consultation:    Symptom and/or pain management  Provision of information regarding PC and/or hospice philosophies  Complex, time-intensive communication and interdisciplinary psychosocial support  Clarification of goals of care and/or assistance with difficult decision-making  Guidance in regards to resources and transition(s)    Members of PC team contributing to this consultation are : Magda Morataya, Palliative Care APRN-CNP    Plan      Palliative Interaction: Patient seen and examined on rounds. Patient up in chair, enjoying breakfast, having just worked with physical therapy. Introduce myself and the palliative role in her care.     Patient shares she lives at [facility]. She states they help her with all her ADLs as she is unable to ambulate and uses a wheelchair. She admits to several admissions over the past few months, she states this admission was prompted by increased swelling in her legs. She states her breathing feels fine to her and states not having any difficulty.     We discuss her wishes for resuscitation, she conveys her wishes have always been to remain a full code and this still stands true. We discuss how cpr could be burdensome in the event of a cardiac arrest given her chronic conditions. She states understanding and states she wishes to continue as a full code.    We discuss palliative care outpatient services given her frequent admissions related to heart failure. She states interest in this and feels it would be beneficial. She has concerns regarding finances and how this would be taken care. Working with case management surrounding this. Referral placed to [facility] [place] to possibly follow patient on discharge.     She has completed [employer] paperwork, this names her  as her  thickness. Normal wall motion.    Mitral Valve: Mild regurgitation.    Image quality is adequate.    Signed by: Siva Franklin MD on 3/1/2024  3:11 PM       Encounter Date: 02/28/24   EKG 12 Lead   Result Value    Ventricular Rate 108    Atrial Rate 115    QRS Duration 138    Q-T Interval 346    QTc Calculation (Bazett) 463    R Axis 80    T Axis -24    Narrative    Atrial fibrillation with rapid ventricular response  Right bundle branch block  T wave abnormality, consider inferolateral ischemia  Abnormal ECG  No previous ECGs available        Code Status: Full Code     ADVANCED CARE PLANNING:  Patient has capacity for medical decisions: yes  Health Care Power of : yes  Living Will: no     Personal, Social, and Family History  Marital Status:   Living situation:nursing home  Importance of rea/Lutheran/spiritual beliefs: [] Very [] Somewhat [] Not   Psychological Distress: moderate  Does patient understand diagnosis/treatment? yes  Does caregiver understand diagnosis/treatment? not asked      Assessment        REVIEW OF SYSTEMS  Constitutional: no fever, no chills or weight loss  Eyes: no eye pain or blurred vision  ENT: no hearing loss, congestion, or difficulty swallowing   Respiratory: no wheezing, chest tightness, or shortness of breath   Cardiovascular: no chest pain or pressure, no palpitations, no diaphoresis   Gastrointestinal: no nausea, vomiting,abdominal pain, diarrhea or constipation, no melena   Genitourinary: no dysuria, frequency,hematuria , or nocturia   Musculoskeletal: no myalgias or arthralgias, no back pain   Skin: no rashes or sores   Neurological: no focal weakness, numbness, tingling, or headache, no seizures    PHYSICAL ASSESSMENT:  Constitutional: Alert and oriented to person, place, and time.   Head: Normocephalic and atraumatic.   Eyes: EOM are normal. Pupils are equal, round   Neck: Normal range of motion. Neck supple. No tracheal deviation present.   Cardiovascular:  Normal rate and regular rhythm, S1, S2, no murmur   Pulmonary/Chest: Effort normal and breath sounds normal. No rales or wheezes.  Abdomen: Soft. No tenderness, not distended, no ascites, no organomegaly   Musculoskeletal: Normal range of motion. No edema lower ext.   Neurological: CN II-XII grossly intact, no focal neurological deficits   Skin: Normal turgor, no bleeding, no bruising     Palliative Performance Scale:  _x__60%  Ambulation reduced; Significant disease; Can't do hobbies/housework; intake normal or reduced; occasional assist; LOC full/confusion  ___50%  Mainly sit/lie; Extensive disease; Can't do any work; Considerable assist; intake normal or reduced; LOC full/confusion  ___40%  Mainly in bed; Extensive disease; Mainly assist; intake normal or reduced; LOC full/confusion   ___30%  Bed Bound; Extensive disease; Total care; intake reduced; LOCfull/confusion  ___20%  Bed Bound; Extensive disease; Total care; intake minimal; Drowsy/coma  ___10%  Bed Bound; Extensive disease; Total care; Mouth care only; Drowsy/coma  ___0       Death        Thank you for allowing Palliative Care to participate in the care of Ms. Haley .    This note has been dictated by dragon, typing errors may be a possibility.    The total time I spent in seeing the patient, discussing goals of care, advanced directives, code status and other major issues was more than 60 minutes      Electronically signed by   ADITYA Finn CNP  Palliative Care Team  on 4/8/2024 at 8:48 AM    Palliative Care can be reached via perfect serve.

## 2024-04-08 NOTE — RT PROTOCOL NOTE
RT Nebulizer Bronchodilator Protocol Note    There is a bronchodilator order in the chart from a provider indicating to follow the RT Bronchodilator Protocol and there is an “Initiate RT Bronchodilator Protocol” order as well (see protocol at bottom of note).    CXR Findings:  XR CHEST PORTABLE    Result Date: 4/6/2024  Pulmonary venous congestion with perihilar pulmonary edema. Underlying pneumonia cannot be excluded.       The findings from the last RT Protocol Assessment were as follows:  Smoking: Chronic pulmonary disease  Respiratory Pattern: Dyspnea on exertion or RR 21-25 bpm  Breath Sounds: Slightly diminished and/or crackles  Cough: Strong, spontaneous, non-productive  Indication for Bronchodilator Therapy: Decreased or absent breath sounds, On home bronchodilators  Bronchodilator Assessment Score: 6    Aerosolized bronchodilator medication orders have been revised according to the RT Nebulizer Bronchodilator Protocol below.    Respiratory Therapist to perform RT Therapy Protocol Assessment initially then follow the protocol.  Repeat RT Therapy Protocol Assessment PRN for score 0-3 or on second treatment, BID, and PRN for scores above 3.    No Indications - adjust the frequency to every 6 hours PRN wheezing or bronchospasm, if no treatments needed after 48 hours then discontinue using Per Protocol order mode.     If indication present, adjust the RT bronchodilator orders based on the Bronchodilator Assessment Score as indicated below.  If a patient is on this medication at home then do not decrease Frequency below that used at home.    0-3 - enter or revise RT bronchodilator order(s) to equivalent RT Bronchodilator order with Frequency of every 4 hours PRN for wheezing or increased work of breathing using Per Protocol order mode.       4-6 - enter or revise RT Bronchodilator order(s) to two equivalent RT bronchodilator orders with one order with BID Frequency and one order with Frequency of every 4 hours  PRN wheezing or increased work of breathing using Per Protocol order mode.         7-10 - enter or revise RT Bronchodilator order(s) to two equivalent RT bronchodilator orders with one order with TID Frequency and one order with Frequency of every 4 hours PRN wheezing or increased work of breathing using Per Protocol order mode.       11-13 - enter or revise RT Bronchodilator order(s) to one equivalent RT bronchodilator order with QID Frequency and an Albuterol order with Frequency of every 4 hours PRN wheezing or increased work of breathing using Per Protocol order mode.      Greater than 13 - enter or revise RT Bronchodilator order(s) to one equivalent RT bronchodilator order with every 4 hours Frequency and an Albuterol order with Frequency of every 2 hours PRN wheezing or increased work of breathing using Per Protocol order mode.     RT to enter RT Home Evaluation for COPD & MDI Assessment order using Per Protocol order mode.    Electronically signed by Mely Medellin RCP on 4/7/2024 at 8:23 PM

## 2024-04-08 NOTE — ACP (ADVANCE CARE PLANNING)
Advance Care Planning     Advance Care Planning (ACP) Physician/NP/PA Conversation    Date of Conversation: 4/6/2024  Conducted with: Patient with Decision Making Capacity    Healthcare Decision Maker:  Patients son Gopal Haley - named in advanced directive.         Care Preferences:    Hospitalization:  \"If your health worsens and it becomes clear that your chance of recovery is unlikely, what would be your preference regarding hospitalization?\"  The patient would prefer hospitalization.    Ventilation:  \"If you were unable to breath on your own and your chance of recovery was unlikely, what would be your preference about the use of a ventilator (breathing machine) if it was available to you?\"  The patient would desire the use of a ventilator.    Resuscitation:  \"In the event your heart stopped as a result of an underlying serious health condition, would you want attempts made to restart your heart, or would you prefer a natural death?\"  Yes, attempt to resuscitate.    treatment goals    Conversation Outcomes / Follow-Up Plan:  ACP in process - information provided, considering goals and options  Reviewed DNR/DNI and patient elects Full Code (Attempt Resuscitation)    Length of Voluntary ACP Conversation in minutes:  <16 minutes (Non-Billable)    Magda Morataya, APRN - CNP

## 2024-04-08 NOTE — PROGRESS NOTES
Physical Therapy  Facility/Department: [address]  Physical Therapy Initial Evaluation    Name: Rosi Haley  : 1945  MRN: [id number]  Date of Service: 2024    Discharge Recommendations:  Patient would benefit from continued therapy after discharge   PT Equipment Recommendations  Equipment Needed: No      Patient Diagnosis(es): The primary encounter diagnosis was Congestive heart failure, unspecified HF chronicity, unspecified heart failure type (HCC). A diagnosis of Pneumonia due to infectious organism, unspecified laterality, unspecified part of lung was also pertinent to this visit.  Past Medical History:  has a past medical history of Atrial fibrillation (HCC), CAD (coronary artery disease), CHF (congestive heart failure) (HCC), Chronic bilateral low back pain with left-sided sciatica, Chronic respiratory failure (HCC), CKD (chronic kidney disease), Colitis, Dyslipidemia, GI bleed, Hypertension, Hypoxia, Leukopenia, LLQ pain, PAC (premature atrial contraction), Short of breath on exertion, Supplemental oxygen dependent, and Wound of left leg.  Past Surgical History:  has a past surgical history that includes Coronary angioplasty with stent; Hysterectomy; Tubal ligation; Tonsillectomy; Colonoscopy; Leg Surgery (Left, 2017); pr i&d below fascia foot 1 bursal space (Left, 2017); Upper gastrointestinal endoscopy (N/A, 2023); Colonoscopy (N/A, 12/10/2023); Upper gastrointestinal endoscopy (N/A, 2023); and CT BIOPSY BONE MARROW (3/26/2024).    Assessment   Body Structures, Functions, Activity Limitations Requiring Skilled Therapeutic Intervention: Decreased functional mobility ;Decreased strength;Decreased safe awareness;Decreased balance;Decreased endurance  Assessment: Pt presents with generalized weakness and impaired mobility. Pt resides in [facility] and requires assist x 2 for bed mobility and stand pivot transfers; pt has been nonambulatory since November. Currently, pt requried  Max assist x 2 for bed mobility and stood with RW with Mod assist x 2. Pt was unable to perform stand pivot so required use of Nicole Stedy. Pt will benefit from acute care therapy and would be appropriate for return to facility at discharge. Pt may benefit from further therapy upon discharge.  Treatment Diagnosis: decreased mobility  Therapy Prognosis: Fair  Decision Making: Medium Complexity  Requires PT Follow-Up: Yes  Activity Tolerance  Activity Tolerance: Patient tolerated evaluation without incident;Patient limited by endurance     Plan   Physical Therapy Plan  General Plan:  (5-6x/week)  Current Treatment Recommendations: Strengthening, Functional mobility training, Transfer training, Endurance training, Safety education & training, Patient/Caregiver education & training, Therapeutic activities, Balance training  Safety Devices  Type of Devices: Patient at risk for falls, Left in chair, Chair alarm in place, Gait belt  Restraints  Restraints Initially in Place: No     Restrictions  Restrictions/Precautions  Restrictions/Precautions: General Precautions, Contact Precautions  Required Braces or Orthoses?: No  Position Activity Restriction  Other position/activity restrictions: Fluid restriction     Subjective   General  Patient assessed for rehabilitation services?: Yes  Family / Caregiver Present: No  Referring Practitioner: Warren  Referral Date : 04/07/24  Diagnosis: Acute on chronic CHF  Follows Commands: Within Functional Limits  General Comment  Comments: Pt on 3 L O2 and uses it at baseline.  Subjective  Subjective: Pt supine in bed and agreeable to therapy. Pt denies pain at this time.         Social/Functional History  Social/Functional History  Lives With:  ([place])  Type of Home: Facility  Home Layout: One level  Home Access: Level entry  Bathroom Toilet: Handicap height  Bathroom Equipment: Grab bars around toilet, Shower chair  Bathroom Accessibility: Wheelchair accessible  Home Equipment:  mobility.  Short Term Goal 3: Pt to transfer with RW and Min assist x 2 sit to stand and pivot to chair without LOB.  Patient Goals   Patient Goals : None stated       Education  Patient Education  Education Given To: Patient  Education Provided: Role of Therapy;Plan of Care;Transfer Training  Education Method: Verbal;Demonstration  Barriers to Learning: None  Education Outcome: Verbalized understanding      Therapy Time   Individual Concurrent Group Co-treatment   Time In 0841         Time Out 0910         Minutes 29         Timed Code Treatment Minutes: 8 Minutes       Melissa Wiggins, PT

## 2024-04-09 LAB
ANION GAP SERPL CALCULATED.3IONS-SCNC: 7 MMOL/L (ref 9–17)
BASOPHILS # BLD: 0.09 K/UL (ref 0–0.2)
BASOPHILS NFR BLD: 1 % (ref 0–2)
BNP SERPL-MCNC: ABNORMAL PG/ML
BUN SERPL-MCNC: 26 MG/DL (ref 8–23)
CALCIUM SERPL-MCNC: 7.7 MG/DL (ref 8.6–10.4)
CHLORIDE SERPL-SCNC: 98 MMOL/L (ref 98–107)
CO2 SERPL-SCNC: 40 MMOL/L (ref 20–31)
CREAT SERPL-MCNC: 1.6 MG/DL (ref 0.5–0.9)
EOSINOPHIL # BLD: 1.38 K/UL (ref 0–0.4)
EOSINOPHILS RELATIVE PERCENT: 15 % (ref 1–4)
ERYTHROCYTE [DISTWIDTH] IN BLOOD BY AUTOMATED COUNT: 20.8 % (ref 12.5–15.4)
GFR SERPL CREATININE-BSD FRML MDRD: 33 ML/MIN/1.73M2
GLUCOSE SERPL-MCNC: 81 MG/DL (ref 70–99)
HCO3 VENOUS: 39.5 MMOL/L (ref 22–29)
HCT VFR BLD AUTO: 22.1 % (ref 36–46)
HCT VFR BLD AUTO: 25.9 % (ref 36–46)
HGB BLD-MCNC: 7 G/DL (ref 12–16)
HGB BLD-MCNC: 8.3 G/DL (ref 12–16)
LYMPHOCYTES NFR BLD: 1.93 K/UL (ref 1–4.8)
LYMPHOCYTES RELATIVE PERCENT: 21 % (ref 24–44)
MAGNESIUM SERPL-MCNC: 1.4 MG/DL (ref 1.6–2.6)
MAGNESIUM SERPL-MCNC: 1.5 MG/DL (ref 1.6–2.6)
MCH RBC QN AUTO: 29.7 PG (ref 26–34)
MCHC RBC AUTO-ENTMCNC: 31.6 G/DL (ref 31–37)
MCV RBC AUTO: 93.8 FL (ref 80–100)
MONOCYTES NFR BLD: 1.1 K/UL (ref 0.1–1.2)
MONOCYTES NFR BLD: 12 % (ref 2–11)
MORPHOLOGY: ABNORMAL
MORPHOLOGY: ABNORMAL
NEUTROPHILS NFR BLD: 51 % (ref 36–66)
NEUTS SEG NFR BLD: 4.7 K/UL (ref 1.8–7.7)
O2 DELIVERY DEVICE: ABNORMAL
O2 SAT, VEN: 94.6 % (ref 60–85)
PCO2, VEN: 54.4 MM HG (ref 41–51)
PH VENOUS: 7.47 (ref 7.32–7.43)
PLATELET # BLD AUTO: 239 K/UL (ref 140–450)
PMV BLD AUTO: 7.8 FL (ref 6–12)
PO2, VEN: 71.4 MM HG (ref 30–50)
POSITIVE BASE EXCESS, VEN: 14.2 MMOL/L (ref 0–3)
POTASSIUM SERPL-SCNC: 3.5 MMOL/L (ref 3.7–5.3)
RBC # BLD AUTO: 2.36 M/UL (ref 4–5.2)
SODIUM SERPL-SCNC: 145 MMOL/L (ref 135–144)
WBC OTHER # BLD: 9.2 K/UL (ref 3.5–11)

## 2024-04-09 PROCEDURE — 6370000000 HC RX 637 (ALT 250 FOR IP): Performed by: NURSE PRACTITIONER

## 2024-04-09 PROCEDURE — 6370000000 HC RX 637 (ALT 250 FOR IP): Performed by: STUDENT IN AN ORGANIZED HEALTH CARE EDUCATION/TRAINING PROGRAM

## 2024-04-09 PROCEDURE — 99231 SBSQ HOSP IP/OBS SF/LOW 25: CPT

## 2024-04-09 PROCEDURE — P9016 RBC LEUKOCYTES REDUCED: HCPCS

## 2024-04-09 PROCEDURE — 2580000003 HC RX 258: Performed by: NURSE PRACTITIONER

## 2024-04-09 PROCEDURE — 6370000000 HC RX 637 (ALT 250 FOR IP): Performed by: INTERNAL MEDICINE

## 2024-04-09 PROCEDURE — 83880 ASSAY OF NATRIURETIC PEPTIDE: CPT

## 2024-04-09 PROCEDURE — 85014 HEMATOCRIT: CPT

## 2024-04-09 PROCEDURE — 94640 AIRWAY INHALATION TREATMENT: CPT

## 2024-04-09 PROCEDURE — 80048 BASIC METABOLIC PNL TOTAL CA: CPT

## 2024-04-09 PROCEDURE — 36415 COLL VENOUS BLD VENIPUNCTURE: CPT

## 2024-04-09 PROCEDURE — 36430 TRANSFUSION BLD/BLD COMPNT: CPT

## 2024-04-09 PROCEDURE — 85025 COMPLETE CBC W/AUTO DIFF WBC: CPT

## 2024-04-09 PROCEDURE — 94761 N-INVAS EAR/PLS OXIMETRY MLT: CPT

## 2024-04-09 PROCEDURE — 82803 BLOOD GASES ANY COMBINATION: CPT

## 2024-04-09 PROCEDURE — 85018 HEMOGLOBIN: CPT

## 2024-04-09 PROCEDURE — 2700000000 HC OXYGEN THERAPY PER DAY

## 2024-04-09 PROCEDURE — 6360000002 HC RX W HCPCS: Performed by: INTERNAL MEDICINE

## 2024-04-09 PROCEDURE — 1210000000 HC MED SURG R&B

## 2024-04-09 PROCEDURE — 6360000002 HC RX W HCPCS: Performed by: NURSE PRACTITIONER

## 2024-04-09 PROCEDURE — 99232 SBSQ HOSP IP/OBS MODERATE 35: CPT | Performed by: STUDENT IN AN ORGANIZED HEALTH CARE EDUCATION/TRAINING PROGRAM

## 2024-04-09 PROCEDURE — 83735 ASSAY OF MAGNESIUM: CPT

## 2024-04-09 RX ORDER — SODIUM CHLORIDE 9 MG/ML
INJECTION, SOLUTION INTRAVENOUS PRN
Status: DISCONTINUED | OUTPATIENT
Start: 2024-04-09 | End: 2024-04-09

## 2024-04-09 RX ORDER — SPIRONOLACTONE 25 MG/1
25 TABLET ORAL DAILY
Status: DISCONTINUED | OUTPATIENT
Start: 2024-04-09 | End: 2024-04-10 | Stop reason: HOSPADM

## 2024-04-09 RX ADMIN — PREGABALIN 50 MG: 25 CAPSULE ORAL at 09:00

## 2024-04-09 RX ADMIN — CINACALCET HYDROCHLORIDE 90 MG: 30 TABLET, FILM COATED ORAL at 17:29

## 2024-04-09 RX ADMIN — IPRATROPIUM BROMIDE AND ALBUTEROL SULFATE 1 DOSE: 2.5; .5 SOLUTION RESPIRATORY (INHALATION) at 21:13

## 2024-04-09 RX ADMIN — MAGNESIUM SULFATE HEPTAHYDRATE 2000 MG: 40 INJECTION, SOLUTION INTRAVENOUS at 18:58

## 2024-04-09 RX ADMIN — MIDODRINE HYDROCHLORIDE 5 MG: 5 TABLET ORAL at 17:29

## 2024-04-09 RX ADMIN — ANTI-FUNGAL POWDER MICONAZOLE NITRATE TALC FREE: 1.42 POWDER TOPICAL at 09:01

## 2024-04-09 RX ADMIN — POTASSIUM CHLORIDE 40 MEQ: 1500 TABLET, EXTENDED RELEASE ORAL at 09:00

## 2024-04-09 RX ADMIN — APIXABAN 5 MG: 5 TABLET, FILM COATED ORAL at 20:09

## 2024-04-09 RX ADMIN — Medication 400 MG: at 09:00

## 2024-04-09 RX ADMIN — CINACALCET HYDROCHLORIDE 90 MG: 30 TABLET, FILM COATED ORAL at 08:59

## 2024-04-09 RX ADMIN — ANTI-FUNGAL POWDER MICONAZOLE NITRATE TALC FREE: 1.42 POWDER TOPICAL at 20:09

## 2024-04-09 RX ADMIN — APIXABAN 5 MG: 5 TABLET, FILM COATED ORAL at 09:00

## 2024-04-09 RX ADMIN — PANTOPRAZOLE SODIUM 40 MG: 40 TABLET, DELAYED RELEASE ORAL at 06:33

## 2024-04-09 RX ADMIN — HYDROXYZINE HYDROCHLORIDE 25 MG: 25 TABLET, FILM COATED ORAL at 12:01

## 2024-04-09 RX ADMIN — VANCOMYCIN HYDROCHLORIDE 125 MG: 125 CAPSULE ORAL at 20:09

## 2024-04-09 RX ADMIN — MAGNESIUM SULFATE HEPTAHYDRATE 2000 MG: 40 INJECTION, SOLUTION INTRAVENOUS at 09:13

## 2024-04-09 RX ADMIN — MIDODRINE HYDROCHLORIDE 5 MG: 5 TABLET ORAL at 12:01

## 2024-04-09 RX ADMIN — VANCOMYCIN HYDROCHLORIDE 125 MG: 125 CAPSULE ORAL at 12:01

## 2024-04-09 RX ADMIN — FOLIC ACID 2 MG: 1 TABLET ORAL at 08:59

## 2024-04-09 RX ADMIN — BUMETANIDE 1 MG: 0.25 INJECTION INTRAMUSCULAR; INTRAVENOUS at 20:09

## 2024-04-09 RX ADMIN — SODIUM CHLORIDE, PRESERVATIVE FREE 10 ML: 5 INJECTION INTRAVENOUS at 20:09

## 2024-04-09 RX ADMIN — VANCOMYCIN HYDROCHLORIDE 125 MG: 125 CAPSULE ORAL at 09:00

## 2024-04-09 RX ADMIN — SPIRONOLACTONE 25 MG: 25 TABLET, FILM COATED ORAL at 15:19

## 2024-04-09 RX ADMIN — IPRATROPIUM BROMIDE AND ALBUTEROL SULFATE 1 DOSE: 2.5; .5 SOLUTION RESPIRATORY (INHALATION) at 08:23

## 2024-04-09 RX ADMIN — SODIUM CHLORIDE, PRESERVATIVE FREE 10 ML: 5 INJECTION INTRAVENOUS at 09:06

## 2024-04-09 RX ADMIN — BUMETANIDE 1 MG: 0.25 INJECTION INTRAMUSCULAR; INTRAVENOUS at 09:00

## 2024-04-09 RX ADMIN — VANCOMYCIN HYDROCHLORIDE 125 MG: 125 CAPSULE ORAL at 17:29

## 2024-04-09 RX ADMIN — PREGABALIN 50 MG: 25 CAPSULE ORAL at 20:09

## 2024-04-09 RX ADMIN — PRAVASTATIN SODIUM 40 MG: 20 TABLET ORAL at 09:00

## 2024-04-09 RX ADMIN — HYDROXYZINE HYDROCHLORIDE 25 MG: 25 TABLET, FILM COATED ORAL at 21:23

## 2024-04-09 NOTE — PROGRESS NOTES
SPIRITUAL CARE DEPARTMENT - [facility]  PROGRESS NOTE    Room # 321/321-01   Name: Rosi Haley            Lutheran: Zoroastrianism     Reason for visit:  Palliative Care    I visited the  Patient and Son .    Admit Date & Time: 4/6/2024  7:11 PM    Assessment:  Rosi Haley is a 78 y.o. female in the hospital because of Acute on chronic heart failure. Upon entering the room Writer observed Pt lying in bed, and Pt's son was at bedside. Pt smiled and greeted writer. Pt shared how she was doing. Pt asked if writer could offer a prayer for her. Pt shared that she is Zoroastrianism. Pt voiced her prayer requests.    Intervention:  I introduced myself and my title as   Writer inquired how Pt was doing.  Writer prayed for and with Pt. Writer offered words of support and encouragement. Writer told Pt the chaplains are available for support.    Outcome:  Pt and Son thanked writer for the visit.     Plan:  Chaplains will remain available to offer spiritual and emotional support as needed.     04/09/24 1646   Encounter Summary   Service Provided For: Patient   Referral/Consult From: [facility]   [facility]   Last Encounter  04/09/24   Complexity of Encounter Low   Begin Time 1635   End Time  1643   Total Time Calculated 8 min   Spiritual/Emotional needs   Type Spiritual Support   Assessment/Intervention/Outcome   Assessment Impaired resilience;Coping   Intervention Discussed illness injury and it’s impact;Discussed belief system/Buddhist practices/rea;Active listening;Explored/Affirmed feelings, thoughts, concerns;Explored Coping Skills/Resources;Prayer (assurance of)/[place];Sustaining Presence/Ministry of presence   Outcome Receptive;Expressed Gratitude;Expressed feelings, needs, and concerns;Engaged in conversation   Plan and Referrals   Plan/Referrals Continue Support (comment)       Electronically signed by Chaplain Kayleigh, on 4/9/2024 at 4:47 PM.  Spiritual Health Department  [facility]

## 2024-04-09 NOTE — PROGRESS NOTES
[facility]  Office: [phone]  Marty Thorpe DO, Alvaro Brannon DO, Jefferson Lacy DO, Josh Licona DO, Richard Chinchilla MD, Margarita Mullins MD, Ilsa Monroe MD, Dorothea Perez MD,  Maycol Castanon MD, Levi Sweeney MD, Andi Myles MD,  Dinh Olson DO, Elyse Griffin MD, Wyatt Kelley MD, Vineet Thorpe DO, Melinda Fernandez MD,  Diogo Parr DO, Taylor Biswas MD, Tala Ruiz MD, Radha Whitt MD, Erik Bird MD,  Erasmo De L aO MD, Maribell Leigh MD, Rupali Randall MD, Raissa Moran MD, Ru Silva MD, Winston Guaman MD, Jonathon Walters DO, Justice Abreu DO, Scott Blum MD,  Kapil Aguilar MD, Shirley Waterhouse, CNP,  Catherine Johnson CNP, Roger Sierra, CNP,  Kaelyn Alegre, DNP, Gillian Bey, CNP, Ely Delgado, CNP, Victorina Bear CNP, Eileen Yeager, CNP, Stacey Mccrary, PA-C, Charity Ramachandran PA-C, Yany Beltran, CNP, Jen Zhao, CNP, Shamir Starks, CNP, Yajaira Suarez, CNP, Lilia Agarwal, CNP, Piedad Preston, CNS, Kathi Bauer, CNP, Natalie Wu CNP, Tracy Schwab, CNP         [facility]   IN-PATIENT SERVICE   [facility]    Progress Note    4/9/2024    10:17 AM    Name:   Rosi Haley  MRN:     [id number]     Acct:      [id number]   Room:   321/321-01   Day:  3  Admit Date:  4/6/2024  7:11 PM    PCP:   Radha Wynn MD  Code Status:  Full Code    Subjective:     Patient was seen and examined at bedside this AM. She reports much better and states that she would like to be discharged. The patient continues to have significant bilateral lower extremity edema. Plan to continue diuresis and monitor for improvement. Anticipate discharge in 1-2 days. The patient remains anemic although her hemoglobin is stable at 7. Plan to transfuse 1 unit of PRBCs today.     Medications:     Allergies:    Allergies   Allergen Reactions   • Amlodipine Other (See Comments)     Ankle and leg swelling   • Aspirin Itching and Rash     Pt can only take  97*  --   --  101 100 99 98   CO2 37*  --   --  39* 37* 40* 40*   GLUCOSE 98  --   --  79 97 103* 81   BUN 22  --   --  20 23 24* 26*   CREATININE 1.3*  --   --  1.1* 1.3* 1.6* 1.6*   MG 1.5*  --   --  1.3* 1.4*  --  1.4*   ANIONGAP 8*  --   --  6* 8* 9 7*   LABGLOM 43*  --   --  52* 43* 33* 33*   CALCIUM 8.1*  --   --  7.8* 7.5* 7.4* 7.7*   CAION  --   --   --   --   --  1.01*  --    PROBNP 14,913*  --   --  12,326*  --   --  14,988*   TROPHS 46* 43* 43*  --   --   --   --      Recent Labs     04/06/24  1929 04/07/24  0741   PROT 6.5  --    LABALBU 3.3*  --    TSH  --  6.63*   AST 21  --    ALT 12  --    ALKPHOS 84  --    BILITOT 0.3  --    CHOL  --  124   HDL  --  46   LDLCHOLESTEROL  --  60   CHOLHDLRATIO  --  3.0   TRIG  --  87   VLDL  --  17     ABG:  Lab Results   Component Value Date/Time    PHART 7.427 12/31/2023 10:15 AM    EUB9KDZ 67.7 12/31/2023 10:15 AM    PO2ART 75.8 12/31/2023 10:15 AM    MEW9TOE 44.6 12/31/2023 10:15 AM    PBEA 20.2 12/31/2023 10:15 AM    T1HBICAM 93.2 12/31/2023 10:15 AM     Lab Results   Component Value Date/Time    SPECIAL LEFT ARM 6ML 04/06/2024 10:51 PM     Lab Results   Component Value Date/Time    CULTURE NO GROWTH 2 DAYS 04/06/2024 10:51 PM       Radiology:  XR CHEST (2 VW)    Result Date: 4/7/2024  1. Findings suggestive of mild CHF/volume overload. 2. Retrocardiac opacity may represent atelectasis, consolidation, and/or effusion.     XR CHEST PORTABLE    Result Date: 4/6/2024  Pulmonary venous congestion with perihilar pulmonary edema. Underlying pneumonia cannot be excluded.       Physical Examination:     General appearance:  alert, cooperative and no distress  Mental Status:  oriented to person, place and time and normal affect  Lungs:  Scattered wheezes appreciated.   Heart:  regular rate and rhythm, no murmur  Abdomen:  soft, nontender, nondistended, normal bowel sounds, no masses, hepatomegaly, splenomegaly  Extremities:  3+ pitting edema appreciated.   Skin:  no gross

## 2024-04-09 NOTE — CONSENT
Informed Consent for Blood Component Transfusion Note    I have discussed with the patient the rationale for blood component transfusion; its benefits in treating or preventing fatigue, organ damage, or death; and its risk which includes mild transfusion reactions, rare risk of blood borne infection, or more serious but rare reactions. I have discussed the alternatives to transfusion, including the risk and consequences of not receiving transfusion. The patient had an opportunity to ask questions and had agreed to proceed with transfusion of blood components.    Electronically signed by Wyatt Kelley MD on 4/9/24 at 11:00 AM EDT

## 2024-04-09 NOTE — PLAN OF CARE
Problem: Discharge Planning  Goal: Discharge to home or other facility with appropriate resources  Outcome: Progressing  Flowsheets (Taken 4/9/2024 1628)  Discharge to home or other facility with appropriate resources:   Identify barriers to discharge with patient and caregiver   Arrange for needed discharge resources and transportation as appropriate   Identify discharge learning needs (meds, wound care, etc)   Refer to discharge planning if patient needs post-hospital services based on physician order or complex needs related to functional status, cognitive ability or social support system     Problem: ABCDS Injury Assessment  Goal: Absence of physical injury  Outcome: Progressing  Flowsheets (Taken 4/9/2024 1628)  Absence of Physical Injury: Implement safety measures based on patient assessment     Problem: Safety - Adult  Goal: Free from fall injury  Outcome: Progressing  Flowsheets (Taken 4/9/2024 1628)  Free From Fall Injury:   Instruct family/caregiver on patient safety   Based on caregiver fall risk screen, instruct family/caregiver to ask for assistance with transferring infant if caregiver noted to have fall risk factors     Problem: Respiratory - Adult  Goal: Achieves optimal ventilation and oxygenation  4/9/2024 0829 by Madai Monzon RCP  Outcome: Progressing     Problem: Chronic Conditions and Co-morbidities  Goal: Patient's chronic conditions and co-morbidity symptoms are monitored and maintained or improved  Outcome: Progressing  Flowsheets (Taken 4/9/2024 1628)  Care Plan - Patient's Chronic Conditions and Co-Morbidity Symptoms are Monitored and Maintained or Improved:   Monitor and assess patient's chronic conditions and comorbid symptoms for stability, deterioration, or improvement   Collaborate with multidisciplinary team to address chronic and comorbid conditions and prevent exacerbation or deterioration   Update acute care plan with appropriate goals if chronic or comorbid symptoms are  exacerbated and prevent overall improvement and discharge

## 2024-04-09 NOTE — RT PROTOCOL NOTE
RT Inhaler-Nebulizer Bronchodilator Protocol Note    There is a bronchodilator order in the chart from a provider indicating to follow the RT Bronchodilator Protocol and there is an “Initiate RT Inhaler-Nebulizer Bronchodilator Protocol” order as well (see protocol at bottom of note).    CXR Findings:  No results found.    The findings from the last RT Protocol Assessment were as follows:   History Pulmonary Disease: Chronic pulmonary disease  Respiratory Pattern: Dyspnea on exertion or RR 21-25 bpm  Breath Sounds: Slightly diminished and/or crackles  Cough: Strong, spontaneous, non-productive  Indication for Bronchodilator Therapy: Decreased or absent breath sounds, On home bronchodilators  Bronchodilator Assessment Score: 6    Aerosolized bronchodilator medication orders have been revised according to the RT Inhaler-Nebulizer Bronchodilator Protocol below.    Respiratory Therapist to perform RT Therapy Protocol Assessment initially then follow the protocol.  Repeat RT Therapy Protocol Assessment PRN for score 0-3 or on second treatment, BID, and PRN for scores above 3.    No Indications - adjust the frequency to every 6 hours PRN wheezing or bronchospasm, if no treatments needed after 48 hours then discontinue using Per Protocol order mode.     If indication present, adjust the RT bronchodilator orders based on the Bronchodilator Assessment Score as indicated below.  Use Inhaler orders unless patient has one or more of the following: on home nebulizer, not able to hold breath for 10 seconds, is not alert and oriented, cannot activate and use MDI correctly, or respiratory rate 25 breaths per minute or more, then use the equivalent nebulizer order(s) with same Frequency and PRN reasons based on the score.  If a patient is on this medication at home then do not decrease Frequency below that used at home.    0-3 - enter or revise RT bronchodilator order(s) to equivalent RT Bronchodilator order with Frequency of every  4 hours PRN for wheezing or increased work of breathing using Per Protocol order mode.        4-6 - enter or revise RT Bronchodilator order(s) to two equivalent RT bronchodilator orders with one order with BID Frequency and one order with Frequency of every 4 hours PRN wheezing or increased work of breathing using Per Protocol order mode.        7-10 - enter or revise RT Bronchodilator order(s) to two equivalent RT bronchodilator orders with one order with TID Frequency and one order with Frequency of every 4 hours PRN wheezing or increased work of breathing using Per Protocol order mode.       11-13 - enter or revise RT Bronchodilator order(s) to one equivalent RT bronchodilator order with QID Frequency and an Albuterol order with Frequency of every 4 hours PRN wheezing or increased work of breathing using Per Protocol order mode.      Greater than 13 - enter or revise RT Bronchodilator order(s) to one equivalent RT bronchodilator order with every 4 hours Frequency and an Albuterol order with Frequency of every 2 hours PRN wheezing or increased work of breathing using Per Protocol order mode.     RT to enter RT Home Evaluation for COPD & MDI Assessment order using Per Protocol order mode.    Electronically signed by Madai Monzon RCP on 4/9/2024 at 8:30 AM

## 2024-04-09 NOTE — PROGRESS NOTES
Palliative Care Progress Note    NAME:  Rosi Haley  MEDICAL RECORD NUMBER:  [id number]  AGE: 78 y.o.   GENDER: female  : 1945  TODAY'S DATE:  2024    Reason for Consult:  goals of care      Plan        Palliative Interaction: Patient seen and examined on rounds. Patient resting in bed, enjoying breakfast. She is currently without complaint and states feeling well today.     Was updated that her son Gopal would like a call as he had a couple of questions surrounding palliative care. The patient and I called Gopal together.     We discussed outpatient palliative services and how these could be beneficial. Both Gopal and patient are agreeable to palliative following the patient upon discharge. Referral placed to [facility] with the assistance of case management.     Palliative to follow while she remains inpatient.       Principle Problem/Diagnosis:  Acute on chronic heart failure, unspecified heart failure type (HCC)    Additional Assessments:    1- Symptom management/ pain control     Pain Assessment:  The patient is not having any pain.               Anxiety:  none                          Dyspnea:  none                          Fatigue:   generalized         We feel the patient symptoms are being controlled. her current regimen is reviewed by myself and discussed with the staff.       2- Goals of care evaluation   The patient goals of care are improve or maintain function/quality of life   Goals of care discussed with:    [] Patient independently    [x] Patient and Family    [] Family or Healthcare DPOA independently    [] Unable to discuss with patient, family/DPOA not present    3- Code Status  Full Code    4- Other recommendations  - We will continue to provide comfort and support to the patient and the family      History of Present Illness     The patient is a 78 y.o.  Non- / non  female who presents with Abnormal Lab      Referred to Palliative Care by  [x]  biopsy of the iliac bone.       Xray Result (most recent):  XR CHEST STANDARD TWO VW 04/07/2024    Narrative  EXAMINATION:  TWO XRAY VIEWS OF THE CHEST    4/7/2024 1:17 pm    COMPARISON:  AP chest from 04/06/2024    HISTORY:  ORDERING SYSTEM PROVIDED HISTORY: CHF  TECHNOLOGIST PROVIDED HISTORY:  CHF  Reason for Exam: HX OF CHF    FINDINGS:  Overlying ECG monitor leads.    Enlarged cardiac silhouette.  Mediastinal structures midline unchanged.    Plethora central markings with some cephalization of blood flow but no Kerley  lines; hazy bibasilar opacities, accentuated by overlying breast/soft tissue.  Some increased retrocardiac opacity appears similar.    Bones unchanged.    Impression  1. Findings suggestive of mild CHF/volume overload.  2. Retrocardiac opacity may represent atelectasis, consolidation, and/or  effusion.       MRI Result (most recent):  MRI HIP RIGHT WO CONTRAST 09/13/2018    Narrative  MRI RIGHT HIP    CLINICAL INFORMATION: Right hip pain since April, no known injury.  Pain over the trochanteric bursa slightly posterior, snapping sensation during ambulation and antalgic gait on the right.  No previous surgery    COMPARISON: None.    PROCEDURE: Routine MRI right hip was obtained without contrast. Multisequence, multiplanar imaging was obtained.    FINDINGS:    BONE AND JOINT    Hip Joint: Normal.    Joint effusion: None.    Labrum: Normal. No labral tear.    CARTILAGE    Femoral cartilage: Normal.    Acetabular cartilage: Normal.    Capsule and ligaments: Normal.    MUSCLES AND TENDONS    Gluteus Minimus and Medius: Slightly increased signal at the tendon insertion.    Iliopsoas: Normal.    Hamstrings: Normal.    OTHER FINDINGS    Bursae: Small right trochanteric bursa.    Nerves: Normal.    Soft tissues: Normal.    Viscera: No abnormality seen in pelvis. No lymphadenopathy. No free fluid in the pelvis.    IMPRESSION:  1.  Small amount of fluid in the right trochanteric bursa.  2.  Slightly

## 2024-04-09 NOTE — PLAN OF CARE
Problem: Respiratory - Adult  Goal: Achieves optimal ventilation and oxygenation  4/8/2024 2038 by Alana Sánchez RCP  Outcome: Progressing  Flowsheets (Taken 4/8/2024 2038)  Achieves optimal ventilation and oxygenation:   Assess for changes in respiratory status   Respiratory therapy support as indicated   Assess for changes in mentation and behavior   Assess and instruct to report shortness of breath or any respiratory difficulty

## 2024-04-09 NOTE — PROGRESS NOTES
Physical Therapy                                                                  Physical Therapy Cancel Note      DATE: 2024    NAME: Rosi Haley  MRN: [id number]   : 1945      Patient not seen this date for Physical Therapy due to:    Other: Pt Hgb 7 this morning. RN reports pt had transfusion.  PLAN: await repeat bloodwork; if Hgb above 7, continue to pursue PT treatment as tolerable.       Electronically signed by Lisette Hassan PT on 2024 at 3:18 PM

## 2024-04-10 ENCOUNTER — TELEPHONE (OUTPATIENT)
Dept: PRIMARY CARE CLINIC | Age: 79
End: 2024-04-10

## 2024-04-10 ENCOUNTER — OFFICE VISIT (OUTPATIENT)
Dept: ONCOLOGY | Age: 79
End: 2024-04-10
Payer: MEDICARE

## 2024-04-10 VITALS
DIASTOLIC BLOOD PRESSURE: 75 MMHG | TEMPERATURE: 96.4 F | SYSTOLIC BLOOD PRESSURE: 114 MMHG | RESPIRATION RATE: 22 BRPM | HEART RATE: 85 BPM

## 2024-04-10 VITALS
WEIGHT: 277.78 LBS | HEIGHT: 63 IN | BODY MASS INDEX: 49.22 KG/M2 | RESPIRATION RATE: 18 BRPM | SYSTOLIC BLOOD PRESSURE: 121 MMHG | OXYGEN SATURATION: 97 % | HEART RATE: 80 BPM | DIASTOLIC BLOOD PRESSURE: 67 MMHG | TEMPERATURE: 98.4 F

## 2024-04-10 DIAGNOSIS — D64.9 ANEMIA, UNSPECIFIED TYPE: Primary | ICD-10-CM

## 2024-04-10 DIAGNOSIS — N18.9 ANEMIA DUE TO CHRONIC KIDNEY DISEASE, UNSPECIFIED CKD STAGE: ICD-10-CM

## 2024-04-10 DIAGNOSIS — D69.6 THROMBOCYTOPENIA (HCC): ICD-10-CM

## 2024-04-10 DIAGNOSIS — D63.1 ANEMIA DUE TO CHRONIC KIDNEY DISEASE, UNSPECIFIED CKD STAGE: ICD-10-CM

## 2024-04-10 LAB
ABO/RH: NORMAL
ANION GAP SERPL CALCULATED.3IONS-SCNC: 6 MMOL/L (ref 9–17)
ANTIBODY SCREEN: NEGATIVE
ARM BAND NUMBER: NORMAL
BASOPHILS # BLD: 0.18 K/UL (ref 0–0.2)
BASOPHILS NFR BLD: 2 % (ref 0–2)
BLOOD BANK BLOOD PRODUCT EXPIRATION DATE: NORMAL
BLOOD BANK DISPENSE STATUS: NORMAL
BLOOD BANK ISBT PRODUCT BLOOD TYPE: 9500
BLOOD BANK PRODUCT CODE: NORMAL
BLOOD BANK SAMPLE EXPIRATION: NORMAL
BLOOD BANK UNIT TYPE AND RH: NORMAL
BPU ID: NORMAL
BUN SERPL-MCNC: 26 MG/DL (ref 8–23)
CALCIUM SERPL-MCNC: 7.7 MG/DL (ref 8.6–10.4)
CHLORIDE SERPL-SCNC: 98 MMOL/L (ref 98–107)
CO2 SERPL-SCNC: 41 MMOL/L (ref 20–31)
COMPONENT: NORMAL
CREAT SERPL-MCNC: 1.5 MG/DL (ref 0.5–0.9)
CROSSMATCH RESULT: NORMAL
EOSINOPHIL # BLD: 0.99 K/UL (ref 0–0.4)
EOSINOPHILS RELATIVE PERCENT: 11 % (ref 1–4)
ERYTHROCYTE [DISTWIDTH] IN BLOOD BY AUTOMATED COUNT: 19.7 % (ref 12.5–15.4)
GFR SERPL CREATININE-BSD FRML MDRD: 36 ML/MIN/1.73M2
GLUCOSE SERPL-MCNC: 87 MG/DL (ref 70–99)
HCT VFR BLD AUTO: 25.7 % (ref 36–46)
HGB BLD-MCNC: 8.2 G/DL (ref 12–16)
LYMPHOCYTES NFR BLD: 2.07 K/UL (ref 1–4.8)
LYMPHOCYTES RELATIVE PERCENT: 23 % (ref 24–44)
MAGNESIUM SERPL-MCNC: 1.8 MG/DL (ref 1.6–2.6)
MCH RBC QN AUTO: 29.9 PG (ref 26–34)
MCHC RBC AUTO-ENTMCNC: 32.1 G/DL (ref 31–37)
MCV RBC AUTO: 93 FL (ref 80–100)
MISCELLANEOUS LAB TEST RESULT: NORMAL
MONOCYTES NFR BLD: 1.17 K/UL (ref 0.1–0.8)
MONOCYTES NFR BLD: 13 % (ref 1–7)
MORPHOLOGY: ABNORMAL
NEUTROPHILS NFR BLD: 51 % (ref 36–66)
NEUTS SEG NFR BLD: 4.59 K/UL (ref 1.8–7.7)
PLATELET # BLD AUTO: 245 K/UL (ref 140–450)
PMV BLD AUTO: 7.6 FL (ref 6–12)
POTASSIUM SERPL-SCNC: 3.6 MMOL/L (ref 3.7–5.3)
RBC # BLD AUTO: 2.76 M/UL (ref 4–5.2)
SODIUM SERPL-SCNC: 145 MMOL/L (ref 135–144)
TEST NAME: NORMAL
TRANSFUSION STATUS: NORMAL
UNIT DIVISION: 0
UNIT ISSUE DATE/TIME: NORMAL
WBC OTHER # BLD: 9 K/UL (ref 3.5–11)

## 2024-04-10 PROCEDURE — 1123F ACP DISCUSS/DSCN MKR DOCD: CPT | Performed by: INTERNAL MEDICINE

## 2024-04-10 PROCEDURE — 97530 THERAPEUTIC ACTIVITIES: CPT

## 2024-04-10 PROCEDURE — 6370000000 HC RX 637 (ALT 250 FOR IP): Performed by: NURSE PRACTITIONER

## 2024-04-10 PROCEDURE — 99211 OFF/OP EST MAY X REQ PHY/QHP: CPT | Performed by: INTERNAL MEDICINE

## 2024-04-10 PROCEDURE — 99238 HOSP IP/OBS DSCHRG MGMT 30/<: CPT | Performed by: STUDENT IN AN ORGANIZED HEALTH CARE EDUCATION/TRAINING PROGRAM

## 2024-04-10 PROCEDURE — 36415 COLL VENOUS BLD VENIPUNCTURE: CPT

## 2024-04-10 PROCEDURE — 83735 ASSAY OF MAGNESIUM: CPT

## 2024-04-10 PROCEDURE — 6370000000 HC RX 637 (ALT 250 FOR IP): Performed by: INTERNAL MEDICINE

## 2024-04-10 PROCEDURE — 85025 COMPLETE CBC W/AUTO DIFF WBC: CPT

## 2024-04-10 PROCEDURE — 6360000002 HC RX W HCPCS: Performed by: INTERNAL MEDICINE

## 2024-04-10 PROCEDURE — 2700000000 HC OXYGEN THERAPY PER DAY

## 2024-04-10 PROCEDURE — 3078F DIAST BP <80 MM HG: CPT | Performed by: INTERNAL MEDICINE

## 2024-04-10 PROCEDURE — 94761 N-INVAS EAR/PLS OXIMETRY MLT: CPT

## 2024-04-10 PROCEDURE — 3074F SYST BP LT 130 MM HG: CPT | Performed by: INTERNAL MEDICINE

## 2024-04-10 PROCEDURE — 99215 OFFICE O/P EST HI 40 MIN: CPT | Performed by: INTERNAL MEDICINE

## 2024-04-10 PROCEDURE — 80048 BASIC METABOLIC PNL TOTAL CA: CPT

## 2024-04-10 PROCEDURE — 6370000000 HC RX 637 (ALT 250 FOR IP): Performed by: STUDENT IN AN ORGANIZED HEALTH CARE EDUCATION/TRAINING PROGRAM

## 2024-04-10 PROCEDURE — 94640 AIRWAY INHALATION TREATMENT: CPT

## 2024-04-10 PROCEDURE — 2580000003 HC RX 258: Performed by: NURSE PRACTITIONER

## 2024-04-10 RX ORDER — SPIRONOLACTONE 25 MG/1
25 TABLET ORAL DAILY
Qty: 30 TABLET | Refills: 3 | DISCHARGE
Start: 2024-04-10

## 2024-04-10 RX ORDER — VANCOMYCIN HYDROCHLORIDE 125 MG/1
125 CAPSULE ORAL 4 TIMES DAILY
Qty: 40 CAPSULE | Refills: 0 | DISCHARGE
Start: 2024-04-10 | End: 2024-04-20

## 2024-04-10 RX ORDER — BUMETANIDE 1 MG/1
1 TABLET ORAL 2 TIMES DAILY
Qty: 90 TABLET | Refills: 0 | DISCHARGE
Start: 2024-04-10

## 2024-04-10 RX ADMIN — VANCOMYCIN HYDROCHLORIDE 125 MG: 125 CAPSULE ORAL at 09:09

## 2024-04-10 RX ADMIN — Medication 400 MG: at 09:08

## 2024-04-10 RX ADMIN — SODIUM CHLORIDE, PRESERVATIVE FREE 10 ML: 5 INJECTION INTRAVENOUS at 09:11

## 2024-04-10 RX ADMIN — PANTOPRAZOLE SODIUM 40 MG: 40 TABLET, DELAYED RELEASE ORAL at 05:01

## 2024-04-10 RX ADMIN — ANTI-FUNGAL POWDER MICONAZOLE NITRATE TALC FREE: 1.42 POWDER TOPICAL at 09:08

## 2024-04-10 RX ADMIN — MIDODRINE HYDROCHLORIDE 5 MG: 5 TABLET ORAL at 09:09

## 2024-04-10 RX ADMIN — CINACALCET HYDROCHLORIDE 90 MG: 30 TABLET, FILM COATED ORAL at 09:08

## 2024-04-10 RX ADMIN — APIXABAN 5 MG: 5 TABLET, FILM COATED ORAL at 09:08

## 2024-04-10 RX ADMIN — PREGABALIN 50 MG: 25 CAPSULE ORAL at 09:09

## 2024-04-10 RX ADMIN — IPRATROPIUM BROMIDE AND ALBUTEROL SULFATE 1 DOSE: 2.5; .5 SOLUTION RESPIRATORY (INHALATION) at 08:36

## 2024-04-10 RX ADMIN — SPIRONOLACTONE 25 MG: 25 TABLET, FILM COATED ORAL at 09:08

## 2024-04-10 RX ADMIN — FOLIC ACID 2 MG: 1 TABLET ORAL at 09:08

## 2024-04-10 RX ADMIN — BUMETANIDE 1 MG: 0.25 INJECTION INTRAMUSCULAR; INTRAVENOUS at 09:09

## 2024-04-10 RX ADMIN — PRAVASTATIN SODIUM 40 MG: 20 TABLET ORAL at 09:08

## 2024-04-10 RX ADMIN — HYDROXYZINE HYDROCHLORIDE 25 MG: 25 TABLET, FILM COATED ORAL at 05:01

## 2024-04-10 NOTE — TELEPHONE ENCOUNTER
Called them back, patient lives at [facility]. If she comes to this office for primary care again I am ok with palliative care consult as OP  Palliative care consult done in the hospital states patient wishes to remain a full code.

## 2024-04-10 NOTE — TELEPHONE ENCOUNTER
[facility] is asking if you're available to have a palliative consult for pt.    Contact: Shanel  Contact call back number: [phone] Option 1    Pt was admitted into the hospital on 4/6/2024, has yet to be discharged.    Please advise.

## 2024-04-10 NOTE — PLAN OF CARE
Problem: Discharge Planning  Goal: Discharge to home or other facility with appropriate resources  Outcome: Adequate for Discharge     Problem: Skin/Tissue Integrity  Goal: Absence of new skin breakdown  Description: 1.  Monitor for areas of redness and/or skin breakdown  2.  Assess vascular access sites hourly  3.  Every 4-6 hours minimum:  Change oxygen saturation probe site  4.  Every 4-6 hours:  If on nasal continuous positive airway pressure, respiratory therapy assess nares and determine need for appliance change or resting period.  Outcome: Adequate for Discharge     Problem: ABCDS Injury Assessment  Goal: Absence of physical injury  Outcome: Adequate for Discharge     Problem: Safety - Adult  Goal: Free from fall injury  Outcome: Adequate for Discharge     Problem: Respiratory - Adult  Goal: Achieves optimal ventilation and oxygenation  4/10/2024 1029 by Yulissa Heart RN  Outcome: Adequate for Discharge  4/10/2024 0840 by Vicky Daniel RCP  Flowsheets (Taken 4/10/2024 0840)  Achieves optimal ventilation and oxygenation:   Assess for changes in respiratory status   Position to facilitate oxygenation and minimize respiratory effort   Assess the need for suctioning and aspirate as needed   Respiratory therapy support as indicated   Assess and instruct to report shortness of breath or any respiratory difficulty   Encourage broncho-pulmonary hygiene including cough, deep breathe, incentive spirometry   Oxygen supplementation based on oxygen saturation or arterial blood gases   Assess for changes in mentation and behavior  4/9/2024 2117 by Noris Hemphill RCP  Outcome: Progressing     Problem: Chronic Conditions and Co-morbidities  Goal: Patient's chronic conditions and co-morbidity symptoms are monitored and maintained or improved  Outcome: Adequate for Discharge     Problem: Infection - Adult  Goal: Absence of infection at discharge  Outcome: Adequate for Discharge  Goal: Absence of infection during  Discharge  Goal: Maintain proper alignment of affected body part  Outcome: Adequate for Discharge

## 2024-04-10 NOTE — PROGRESS NOTES
Occupational Therapy  Facility/Department: [address]  Occupational Therapy Daily Treatment Note    Name: Rosi Haley  : 1945  MRN: [id number]  Date of Service: 4/10/2024    Discharge Recommendations:  Patient would benefit from continued therapy after discharge  OT Equipment Recommendations  Other: TBD       Patient Diagnosis(es): The primary encounter diagnosis was Congestive heart failure, unspecified HF chronicity, unspecified heart failure type (HCC). A diagnosis of Pneumonia due to infectious organism, unspecified laterality, unspecified part of lung was also pertinent to this visit.  Past Medical History:  has a past medical history of Atrial fibrillation (HCC), CAD (coronary artery disease), CHF (congestive heart failure) (HCC), Chronic bilateral low back pain with left-sided sciatica, Chronic respiratory failure (HCC), CKD (chronic kidney disease), Colitis, Dyslipidemia, GI bleed, Hypertension, Hypoxia, Leukopenia, LLQ pain, PAC (premature atrial contraction), Short of breath on exertion, Supplemental oxygen dependent, and Wound of left leg.  Past Surgical History:  has a past surgical history that includes Coronary angioplasty with stent; Hysterectomy; Tubal ligation; Tonsillectomy; Colonoscopy; Leg Surgery (Left, 2017); pr i&d below fascia foot 1 bursal space (Left, 2017); Upper gastrointestinal endoscopy (N/A, 2023); Colonoscopy (N/A, 12/10/2023); Upper gastrointestinal endoscopy (N/A, 2023); and CT BIOPSY BONE MARROW (3/26/2024).           Assessment   Performance deficits / Impairments: Decreased functional mobility ;Decreased ADL status;Decreased balance  Assessment: Patient progressing towards STGs. Patient would benefit from skilled OT services addressing above deficits while here at hospital and following discharge to maximize independence.  Prognosis: Good  REQUIRES OT FOLLOW-UP: Yes  Activity Tolerance  Activity Tolerance: Patient limited by fatigue        Plan    Occupational Therapy Plan  Times Per Week: 2-3x/wk  Current Treatment Recommendations: Balance training, Functional mobility training, Equipment evaluation, education, & procurement, Patient/Caregiver education & training, Safety education & training, Self-Care / ADL     Restrictions  Restrictions/Precautions  Restrictions/Precautions: General Precautions, Contact Precautions  Required Braces or Orthoses?: No  Position Activity Restriction  Other position/activity restrictions: Fluid restriction    Subjective   General  Patient assessed for rehabilitation services?: Yes  Family / Caregiver Present: Yes (son)  Subjective  Subjective: No pain reported.  General Comment  Comments: RN and family attempting to get patient to w/c, unsuccessful.            Objective              Safety Devices  Type of Devices:  (patient placed in w/c for d/c with family, RN present)  Restraints  Restraints Initially in Place: No    Balance  Sitting: Impaired (static/dynamic-CGA)  Standing: Impaired (static-MAX x2)        ADL  Functional Mobility: Maximum assistance  Functional Mobility Skilled Clinical Factors: MAX x2 attempted from EOB without success, patient unable to lift buttocks up from bed; sit>stand at [facility] with bed elevated and assist x3, stand>sit into w/c from [facility] MAX x2, dep bed to chair with Nicole RUST  Skin Care: N/A           Transfers  Sit to stand: Maximum assistance;2 Person assistance (plus assist of 3 person)  Stand to sit: Maximum assistance;2 Person assistance  Transfer Comments: cues for hand placement and technique; [facility] bed to w/c as she was unable to complete stand from edge of bed with 3 attempts                        Education Given To: Patient  Education Provided: Transfer Training;Fall Prevention Strategies;Precautions  Education Method: Demonstration;Verbal  Barriers to Learning: Hearing;Cognition  Education Outcome: Verbalized understanding;Continued education needed

## 2024-04-10 NOTE — PATIENT INSTRUCTIONS
Cbc once a week starting every Tuesday , transfuse if hb < 7    Aranesp ordered    Labs with rv    Rv wit first aranesp shot

## 2024-04-10 NOTE — RT PROTOCOL NOTE
RT Nebulizer Bronchodilator Protocol Note    There is a bronchodilator order in the chart from a provider indicating to follow the RT Bronchodilator Protocol and there is an “Initiate RT Bronchodilator Protocol” order as well (see protocol at bottom of note).    CXR Findings:  No results found.    The findings from the last RT Protocol Assessment were as follows:  Smoking: Chronic pulmonary disease  Respiratory Pattern: Regular pattern and RR 12-20 bpm  Breath Sounds: Clear breath sounds  Cough: Weak, non-productive  Indication for Bronchodilator Therapy: Decreased or absent breath sounds, On home bronchodilators  Bronchodilator Assessment Score: 5    Aerosolized bronchodilator medication orders have been revised according to the RT Nebulizer Bronchodilator Protocol below.    Respiratory Therapist to perform RT Therapy Protocol Assessment initially then follow the protocol.  Repeat RT Therapy Protocol Assessment PRN for score 0-3 or on second treatment, BID, and PRN for scores above 3.    No Indications - adjust the frequency to every 6 hours PRN wheezing or bronchospasm, if no treatments needed after 48 hours then discontinue using Per Protocol order mode.     If indication present, adjust the RT bronchodilator orders based on the Bronchodilator Assessment Score as indicated below.  If a patient is on this medication at home then do not decrease Frequency below that used at home.    0-3 - enter or revise RT bronchodilator order(s) to equivalent RT Bronchodilator order with Frequency of every 4 hours PRN for wheezing or increased work of breathing using Per Protocol order mode.       4-6 - enter or revise RT Bronchodilator order(s) to two equivalent RT bronchodilator orders with one order with BID Frequency and one order with Frequency of every 4 hours PRN wheezing or increased work of breathing using Per Protocol order mode.         7-10 - enter or revise RT Bronchodilator order(s) to two equivalent RT  bronchodilator orders with one order with TID Frequency and one order with Frequency of every 4 hours PRN wheezing or increased work of breathing using Per Protocol order mode.       11-13 - enter or revise RT Bronchodilator order(s) to one equivalent RT bronchodilator order with QID Frequency and an Albuterol order with Frequency of every 4 hours PRN wheezing or increased work of breathing using Per Protocol order mode.      Greater than 13 - enter or revise RT Bronchodilator order(s) to one equivalent RT bronchodilator order with every 4 hours Frequency and an Albuterol order with Frequency of every 2 hours PRN wheezing or increased work of breathing using Per Protocol order mode.     RT to enter RT Home Evaluation for COPD & MDI Assessment order using Per Protocol order mode.    Electronically signed by AIDEN TERRY RCP on 4/9/2024 at 9:18 PM

## 2024-04-10 NOTE — PLAN OF CARE
BRONCHOSPASM/BRONCHOCONSTRICTION     [x]         IMPROVE AERATION/BREATH SOUNDS  [x]   ADMINISTER BRONCHODILATOR THERAPY AS APPROPRIATE  [x]   ASSESS BREATH SOUNDS  [x]   IMPLEMENT AEROSOL/MDI PROTOCOL  [x]   PATIENT EDUCATION AS NEEDED      Problem: Respiratory - Adult  Goal: Achieves optimal ventilation and oxygenation  4/9/2024 2117 by Noris Hemphill, JOSE MIGUEL  Outcome: Progressing

## 2024-04-10 NOTE — PROGRESS NOTES
Physical Therapy  Facility/Department: [address]  Physical Therapy Daily Treatment Note    Name: Rosi Haley  : 1945  MRN: [id number]  Date of Service: 4/10/2024    Discharge Recommendations:  Patient would benefit from continued therapy after discharge   PT Equipment Recommendations  Equipment Needed: No      Patient Diagnosis(es): The primary encounter diagnosis was Congestive heart failure, unspecified HF chronicity, unspecified heart failure type (HCC). A diagnosis of Pneumonia due to infectious organism, unspecified laterality, unspecified part of lung was also pertinent to this visit.  Past Medical History:  has a past medical history of Atrial fibrillation (HCC), CAD (coronary artery disease), CHF (congestive heart failure) (HCC), Chronic bilateral low back pain with left-sided sciatica, Chronic respiratory failure (HCC), CKD (chronic kidney disease), Colitis, Dyslipidemia, GI bleed, Hypertension, Hypoxia, Leukopenia, LLQ pain, PAC (premature atrial contraction), Short of breath on exertion, Supplemental oxygen dependent, and Wound of left leg.  Past Surgical History:  has a past surgical history that includes Coronary angioplasty with stent; Hysterectomy; Tubal ligation; Tonsillectomy; Colonoscopy; Leg Surgery (Left, 2017); pr i&d below fascia foot 1 bursal space (Left, 2017); Upper gastrointestinal endoscopy (N/A, 2023); Colonoscopy (N/A, 12/10/2023); Upper gastrointestinal endoscopy (N/A, 2023); and CT BIOPSY BONE MARROW (3/26/2024).    Assessment   Body Structures, Functions, Activity Limitations Requiring Skilled Therapeutic Intervention: Decreased functional mobility ;Decreased strength;Decreased safe awareness;Decreased balance;Decreased endurance    Assessment: Pt presents with generalized weakness and impaired mobility. Pt resides in [facility] and requires assist x 2 for bed mobility and stand pivot transfers; pt has been nonambulatory since November. During this

## 2024-04-10 NOTE — DISCHARGE SUMMARY
[facility]  Office: [phone]  Marty Thorpe DO, Alvaro Brannon DO, Jefferson Lacy DO, Josh Licona DO, Richard Chinchilla MD, Margarita Mullins MD, Ilsa Monroe MD, Dorothea Perez MD,  Maycol Castanon MD, Levi Sweeney MD, Andi Myles MD,  Dinh Olson DO, Elyse Griffin MD, Wyatt Kelley MD, Vineet Thorpe DO, Melinda Fernandez MD,  Diogo Parr DO, Taylor Biswas MD, Tala Ruiz MD, Radha Whitt MD, Erik Bird MD,  Erasmo De La O MD, Maribell Leigh MD, Rupali Randall MD, Raissa Moran MD, Ru Silva MD, Winston Guaman MD, Jonathon Walters DO, Justice Abreu DO, Scott Blum MD,  Kapil Aguilar MD, Shirley Waterhouse, CNP,  Catherine Johnson CNP, Roger Sierra, CNP,  Kaelyn Alegre, DNP, Gillian Bey, CNP, Ely Delgado, CNP, Victorina Bear, CNP, Eileen Yeager, CNP, Stacey Mccrary, PA-C, Charity Ramachandran PA-C, Yany Beltran, CNP, Jen Zhao, CNP, Shamir Starks, CNP, Yajaira Suarez, CNP, Lilia Agarwal, CNP, Piedad Preston, CNS, Kathi Bauer, CNP, Natalie Wu CNP, Tracy Schwab, CNP         [facility]   IN-PATIENT SERVICE   [facility]    Discharge Summary     Patient ID: Rosi Haley  :  1945   MRN: [id number]     ACCOUNT:  [id number]   Patient's PCP: Radha Wynn MD  Admit Date: 2024   Discharge Date: 4/10/2024  Length of Stay: 4  Code Status:  Full Code  Admitting Physician: Wyatt Kelley MD  Discharge Physician: Wyatt Kelley MD     Active Discharge Diagnoses:     Hospital Problem Lists:  Principal Problem:    Acute on chronic heart failure, unspecified heart failure type ([facility])  Active Problems:    Essential hypertension    Dyslipidemia    CKD (chronic kidney disease)    Class 3 severe obesity with body mass index (BMI) of 40.0 to 44.9 in adult ([facility])    Chronic obstructive pulmonary disease, unspecified    Chronic congestive heart failure ([facility])    C. difficile colitis    Encounter for palliative care    Goals of care,

## 2024-04-10 NOTE — RT PROTOCOL NOTE
RT Nebulizer Bronchodilator Protocol Note    There is a bronchodilator order in the chart from a provider indicating to follow the RT Bronchodilator Protocol and there is an “Initiate RT Bronchodilator Protocol” order as well (see protocol at bottom of note).    CXR Findings:  No results found.    The findings from the last RT Protocol Assessment were as follows:  Smoking: Chronic pulmonary disease  Respiratory Pattern: Dyspnea on exertion or RR 21-25 bpm  Breath Sounds: Clear breath sounds  Cough: Strong, spontaneous, non-productive  Indication for Bronchodilator Therapy: Decreased or absent breath sounds, On home bronchodilators  Bronchodilator Assessment Score: 4    Aerosolized bronchodilator medication orders have been revised according to the RT Nebulizer Bronchodilator Protocol below.    Respiratory Therapist to perform RT Therapy Protocol Assessment initially then follow the protocol.  Repeat RT Therapy Protocol Assessment PRN for score 0-3 or on second treatment, BID, and PRN for scores above 3.    No Indications - adjust the frequency to every 6 hours PRN wheezing or bronchospasm, if no treatments needed after 48 hours then discontinue using Per Protocol order mode.     If indication present, adjust the RT bronchodilator orders based on the Bronchodilator Assessment Score as indicated below.  If a patient is on this medication at home then do not decrease Frequency below that used at home.    0-3 - enter or revise RT bronchodilator order(s) to equivalent RT Bronchodilator order with Frequency of every 4 hours PRN for wheezing or increased work of breathing using Per Protocol order mode.       4-6 - enter or revise RT Bronchodilator order(s) to two equivalent RT bronchodilator orders with one order with BID Frequency and one order with Frequency of every 4 hours PRN wheezing or increased work of breathing using Per Protocol order mode.         7-10 - enter or revise RT Bronchodilator order(s) to two

## 2024-04-10 NOTE — CARE COORDINATION
Auth back for admission to [facility] per Brie.  AURE/ HENS completed. Patients son Gopal will pick patient up around [phone] to take patient to doctors appointment and transport her to facility

## 2024-04-10 NOTE — PLAN OF CARE
Problem: Respiratory - Adult  Goal: Achieves optimal ventilation and oxygenation  4/10/2024 0840 by Vicky Daniel RCP  Flowsheets (Taken 4/10/2024 0840)  Achieves optimal ventilation and oxygenation:   Assess for changes in respiratory status   Position to facilitate oxygenation and minimize respiratory effort   Assess the need for suctioning and aspirate as needed   Respiratory therapy support as indicated   Assess and instruct to report shortness of breath or any respiratory difficulty   Encourage broncho-pulmonary hygiene including cough, deep breathe, incentive spirometry   Oxygen supplementation based on oxygen saturation or arterial blood gases   Assess for changes in mentation and behavior

## 2024-04-10 NOTE — PLAN OF CARE
Problem: Discharge Planning  Goal: Discharge to home or other facility with appropriate resources  4/9/2024 2018 by Shannon Dowell RN  Outcome: Progressing  4/9/2024 1628 by Yulissa Heart RN  Outcome: Progressing  Flowsheets (Taken 4/9/2024 1628)  Discharge to home or other facility with appropriate resources:   Identify barriers to discharge with patient and caregiver   Arrange for needed discharge resources and transportation as appropriate   Identify discharge learning needs (meds, wound care, etc)   Refer to discharge planning if patient needs post-hospital services based on physician order or complex needs related to functional status, cognitive ability or social support system     Problem: Skin/Tissue Integrity  Goal: Absence of new skin breakdown  Description: 1.  Monitor for areas of redness and/or skin breakdown  2.  Assess vascular access sites hourly  3.  Every 4-6 hours minimum:  Change oxygen saturation probe site  4.  Every 4-6 hours:  If on nasal continuous positive airway pressure, respiratory therapy assess nares and determine need for appliance change or resting period.  Outcome: Progressing     Problem: ABCDS Injury Assessment  Goal: Absence of physical injury  4/9/2024 2018 by Shannon Dowell RN  Outcome: Progressing  4/9/2024 1628 by Yulissa Heart RN  Outcome: Progressing  Flowsheets (Taken 4/9/2024 1628)  Absence of Physical Injury: Implement safety measures based on patient assessment     Problem: Safety - Adult  Goal: Free from fall injury  4/9/2024 2018 by Shannon Dowell RN  Outcome: Progressing  4/9/2024 1628 by Yulissa Heart RN  Outcome: Progressing  Flowsheets (Taken 4/9/2024 1628)  Free From Fall Injury:   Instruct family/caregiver on patient safety   Based on caregiver fall risk screen, instruct family/caregiver to ask for assistance with transferring infant if caregiver noted to have fall risk factors     Problem: Respiratory - Adult  Goal: Achieves

## 2024-04-10 NOTE — DISCHARGE INSTR - COC
Continuity of Care Form    Patient Name: Rosi Haley   :  1945  MRN:  [id number]    Admit date:  2024  Discharge date:  4/10/24    Code Status Order: Full Code   Advance Directives:     Admitting Physician:  Wyatt Kelley MD  PCP: Radha Wynn MD    Discharging Nurse: CARYL Duenas  Discharging Hospital Unit/Room#: 321/321-01  Discharging Unit Phone Number: [phone]    Emergency Contact:   Extended Emergency Contact Information  Primary Emergency Contact: Gopal Haley  Address: [address] [place] [address] [facility]  Home Phone: [phone]  Work Phone: [phone]  Relation: Child  Secondary Emergency Contact: Ihsan Haley  Address: [address] [place] [address] [facility]  Home Phone: [phone]  Work Phone: [phone]  Mobile Phone: [phone]  Relation: Spouse    Past Surgical History:  Past Surgical History:   Procedure Laterality Date    COLONOSCOPY      COLONOSCOPY N/A 12/10/2023    COLONOSCOPY DIAGNOSTIC performed by Carlos Rodriguez MD at [facility] ENDO    CORONARY ANGIOPLASTY WITH STENT PLACEMENT        patient reports one stent    CT BONE MARROW BIOPSY  3/26/2024    CT BONE MARROW BIOPSY 3/26/2024 [facility] CT SCAN    HYSTERECTOMY (CERVIX STATUS UNKNOWN)      COMPLETE    LEG SURGERY Left 2017    MN I&D BELOW FASCIA FOOT 1 BURSAL SPACE Left 2017    DEBRIDEMENT LEFT LOWER EXTREMITY WITH APPLICATION OF APPLIGRAFT performed by Gideon Wasserman DPM at [facility] OR    TONSILLECTOMY      TUBAL LIGATION      UPPER GASTROINTESTINAL ENDOSCOPY N/A 2023    EGD BIOPSY performed by Deisy Andre MD at [facility] ENDO    UPPER GASTROINTESTINAL ENDOSCOPY N/A 2023    EGD ESOPHAGOGASTRODUODENOSCOPY performed by Washington Monroe MD at [facility] OR       Immunization History:   Immunization History   Administered Date(s) Administered    COVID-19, MODERNA BLUE border, Primary or Immunocompromised, (age 12y+), IM, 100 mcg/0.5mL 2021       At Risk for Falls    Impairments/Disabilities:      None    Nutrition Therapy:  Current Nutrition Therapy:   - Oral Diet:  Low Sodium (3-4gm)    Routes of Feeding: Oral  Liquids: No Restrictions  Daily Fluid Restriction: yes - amount 1200 mLs  Last Modified Barium Swallow with Video (Video Swallowing Test): not done    Treatments at the Time of Hospital Discharge:   Respiratory Treatments: Nebulizers prn  Oxygen Therapy:  is on oxygen at 2 L/min per nasal cannula.  Ventilator:    - No ventilator support    Rehab Therapies: Physical Therapy and Occupational Therapy  Weight Bearing Status/Restrictions: No weight bearing restrictions  Other Medical Equipment (for information only, NOT a DME order):  wheelchair, walker, hospital bed, and nevaeh stedy  Other Treatments: n/a    Patient's personal belongings (please select all that are sent with patient):  Jacobo    RN SIGNATURE:  Electronically signed by Yulissa Heart RN on 4/10/24 at 10:25 AM EDT    CASE MANAGEMENT/SOCIAL WORK SECTION    Inpatient Status Date: 4/6/24    Readmission Risk Assessment Score:  Readmission Risk              Risk of Unplanned Readmission:  39           Discharging to Facility/ Agency   Facility: [facility]  Address: [address] [place] [address]  Phone: [phone]  Fax: [phone]      / signature: Electronically signed by RAVINDRA Cabrera, AMYW on 4/10/24 at 10:06 AM EDT    PHYSICIAN SECTION    Prognosis: Guarded    Condition at Discharge: Stable    Rehab Potential (if transferring to Rehab): Guarded    Recommended Labs or Other Treatments After Discharge: Home care     Physician Certification: I certify the above information and transfer of Rosi Haley  is necessary for the continuing treatment of the diagnosis listed and that she requires Home Care for greater 30 days.     Update Admission H&P: No change in H&P    PHYSICIAN SIGNATURE:  Electronically signed by Wyatt

## 2024-04-10 NOTE — PROGRESS NOTES
the content of the visit, the document can still have some errors including those of syntax and sound a like substitutions which may escape proof reading.  It such instances, actual meaning can be extrapolated by contextual diversion.

## 2024-04-11 ENCOUNTER — TELEPHONE (OUTPATIENT)
Dept: ONCOLOGY | Age: 79
End: 2024-04-11

## 2024-04-11 NOTE — TELEPHONE ENCOUNTER
Cbc once a week starting every Tuesday , transfuse if hb < 7     Aranesp ordered     Labs with rv     Rv wit first aranesp shot       Patient will have labs done weekly. Injection went to [facility], did let patient know we will call to get scheduled once its back. Did let her know we will schedule follow up with her injection also      Gave patient avs

## 2024-04-12 ENCOUNTER — CARE COORDINATION (OUTPATIENT)
Dept: CARE COORDINATION | Age: 79
End: 2024-04-12

## 2024-04-12 NOTE — CARE COORDINATION
Patient was moved from [facility] to [facility] last week. Left VM message on nurses' line asking if patient will be following with a provider there or if will be following with Dr. Wynn.    Indy from [facility] called back, patient was transferred back to [place] a few days ago and doesn't know if plans on coming back to their facility or not. Will follow up again in a few weeks.    No future appointments.

## 2024-04-16 NOTE — PROGRESS NOTES
Physician Progress Note      PATIENT:               AMELIA GUTIERREZ  CSN #:                  [id number]  :                       1945  ADMIT DATE:       2024 7:11 PM  DISCH DATE:        4/10/2024 12:22 PM  RESPONDING  PROVIDER #:        Wyatt Kelley MD          QUERY TEXT:    Patient admitted with CHF. Noted documentation of possible pneumonia in H & P.    Pt treated for pneumonia with Cefepime. CXR states: Underlying pneumonia   cannot be excluded. If possible, please document in the progress notes and   discharge summary if you are evaluating and/or treating any of the following:    The medical record reflects the following:  Risk Factors: age  Clinical Indicators:  documentation of possible pneumonia in H & P.  Pt   treated for pneumonia with Cefepime. CXR states: Underlying pneumonia cannot   be excluded  Treatment: Cefepime, Frequently monitor respiratory status, imaging and vital   signs.    Thank you for your time, Dionna SINGLETARY, RN CDS. If you have questions, please   call [phone] (B-G 7o-3p).  Options provided:  -- Gram negative pneumonia  -- Pneumonia  -- Pneumonia is ruled out  -- Other - I will add my own diagnosis  -- Disagree - Not applicable / Not valid  -- Disagree - Clinically unable to determine / Unknown  -- Refer to Clinical Documentation Reviewer    PROVIDER RESPONSE TEXT:    Pneumonia has been moved out.    Query created by: Dionna Ruiz on 4/15/2024 6:49 PM      Electronically signed by:  Wyatt Kelley MD 2024 5:04 AM

## 2024-04-23 ENCOUNTER — HOSPITAL ENCOUNTER (OUTPATIENT)
Dept: INFUSION THERAPY | Age: 79
Discharge: HOME | End: 2024-04-23
Payer: MEDICARE

## 2024-04-23 VITALS — HEART RATE: 74 BPM | RESPIRATION RATE: 20 BRPM | DIASTOLIC BLOOD PRESSURE: 65 MMHG | SYSTOLIC BLOOD PRESSURE: 100 MMHG

## 2024-04-23 DIAGNOSIS — D63.1 ANEMIA DUE TO CHRONIC KIDNEY DISEASE, UNSPECIFIED CKD STAGE: Primary | ICD-10-CM

## 2024-04-23 DIAGNOSIS — N18.9 ANEMIA DUE TO CHRONIC KIDNEY DISEASE, UNSPECIFIED CKD STAGE: Primary | ICD-10-CM

## 2024-04-23 PROCEDURE — 96372 THER/PROPH/DIAG INJ SC/IM: CPT

## 2024-04-23 PROCEDURE — 6360000002 HC RX W HCPCS: Performed by: INTERNAL MEDICINE

## 2024-04-23 RX ORDER — ONDANSETRON 2 MG/ML
8 INJECTION INTRAMUSCULAR; INTRAVENOUS
OUTPATIENT
Start: 2024-05-05

## 2024-04-23 RX ORDER — DIPHENHYDRAMINE HYDROCHLORIDE 50 MG/ML
50 INJECTION INTRAMUSCULAR; INTRAVENOUS
Status: CANCELLED | OUTPATIENT
Start: 2024-04-23

## 2024-04-23 RX ORDER — ACETAMINOPHEN 325 MG/1
650 TABLET ORAL
OUTPATIENT
Start: 2024-05-05

## 2024-04-23 RX ORDER — EPINEPHRINE 1 MG/ML
0.3 INJECTION, SOLUTION, CONCENTRATE INTRAVENOUS PRN
Status: CANCELLED | OUTPATIENT
Start: 2024-04-23

## 2024-04-23 RX ORDER — DIPHENHYDRAMINE HYDROCHLORIDE 50 MG/ML
50 INJECTION INTRAMUSCULAR; INTRAVENOUS
OUTPATIENT
Start: 2024-05-05

## 2024-04-23 RX ORDER — ACETAMINOPHEN 325 MG/1
650 TABLET ORAL
Status: CANCELLED | OUTPATIENT
Start: 2024-04-23

## 2024-04-23 RX ORDER — EPINEPHRINE 1 MG/ML
0.3 INJECTION, SOLUTION, CONCENTRATE INTRAVENOUS PRN
OUTPATIENT
Start: 2024-05-05

## 2024-04-23 RX ORDER — FAMOTIDINE 10 MG/ML
20 INJECTION, SOLUTION INTRAVENOUS
OUTPATIENT
Start: 2024-05-05

## 2024-04-23 RX ORDER — SODIUM CHLORIDE 9 MG/ML
INJECTION, SOLUTION INTRAVENOUS CONTINUOUS
Status: CANCELLED | OUTPATIENT
Start: 2024-04-23

## 2024-04-23 RX ORDER — FAMOTIDINE 10 MG/ML
20 INJECTION, SOLUTION INTRAVENOUS
Status: CANCELLED | OUTPATIENT
Start: 2024-04-23

## 2024-04-23 RX ORDER — ALBUTEROL SULFATE 90 UG/1
4 AEROSOL, METERED RESPIRATORY (INHALATION) PRN
OUTPATIENT
Start: 2024-05-05

## 2024-04-23 RX ORDER — ONDANSETRON 2 MG/ML
8 INJECTION INTRAMUSCULAR; INTRAVENOUS
Status: CANCELLED | OUTPATIENT
Start: 2024-04-23

## 2024-04-23 RX ORDER — SODIUM CHLORIDE 9 MG/ML
INJECTION, SOLUTION INTRAVENOUS CONTINUOUS
OUTPATIENT
Start: 2024-05-05

## 2024-04-23 RX ORDER — ALBUTEROL SULFATE 90 UG/1
4 AEROSOL, METERED RESPIRATORY (INHALATION) PRN
Status: CANCELLED | OUTPATIENT
Start: 2024-04-23

## 2024-04-23 RX ADMIN — DARBEPOETIN ALFA 60 MCG: 60 INJECTION, SOLUTION INTRAVENOUS; SUBCUTANEOUS at 14:36

## 2024-04-29 ENCOUNTER — CARE COORDINATION (OUTPATIENT)
Dept: CARE COORDINATION | Age: 79
End: 2024-04-29

## 2024-04-29 NOTE — CARE COORDINATION
Attempted to call [facility] to check on any discharge or transfer plans, unable to speak with anyone. Spoke with [facility], their visits are on hold since in rehab facility, they are aware she is in [place] and do not know further plans.

## 2024-04-30 NOTE — CARE COORDINATION
Called [facility] [place], she is still there for rehab.  Left VM message asking  to return call to discuss discharge plans to either another facility or to home.

## 2024-05-02 NOTE — CARE COORDINATION
Pt d/c from [facility] on 4/2 to [facility]. Called [facility] and [facility] for medlist.    Viri Purcell, BSN, RN   [facility] Transition Nurse  [phone]    
varicose veins b/l lower extremity

## 2024-05-07 ENCOUNTER — HOSPITAL ENCOUNTER (OUTPATIENT)
Dept: INFUSION THERAPY | Age: 79
Discharge: HOME | End: 2024-05-07
Payer: MEDICARE

## 2024-05-07 VITALS — HEART RATE: 82 BPM | SYSTOLIC BLOOD PRESSURE: 117 MMHG | RESPIRATION RATE: 18 BRPM | DIASTOLIC BLOOD PRESSURE: 73 MMHG

## 2024-05-07 DIAGNOSIS — N18.9 ANEMIA DUE TO CHRONIC KIDNEY DISEASE, UNSPECIFIED CKD STAGE: Primary | ICD-10-CM

## 2024-05-07 DIAGNOSIS — D63.1 ANEMIA DUE TO CHRONIC KIDNEY DISEASE, UNSPECIFIED CKD STAGE: Primary | ICD-10-CM

## 2024-05-07 PROCEDURE — 6360000002 HC RX W HCPCS: Performed by: INTERNAL MEDICINE

## 2024-05-07 PROCEDURE — 96372 THER/PROPH/DIAG INJ SC/IM: CPT

## 2024-05-07 RX ORDER — SODIUM CHLORIDE 9 MG/ML
INJECTION, SOLUTION INTRAVENOUS CONTINUOUS
OUTPATIENT
Start: 2024-05-21

## 2024-05-07 RX ORDER — ONDANSETRON 2 MG/ML
8 INJECTION INTRAMUSCULAR; INTRAVENOUS
OUTPATIENT
Start: 2024-05-21

## 2024-05-07 RX ORDER — EPINEPHRINE 1 MG/ML
0.3 INJECTION, SOLUTION, CONCENTRATE INTRAVENOUS PRN
OUTPATIENT
Start: 2024-05-21

## 2024-05-07 RX ORDER — ACETAMINOPHEN 325 MG/1
650 TABLET ORAL
OUTPATIENT
Start: 2024-05-21

## 2024-05-07 RX ORDER — DIPHENHYDRAMINE HYDROCHLORIDE 50 MG/ML
50 INJECTION INTRAMUSCULAR; INTRAVENOUS
OUTPATIENT
Start: 2024-05-21

## 2024-05-07 RX ORDER — ALBUTEROL SULFATE 90 UG/1
4 AEROSOL, METERED RESPIRATORY (INHALATION) PRN
OUTPATIENT
Start: 2024-05-21

## 2024-05-07 RX ORDER — FAMOTIDINE 10 MG/ML
20 INJECTION, SOLUTION INTRAVENOUS
OUTPATIENT
Start: 2024-05-21

## 2024-05-07 RX ADMIN — DARBEPOETIN ALFA 60 MCG: 60 INJECTION, SOLUTION INTRAVENOUS; SUBCUTANEOUS at 15:18

## 2024-05-07 NOTE — PROGRESS NOTES
Pt here for aranesp.  Hemoglobin 8.4, hematocrit 27.8.  Injection administered as ordered.  Pt to return 5/21/24.  Pt discharged.

## 2024-05-16 ENCOUNTER — CARE COORDINATION (OUTPATIENT)
Dept: CASE MANAGEMENT | Age: 79
End: 2024-05-16

## 2024-05-16 ENCOUNTER — CARE COORDINATION (OUTPATIENT)
Dept: CARE COORDINATION | Age: 79
End: 2024-05-16

## 2024-05-16 NOTE — CARE COORDINATION
Care Transitions Post-Acute Facility Discharge Call    2024    Patient: Rosi Haley Patient : 1945   MRN: <[id number]>  Reason for Admission: CHF and anemia  Discharge Date: 4/10/24 RARS: Readmission Risk Score: 25.2       Acute Care Course:    to 4/10 [facility] with CHF, anemia, edema, Pul edema, and cdiff  4/10 to 5/15 [facility], to [facility] side    HFU made:  4/10 for anemia and bone marrow   onc    Sig Hx:   COPD, CHF, palliative care     DME:     Conversation:   Called 2 x to JANNIE and unable to reach FN for medlist request     Follow up plan:  Will re-attempt           Care Transitions Post Acute Facility Transition    Post Acute Facility: [facility]  Post Acute Admit Date: 4/10/24  Post Acute Discharge Date: 5/15/24  ELOS: 35 days        Do you have support at home?: Partner/Spouse/SO   Patient DME: Walker, Straight cane   Patient Home Equipment: Oxygen   Oxygen Regimen: Continuous      Care Transitions Interventions         Future Appointments   Date Time Provider Department Center   2024  3:30 PM STV PB MED ONC CHAIR 05 [facility]   2024  1:30 PM [facility]       FRANCIS Frazier, RN   [facility] Transition Nurse  [phone]

## 2024-05-16 NOTE — CARE COORDINATION
After Visit Summary   11/28/2017    Benjamin Hooper    MRN: [id number]           Patient Information     Date Of Birth          1951        Visit Information        Provider Department      11/28/2017 1:45 PM Demetrice Crawford MD [facility]        Today's Diagnoses     Hypotropia of right eye    -  1    Ocular torticollis           Follow-ups after your visit        Your next 10 appointments already scheduled     Jan 05, 2018 10:00 AM CST   Return Pediatric Visit with Demetrice Crawford MD   [facility] ([facility])    [address]  [place] [address] [facility] [place] [facility] [address] [phone]              Who to contact     If you have questions or need follow up information about today's clinic visit or your schedule please contact [facility] directly at [phone].  Normal or non-critical lab and imaging results will be communicated to you by MyChart, letter or phone within 4 business days after the clinic has received the results. If you do not hear from us within 7 days, please contact the clinic through MyChart or phone. If you have a critical or abnormal lab result, we will notify you by phone as soon as possible.  Submit refill requests through [employer] or call your pharmacy and they will forward the refill request to us. Please allow 3 business days for your refill to be completed.          Additional Information About Your Visit        MyChart Information     [employer] is an electronic gateway that provides easy, online access to your medical records. With [employer], you can request a clinic appointment, read your test results, renew a prescription or communicate with your care team.     To sign up for [employer] visit the website at [email or web address]   You will be asked to enter the access code listed below, as well as some personal information. Please follow the directions to create your username and password.     Your access code  Attempted to call about care management, per [facility], she was discharged yesterday to [facility]. Will not follow for care management.    Future Appointments   Date Time Provider Department Center   5/21/2024  3:30 PM ST PB MED ONC CHAIR 05 [facility]   6/13/2024  1:30 PM Deisy Andre MD [facility]        is: [email or web address]  Expires: 2018  3:46 PM     Your access code will  in 90 days. If you need help or a new code, please contact your [facility] or call [phone] for assistance.        Care EveryWhere ID     This is your Care EveryWhere ID. This could be used by other organizations to access your [facility] medical records  [id number]         Blood Pressure from Last 3 Encounters:   No data found for BP    Weight from Last 3 Encounters:   No data found for Wt              We Performed the Following     Sensorimotor        Primary Care Provider Office Phone # Fax #    Philomena Ramírez [phone]       [place] [address] [address] [facility] [address]        Equal Access to Services     GENEVIEVE MAURICIO : Hadii rose muhammad hadasho Soomaali, waaxda luqadaha, qaybta kaalmada adeegyada, waxaby pedraza . So [facility] [phone].    ATENCIÓN: Si habla español, tiene a cody disposición servicios gratuitos de asistencia lingüística. [facility]St[facility] [phone].    We comply with applicable federal civil rights laws and [place] laws. We do not discriminate on the basis of race, color, national origin, age, disability, sex, sexual orientation, or gender identity.            Thank you!     Thank you for choosing [facility]  for your care. Our goal is always to provide you with excellent care. Hearing back from our patients is one way we can continue to improve our services. Please take a few minutes to complete the written survey that you may receive in the mail after your visit with us. Thank you!             Your Updated Medication List - Protect others around you: Learn how to safely use, store and throw away your medicines at www.disposemymeds.org.          This list is accurate as of: 17  3:46 PM.  Always use your most recent med list.                   Brand Name Dispense Instructions for use Diagnosis    atorvastatin 20 MG  tablet    LIPITOR          losartan 50 MG tablet    COZAAR

## 2024-05-20 ENCOUNTER — CARE COORDINATION (OUTPATIENT)
Dept: CASE MANAGEMENT | Age: 79
End: 2024-05-20

## 2024-05-20 NOTE — CARE COORDINATION
Left HIPPA compliant message regarding the nature of the call and a request for a return call with my contact information      RAJI FrazierN, [phone]  [facility] Transition Nurse

## 2024-05-21 ENCOUNTER — CARE COORDINATION (OUTPATIENT)
Dept: CASE MANAGEMENT | Age: 79
End: 2024-05-21

## 2024-05-21 ENCOUNTER — HOSPITAL ENCOUNTER (OUTPATIENT)
Age: 79
Discharge: HOME | End: 2024-05-21
Payer: MEDICARE

## 2024-05-21 ENCOUNTER — HOSPITAL ENCOUNTER (OUTPATIENT)
Dept: INFUSION THERAPY | Age: 79
Discharge: HOME | End: 2024-05-21
Payer: MEDICARE

## 2024-05-21 VITALS — HEART RATE: 69 BPM | DIASTOLIC BLOOD PRESSURE: 82 MMHG | SYSTOLIC BLOOD PRESSURE: 136 MMHG

## 2024-05-21 DIAGNOSIS — N18.9 ANEMIA DUE TO CHRONIC KIDNEY DISEASE, UNSPECIFIED CKD STAGE: ICD-10-CM

## 2024-05-21 DIAGNOSIS — N18.9 ANEMIA DUE TO CHRONIC KIDNEY DISEASE, UNSPECIFIED CKD STAGE: Primary | ICD-10-CM

## 2024-05-21 DIAGNOSIS — D63.1 ANEMIA DUE TO CHRONIC KIDNEY DISEASE, UNSPECIFIED CKD STAGE: Primary | ICD-10-CM

## 2024-05-21 DIAGNOSIS — D63.1 ANEMIA DUE TO CHRONIC KIDNEY DISEASE, UNSPECIFIED CKD STAGE: ICD-10-CM

## 2024-05-21 LAB
BASOPHILS # BLD: 0.1 K/UL (ref 0–0.2)
BASOPHILS NFR BLD: 1 % (ref 0–2)
EOSINOPHIL # BLD: 1.1 K/UL (ref 0–0.4)
EOSINOPHILS RELATIVE PERCENT: 11 % (ref 1–4)
ERYTHROCYTE [DISTWIDTH] IN BLOOD BY AUTOMATED COUNT: 18.8 % (ref 12.5–15.4)
HCT VFR BLD AUTO: 27 % (ref 36–46)
HGB BLD-MCNC: 8.9 G/DL (ref 12–16)
LYMPHOCYTES NFR BLD: 1.8 K/UL (ref 1–4.8)
LYMPHOCYTES RELATIVE PERCENT: 17 % (ref 24–44)
MCH RBC QN AUTO: 30.4 PG (ref 26–34)
MCHC RBC AUTO-ENTMCNC: 32.9 G/DL (ref 31–37)
MCV RBC AUTO: 92.6 FL (ref 80–100)
MONOCYTES NFR BLD: 0.9 K/UL (ref 0.1–1.2)
MONOCYTES NFR BLD: 8 % (ref 2–11)
NEUTROPHILS NFR BLD: 63 % (ref 36–66)
NEUTS SEG NFR BLD: 6.6 K/UL (ref 1.8–7.7)
PLATELET # BLD AUTO: 375 K/UL (ref 140–450)
PMV BLD AUTO: 7.7 FL (ref 6–12)
RBC # BLD AUTO: 2.92 M/UL (ref 4–5.2)
WBC OTHER # BLD: 10.6 K/UL (ref 3.5–11)

## 2024-05-21 PROCEDURE — 36415 COLL VENOUS BLD VENIPUNCTURE: CPT

## 2024-05-21 PROCEDURE — 6360000002 HC RX W HCPCS: Performed by: INTERNAL MEDICINE

## 2024-05-21 PROCEDURE — 85025 COMPLETE CBC W/AUTO DIFF WBC: CPT

## 2024-05-21 PROCEDURE — 96372 THER/PROPH/DIAG INJ SC/IM: CPT

## 2024-05-21 RX ORDER — SODIUM CHLORIDE 9 MG/ML
INJECTION, SOLUTION INTRAVENOUS CONTINUOUS
OUTPATIENT
Start: 2024-06-04

## 2024-05-21 RX ORDER — EPINEPHRINE 1 MG/ML
0.3 INJECTION, SOLUTION, CONCENTRATE INTRAVENOUS PRN
OUTPATIENT
Start: 2024-06-04

## 2024-05-21 RX ORDER — ALBUTEROL SULFATE 90 UG/1
4 AEROSOL, METERED RESPIRATORY (INHALATION) PRN
OUTPATIENT
Start: 2024-06-04

## 2024-05-21 RX ORDER — ONDANSETRON 2 MG/ML
8 INJECTION INTRAMUSCULAR; INTRAVENOUS
OUTPATIENT
Start: 2024-06-04

## 2024-05-21 RX ORDER — DIPHENHYDRAMINE HYDROCHLORIDE 50 MG/ML
50 INJECTION INTRAMUSCULAR; INTRAVENOUS
OUTPATIENT
Start: 2024-06-04

## 2024-05-21 RX ORDER — FAMOTIDINE 10 MG/ML
20 INJECTION, SOLUTION INTRAVENOUS
OUTPATIENT
Start: 2024-06-04

## 2024-05-21 RX ORDER — ACETAMINOPHEN 325 MG/1
650 TABLET ORAL
OUTPATIENT
Start: 2024-06-04

## 2024-05-21 RX ADMIN — DARBEPOETIN ALFA 60 MCG: 60 INJECTION, SOLUTION INTRAVENOUS; SUBCUTANEOUS at 16:14

## 2024-05-21 NOTE — CARE COORDINATION
Unable to reach FN as  disconnects me every time I call      Thanks,    Viri Purcell, BSN, RN   [facility] Transition Nurse  [phone]

## 2024-06-04 ENCOUNTER — HOSPITAL ENCOUNTER (OUTPATIENT)
Dept: INFUSION THERAPY | Age: 79
Discharge: HOME | End: 2024-06-04
Payer: MEDICARE

## 2024-06-04 VITALS — DIASTOLIC BLOOD PRESSURE: 79 MMHG | SYSTOLIC BLOOD PRESSURE: 137 MMHG | HEART RATE: 78 BPM

## 2024-06-04 DIAGNOSIS — D63.1 ANEMIA DUE TO CHRONIC KIDNEY DISEASE, UNSPECIFIED CKD STAGE: Primary | ICD-10-CM

## 2024-06-04 DIAGNOSIS — N18.9 ANEMIA DUE TO CHRONIC KIDNEY DISEASE, UNSPECIFIED CKD STAGE: Primary | ICD-10-CM

## 2024-06-04 PROCEDURE — 6360000002 HC RX W HCPCS: Performed by: INTERNAL MEDICINE

## 2024-06-04 PROCEDURE — 96372 THER/PROPH/DIAG INJ SC/IM: CPT

## 2024-06-04 RX ORDER — FAMOTIDINE 10 MG/ML
20 INJECTION, SOLUTION INTRAVENOUS
OUTPATIENT
Start: 2024-06-18

## 2024-06-04 RX ORDER — ALBUTEROL SULFATE 90 UG/1
4 AEROSOL, METERED RESPIRATORY (INHALATION) PRN
OUTPATIENT
Start: 2024-06-18

## 2024-06-04 RX ORDER — ACETAMINOPHEN 325 MG/1
650 TABLET ORAL
OUTPATIENT
Start: 2024-06-18

## 2024-06-04 RX ORDER — EPINEPHRINE 1 MG/ML
0.3 INJECTION, SOLUTION, CONCENTRATE INTRAVENOUS PRN
OUTPATIENT
Start: 2024-06-18

## 2024-06-04 RX ORDER — DIPHENHYDRAMINE HYDROCHLORIDE 50 MG/ML
50 INJECTION INTRAMUSCULAR; INTRAVENOUS
OUTPATIENT
Start: 2024-06-18

## 2024-06-04 RX ORDER — SODIUM CHLORIDE 9 MG/ML
INJECTION, SOLUTION INTRAVENOUS CONTINUOUS
OUTPATIENT
Start: 2024-06-18

## 2024-06-04 RX ORDER — ONDANSETRON 2 MG/ML
8 INJECTION INTRAMUSCULAR; INTRAVENOUS
OUTPATIENT
Start: 2024-06-18

## 2024-06-04 RX ADMIN — DARBEPOETIN ALFA 60 MCG: 60 INJECTION, SOLUTION INTRAVENOUS; SUBCUTANEOUS at 15:44

## 2024-06-04 NOTE — PROGRESS NOTES
Patient arrived for aranesp. Labs drawn at [place], hgb 8.4 on 6/3/24. Injection given without issue. Pt stable at discharge. Scheduled to return 6/18/24 for aranesp.

## 2024-06-13 ENCOUNTER — OFFICE VISIT (OUTPATIENT)
Dept: GASTROENTEROLOGY | Age: 79
End: 2024-06-13
Payer: MEDICARE

## 2024-06-13 VITALS
DIASTOLIC BLOOD PRESSURE: 68 MMHG | WEIGHT: 254 LBS | BODY MASS INDEX: 45 KG/M2 | HEIGHT: 63 IN | HEART RATE: 83 BPM | SYSTOLIC BLOOD PRESSURE: 122 MMHG

## 2024-06-13 DIAGNOSIS — D50.0 IRON DEFICIENCY ANEMIA DUE TO CHRONIC BLOOD LOSS: Primary | ICD-10-CM

## 2024-06-13 PROCEDURE — 3074F SYST BP LT 130 MM HG: CPT | Performed by: INTERNAL MEDICINE

## 2024-06-13 PROCEDURE — 1123F ACP DISCUSS/DSCN MKR DOCD: CPT | Performed by: INTERNAL MEDICINE

## 2024-06-13 PROCEDURE — 99214 OFFICE O/P EST MOD 30 MIN: CPT | Performed by: INTERNAL MEDICINE

## 2024-06-13 PROCEDURE — 3078F DIAST BP <80 MM HG: CPT | Performed by: INTERNAL MEDICINE

## 2024-06-13 RX ORDER — FUROSEMIDE 20 MG/1
TABLET ORAL
COMMUNITY
Start: 2022-11-16

## 2024-06-13 RX ORDER — ADHESIVE TAPE 0.5"X360"
TAPE, NON-MEDICATED TOPICAL
COMMUNITY
Start: 2024-05-28

## 2024-06-13 RX ORDER — CETIRIZINE HYDROCHLORIDE 10 MG/1
TABLET ORAL
COMMUNITY
Start: 2024-05-30

## 2024-06-13 RX ORDER — MENTHOL 1.4 %
ADHESIVE PATCH, MEDICATED TOPICAL
COMMUNITY
Start: 2024-04-04

## 2024-06-13 RX ORDER — DIPHENHYDRAMINE HCL 25 MG/1
TABLET ORAL
COMMUNITY
Start: 2024-05-15

## 2024-06-13 RX ORDER — FLUTICASONE FUROATE AND VILANTEROL 100; 25 UG/1; UG/1
1 POWDER RESPIRATORY (INHALATION) DAILY
COMMUNITY
Start: 2024-01-14

## 2024-06-13 RX ORDER — CARVEDILOL 6.25 MG/1
TABLET ORAL
COMMUNITY
Start: 2024-04-23

## 2024-06-13 NOTE — PROGRESS NOTES
Moderately Integrated (11/21/2023)    Social Connection and Isolation Panel [NHANES]     Frequency of Communication with Friends and Family: Three times a week     Frequency of Social Gatherings with Friends and Family: Twice a week     Attends Holiness Services: 1 to 4 times per year     Active Member of Clubs or Organizations: No     Attends Club or Organization Meetings: Never     Marital Status:    Intimate Partner Violence: Not on file   Housing Stability: High Risk (4/7/2024)    Housing Stability Vital Sign     Unable to Pay for Housing in the Last Year: No     Number of Places Lived in the Last Year: 3     Unstable Housing in the Last Year: No       REVIEW OF SYSTEMS: A 12-point review of systemswas obtained and pertinent positives and negatives were enumerated above in the history of present illness. All other reviewed systems / symptoms were negative.  Constitutional: No fever, no chills, no lethargy, no weakness.  HEENT:  No headache, otalgia, itchy eyes, nasal discharge or sore throat.  Cardiac:  No chest pain, dyspnea, orthopnea or PND.  Chest:   No cough, phlegm or wheezing.  Abdomen:       See above  Neuro:   No focal weakness, abnormal movements or seizure like activity.  Skin:   No rashes, no itching.  Extremities:  No swelling or joint pains.  ROS was otherwise negative    PHYSICAL EXAMINATION: Vital signs reviewed per the nursing documentation.     There were no vitals taken for this visit.  There is no height or weight on file to calculate BMI.     Constitutional: Patient is oriented to person, place, and time. Patient appears well-developed and well-nourished.   Head: Normocephalic and atraumatic.   Eyes: Pupils are equal, round, and reactive to light. EOM are normal.   Neck: Normal range of motion. Neck supple. No JVD present. No tracheal deviation present. No thyromegaly present.   Cardiovascular: Normal rate, regular rhythm, normal heart sounds and intact distal pulses.

## 2024-06-14 ENCOUNTER — TELEPHONE (OUTPATIENT)
Dept: GASTROENTEROLOGY | Age: 79
End: 2024-06-14

## 2024-06-18 ENCOUNTER — HOSPITAL ENCOUNTER (OUTPATIENT)
Dept: INFUSION THERAPY | Age: 79
Discharge: HOME | End: 2024-06-18
Payer: MEDICARE

## 2024-06-18 VITALS — HEART RATE: 84 BPM | DIASTOLIC BLOOD PRESSURE: 80 MMHG | SYSTOLIC BLOOD PRESSURE: 122 MMHG

## 2024-06-18 DIAGNOSIS — D63.1 ANEMIA DUE TO CHRONIC KIDNEY DISEASE, UNSPECIFIED CKD STAGE: Primary | ICD-10-CM

## 2024-06-18 DIAGNOSIS — N18.9 ANEMIA DUE TO CHRONIC KIDNEY DISEASE, UNSPECIFIED CKD STAGE: Primary | ICD-10-CM

## 2024-06-18 PROCEDURE — 6360000002 HC RX W HCPCS: Performed by: INTERNAL MEDICINE

## 2024-06-18 PROCEDURE — 96372 THER/PROPH/DIAG INJ SC/IM: CPT

## 2024-06-18 RX ORDER — DIPHENHYDRAMINE HYDROCHLORIDE 50 MG/ML
50 INJECTION INTRAMUSCULAR; INTRAVENOUS
OUTPATIENT
Start: 2024-07-02

## 2024-06-18 RX ORDER — ALBUTEROL SULFATE 90 UG/1
4 AEROSOL, METERED RESPIRATORY (INHALATION) PRN
OUTPATIENT
Start: 2024-07-02

## 2024-06-18 RX ORDER — FAMOTIDINE 10 MG/ML
20 INJECTION, SOLUTION INTRAVENOUS
OUTPATIENT
Start: 2024-07-02

## 2024-06-18 RX ORDER — ONDANSETRON 2 MG/ML
8 INJECTION INTRAMUSCULAR; INTRAVENOUS
OUTPATIENT
Start: 2024-07-02

## 2024-06-18 RX ORDER — ACETAMINOPHEN 325 MG/1
650 TABLET ORAL
OUTPATIENT
Start: 2024-07-02

## 2024-06-18 RX ORDER — SODIUM CHLORIDE 9 MG/ML
INJECTION, SOLUTION INTRAVENOUS CONTINUOUS
OUTPATIENT
Start: 2024-07-02

## 2024-06-18 RX ORDER — EPINEPHRINE 1 MG/ML
0.3 INJECTION, SOLUTION, CONCENTRATE INTRAVENOUS PRN
OUTPATIENT
Start: 2024-07-02

## 2024-06-18 RX ADMIN — DARBEPOETIN ALFA 60 MCG: 60 INJECTION, SOLUTION INTRAVENOUS; SUBCUTANEOUS at 15:32

## 2024-06-18 NOTE — PROGRESS NOTES
Patient arrived for aranesp injection.  Arrives via wheelchair.  Denies any complaints.  Labs reviewed. Hgb 9.1  Injection completed without incident.  Discharged in stable condition.  Returns 6/21/2024 for office visit with Dr. Marshall.

## 2024-06-26 NOTE — TELEPHONE ENCOUNTER
Tried to contact insurance company, their system is currently down and they would like for us to call back at a later time. Will try again.

## 2024-07-02 ENCOUNTER — HOSPITAL ENCOUNTER (OUTPATIENT)
Dept: INFUSION THERAPY | Age: 79
Discharge: HOME | End: 2024-07-02
Payer: MEDICARE

## 2024-07-02 VITALS — SYSTOLIC BLOOD PRESSURE: 154 MMHG | DIASTOLIC BLOOD PRESSURE: 77 MMHG | HEART RATE: 71 BPM

## 2024-07-02 DIAGNOSIS — D63.1 ANEMIA DUE TO CHRONIC KIDNEY DISEASE, UNSPECIFIED CKD STAGE: Primary | ICD-10-CM

## 2024-07-02 DIAGNOSIS — N18.9 ANEMIA DUE TO CHRONIC KIDNEY DISEASE, UNSPECIFIED CKD STAGE: Primary | ICD-10-CM

## 2024-07-02 PROCEDURE — 96372 THER/PROPH/DIAG INJ SC/IM: CPT

## 2024-07-02 PROCEDURE — 6360000002 HC RX W HCPCS: Performed by: INTERNAL MEDICINE

## 2024-07-02 RX ORDER — ONDANSETRON 2 MG/ML
8 INJECTION INTRAMUSCULAR; INTRAVENOUS
OUTPATIENT
Start: 2024-07-16

## 2024-07-02 RX ORDER — SODIUM CHLORIDE 9 MG/ML
INJECTION, SOLUTION INTRAVENOUS CONTINUOUS
OUTPATIENT
Start: 2024-07-16

## 2024-07-02 RX ORDER — FAMOTIDINE 10 MG/ML
20 INJECTION, SOLUTION INTRAVENOUS
OUTPATIENT
Start: 2024-07-16

## 2024-07-02 RX ORDER — ACETAMINOPHEN 325 MG/1
650 TABLET ORAL
OUTPATIENT
Start: 2024-07-16

## 2024-07-02 RX ORDER — DIPHENHYDRAMINE HYDROCHLORIDE 50 MG/ML
50 INJECTION INTRAMUSCULAR; INTRAVENOUS
OUTPATIENT
Start: 2024-07-16

## 2024-07-02 RX ORDER — ALBUTEROL SULFATE 90 UG/1
4 AEROSOL, METERED RESPIRATORY (INHALATION) PRN
OUTPATIENT
Start: 2024-07-16

## 2024-07-02 RX ORDER — EPINEPHRINE 1 MG/ML
0.3 INJECTION, SOLUTION, CONCENTRATE INTRAVENOUS PRN
OUTPATIENT
Start: 2024-07-16

## 2024-07-02 RX ADMIN — DARBEPOETIN ALFA 60 MCG: 60 INJECTION, SOLUTION INTRAVENOUS; SUBCUTANEOUS at 15:28

## 2024-07-16 ENCOUNTER — HOSPITAL ENCOUNTER (OUTPATIENT)
Dept: INFUSION THERAPY | Age: 79
Discharge: HOME | End: 2024-07-16
Payer: MEDICARE

## 2024-07-16 VITALS — DIASTOLIC BLOOD PRESSURE: 65 MMHG | SYSTOLIC BLOOD PRESSURE: 95 MMHG | HEART RATE: 75 BPM

## 2024-07-16 DIAGNOSIS — N18.9 ANEMIA DUE TO CHRONIC KIDNEY DISEASE, UNSPECIFIED CKD STAGE: Primary | ICD-10-CM

## 2024-07-16 DIAGNOSIS — D63.1 ANEMIA DUE TO CHRONIC KIDNEY DISEASE, UNSPECIFIED CKD STAGE: Primary | ICD-10-CM

## 2024-07-16 PROCEDURE — 96372 THER/PROPH/DIAG INJ SC/IM: CPT

## 2024-07-16 PROCEDURE — 6360000002 HC RX W HCPCS: Performed by: INTERNAL MEDICINE

## 2024-07-16 RX ORDER — FAMOTIDINE 10 MG/ML
20 INJECTION, SOLUTION INTRAVENOUS
OUTPATIENT
Start: 2024-07-30

## 2024-07-16 RX ORDER — ONDANSETRON 2 MG/ML
8 INJECTION INTRAMUSCULAR; INTRAVENOUS
OUTPATIENT
Start: 2024-07-30

## 2024-07-16 RX ORDER — EPINEPHRINE 1 MG/ML
0.3 INJECTION, SOLUTION, CONCENTRATE INTRAVENOUS PRN
OUTPATIENT
Start: 2024-07-30

## 2024-07-16 RX ORDER — ALBUTEROL SULFATE 90 UG/1
4 AEROSOL, METERED RESPIRATORY (INHALATION) PRN
OUTPATIENT
Start: 2024-07-30

## 2024-07-16 RX ORDER — SODIUM CHLORIDE 9 MG/ML
INJECTION, SOLUTION INTRAVENOUS CONTINUOUS
OUTPATIENT
Start: 2024-07-30

## 2024-07-16 RX ORDER — DIPHENHYDRAMINE HYDROCHLORIDE 50 MG/ML
50 INJECTION INTRAMUSCULAR; INTRAVENOUS
OUTPATIENT
Start: 2024-07-30

## 2024-07-16 RX ORDER — ACETAMINOPHEN 325 MG/1
650 TABLET ORAL
OUTPATIENT
Start: 2024-07-30

## 2024-07-16 RX ADMIN — DARBEPOETIN ALFA 60 MCG: 60 INJECTION, SOLUTION INTRAVENOUS; SUBCUTANEOUS at 15:45

## 2024-07-16 NOTE — PROGRESS NOTES
Patient arrived for [facility]. Hgb 8.4. injection given without issue. Pt stable at discharge. Scheduled to return 7/17/24 for MD visit.

## 2024-07-17 ENCOUNTER — HOSPITAL ENCOUNTER (OUTPATIENT)
Age: 79
End: 2024-07-17

## 2024-07-17 ENCOUNTER — TELEPHONE (OUTPATIENT)
Dept: ONCOLOGY | Age: 79
End: 2024-07-17

## 2024-07-17 ENCOUNTER — OFFICE VISIT (OUTPATIENT)
Dept: ONCOLOGY | Age: 79
End: 2024-07-17
Payer: MEDICARE

## 2024-07-17 VITALS
TEMPERATURE: 97.3 F | HEART RATE: 73 BPM | WEIGHT: 246.9 LBS | DIASTOLIC BLOOD PRESSURE: 69 MMHG | RESPIRATION RATE: 18 BRPM | BODY MASS INDEX: 43.42 KG/M2 | SYSTOLIC BLOOD PRESSURE: 137 MMHG | OXYGEN SATURATION: 94 %

## 2024-07-17 DIAGNOSIS — E66.01 OBESITY, CLASS III, BMI 40-49.9 (MORBID OBESITY) (HCC): ICD-10-CM

## 2024-07-17 DIAGNOSIS — D69.6 THROMBOCYTOPENIA (HCC): ICD-10-CM

## 2024-07-17 DIAGNOSIS — D64.9 ANEMIA, UNSPECIFIED TYPE: Primary | ICD-10-CM

## 2024-07-17 PROCEDURE — 99214 OFFICE O/P EST MOD 30 MIN: CPT | Performed by: INTERNAL MEDICINE

## 2024-07-17 PROCEDURE — 3078F DIAST BP <80 MM HG: CPT | Performed by: INTERNAL MEDICINE

## 2024-07-17 PROCEDURE — 1123F ACP DISCUSS/DSCN MKR DOCD: CPT | Performed by: INTERNAL MEDICINE

## 2024-07-17 PROCEDURE — 3075F SYST BP GE 130 - 139MM HG: CPT | Performed by: INTERNAL MEDICINE

## 2024-07-17 PROCEDURE — 99211 OFF/OP EST MAY X REQ PHY/QHP: CPT | Performed by: INTERNAL MEDICINE

## 2024-07-17 NOTE — PROGRESS NOTES
and external ear canals normal   Mouth - mucous membranes moist, pharynx normal without lesions   Neck - supple, no significant adenopathy   Lymphatics - no palpable lymphadenopathy, no hepatosplenomegaly   Chest - clear to auscultation, no wheezes, rales or rhonchi, symmetric air entry   Heart - normal rate, regular rhythm, normal S1, S2, no murmurs  Abdomen - soft, nontender, nondistended, no masses or organomegaly   Neurological - alert, oriented, normal speech, no focal findings or movement disorder noted   Musculoskeletal - no joint tenderness, deformity or swelling   Extremities -positive leg swelling.  Skin - normal coloration and turgor, no rashes, no suspicious skin lesions noted ,      DATA:      Labs:     Results for orders placed or performed in visit on 06/17/24   CBC with Auto Differential   Result Value Ref Range    WBC 9.57 4.00 - 10.60 10*3/uL    RBC 3.09 (L) 3.80 - 5.00 10*6/uL    Hemoglobin 9.1 (L) 12.0 - 15.0 g/dL    Hematocrit 29.7 (L) 36.0 - 48.0 %    MCV 96.1 82.0 - 98.0 fL    MCH 29.4 27.0 - 33.0 pg    MCHC 30.6 (L) 32.0 - 35.0 g/dL    RDW 16.8 (H) 11.5 - 15.0 %    Neutrophils % 71.1 40.0 - 72.0 %    Lymphocytes % 12.4 (L) 20.0 - 45.0 %    Monocytes % 8.3 5.0 - 12.0 %    Eosinophils % 7.1 (H) 0.0 - 6.0 %    Basophils % 0.7 0.0 - 1.0 %    Neutrophils Absolute 6.80 1.60 - 7.60 10*3/uL    Lymphocytes Absolute 1.19 (L) 1.20 - 4.00 10*3/uL    Monocytes Absolute 0.79 0.10 - 1.00 10*3/uL    Eosinophils Absolute 0.68 (H) 0.00 - 0.50 10*3/uL    Basophils Absolute 0.07 0.00 - 0.20 10*3/uL    Platelets 294 150 - 400 10*3/uL    NRBC Automated 0.0 0 %    Immature Granulocytes % 0.4 0.0 - 1.0 %    IMMATURE GRANULOCYTES ABSOLUTE 0.04 0.00 - 0.20 10*3/uL   CBC with Auto Differential   Result Value Ref Range    WBC 8.89 4.00 - 10.60 10*3/uL    RBC 2.78 (L) 3.80 - 5.00 10*6/uL    Hemoglobin 8.0 (L) 12.0 - 15.0 g/dL    Hematocrit 27.0 (L) 36.0 - 48.0 %    MCV 97.1 82.0 - 98.0 fL    MCH 28.8 27.0 - 33.0 pg

## (undated) DEVICE — GLOVE SURG SZ 65 L12IN FNGR THK87MIL WHT LTX FREE

## (undated) DEVICE — STRIP SKIN CLSR W0.25XL4IN WHT SPUNBOUND FBR NYL HI ADH

## (undated) DEVICE — ENDO KIT W/SYRINGE: Brand: MEDLINE INDUSTRIES, INC.

## (undated) DEVICE — GLOVE SURG SZ 8 L12IN FNGR THK87MIL WHT LTX FREE

## (undated) DEVICE — GLOVE SURG SZ 75 L12IN FNGR THK87MIL WHT LTX FREE

## (undated) DEVICE — BITEBLOCK 54FR W/ DENT RIM BLOX

## (undated) DEVICE — MEDI-VAC NON-CONDUCTIVE SUCTION TUBING: Brand: CARDINAL HEALTH

## (undated) DEVICE — 3M™ STERI-STRIP™ COMPOUND BENZOIN TINCTURE 40 BAGS/CARTON 4 CARTONS/CASE C1544: Brand: 3M™ STERI-STRIP™

## (undated) DEVICE — DISCONTINUED USE 111569 BF APPLICATOR COTN TIP 6IN ST

## (undated) DEVICE — STANDARD HYPODERMIC NEEDLE,POLYPROPYLENE HUB: Brand: MONOJECT

## (undated) DEVICE — DEFENDO AIR WATER SUCTION AND BIOPSY VALVE KIT FOR  OLYMPUS: Brand: DEFENDO AIR/WATER/SUCTION AND BIOPSY VALVE

## (undated) DEVICE — DISCONTINUED USE 405792 GLOVE SURG SENSICARE ALOE LT LF PF ST GRN SZ 7

## (undated) DEVICE — Device

## (undated) DEVICE — STOCKINETTE ORTH UNIV W4INXL25YD COT W DISPNS BX PROTOUCH

## (undated) DEVICE — GLOVE SURG SZ 8 L12IN FNGR THK79MIL GRN LTX FREE

## (undated) DEVICE — GLOVE ORANGE PI 7 1/2   MSG9075

## (undated) DEVICE — FORCEPS BX L240CM WRK CHN 2.8MM STD CAP W/ NDL MIC MESH

## (undated) DEVICE — Z DISCONTINUED BY MEDLINE USE 2271199 TRAY PREP WET 4% CHG SCRB SOL